# Patient Record
Sex: MALE | Race: WHITE | NOT HISPANIC OR LATINO | Employment: OTHER | URBAN - METROPOLITAN AREA
[De-identification: names, ages, dates, MRNs, and addresses within clinical notes are randomized per-mention and may not be internally consistent; named-entity substitution may affect disease eponyms.]

---

## 2017-03-16 ENCOUNTER — GENERIC CONVERSION - ENCOUNTER (OUTPATIENT)
Dept: OTHER | Facility: OTHER | Age: 82
End: 2017-03-16

## 2017-04-11 ENCOUNTER — TRANSCRIBE ORDERS (OUTPATIENT)
Dept: ADMINISTRATIVE | Facility: HOSPITAL | Age: 82
End: 2017-04-11

## 2017-04-11 ENCOUNTER — ALLSCRIPTS OFFICE VISIT (OUTPATIENT)
Dept: OTHER | Facility: OTHER | Age: 82
End: 2017-04-11

## 2017-04-11 DIAGNOSIS — I10 ESSENTIAL HYPERTENSION, MALIGNANT: ICD-10-CM

## 2017-04-11 DIAGNOSIS — E78.5 OTHER AND UNSPECIFIED HYPERLIPIDEMIA: ICD-10-CM

## 2017-04-11 DIAGNOSIS — I70.219 EXTREMITY ATHEROSCLEROSIS WITH INTERMITTENT CLAUDICATION (HCC): Primary | ICD-10-CM

## 2017-04-19 ENCOUNTER — HOSPITAL ENCOUNTER (OUTPATIENT)
Dept: RADIOLOGY | Facility: HOSPITAL | Age: 82
Discharge: HOME | End: 2017-04-19
Attending: INTERNAL MEDICINE
Payer: COMMERCIAL

## 2017-04-19 DIAGNOSIS — E78.5 OTHER AND UNSPECIFIED HYPERLIPIDEMIA: ICD-10-CM

## 2017-04-19 DIAGNOSIS — I70.219 EXTREMITY ATHEROSCLEROSIS WITH INTERMITTENT CLAUDICATION (HCC): ICD-10-CM

## 2017-04-19 DIAGNOSIS — I10 ESSENTIAL HYPERTENSION, MALIGNANT: ICD-10-CM

## 2017-04-19 PROCEDURE — 93975 VASCULAR STUDY: CPT

## 2017-04-20 ENCOUNTER — GENERIC CONVERSION - ENCOUNTER (OUTPATIENT)
Dept: OTHER | Facility: OTHER | Age: 82
End: 2017-04-20

## 2017-05-03 ENCOUNTER — ALLSCRIPTS OFFICE VISIT (OUTPATIENT)
Dept: OTHER | Facility: OTHER | Age: 82
End: 2017-05-03

## 2017-07-19 ENCOUNTER — ALLSCRIPTS OFFICE VISIT (OUTPATIENT)
Dept: OTHER | Facility: OTHER | Age: 82
End: 2017-07-19

## 2017-08-31 ENCOUNTER — GENERIC CONVERSION - ENCOUNTER (OUTPATIENT)
Dept: OTHER | Facility: OTHER | Age: 82
End: 2017-08-31

## 2017-08-31 ENCOUNTER — GENERIC CONVERSION - ENCOUNTER (OUTPATIENT)
Dept: CARDIOLOGY CLINIC | Facility: CLINIC | Age: 82
End: 2017-08-31

## 2017-10-11 ENCOUNTER — GENERIC CONVERSION - ENCOUNTER (OUTPATIENT)
Dept: OTHER | Facility: OTHER | Age: 82
End: 2017-10-11

## 2017-10-11 LAB
BUN SERPL-MCNC: 15 MG/DL (ref 8–27)
CALCIUM SERPL-MCNC: 9.8 MG/DL (ref 8.6–10.2)
CHLORIDE SERPL-SCNC: 92 MMOL/L (ref 96–106)
CO2 SERPL-SCNC: 25 MMOL/L (ref 18–29)
CREAT SERPL-MCNC: 0.84 MG/DL (ref 0.76–1.27)
EGFR AFRICAN AMERICAN (HISTORICAL): 91
EGFR-AMERICAN CALC (HISTORICAL): 79
GLUCOSE FASTING (HISTORICAL): 94 (ref 65–99)
POTASSIUM SERPL-SCNC: 4 MMOL/L (ref 3.5–5.2)
SODIUM SERPL-SCNC: 135 MMOL/L (ref 134–144)

## 2017-10-18 ENCOUNTER — ALLSCRIPTS OFFICE VISIT (OUTPATIENT)
Dept: OTHER | Facility: OTHER | Age: 82
End: 2017-10-18

## 2017-10-19 NOTE — PROGRESS NOTES
Assessment  Assessed    1  HTN (hypertension) (401 9) (I10)   2  Hyperlipidemia (272 4) (E78 5)   3  Asymptomatic PVCs (427 69) (I49 3)   4  History of BPH (V13 89) (Z87 438)   5  Syncope (780 2) (R55)   6  Hypotension due to drugs (458 8,E947 9) (I95 2)    Plan  Health Maintenance    · (1) BASIC METABOLIC PROFILE; Status:Active; Requested NDV:[id number]; Perform:[facility]; RJW:[id number];DJFPUE;AHSANY:ROZAJF Maintenance; Ordered By:Popkave, Ulysses Kelp;      1  Change amlodipine to 6 PM daily  2  If the nighttime blood pressures remain high, resumed lisinopril 5 mg daily at 6 PM and move the amlodipine back to 8 PM daily  3  Follow-up in 3 months with basic metabolic profile blood test      Discussion/Summary  Cardiology Discussion Summary Free Text Note Form [facility]:       Much improved but still labile essential hypertension with moderately frequent nocturnal surges in systolic blood pressure at 9-11 PM in the 347 systolic range  Asymptomatic chronic PVCs, per patient history, but not evident on today's exam History of carvedilol-induced syncope from hypotension on 1/10/17 and 1/20/17 with episode on 9/11/17 from low blood pressure, cause uncertain  Stable right calf claudication and bilateral lower extremity PAD present for 5 years  Questionable right neck bruit  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 with benign cyst adjacent to left testesTreated dyslipidemia, extremely well-controlledStable chronic BPHRemote history of syncope after Flomax  Chronic nocturnal GERD with water brash                 Goals and Barriers: The patient has the current Goals: Control of hypertension  Avoidance of recurrent syncope  Screening for additional vascular disease involving the carotid and coronary circulation  The patent has the current Barriers: None  Patient's Capacity to Self-Care: Patient is able to Self-Care     Medication SE Review and Pt Understands Tx: Possible side effects of new medications were reviewed with the patient/guardian today  The treatment plan was reviewed with the patient/guardian  The patient/guardian understands and agrees with the treatment plan   Counseling Documentation With Imm: The patient, patient's family was counseled regarding diagnostic results,-- instructions for management,-- risk factor reductions,-- prognosis,-- patient and family education,-- impressions,-- risks and benefits of treatment options,-- importance of compliance with treatment  Immunization Counseling The parent/guardian was counseled on the following vaccine components: Shingles vaccine  -- Total number of vaccine components counseled: One component  total time of encounter was 27 minutes-- and-- 25 minutes was spent counseling  Self Referrals:   Self Referrals: Yes   Transitional Care: Co-manage care with PCP/Referring Provider  Chief Complaint  Chief Complaint Free Text Note Form: Katherine Tripp is here today for a blood pressure followup  He states that he had a syncopal episode in the store on 9/11/17, but EMS came and he was fine  He had a BMP done on 10/11/17  JW       History of Present Illness  Cardiology HPI Free Text Note Form [facility]:   9/14/17, this 66-year-old man was seen by my medical assistant with the following reported:  came in today to have his blood pressure machine compared to manual blood pressure reading  Initial manual bp reading was 144/84, the machine read 166/93  reading manually was 156/86, the machine read 153/82  states that he passed out in [place] last week on 9/11/17 because his blood pressure dropped, once the EMT arrived it was back to normal  Wife states that this was the first time this has happened out in public    patient has had no recurrent syncope since then and is feeling well overall  He has no cardiopulmonary or new medical issues   His home blood pressure log was reviewed for the past 2 weeks and showed a tendency for some elevated blood pressures in the evening only with daytime blood pressure well-controlled  He is no longer taking any lisinopril because of fear of hypotension  He takes his amlodipine at approximately 8 PM every night  Review of Systems  ROS Reviewed:   ROS reviewed  All systems negative, except as noted in history of present illness      Active Problems  Problems    1  Asymptomatic PVCs (427 69) (I49 3)   2  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)   3  History of BPH (V13 89) (Z87 438)   4  HTN (hypertension) (401 9) (I10)   5  Hyperlipidemia (272 4) (E78 5)   6  Hypotension due to drugs (458 8,E947 9) (I95 2)   7  Syncope (780 2) (R55)    Past Medical History  Problems    1  Asymptomatic PVCs (427 69) (I49 3)   2  History of BPH (benign prostatic hyperplasia) (600 00) (N40 0)   3  History of Cellulitis of right thumb (681 00) (L03 011)   4  History of Hearing problem (V41 2) (H91 90)   5  History of High cholesterol (272 0) (E78 00)   6  History of Hypertension, uncontrolled (401 9) (I10)   7  History of PAD (peripheral artery disease) (443 9) (I73 9)  Active Problems And Past Medical History Reviewed: The active problems and past medical history were reviewed and updated today  Surgical History  Problems    1  History of Complete Colonoscopy   2  History of Surgery Testis  Surgical History Reviewed: The surgical history was reviewed and updated today  Family History  Mother    1  Family history of hypertension (V17 49) (Z82 49)  Father    2  Family history of hypertension (V17 49) (Z82 49)  Family History    3  Family history of hypertension (V17 49) (Z82 49)   4  Family history of High cholesterol  Family History Reviewed: The family history was reviewed and updated today         Social History  Problems    · Denied: History of Alcohol use   · Denied: History of Drug use   · Exercises daily (V49 89) (Z78 9)   · Former smoker (N59 54) (U61 797)   ·  · Retired   · Sleeps 6 -7 hours a day  Social History Reviewed: The social history was reviewed and is unchanged  Current Meds   1  AmLODIPine Besylate 5 MG Oral Tablet; 1 DAILY; Therapy: 11Apr2017 to (Last Rx:11Apr2017)  Requested for: 11Apr2017 Ordered   2  Atorvastatin Calcium 10 MG Oral Tablet; TAKE 1 TABLET BY MOUTH EVERY NIGHT AT   BEDTIME; Therapy: (Recorded:11Apr2017) to Recorded   3  Chlorthalidone 25 MG Oral Tablet; take one tablet every other day; Therapy: 31Aug2017 to (Last Rx:31Aug2017)  Requested for: 31Aug2017 Ordered   4  Finasteride 5 MG Oral Tablet; TAKE 1 TABLET DAILY; Therapy: (Recorded:11Apr2017) to Recorded  Medication List Reviewed: The medication list was reviewed and updated today  Allergies  Medication    1  Tamsulosin HCl CAPS    Vitals  Vital Signs    Recorded: 58JQP5164 10:25AM   Heart Rate 55, Apical   Systolic 353, RUE, Sitting   Diastolic 74, RUE, Sitting   BP CUFF SIZE Large   Height 5 ft 11 in   Weight 158 lb 9 6 oz   BMI Calculated 22 12   BSA Calculated 1 91   O2 Saturation 99, RA     No weight change     Physical Exam    Constitutional   General appearance: No acute distress, well appearing and well nourished  Eyes   Conjunctiva and Sclera examination: Conjunctiva pink, sclera anicteric  Ears, Nose, Mouth, and Throat - Oropharynx: Clear, nares are clear, mucous membranes are moist    Neck   Neck and thyroid: Normal, supple, trachea midline, no thyromegaly  Pulmonary   Respiratory effort: No increased work of breathing or signs of respiratory distress  Auscultation of lungs: Clear to auscultation, no rales, no rhonchi, no wheezing, good air movement  Cardiovascular   Auscultation of heart: Abnormal  -- No murmur, gallop, extrasystoles, rub, click  Carotid pulses: Abnormal  -- Questionable grade 1/4 right neck bruit  Peripheral vascular exam: Normal pulses throughout, no tenderness, erythema or swelling      Pedal pulses: Abnormal  -- Absent right ankle and pedal pulses  Examination of extremities for edema and/or varicosities: Normal     Abdomen   Abdomen: Non-tender and no distention  Liver and spleen: No hepatomegaly or splenomegaly  Musculoskeletal Gait and station: Normal gait  -- Digits and nails: Normal without clubbing or cyanosis  -- Inspection/palpation of joints, bones, and muscles: Normal, ROM normal     Skin - Skin and subcutaneous tissue: Normal without rashes or lesions  Skin is warm and well perfused, normal turgor  Neurologic - Cranial nerves: II - XII intact  -- Speech: Normal     Psychiatric - Orientation to person, place, and time: Normal -- Mood and affect: Normal       Results/Data  (1) BASIC METABOLIC PROFILE [id number] 01:10PM Mckayla Panda     Test Name Result Flag Reference   SODIUM 135  134-144   POTASSIUM 4 0  3 5-5 2   CHLORIDE 92 L    CARBON DIOXIDE 25  18-29   BLOOD UREA NITROGEN 15  8-27   CREATININE 0 84  0 76-1 27   CALCIUM 9 8  8 6-10 2   eGFR  91  >59   eGFR Non- 79  >59   GLUCOSE FASTING 94  65-99     Signatures   Electronically signed by : MARVIN John ; Oct 18 2017 11:40AM EST                       (Author)

## 2018-01-09 ENCOUNTER — APPOINTMENT (OUTPATIENT)
Dept: LAB | Facility: CLINIC | Age: 83
End: 2018-01-09
Payer: COMMERCIAL

## 2018-01-09 ENCOUNTER — TRANSCRIBE ORDERS (OUTPATIENT)
Dept: LAB | Facility: CLINIC | Age: 83
End: 2018-01-09

## 2018-01-09 ENCOUNTER — ALLSCRIPTS OFFICE VISIT (OUTPATIENT)
Dept: OTHER | Facility: OTHER | Age: 83
End: 2018-01-09

## 2018-01-09 DIAGNOSIS — I10 ESSENTIAL (PRIMARY) HYPERTENSION: ICD-10-CM

## 2018-01-09 DIAGNOSIS — Z00.00 ROUTINE GENERAL MEDICAL EXAMINATION AT A HEALTH CARE FACILITY: Primary | ICD-10-CM

## 2018-01-09 DIAGNOSIS — E78.5 HYPERLIPIDEMIA: ICD-10-CM

## 2018-01-09 DIAGNOSIS — C62.90 MALIGNANT NEOPLASM OF TESTIS (HCC): ICD-10-CM

## 2018-01-09 LAB
ANION GAP SERPL CALCULATED.3IONS-SCNC: 6 MMOL/L (ref 4–13)
BASOPHILS # BLD AUTO: 0.04 THOUSANDS/ΜL (ref 0–0.1)
BASOPHILS NFR BLD AUTO: 1 % (ref 0–1)
BILIRUB UR QL STRIP: NEGATIVE
BUN SERPL-MCNC: 18 MG/DL (ref 5–25)
CALCIUM SERPL-MCNC: 9.6 MG/DL (ref 8.3–10.1)
CHLORIDE SERPL-SCNC: 101 MMOL/L (ref 100–108)
CHOLEST SERPL-MCNC: 131 MG/DL (ref 50–200)
CLARITY UR: CLEAR
CO2 SERPL-SCNC: 28 MMOL/L (ref 21–32)
COLOR UR: YELLOW
CREAT SERPL-MCNC: 0.8 MG/DL (ref 0.6–1.3)
EOSINOPHIL # BLD AUTO: 0.21 THOUSAND/ΜL (ref 0–0.61)
EOSINOPHIL NFR BLD AUTO: 3 % (ref 0–6)
ERYTHROCYTE [DISTWIDTH] IN BLOOD BY AUTOMATED COUNT: 12.5 % (ref 11.6–15.1)
GFR SERPL CREATININE-BSD FRML MDRD: 80 ML/MIN/1.73SQ M
GLUCOSE P FAST SERPL-MCNC: 88 MG/DL (ref 65–99)
GLUCOSE UR STRIP-MCNC: NEGATIVE MG/DL
HCT VFR BLD AUTO: 36.7 % (ref 36.5–49.3)
HDLC SERPL-MCNC: 73 MG/DL (ref 40–60)
HGB BLD-MCNC: 12.2 G/DL (ref 12–17)
HGB UR QL STRIP.AUTO: NEGATIVE
KETONES UR STRIP-MCNC: NEGATIVE MG/DL
LDLC SERPL CALC-MCNC: 43 MG/DL (ref 0–100)
LEUKOCYTE ESTERASE UR QL STRIP: NEGATIVE
LYMPHOCYTES # BLD AUTO: 0.82 THOUSANDS/ΜL (ref 0.6–4.47)
LYMPHOCYTES NFR BLD AUTO: 13 % (ref 14–44)
MCH RBC QN AUTO: 29.6 PG (ref 26.8–34.3)
MCHC RBC AUTO-ENTMCNC: 33.2 G/DL (ref 31.4–37.4)
MCV RBC AUTO: 89 FL (ref 82–98)
MONOCYTES # BLD AUTO: 0.69 THOUSAND/ΜL (ref 0.17–1.22)
MONOCYTES NFR BLD AUTO: 11 % (ref 4–12)
NEUTROPHILS # BLD AUTO: 4.61 THOUSANDS/ΜL (ref 1.85–7.62)
NEUTS SEG NFR BLD AUTO: 72 % (ref 43–75)
NITRITE UR QL STRIP: NEGATIVE
NRBC BLD AUTO-RTO: 0 /100 WBCS
PH UR STRIP.AUTO: 6 [PH] (ref 4.5–8)
PLATELET # BLD AUTO: 360 THOUSANDS/UL (ref 149–390)
PMV BLD AUTO: 9.1 FL (ref 8.9–12.7)
POTASSIUM SERPL-SCNC: 4.5 MMOL/L (ref 3.5–5.3)
PROT UR STRIP-MCNC: NEGATIVE MG/DL
RBC # BLD AUTO: 4.12 MILLION/UL (ref 3.88–5.62)
SODIUM SERPL-SCNC: 135 MMOL/L (ref 136–145)
SP GR UR STRIP.AUTO: 1.01 (ref 1–1.03)
TRIGL SERPL-MCNC: 76 MG/DL
UROBILINOGEN UR QL STRIP.AUTO: 0.2 E.U./DL
WBC # BLD AUTO: 6.38 THOUSAND/UL (ref 4.31–10.16)

## 2018-01-09 PROCEDURE — 36415 COLL VENOUS BLD VENIPUNCTURE: CPT | Performed by: INTERNAL MEDICINE

## 2018-01-09 PROCEDURE — 80048 BASIC METABOLIC PNL TOTAL CA: CPT | Performed by: INTERNAL MEDICINE

## 2018-01-09 PROCEDURE — 85025 COMPLETE CBC W/AUTO DIFF WBC: CPT

## 2018-01-09 PROCEDURE — 80061 LIPID PANEL: CPT

## 2018-01-09 PROCEDURE — 81003 URINALYSIS AUTO W/O SCOPE: CPT

## 2018-01-09 NOTE — MISCELLANEOUS
Message   Recorded as Task   Date: 09/14/2017 10:40 AM, Created By: Dinorah Soriano   Task Name: Care Coordination   Assigned To: Ginny Florence   Regarding Patient: Jarad Parker, Status: Active   Comment:    Dinorah Soriano - 14 Sep 2017 10:40 Luane Alissa Braun came in today to have his blood pressure machine compared to manual blood pressure reading  Initial manual bp reading was 144/84, the machine read 166/93  Second reading manually was 156/86, the machine read 153/82  Patient states that he passed out in [facility] last week because his blood pressure dropped, once the EMT arrived it was back to normal  Wife states that this was the first time this has happened out in public  Active Problems    1  Asymptomatic PVCs (427 69) (I49 3)   2  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)   3  History of BPH (V13 89) (Z87 438)   4  HTN (hypertension) (401 9) (I10)   5  Hyperlipidemia (272 4) (E78 5)   6  Hypotension due to drugs (458 8,E980 5) (I95 2)   7  Syncope (780 2) (R55)    Current Meds   1  AmLODIPine Besylate 5 MG Oral Tablet; 1 DAILY; Therapy: 61Sxi5582 to (Last Rx:56Pbe2323)  Requested for: 36Yop4036 Ordered   2  Atorvastatin Calcium 10 MG Oral Tablet; TAKE 1 TABLET BY MOUTH EVERY NIGHT AT   BEDTIME; Therapy: (Recorded:66Atc3207) to Recorded   3  Chlorthalidone 25 MG Oral Tablet; take one tablet every other day; Therapy: 27Wsw8695 to (Last Rx:55Bhd2957)  Requested for: 90Psx9318 Ordered   4  Finasteride 5 MG Oral Tablet; TAKE 1 TABLET DAILY; Therapy: (Recorded:75Cbp7690) to Recorded   5  Lisinopril 5 MG Oral Tablet; Take one tablet in a m  and 2 tablets between 5 and 6 PM   daily; Therapy: 43Chg7442 to (Evaluate:06Imw6008); Last Rx:26Zpa9403 Ordered    Allergies    1   Tamsulosin HCl CAPS    Signatures   Electronically signed by : MARVIN Warner ; Sep 14 2017  6:24PM EST (Author)

## 2018-01-11 ENCOUNTER — GENERIC CONVERSION - ENCOUNTER (OUTPATIENT)
Dept: OTHER | Facility: OTHER | Age: 83
End: 2018-01-11

## 2018-01-12 VITALS
BODY MASS INDEX: 22.58 KG/M2 | OXYGEN SATURATION: 98 % | DIASTOLIC BLOOD PRESSURE: 76 MMHG | HEART RATE: 74 BPM | HEIGHT: 71 IN | WEIGHT: 161.3 LBS | SYSTOLIC BLOOD PRESSURE: 140 MMHG

## 2018-01-14 VITALS
BODY MASS INDEX: 22.2 KG/M2 | WEIGHT: 158.6 LBS | HEART RATE: 55 BPM | OXYGEN SATURATION: 99 % | DIASTOLIC BLOOD PRESSURE: 74 MMHG | HEIGHT: 71 IN | SYSTOLIC BLOOD PRESSURE: 144 MMHG

## 2018-01-14 VITALS — SYSTOLIC BLOOD PRESSURE: 144 MMHG | DIASTOLIC BLOOD PRESSURE: 79 MMHG

## 2018-01-15 VITALS
HEART RATE: 87 BPM | SYSTOLIC BLOOD PRESSURE: 188 MMHG | WEIGHT: 166.1 LBS | DIASTOLIC BLOOD PRESSURE: 91 MMHG | BODY MASS INDEX: 23.25 KG/M2 | OXYGEN SATURATION: 97 % | HEIGHT: 71 IN

## 2018-01-18 ENCOUNTER — GENERIC CONVERSION - ENCOUNTER (OUTPATIENT)
Dept: OTHER | Facility: OTHER | Age: 83
End: 2018-01-18

## 2018-01-18 NOTE — RESULT NOTES
Verified Results  VAS RENAL ARTERY COMPLETE BILATERAL 19Apr2017 08:32AM Pelon Mckenzie     Test Name Result Flag Reference   VAS RENAL ARTERY COMPLETE BILATERAL (Report)     [facility] REPORT   CLINICAL:   Indications: Benign Essential Hypertension [I10]  Patient presents to evaluate   the renal arteries secondary to uncontrolled HTN  Patient is currently on 3   medications for Blood pressure  Risk Factors   The patient has history of hypertension and hyperlipidemia  He has no history   of diabetes or CAD  The patient current BMI is 23 67, weight is 165 lb and   height is 70 in  FINDINGS:      Unilateral       PSV (cm/s) EDV (cm/s)    Sup Renal Aorta         90          Celiac Artery Standing     171     47    Splenic            159     44    Px Inf-Tad Ao          77          Ds Inf-Tad Ao          44          Prox   SMA           152             Right     Impression PSV (cm/s) EDV (cm/s)  RI AT (ms) Kidney (cm)    Ostial Renal           137     43                  Prox Renal            131     42 0 68               Mid Renal            165     54 0 68               Dist Renal            179     54 0 70               Kidney                                 10 60    Hilum 1              22      7 0 67               Mid Pole             20      5 0 75               Hilum 3              27      8 0 72               Renal     Patent                                Hilum                          60 000              Left     Impression PSV (cm/s) EDV (cm/s)  RI AT (ms) Kidney (cm)    Ostial Renal           116     29                  Prox Renal            89     23 0 74               Mid Renal             82     23 0 72               Dist Renal            105     33                  Kidney                                 10 90    Hilum 1              17      7 0 58               Mid Pole             35     10 0 71               Hilum 3              38     14 0 62               Renal     Patent Hilum                          48 000                    CONCLUSION:   Impression   The abdominal aorta is widely patent and normal caliber  RIGHT RENAL:   No evidence of significant arterial occlusive disease in the main renal artery  Patent renal vein   Adequate parenchymal flow noted with a renovascular resistive index of 0 75   Renal/Aorta Ratio: 1 99  The Kidney measures 10 6 cm , No Prior  LEFT RENAL:   No evidence of significant arterial occlusive disease in the main renal artery  Patent renal vein   Adequate parenchymal flow noted with a renovascular resistive index of 0 71   Renal/Aorta Ratio: 1 29  The Kidney measures 10 9 cm, No Prior   MESENTERIC:   Celiac and superior mesenteric arteries are patent  No studies are available for comparison        SIGNATURE:   Electronically Signed by: Yamel Montenegro MD, RPVI on 2017-04-20 05:30:28 AM

## 2018-01-22 VITALS — DIASTOLIC BLOOD PRESSURE: 71 MMHG | SYSTOLIC BLOOD PRESSURE: 136 MMHG

## 2018-01-22 VITALS
SYSTOLIC BLOOD PRESSURE: 160 MMHG | HEART RATE: 79 BPM | HEIGHT: 71 IN | BODY MASS INDEX: 22.19 KG/M2 | DIASTOLIC BLOOD PRESSURE: 80 MMHG | OXYGEN SATURATION: 98 % | WEIGHT: 158.5 LBS

## 2018-01-22 VITALS — DIASTOLIC BLOOD PRESSURE: 80 MMHG | SYSTOLIC BLOOD PRESSURE: 150 MMHG

## 2018-01-22 VITALS — DIASTOLIC BLOOD PRESSURE: 77 MMHG | SYSTOLIC BLOOD PRESSURE: 140 MMHG

## 2018-01-23 VITALS
BODY MASS INDEX: 22.6 KG/M2 | RESPIRATION RATE: 16 BRPM | HEIGHT: 71 IN | SYSTOLIC BLOOD PRESSURE: 140 MMHG | HEART RATE: 72 BPM | TEMPERATURE: 97.4 F | DIASTOLIC BLOOD PRESSURE: 70 MMHG | WEIGHT: 161.4 LBS

## 2018-01-23 NOTE — PROGRESS NOTES
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)   5  Testicular cancer (186 9) (C62 90)   6  BMI 22 0-22 9, adult (V85 1) (Z68 22)   7  Medicare annual wellness visit, initial (V70 0) (Z00 00)    Plan  Cough    · Benzonatate 200 MG Oral Capsule; TAKE 1 CAPSULE 3 TIMES DAILY AS NEEDED   · Montelukast Sodium 10 MG Oral Tablet (Singulair); TAKE 1 TABLET DAILY  Cough, HTN (hypertension), Hyperlipidemia    · Follow-up visit in 1 month Evaluation and Treatment  Follow-up  Status: Complete  Done:  15GKI8331  HTN (hypertension)    · Lisinopril 5 MG Oral Tablet; Take one tablet in a m  and 2 tablets between 5 and  6 PM daily  HTN (hypertension), Hyperlipidemia    · (1) CBC/PLT/DIFF; Status:Active; Requested [id number]:[id number];    · (1) URINALYSIS (will reflex a microscopy if leukocytes, occult blood, protein or nitrites are  not within normal limits); Status:Active; Requested [id number];   HTN (hypertension), Testicular cancer    · (1) LIPID PANEL FASTING W DIRECT LDL REFLEX; Status:Active; Requested  QPB:[id number];    Medicare annual wellness visit, initial    · Eat a low fat and low cholesterol diet ; Status:Complete;   Done: 49JFX5546 11:57AM   · Stretch and warm up your muscles during the first 10 minutes , then cool down your  muscles for the last 10 minutes of exercise ; Status:Complete;   Done: 92TFH0432  11:57AM   · These are things you can do to prevent falls in and around the home ; Status:Complete;    Done: 31DYI8163 11:57AM   · We recommend that you create an advance directive ; Status:Complete;   Done:  85CFE7118 11:57AM  Testicular cancer    · 2 - Chitra Cho MD , Clarisa Lopez  (Urology) Co-Management  *  Status: Hold For - Scheduling   Requested for: 18TUX0987  Care Summary provided  : Yes    Discussion/Summary    Reported that immunizations UTD by prior PCP  will get records from prior pcp  again, reinforced importance of AD and lving will  Impression: Initial Annual Wellness Visit, with preventive exam as well as age and risk appropriate counseling completed  Cardiovascular screening and counseling: the risks and benefits of screening were discussed and screening is current  Diabetes screening and counseling: the risks and benefits of screening were discussed, counseling was given on maintaining a healthy diet and counseling was given on maintaining a healthy weight  Colorectal cancer screening and counseling: the risks and benefits of screening were discussed and counseling was given on ways to eat a high fiber diet  Prostate cancer screening and counseling: the risks and benefits of screening were discussed  Abdominal aortic aneurysm screening and counseling: the risks and benefits of screening were discussed and screening not indicated  Glaucoma screening and counseling: screening is current  Advance Directive Planning: not complete, he was encouraged to follow-up with me to discuss his questions and/or decisions, declined information  Advice and education were given regarding fall risk reduction and nutrition (non-diabetic)  Chief Complaint  patient presenting for his awv sl/cma      Advance Directives  Advance Directive [facility]:   The patient is not in agreement to receive the Advance Care Planning service    NO - Patient does not have an advance health care directive  History of Present Illness  The patient is being seen for the initial annual wellness visit  Medicare Screening and Risk Factors   Hospitalizations: no previous hospitalizations  Medicare Screening Tests Risk Questions   Abdominal aortic aneurysm risk assessment: over 72years of age  Osteoporosis risk assessment: none indicated  HIV risk assessment: none indicated  Drug and Alcohol Use: The patient is a former cigarette smoker   The patient reports never drinking alcohol  He has never used illicit drugs  Diet and Physical Activity: Current diet includes well balanced meals  He exercises 4x week times per week  Exercise: cardio and weights 60 minutes per day  Mood Disorder and Cognitive Impairment Screening:   Depression screening  no significant symptoms  He denies feeling down, depressed, or hopeless over the past two weeks  He denies feeling little interest or pleasure in doing things over the past two weeks  Cognitive impairment screening: denies difficulty learning/retaining new information, denies difficulty handling complex tasks, denies difficulty with reasoning, denies difficulty with spatial ability and orientation, denies difficulty with language and denies difficulty with behavior  Functional Ability/Level of Safety: He reports hearing difficulties  He uses a hearing aid  The patient is currently able to do activities of daily living without limitations  Activities of daily living details: does not need help using the phone, no transportation help needed, does not need help shopping, no meal preparation help needed, does not need help doing housework, does not need help doing laundry, does not need help managing medications and does not need help managing money  Injury History: no polypharmacy, no alcohol use, no mobility impairment, no antidepressant use, no deconditioning, no postural hypotension, no sedative use, no visual impairment, no urinary incontinence, no antihypertensive use, no cognitive impairment and up and go test was normal    Home safety risk factors:  no unfamiliar surroundings, no loose rugs, no poor household lighting, no uneven floors, no household clutter, grab bars in the bathroom and handrails on the stairs  Advance Directives: Advance directives: no living will, no durable power of  for health care directives and no advance directives  end of life decisions were reviewed with the patient   Concerns with the patient's end of life decisions: pt declined any info regarding AD or living will  Co-Managers and Medical Equipment/Suppliers: See Patient Care Team   Falls Risk: The patient fell 0 times in the past 12 months  The patient is currently asymptomatic Symptoms Include: The patient currently has no urinary incontinence symptoms  Date of last glaucoma screen was sees Dr Watson Jurado      Patient Care Team    Care Team Member Role Specialty Office Number   [facility] MD  Vascular Surgery [phone]   Baylee Bauman MD  Vascular Surgery [phone]   Samina Chavez MD  Urology [phone]   Ruperto ZENDEJAS  Cardiology [phone]   [place] [address] [phone]   Cathaleen Drain   [phone]   Anne Sheppard MD  Ophthalmology [phone]     Review of Systems    Constitutional: no malaise and no fatigue  Eyes: no blurred vision  ENT: hearing loss, but no sore throat, no scratchy throat, no hoarseness and no nasal congestion  Cardiovascular: negative  Respiratory: dry cough, but no shortness of breath, no wheezing, not sleeping upright or with extra pillows, no clear sputum and no colored sputum  Gastrointestinal: no change in bowel or bladder habits  Musculoskeletal: diffuse joint pain  Neurological: no headache, no dizziness and no fainting  Psychiatric: no anxiety and no depression  Active Problems    1  Asymptomatic PVCs (427 69) (I49 3)   2  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)   3  History of BPH (V13 89) (Z87 438)   4  HTN (hypertension) (401 9) (I10)   5  Hyperlipidemia (272 4) (E78 5)    Past Medical History    1  Asymptomatic PVCs (427 69) (I49 3)   2  History of BPH (benign prostatic hyperplasia) (600 00) (N40 0)   3  History of Cellulitis of right thumb (681 00) (L03 011)   4  History of Hearing problem (V41 2) (H91 90)   5  History of High cholesterol (272 0) (E78 00)   6   History of Hypertension, uncontrolled (401 9) (I10)   7  History of PAD (peripheral artery disease) (443 9) (I73 9)    The active problems and past medical history were reviewed and updated today  Surgical History    1  History of Complete Colonoscopy   2  History of Surgery Testis    The surgical history was reviewed and updated today  Family History  Mother    1  Family history of hypertension (V17 49) (Z82 49)  Father    2  Family history of hypertension (V17 49) (Z82 49)  Family History    3  Family history of hypertension (V17 49) (Z82 49)   4  Family history of High cholesterol    The family history was reviewed and updated today  Social History    · Denied: History of Alcohol use   · Denied: History of Drug use   · Exercises daily (V49 89) (Z78 9)   · Former smoker (B46 82) (M92 162)   ·    · Retired   · Sleeps 6 -7 hours a day  The social history was reviewed and updated today  Current Meds   1  AmLODIPine Besylate 5 MG Oral Tablet; 1 DAILY; Therapy: 88Mia5675 to (Last Rx:21Wtt1235)  Requested for: 48UDW2722 Ordered   2  Atorvastatin Calcium 10 MG Oral Tablet; TAKE 1 TABLET BY MOUTH EVERY NIGHT AT   BEDTIME; Therapy: (Recorded:20Wix6973) to Recorded   3  Chlorthalidone 25 MG Oral Tablet; take one tablet every other day; Therapy: 14Wtn5227 to (Last Rx:98Psu1992)  Requested for: 57Hgt4990 Ordered   4  Finasteride 5 MG Oral Tablet; TAKE 1 TABLET DAILY; Therapy: (Recorded:65Pfw9713) to Recorded    The medication list was reviewed and updated today  Allergies    1   Tamsulosin HCl CAPS    Immunizations  Influenza --- [place]: 09-Oct-2017     Vitals  Signs   Recorded: 48UZH4769 09:34AM   Temperature: 97 4 F  Heart Rate: 72  Respiration: 16  Systolic: 977  Diastolic: 70  Height: 5 ft 11 in  Weight: 161 lb 6 4 oz  BMI Calculated: 22 51  BSA Calculated: 1 93    Results/Data  PHQ-9 Adult Depression Screening 15LJV1949 09:44AM User, Ahs     Test Name Result Flag Reference   PHQ-9 Adult Depression Score 0     Over the last two weeks, how often have you been bothered by any of the following problems? Little interest or pleasure in doing things: Not at all - 0  Feeling down, depressed, or hopeless: Not at all - 0  Trouble falling or staying asleep, or sleeping too much: Not at all - 0  Feeling tired or having little energy: Not at all - 0  Poor appetite or over eating: Not at all - 0  Feeling bad about yourself - or that you are a failure or have let yourself or your family down: Not at all - 0  Trouble concentrating on things, such as reading the newspaper or watching television: Not at all - 0  Moving or speaking so slowly that other people could have noticed  Or the opposite -  being so fidgety or restless that you have been moving around a lot more than usual: Not at all - 0  Thoughts that you would be better off dead, or of hurting yourself in some way: Not at all - 0   PHQ-9 Adult Depression Screening Negative     PHQ-9 Difficulty Level Not difficult at all     PHQ-9 Severity No Depression       PHQ-2 Adult Depression Screening 45XVQ9715 09:36AM User, Ahs     Test Name Result Flag Reference   PHQ-2 Adult Depression Score 0     Over the last two weeks, how often have you been bothered by any of the following problems?   Little interest or pleasure in doing things: Not at all - 0  Feeling down, depressed, or hopeless: Not at all - 0   PHQ-2 Adult Depression Screening Negative         Future Appointments    Date/Time Provider Specialty Site   02/09/2018 09:45 AM Arelis Guy DO Family Medicine [address]     [facility]   Electronically signed by : Gillermo Denver, DO; Jan 9 2018 11:57AM EST                       (Author)

## 2018-01-24 VITALS
SYSTOLIC BLOOD PRESSURE: 126 MMHG | HEART RATE: 72 BPM | OXYGEN SATURATION: 97 % | BODY MASS INDEX: 22.54 KG/M2 | WEIGHT: 161 LBS | HEIGHT: 71 IN | DIASTOLIC BLOOD PRESSURE: 50 MMHG

## 2018-02-09 ENCOUNTER — OFFICE VISIT (OUTPATIENT)
Dept: FAMILY MEDICINE CLINIC | Facility: CLINIC | Age: 83
End: 2018-02-09
Payer: COMMERCIAL

## 2018-02-09 VITALS
RESPIRATION RATE: 16 BRPM | TEMPERATURE: 97.4 F | WEIGHT: 160.2 LBS | SYSTOLIC BLOOD PRESSURE: 120 MMHG | DIASTOLIC BLOOD PRESSURE: 70 MMHG | HEART RATE: 84 BPM | HEIGHT: 72 IN | BODY MASS INDEX: 21.7 KG/M2

## 2018-02-09 DIAGNOSIS — C62.90 MALIGNANT NEOPLASM OF TESTIS, UNSPECIFIED LATERALITY, UNSPECIFIED WHETHER DESCENDED OR UNDESCENDED: ICD-10-CM

## 2018-02-09 DIAGNOSIS — R05.9 COUGH: Primary | ICD-10-CM

## 2018-02-09 DIAGNOSIS — R21 RASH: ICD-10-CM

## 2018-02-09 DIAGNOSIS — K11.7 INCREASED SALIVATION: ICD-10-CM

## 2018-02-09 PROBLEM — I73.9 PAD (PERIPHERAL ARTERY DISEASE) (HCC): Status: ACTIVE | Noted: 2017-04-11

## 2018-02-09 PROCEDURE — 99214 OFFICE O/P EST MOD 30 MIN: CPT | Performed by: FAMILY MEDICINE

## 2018-02-09 RX ORDER — TRIAMCINOLONE ACETONIDE 1 MG/G
CREAM TOPICAL 2 TIMES DAILY
Qty: 45 G | Refills: 6 | Status: SHIPPED | OUTPATIENT
Start: 2018-02-09 | End: 2019-05-21 | Stop reason: SDUPTHER

## 2018-02-09 RX ORDER — FINASTERIDE 5 MG/1
1 TABLET, FILM COATED ORAL DAILY
COMMUNITY
End: 2018-08-10 | Stop reason: SDUPTHER

## 2018-02-09 RX ORDER — AMLODIPINE BESYLATE 5 MG/1
TABLET ORAL DAILY
COMMUNITY
Start: 2017-04-11 | End: 2018-08-10 | Stop reason: SDUPTHER

## 2018-02-09 RX ORDER — ATORVASTATIN CALCIUM 10 MG/1
1 TABLET, FILM COATED ORAL
COMMUNITY
End: 2018-06-05 | Stop reason: SDUPTHER

## 2018-02-09 RX ORDER — CHLORTHALIDONE 25 MG/1
1 TABLET ORAL EVERY OTHER DAY
COMMUNITY
Start: 2017-08-31 | End: 2019-01-23 | Stop reason: SDUPTHER

## 2018-02-09 RX ORDER — LISINOPRIL 5 MG/1
TABLET ORAL
COMMUNITY
Start: 2017-04-11 | End: 2018-04-24 | Stop reason: SDUPTHER

## 2018-02-09 RX ORDER — MONTELUKAST SODIUM 10 MG/1
1 TABLET ORAL DAILY
COMMUNITY
Start: 2018-01-09 | End: 2018-08-10 | Stop reason: ALTCHOICE

## 2018-02-09 NOTE — PATIENT INSTRUCTIONS
Doing well  Try zyrtec otc for saliva  If no improvement will try prescription med  Follow up with urology and cardiology

## 2018-02-09 NOTE — PROGRESS NOTES
Chief Complaint   Patient presents with    Cough    Follow-up        Patient ID: Jani Bright is a 80 y o  male  Pt seen and examined  Cough has subsided  Pt still has a lot of saliva  Doesn't feel singulair helped      Hasn't seen urology yet  Sees cardiology regularly  Has been using triamcinolone for itchy legs which helps tremendously   Symptoms only in winter  No lesions  No numbness/tingling       No other complaints at this time       Cough   The problem has been resolved  Associated symptoms include a rash  Pertinent negatives include no chest pain, headaches, myalgias or postnasal drip  The following portions of the patient's history were reviewed and updated as appropriate: allergies, current medications, past family history, past medical history, past social history, past surgical history and problem list     Review of Systems   Constitutional: Negative for activity change, appetite change and fatigue  HENT: Negative for postnasal drip  Respiratory: Negative for cough and chest tightness  Cardiovascular: Negative for chest pain, palpitations and leg swelling  Gastrointestinal: Negative for abdominal pain, constipation, diarrhea, nausea and vomiting  Genitourinary: Negative for difficulty urinating  Musculoskeletal: Negative for arthralgias and myalgias  Skin: Positive for rash  Neurological: Negative for dizziness, weakness and headaches  Hematological: Negative for adenopathy  Psychiatric/Behavioral: Negative for behavioral problems  The patient is not nervous/anxious            Current Outpatient Prescriptions   Medication Sig Dispense Refill    amLODIPine (NORVASC) 5 mg tablet Take by mouth daily      atorvastatin (LIPITOR) 10 mg tablet Take 1 tablet by mouth      chlorthalidone 25 mg tablet Take 1 tablet by mouth every other day      finasteride (PROSCAR) 5 mg tablet Take 1 tablet by mouth daily      lisinopril (ZESTRIL) 5 mg tablet Take by mouth      montelukast (SINGULAIR) 10 mg tablet Take 1 tablet by mouth daily      triamcinolone (KENALOG) 0 1 % cream Apply topically 2 (two) times a day 45 g 6     No current facility-administered medications for this visit  Objective:    /70 (BP Location: Left arm, Patient Position: Sitting, Cuff Size: Standard)   Pulse 84   Temp (!) 97 4 °F (36 3 °C)   Resp 16   Ht 6' (1 829 m)   Wt 72 7 kg (160 lb 3 2 oz)   BMI 21 73 kg/m²        Physical Exam   Constitutional: He is oriented to person, place, and time  He appears well-developed and well-nourished  Eyes: Conjunctivae are normal    Neck: Normal range of motion  No thyromegaly present  Cardiovascular: Normal rate, regular rhythm and intact distal pulses  Murmur heard  Systolic murmur is present with a grade of 3/6   Pulmonary/Chest: Effort normal and breath sounds normal    Abdominal: Soft  Musculoskeletal: He exhibits no edema  Neurological: He is alert and oriented to person, place, and time  Skin: Skin is dry  Psychiatric: He has a normal mood and affect  Results Reviewed     None            Assessment/Plan:        Diagnoses and all orders for this visit:    Cough  Comments:  resolved  Increased salivation  Comments:  t/c medication but would rather pt try otc zyrtec     follow up if no improvement  Malignant neoplasm of testis, unspecified laterality, unspecified whether descended or undescended [facility])  Comments:  please follow with urology  Rash  Comments:  triamcinolone called in    Orders:  -     triamcinolone (KENALOG) 0 1 % cream; Apply topically 2 (two) times a day                  No Follow-up on file            Dawit Guido DO

## 2018-02-26 NOTE — RESULT NOTES
Discussion/Summary   Your labs are stable  Please follow up as we discussed at your last appointment  Dr John Thomas      Verified Results  (1) CBC/PLT/DIFF 19ZWZ7325 10:24AM [place] Order Number: [id number]     Test Name Result Flag Reference   WBC COUNT 6 38 Thousand/uL  4 31-10 16   RBC COUNT 4 12 Million/uL  3 88-5 62   HEMOGLOBIN 12 2 g/dL  12 0-17 0   HEMATOCRIT 36 7 %  36 5-49 3   MCV 89 fL  82-98   MCH 29 6 pg  26 8-34 3   MCHC 33 2 g/dL  31 4-37 4   RDW 12 5 %  11 6-15 1   MPV 9 1 fL  8 9-12 7   PLATELET COUNT 175 Thousands/uL  149-390   nRBC AUTOMATED 0 /100 WBCs     NEUTROPHILS RELATIVE PERCENT 72 %  43-75   LYMPHOCYTES RELATIVE PERCENT 13 % L 14-44   MONOCYTES RELATIVE PERCENT 11 %  4-12   EOSINOPHILS RELATIVE PERCENT 3 %  0-6   BASOPHILS RELATIVE PERCENT 1 %  0-1   NEUTROPHILS ABSOLUTE COUNT 4 61 Thousands/? ??L  1 85-7 62   LYMPHOCYTES ABSOLUTE COUNT 0 82 Thousands/? ??L  0 60-4 47   MONOCYTES ABSOLUTE COUNT 0 69 Thousand/? ??L  0 17-1 22   EOSINOPHILS ABSOLUTE COUNT 0 21 Thousand/? ??L  0 00-0 61   BASOPHILS ABSOLUTE COUNT 0 04 Thousands/? ??L  0 00-0 10     (1) URINALYSIS (will reflex a microscopy if leukocytes, occult blood, protein or nitrites are not within normal limits) 84CIW7797 10:24AM [place] Order Number: [id number]     Test Name Result Flag Reference   COLOR Yellow     CLARITY Clear     SPECIFIC GRAVITY UA 1 012  1 003-1 030   PH UA 6 0  4 5-8 0   LEUKOCYTE ESTERASE UA Negative  Negative   NITRITE UA Negative  Negative   PROTEIN UA Negative mg/dl  Negative   GLUCOSE UA Negative mg/dl  Negative   KETONES UA Negative mg/dl  Negative   UROBILINOGEN UA 0 2 E U /dl  0 2, 1 0 E U /dl   BILIRUBIN UA Negative  Negative   BLOOD UA Negative  Negative     (1) LIPID PANEL FASTING W DIRECT LDL REFLEX 44ZGH7337 10:24AM [place] Order Number: [id number]     Test Name Result Flag Reference   CHOLESTEROL 131 mg/dL     Cholesterol: Desirable <200 mg/dl    Borderline 200-239 mg/dl    High>239   LDL CHOLESTEROL CALCULATED 43 mg/dL  0-100   This screening LDL is a calculated result  It does not have the accuracy of the Direct Measured LDL in the monitoring of patients with hyperlipidemia and/or statin therapy  Direct Measure LDL (PEJ689) must be ordered separately in these patients  Triglyceride:        Normal <150 mg/dl   Borderline High 150-199 mg/dl   High 200-499 mg/dl   Very High >499 mg/dl   TRIGLYCERIDES 76 mg/dL  <=150   Specimen collection should occur prior to N-Acetylcysteine or Metamizole administration due to the potential for falsely depressed results     HDL,DIRECT 73 mg/dL H 40-60   HDL Cholesterol:    High>59 mg/dL    Low <41 mg/dL  HDL Cholesterol:    High>59 mg/dL    Low <41 mg/dL

## 2018-04-24 DIAGNOSIS — I10 ESSENTIAL HYPERTENSION: Primary | ICD-10-CM

## 2018-04-25 RX ORDER — LISINOPRIL 5 MG/1
TABLET ORAL
Qty: 270 TABLET | Refills: 3 | Status: SHIPPED | OUTPATIENT
Start: 2018-04-25 | End: 2019-05-08 | Stop reason: SDUPTHER

## 2018-05-17 ENCOUNTER — OFFICE VISIT (OUTPATIENT)
Dept: CARDIOLOGY CLINIC | Facility: CLINIC | Age: 83
End: 2018-05-17
Payer: COMMERCIAL

## 2018-05-17 VITALS
SYSTOLIC BLOOD PRESSURE: 138 MMHG | DIASTOLIC BLOOD PRESSURE: 86 MMHG | HEART RATE: 63 BPM | WEIGHT: 161.6 LBS | OXYGEN SATURATION: 99 % | HEIGHT: 72 IN | BODY MASS INDEX: 21.89 KG/M2

## 2018-05-17 DIAGNOSIS — Z87.898 HISTORY OF SYNCOPE: ICD-10-CM

## 2018-05-17 DIAGNOSIS — E78.5 DYSLIPIDEMIA: ICD-10-CM

## 2018-05-17 DIAGNOSIS — I49.3 PVCS (PREMATURE VENTRICULAR CONTRACTIONS): ICD-10-CM

## 2018-05-17 DIAGNOSIS — I10 ESSENTIAL HYPERTENSION: Primary | ICD-10-CM

## 2018-05-17 DIAGNOSIS — I73.9 PVD (PERIPHERAL VASCULAR DISEASE) (HCC): ICD-10-CM

## 2018-05-17 PROCEDURE — 99214 OFFICE O/P EST MOD 30 MIN: CPT | Performed by: INTERNAL MEDICINE

## 2018-05-17 PROCEDURE — 93000 ELECTROCARDIOGRAM COMPLETE: CPT | Performed by: INTERNAL MEDICINE

## 2018-05-17 NOTE — PATIENT INSTRUCTIONS
1   Divide up blood pressure medication to morning and 6:00 p m  daily but resume current schedule if blood pressure gets too high  2   Continue present medications otherwise as taken  3   Cardiology follow-up in 6 months with EKG, lipids, CMP

## 2018-05-17 NOTE — PROGRESS NOTES
Cardiology Progress Note    ASSESSMENT:       1  Much improved essential hypertension with resolved nocturnal surges in systolic blood pressure at 9-11 PM, previously in the 072 systolic range  Appears to have white coat hypertension  2  Asymptomatic chronic PVCs, per patient history, but not evident on today's exam   Sinus arrhythmia on EKG  3  History of carvedilol-induced syncope from hypotension on 1/10/17 and 1/20/17 with episode on 9/11/17 from low blood pressure, cause uncertain  4  Stable right calf claudication and bilateral right more than left lower extremity PAD present for 5 years  5  Questionable right neck bruit  6  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 with benign cyst adjacent to left testes   7  Treated dyslipidemia, extremely well-controlled   8  Stable chronic BPH   9  Remote history of syncope after Flomax  10  Chronic nocturnal GERD with water brash  11  Multiple basal cell skin cancers with recent excisions from scalp, right forehead, right cheek, left ear, left wrist, dorsum of left hand dating back to 04/15/2018  Plan       Patient Instructions     1  Divide up blood pressure medication to morning and 6:00 p m  daily but resume current schedule if blood pressure gets too high  2   Continue present medications otherwise as taken  3   Cardiology follow-up in 6 months with EKG, lipids, CMP  HPI    This 80 y o  male  denies new cardiopulmonary and medical symptoms  He does experience some exertional dyspnea if he does heavy work  We reviewed his home blood pressures which have been satisfactory recently  Beginning on approximately 04/15/2018, he had a series of basal cell skin cancers removed from his scalp, right forehead, right cheek, left ear, left wrist, and dorsum of left hand  This is being done by Dr Misti Bolden in [place], [place]      The patient claimed he is currently taking is lisinopril 5 milligrams at 6:00 p m  daily along with amlodipine at 8:00 p m  daily  He has had occasional systolic blood pressure in the 90s in the mornings, but not recently  The patient has a stable level of right calf claudication when ambulating distances  Review of Systems    All other systems negative, except as noted in history of present illness    Historical Information   History reviewed  No pertinent past medical history  History reviewed  No pertinent surgical history  History   Alcohol Use No     History   Drug Use No     History   Smoking Status    Former Smoker   Smokeless Tobacco    Never Used       Family History:  Family History   Problem Relation Age of Onset    Heart disease Mother     Heart disease Father          Meds/Allergies     Prior to Admission medications    Medication Sig Start Date End Date Taking? Authorizing Provider   amLODIPine (NORVASC) 5 mg tablet Take by mouth daily 4/11/17  Yes Historical Provider, MD   atorvastatin (LIPITOR) 10 mg tablet Take 1 tablet by mouth   Yes Historical Provider, MD   chlorthalidone 25 mg tablet Take 1 tablet by mouth every other day 8/31/17  Yes Historical Provider, MD   finasteride (PROSCAR) 5 mg tablet Take 1 tablet by mouth daily   Yes Historical Provider, MD   lisinopril (ZESTRIL) 5 mg tablet Take 1 tablet in morning and 2 tablets in the evening  Patient taking differently: 5 mg daily Take 1 tablet in morning and 2 tablets in the evening    4/25/18  Yes Trixie Mcgarry MD   triamcinolone (KENALOG) 0 1 % cream Apply topically 2 (two) times a day 2/9/18  Yes Mahamed Schultz DO   montelukast (SINGULAIR) 10 mg tablet Take 1 tablet by mouth daily 1/9/18   Historical Provider, MD       Allergies   Allergen Reactions    Tamsulosin Syncope         Vitals:    05/17/18 1000   BP: 138/86       150/70 right upper extremity, standing, end of exam   BP Location: Right arm   Patient Position: Sitting   Cuff Size: Standard   Pulse: 63   SpO2: 99%   Weight: 73 3 kg (161 lb 9 6 oz)   Height: 5' 11 5" (1 816 m)       Body mass index is 22 22 kg/m²  Physical Exam:    General Appearance:  Alert, cooperative, no distress, appears stated age   Head:  Normocephalic, without obvious abnormality, atraumatic   Eyes:  PERRL, conjunctiva/corneas clear, EOM's intact,   both eyes   Ears:  Normal TM's and external ear canals, both ears   Nose: Nares normal, septum midline, mucosa normal, no drainage or sinus tenderness   Throat: Lips, mucosa, and tongue normal; teeth and gums normal   Neck: Supple, symmetrical, trachea midline, no adenopathy, thyroid: not enlarged, symmetric, no tenderness/mass/nodules, no carotid bruit or JVD   Back:   Symmetric, no curvature, ROM normal, no CVA tenderness   Lungs:   Clear to auscultation bilaterally, respirations unlabored   Chest Wall:  No tenderness or deformity   Heart:  Regular rate and rhythm, S1, S2 normal, no murmur, rub or gallop   Abdomen:   Soft, non-tender, bowel sounds active all four quadrants,  no masses, no organomegaly   Extremities: Extremities normal, atraumatic, no cyanosis or edema   Pulses: 1+ on left  and nonpalpable on right   Skin: Skin showed normal color, texture, turgor and no rashes with multiple Band-Aids over entire scalp, right forehead, right cheek, tragus of left ear, left wrist, and dorsum of left hand  Lymph nodes: Cervical, supraclavicular, and axillary nodes normal   Neurologic: Normal         Cardiographics    ECG 05/17/2018:    Sinus arrhythmia  Inferior T inversions, new compared to 04/11/2017    Imaging    Chest X-Ray :  No Chest XR results available for this patient            Lab Review       Lab Results   Component Value Date     (L) 01/09/2018    K 4 5 01/09/2018     01/09/2018    CO2 28 01/09/2018    ANIONGAP 6 01/09/2018    BUN 18 01/09/2018    CREATININE 0 80 01/09/2018    GLUF 88 01/09/2018    CALCIUM 9 6 01/09/2018    EGFR 80 01/09/2018       Lab Results   Component Value Date    CHOL 131 01/09/2018     Lab Results Component Value Date    HDL 73 (H) 01/09/2018     Lab Results   Component Value Date    LDLCALC 43 01/09/2018     Lab Results   Component Value Date    TRIG 76 01/09/2018     No components found for: [place], [place]    Lab Results   Component Value Date    CALCIUM 9 6 01/09/2018     (L) 01/09/2018    K 4 5 01/09/2018    CO2 28 01/09/2018     01/09/2018    BUN 18 01/09/2018    CREATININE 0 80 01/09/2018           Paula Joseph MD

## 2018-06-05 DIAGNOSIS — E78.2 MIXED HYPERLIPIDEMIA: Primary | ICD-10-CM

## 2018-06-06 RX ORDER — ATORVASTATIN CALCIUM 10 MG/1
10 TABLET, FILM COATED ORAL DAILY
Qty: 30 TABLET | Refills: 3 | Status: SHIPPED | OUTPATIENT
Start: 2018-06-06 | End: 2018-08-20 | Stop reason: SDUPTHER

## 2018-08-10 ENCOUNTER — OFFICE VISIT (OUTPATIENT)
Dept: FAMILY MEDICINE CLINIC | Facility: CLINIC | Age: 83
End: 2018-08-10
Payer: COMMERCIAL

## 2018-08-10 VITALS
DIASTOLIC BLOOD PRESSURE: 74 MMHG | HEART RATE: 72 BPM | HEIGHT: 72 IN | SYSTOLIC BLOOD PRESSURE: 140 MMHG | WEIGHT: 157.4 LBS | TEMPERATURE: 98 F | BODY MASS INDEX: 21.32 KG/M2 | RESPIRATION RATE: 16 BRPM

## 2018-08-10 DIAGNOSIS — E78.5 HYPERLIPIDEMIA, UNSPECIFIED HYPERLIPIDEMIA TYPE: Primary | ICD-10-CM

## 2018-08-10 DIAGNOSIS — M10.9 ACUTE GOUT OF LEFT FOOT, UNSPECIFIED CAUSE: ICD-10-CM

## 2018-08-10 DIAGNOSIS — I10 ESSENTIAL HYPERTENSION: ICD-10-CM

## 2018-08-10 DIAGNOSIS — N40.0 BENIGN PROSTATIC HYPERPLASIA WITHOUT LOWER URINARY TRACT SYMPTOMS: ICD-10-CM

## 2018-08-10 PROBLEM — C62.90 TESTICULAR CANCER (HCC): Status: RESOLVED | Noted: 2018-01-09 | Resolved: 2018-08-10

## 2018-08-10 PROCEDURE — 99214 OFFICE O/P EST MOD 30 MIN: CPT | Performed by: FAMILY MEDICINE

## 2018-08-10 RX ORDER — PREDNISONE 20 MG/1
40 TABLET ORAL DAILY
Qty: 14 TABLET | Refills: 0 | Status: SHIPPED | OUTPATIENT
Start: 2018-08-10 | End: 2018-08-20 | Stop reason: SDUPTHER

## 2018-08-10 RX ORDER — AMLODIPINE BESYLATE 5 MG/1
5 TABLET ORAL DAILY
Qty: 90 TABLET | Refills: 3 | Status: SHIPPED | OUTPATIENT
Start: 2018-08-10 | End: 2019-01-29 | Stop reason: SDUPTHER

## 2018-08-10 RX ORDER — FINASTERIDE 5 MG/1
5 TABLET, FILM COATED ORAL DAILY
Qty: 90 TABLET | Refills: 3 | Status: SHIPPED | OUTPATIENT
Start: 2018-08-10 | End: 2019-08-27 | Stop reason: SDUPTHER

## 2018-08-10 RX ORDER — ASPIRIN 81 MG/1
TABLET, CHEWABLE ORAL
COMMUNITY
End: 2018-10-25 | Stop reason: HOSPADM

## 2018-08-10 NOTE — PROGRESS NOTES
Chief Complaint   Patient presents with    Blood Pressure Check    Gout   HLD     Patient ID: Cedric Grimes is a 80 y o  male  HPI  Pt is seeing for f/u HTN, HLD - stable taking meds, labs are at goal 6 m ago, has Gout -  No attacks for a few y, L foot pain and swelling at the base of great toe x few days -  Worsening, limping, no injury, no therapy tried     The following portions of the patient's history were reviewed and updated as appropriate: allergies, current medications, past family history, past medical history, past social history, past surgical history and problem list     Review of Systems   Constitutional: Negative  Respiratory: Negative  Cardiovascular: Negative  Gastrointestinal: Negative  Genitourinary: Negative  Musculoskeletal: Positive for arthralgias and joint swelling  Skin: Negative  Neurological: Negative  Current Outpatient Prescriptions   Medication Sig Dispense Refill    amLODIPine (NORVASC) 5 mg tablet Take 1 tablet (5 mg total) by mouth daily 90 tablet 3    aspirin 81 mg chewable tablet One tablet daily by mouth      atorvastatin (LIPITOR) 10 mg tablet Take 1 tablet (10 mg total) by mouth daily 30 tablet 3    chlorthalidone 25 mg tablet Take 1 tablet by mouth every other day      finasteride (PROSCAR) 5 mg tablet Take 1 tablet (5 mg total) by mouth daily 90 tablet 3    lisinopril (ZESTRIL) 5 mg tablet Take 1 tablet in morning and 2 tablets in the evening  (Patient taking differently: 5 mg daily Take 1 tablet in morning and 2 tablets in the evening  ) 270 tablet 3    predniSONE 20 mg tablet Take 2 tablets (40 mg total) by mouth daily 14 tablet 0    triamcinolone (KENALOG) 0 1 % cream Apply topically 2 (two) times a day 45 g 6     No current facility-administered medications for this visit          Objective:    /74 (BP Location: Left arm, Patient Position: Sitting, Cuff Size: Adult)   Pulse 72   Temp 98 °F (36 7 °C) (Temporal)   Resp 16    5' 11 5" (1 816 m)   Wt 71 4 kg (157 lb 6 4 oz)   BMI 21 65 kg/m²        Physical Exam   Constitutional: He is oriented to person, place, and time  He appears well-nourished  No distress  Eyes: Conjunctivae are normal    Cardiovascular: Normal rate, regular rhythm and normal heart sounds  Exam reveals no gallop  No murmur heard  Pulmonary/Chest: Effort normal and breath sounds normal  No respiratory distress  He has no wheezes  He has no rales  Abdominal: Soft  There is no tenderness  Musculoskeletal:        Feet:    Neurological: He is oriented to person, place, and time  No cranial nerve deficit  Skin: No pallor  Psychiatric: He has a normal mood and affect  Labs in chart were reviewed  Assessment/Plan:         Diagnoses and all orders for this visit:    Hyperlipidemia, unspecified hyperlipidemia type    Essential hypertension  -     amLODIPine (NORVASC) 5 mg tablet; Take 1 tablet (5 mg total) by mouth daily    Acute gout of left foot, unspecified cause  -     predniSONE 20 mg tablet; Take 2 tablets (40 mg total) by mouth daily    Benign prostatic hyperplasia without lower urinary tract symptoms  -     finasteride (PROSCAR) 5 mg tablet; Take 1 tablet (5 mg total) by mouth daily    Other orders  -     aspirin 81 mg chewable tablet;  One tablet daily by mouth        rto in 6 m                   John Benjamin MD

## 2018-08-20 ENCOUNTER — TELEPHONE (OUTPATIENT)
Dept: FAMILY MEDICINE CLINIC | Facility: CLINIC | Age: 83
End: 2018-08-20

## 2018-08-20 DIAGNOSIS — E78.2 MIXED HYPERLIPIDEMIA: ICD-10-CM

## 2018-08-20 DIAGNOSIS — M10.9 ACUTE GOUT OF LEFT FOOT, UNSPECIFIED CAUSE: ICD-10-CM

## 2018-08-20 RX ORDER — PREDNISONE 10 MG/1
20 TABLET ORAL DAILY
Qty: 13 TABLET | Refills: 0 | Status: SHIPPED | OUTPATIENT
Start: 2018-08-20 | End: 2019-03-05

## 2018-08-20 RX ORDER — ATORVASTATIN CALCIUM 10 MG/1
10 TABLET, FILM COATED ORAL DAILY
Qty: 90 TABLET | Refills: 0 | Status: SHIPPED | OUTPATIENT
Start: 2018-08-20 | End: 2018-12-10 | Stop reason: SDUPTHER

## 2018-08-20 NOTE — TELEPHONE ENCOUNTER
Dr Karen Wilkerson finished prednisone and now gout in toe has returned  Can you prescribe something else?

## 2018-08-20 NOTE — TELEPHONE ENCOUNTER
Pl, advise pt -  Will try smaller Prednisone dose with slow tapering - 20 mg a day for 3 days, then 10 mg a day for 1 wk

## 2018-10-09 ENCOUNTER — TRANSCRIBE ORDERS (OUTPATIENT)
Dept: ADMINISTRATIVE | Facility: HOSPITAL | Age: 83
End: 2018-10-09

## 2018-10-09 DIAGNOSIS — N50.819 TESTICULAR PAIN: Primary | ICD-10-CM

## 2018-10-18 ENCOUNTER — HOSPITAL ENCOUNTER (OUTPATIENT)
Dept: RADIOLOGY | Facility: HOSPITAL | Age: 83
Discharge: HOME | End: 2018-10-18
Attending: SPECIALIST
Payer: COMMERCIAL

## 2018-10-18 ENCOUNTER — APPOINTMENT (OUTPATIENT)
Dept: LAB | Facility: HOSPITAL | Age: 83
End: 2018-10-18
Attending: SPECIALIST
Payer: COMMERCIAL

## 2018-10-18 ENCOUNTER — TRANSCRIBE ORDERS (OUTPATIENT)
Dept: ADMINISTRATIVE | Facility: HOSPITAL | Age: 83
End: 2018-10-18

## 2018-10-18 DIAGNOSIS — Z01.818 PREOP EXAMINATION: Primary | ICD-10-CM

## 2018-10-18 DIAGNOSIS — N50.819 TESTICULAR PAIN: ICD-10-CM

## 2018-10-18 LAB
ALBUMIN SERPL BCP-MCNC: 4 G/DL (ref 3.5–5)
ALP SERPL-CCNC: 68 U/L (ref 46–116)
ALT SERPL W P-5'-P-CCNC: 27 U/L (ref 12–78)
ANION GAP SERPL CALCULATED.3IONS-SCNC: 12 MMOL/L (ref 4–13)
AST SERPL W P-5'-P-CCNC: 20 U/L (ref 5–45)
BASOPHILS # BLD AUTO: 0.03 THOUSANDS/ΜL (ref 0–0.1)
BASOPHILS NFR BLD AUTO: 1 % (ref 0–1)
BILIRUB SERPL-MCNC: 0.6 MG/DL (ref 0.2–1)
BUN SERPL-MCNC: 20 MG/DL (ref 5–25)
CALCIUM SERPL-MCNC: 9.6 MG/DL (ref 8.3–10.1)
CHLORIDE SERPL-SCNC: 98 MMOL/L (ref 100–108)
CO2 SERPL-SCNC: 26 MMOL/L (ref 21–32)
CREAT SERPL-MCNC: 0.81 MG/DL (ref 0.6–1.3)
EOSINOPHIL # BLD AUTO: 0.09 THOUSAND/ΜL (ref 0–0.61)
EOSINOPHIL NFR BLD AUTO: 2 % (ref 0–6)
ERYTHROCYTE [DISTWIDTH] IN BLOOD BY AUTOMATED COUNT: 12.3 % (ref 11.6–15.1)
GFR SERPL CREATININE-BSD FRML MDRD: 79 ML/MIN/1.73SQ M
GLUCOSE SERPL-MCNC: 99 MG/DL (ref 65–140)
HCT VFR BLD AUTO: 39.4 % (ref 36.5–49.3)
HGB BLD-MCNC: 13 G/DL (ref 12–17)
IMM GRANULOCYTES # BLD AUTO: 0.01 THOUSAND/UL (ref 0–0.2)
IMM GRANULOCYTES NFR BLD AUTO: 0 % (ref 0–2)
LYMPHOCYTES # BLD AUTO: 0.83 THOUSANDS/ΜL (ref 0.6–4.47)
LYMPHOCYTES NFR BLD AUTO: 16 % (ref 14–44)
MCH RBC QN AUTO: 29.7 PG (ref 26.8–34.3)
MCHC RBC AUTO-ENTMCNC: 33 G/DL (ref 31.4–37.4)
MCV RBC AUTO: 90 FL (ref 82–98)
MONOCYTES # BLD AUTO: 0.47 THOUSAND/ΜL (ref 0.17–1.22)
MONOCYTES NFR BLD AUTO: 9 % (ref 4–12)
NEUTROPHILS # BLD AUTO: 3.94 THOUSANDS/ΜL (ref 1.85–7.62)
NEUTS SEG NFR BLD AUTO: 72 % (ref 43–75)
NRBC BLD AUTO-RTO: 0 /100 WBCS
PLATELET # BLD AUTO: 272 THOUSANDS/UL (ref 149–390)
PMV BLD AUTO: 9.1 FL (ref 8.9–12.7)
POTASSIUM SERPL-SCNC: 4 MMOL/L (ref 3.5–5.3)
PROT SERPL-MCNC: 7.5 G/DL (ref 6.4–8.2)
RBC # BLD AUTO: 4.37 MILLION/UL (ref 3.88–5.62)
SODIUM SERPL-SCNC: 136 MMOL/L (ref 136–145)
WBC # BLD AUTO: 5.37 THOUSAND/UL (ref 4.31–10.16)

## 2018-10-18 PROCEDURE — 85025 COMPLETE CBC W/AUTO DIFF WBC: CPT | Performed by: SPECIALIST

## 2018-10-18 PROCEDURE — 36415 COLL VENOUS BLD VENIPUNCTURE: CPT | Performed by: SPECIALIST

## 2018-10-18 PROCEDURE — 76870 US EXAM SCROTUM: CPT

## 2018-10-18 PROCEDURE — 80053 COMPREHEN METABOLIC PANEL: CPT | Performed by: SPECIALIST

## 2018-10-18 RX ORDER — MULTIVIT-MIN/IRON/FOLIC ACID/K 18-600-40
CAPSULE ORAL
COMMUNITY
End: 2022-06-15 | Stop reason: SDUPTHER

## 2018-10-18 NOTE — PRE-PROCEDURE INSTRUCTIONS
My Surgical Experience    The following information was developed to assist you to prepare for your operation  What do I need to do before coming to the hospital?   Arrange for a responsible person to drive you to and from the hospital    Arrange care for your children at home  Children are not allowed in the recovery areas of the hospital   Plan to wear clothing that is easy to put on and take off  If you are having shoulder surgery, wear a shirt that buttons or zippers in the front  Bathing  o Shower the evening before and the morning of your surgery with an antibacterial soap  Please refer to the Pre Op Showering Instructions for Surgery Patients Sheet   o Remove nail polish and all body piercing jewelry  o Do not shave any body part for at least 24 hours before surgery-this includes face, arms, legs and upper body  Food  o Nothing to eat or drink after midnight the night before your surgery  This includes candy and chewing gum  o Exception: If your surgery is after 12:00pm (noon), you may have clear liquids such as 7-Up®, ginger ale, apple or cranberry juice, Jell-O®, water, or clear broth until 8:00 am  o Do not drink milk or juice with pulp on the morning before surgery  o Do not drink alcohol 24 hours before surgery  Medicine  o Follow instructions you received from your surgeon about which medicines you may take on the day of surgery  o If instructed to take medicine on the morning of surgery, take pills with just a small sip of water  Call your prescribing doctor for specific infroamtion on what to do if you take insulin    What should I bring to the hospital?    Bring:  Alberto Barfield or a walker, if you have them, for foot or knee surgery   A list of the daily medicines, vitamins, minerals, herbals and nutritional supplements you take   Include the dosages of medicines and the time you take them each day   Glasses, dentures or hearing aids   Minimal clothing; you will be wearing hospital sleepwear   Photo ID; required to verify your identity   If you have a Living Will or Power of , bring a copy of the documents   If you have an ostomy, bring an extra pouch and any supplies you use    Do not bring   Medicines or inhalers   Money, valuables or jewelry    What other information should I know about the day of surgery?  Notify your surgeons if you develop a cold, sore throat, cough, fever, rash or any other illness   Report to the Ambulatory Surgical/Same Day Surgery Unit   You will be instructed to stop at Registration only if you have not been pre-registered   Inform your  fi they do not stay that they will be asked by the staff to leave a phone number where they can be reached   Be available to be reached before surgery  In the event the operating room schedule changes, you may be asked to come in earlier or later than expected    *It is important to tell your doctor and others involved in your health care if you are taking or have been taking any non-prescription drugs, vitamins, minerals, herbals or other nutritional supplements  Any of these may interact with some food or medicines and cause a reaction      Pre-Surgery Instructions:   Medication Instructions    amLODIPine (NORVASC) 5 mg tablet Instructed patient per Anesthesia Guidelines   aspirin 81 mg chewable tablet Instructed patient per Anesthesia Guidelines   atorvastatin (LIPITOR) 10 mg tablet Instructed patient per Anesthesia Guidelines   Calcium Carbonate-Vitamin D (CALCIUM 500 + D) 500-125 MG-UNIT TABS Instructed patient per Anesthesia Guidelines   chlorthalidone 25 mg tablet Instructed patient per Anesthesia Guidelines   Cholecalciferol (VITAMIN D) 2000 units CAPS Instructed patient per Anesthesia Guidelines   finasteride (PROSCAR) 5 mg tablet Instructed patient per Anesthesia Guidelines   lisinopril (ZESTRIL) 5 mg tablet Instructed patient per Anesthesia Guidelines      Multiple Vitamins-Minerals (CENTRUM SILVER ADULT 50+ PO) Instructed patient per Anesthesia Guidelines  To take lisinopril a m  Of surgery

## 2018-10-23 ENCOUNTER — ANESTHESIA EVENT (OUTPATIENT)
Dept: PERIOP | Facility: HOSPITAL | Age: 83
End: 2018-10-23
Payer: COMMERCIAL

## 2018-10-24 NOTE — H&P
H&P Exam - Urology       Patient: Smith Acevedo   : 1930 Sex: male   MRN: [id number]     CSN: [id number]      History of Present Illness   HPI:  Smith Acevedo is a 80 y o  male who presents  Painful increasing right hydrocele        Review of Systems:   Constitutional:  Negative for activity change, fever, chills and diaphoresis  HENT: Negative for hearing loss and trouble swallowing  Eyes: Negative for itching and visual disturbance  Respiratory: Negative for chest tightness and shortness of breath  Cardiovascular: Negative for chest pain, edema  Gastrointestinal: Negative for abdominal distention, na abdominal pain, constipation, diarrhea, Nausea and vomiting  Genitourinary: Negative for decreased urine volume, difficulty urinating, dysuria, enuresis, frequency, hematuria and urgency  Musculoskeletal: Negative for gait problem and myalgias  Neurological: Negative for dizziness and headaches  Hematological: Does not bruise/bleed easily  Historical Information   Past Medical History:   Diagnosis Date    Asymptomatic PVCs     last assessed 18    BPH (benign prostatic hyperplasia)     last assessed 17     High cholesterol     last assessed 17     Hypertension     uncontrolled, last assessed 17     Past Surgical History:   Procedure Laterality Date    CATARACT EXTRACTION      COLONOSCOPY      Complete    TESTICLE SURGERY      last assessed 3/31/15     Social History   History   Alcohol Use No     History   Drug Use No     History   Smoking Status    Former Smoker    Years: 20 00    Quit date: 10/18/1965   Smokeless Tobacco    Never Used     Family History:   Family History   Problem Relation Age of Onset    Heart disease Mother     Hypertension Mother     Heart disease Father     Hypertension Father     Hypertension Family     Hyperlipidemia Family        Meds/Allergies   No prescriptions prior to admission       Allergies   Allergen Reactions    Tamsulosin Syncope       Objective   Vitals: Wt 72 1 kg (159 lb)   BMI 21 87 kg/m²     Physical Exam:  General Alert awake   Normocephalic atraumatic PERRLA  Lungs clear bilaterally  Cardiac normal S1 normal S2  Abdomen soft, flank pain   right hydrocele 500 cc  Extremities no edema    No intake/output data recorded      Invasive Devices          No matching active lines, drains, or airways              Lab Results: CBC:   Lab Results   Component Value Date    WBC 5 37 10/18/2018    HGB 13 0 10/18/2018    HCT 39 4 10/18/2018    MCV 90 10/18/2018     10/18/2018    MCH 29 7 10/18/2018    MCHC 33 0 10/18/2018    RDW 12 3 10/18/2018    MPV 9 1 10/18/2018    NRBC 0 10/18/2018     CMP:   Lab Results   Component Value Date     10/18/2018     10/11/2017    CL 98 (L) 10/18/2018    CL 92 (L) 10/11/2017    CO2 26 10/18/2018    CO2 25 10/11/2017    BUN 20 10/18/2018    BUN 15 10/11/2017    CREATININE 0 81 10/18/2018    CREATININE 0 84 10/11/2017    CALCIUM 9 6 10/18/2018    CALCIUM 9 8 10/11/2017    AST 20 10/18/2018    ALT 27 10/18/2018    ALKPHOS 68 10/18/2018    EGFR 79 10/18/2018     Urinalysis:   Lab Results   Component Value Date    COLORU Yellow 01/09/2018    CLARITYU Clear 01/09/2018    SPECGRAV 1 012 01/09/2018    PHUR 6 0 01/09/2018    LEUKOCYTESUR Negative 01/09/2018    NITRITE Negative 01/09/2018    PROTEINUA Negative 01/09/2018    GLUCOSEU Negative 01/09/2018    KETONESU Negative 01/09/2018    BILIRUBINUR Negative 01/09/2018    BLOODU Negative 01/09/2018     Urine Culture: No results found for: URINECX  PSA: No results found for: PSA        Assessment/ Plan:   painful right hydrocele   right scrotal hydrocelectomy      Samia Boswell MD

## 2018-10-24 NOTE — ANESTHESIA PREPROCEDURE EVALUATION
Atheroscler native arteries the extremities w/intermit claudication (HCC)   HTN (hypertension)   Hyperlipidemia   PAD (peripheral artery disease) (HCC)   Benign prostatic hyperplasia without lower urinary tract symptoms       Review of Systems/Medical History  Patient summary reviewed  Chart reviewed      Cardiovascular  Hyperlipidemia, Hypertension , PVD,    Pulmonary  Smoker ex-smoker  ,        GI/Hepatic  Negative GI/hepatic ROS          Prostatic disorder, benign prostatic hyperplasia       Endo/Other  Negative endo/other ROS      GYN       Hematology  Negative hematology ROS      Musculoskeletal  Negative musculoskeletal ROS        Neurology  Negative neurology ROS      Psychology   Negative psychology ROS              Physical Exam    Airway      TM Distance: >3 FB  Neck ROM: full     Dental       Cardiovascular  Rhythm: regular, Rate: normal,     Pulmonary  Breath sounds clear to auscultation,     Other Findings        Anesthesia Plan  ASA Score- 2     Anesthesia Type- general with ASA Monitors  Additional Monitors:   Airway Plan: LMA  Plan Factors-    Induction- intravenous  Postoperative Plan- Plan for postoperative opioid use  Planned trial extubation    Informed Consent- Anesthetic plan and risks discussed with patient  I personally reviewed this patient with the CRNA  Discussed and agreed on the Anesthesia Plan with the CRNA  Janine Duval

## 2018-10-25 ENCOUNTER — HOSPITAL ENCOUNTER (OUTPATIENT)
Facility: HOSPITAL | Age: 83
Setting detail: OUTPATIENT SURGERY
Discharge: HOME | End: 2018-10-25
Attending: SPECIALIST | Admitting: SPECIALIST
Payer: COMMERCIAL

## 2018-10-25 ENCOUNTER — ANESTHESIA (OUTPATIENT)
Dept: PERIOP | Facility: HOSPITAL | Age: 83
End: 2018-10-25
Payer: COMMERCIAL

## 2018-10-25 VITALS
DIASTOLIC BLOOD PRESSURE: 78 MMHG | BODY MASS INDEX: 20.99 KG/M2 | RESPIRATION RATE: 18 BRPM | HEART RATE: 78 BPM | SYSTOLIC BLOOD PRESSURE: 132 MMHG | OXYGEN SATURATION: 96 % | TEMPERATURE: 97.1 F | WEIGHT: 155 LBS | HEIGHT: 72 IN

## 2018-10-25 DIAGNOSIS — N43.3 HYDROCELE: ICD-10-CM

## 2018-10-25 PROCEDURE — 88302 TISSUE EXAM BY PATHOLOGIST: CPT | Performed by: PATHOLOGY

## 2018-10-25 RX ORDER — FENTANYL CITRATE/PF 50 MCG/ML
25 SYRINGE (ML) INJECTION
Status: DISCONTINUED | OUTPATIENT
Start: 2018-10-25 | End: 2018-10-25 | Stop reason: HOSPADM

## 2018-10-25 RX ORDER — EPHEDRINE SULFATE 50 MG/ML
INJECTION, SOLUTION INTRAVENOUS AS NEEDED
Status: DISCONTINUED | OUTPATIENT
Start: 2018-10-25 | End: 2018-10-25 | Stop reason: SURG

## 2018-10-25 RX ORDER — PROPOFOL 10 MG/ML
INJECTION, EMULSION INTRAVENOUS AS NEEDED
Status: DISCONTINUED | OUTPATIENT
Start: 2018-10-25 | End: 2018-10-25 | Stop reason: SURG

## 2018-10-25 RX ORDER — LEVOFLOXACIN 5 MG/ML
500 INJECTION, SOLUTION INTRAVENOUS ONCE
Status: COMPLETED | OUTPATIENT
Start: 2018-10-25 | End: 2018-10-25

## 2018-10-25 RX ORDER — LIDOCAINE HYDROCHLORIDE 10 MG/ML
INJECTION, SOLUTION INFILTRATION; PERINEURAL AS NEEDED
Status: DISCONTINUED | OUTPATIENT
Start: 2018-10-25 | End: 2018-10-25 | Stop reason: SURG

## 2018-10-25 RX ORDER — SODIUM CHLORIDE, SODIUM LACTATE, POTASSIUM CHLORIDE, CALCIUM CHLORIDE 600; 310; 30; 20 MG/100ML; MG/100ML; MG/100ML; MG/100ML
75 INJECTION, SOLUTION INTRAVENOUS CONTINUOUS
Status: DISCONTINUED | OUTPATIENT
Start: 2018-10-25 | End: 2018-10-25

## 2018-10-25 RX ORDER — ONDANSETRON 2 MG/ML
4 INJECTION INTRAMUSCULAR; INTRAVENOUS ONCE AS NEEDED
Status: DISCONTINUED | OUTPATIENT
Start: 2018-10-25 | End: 2018-10-25 | Stop reason: HOSPADM

## 2018-10-25 RX ORDER — FENTANYL CITRATE 50 UG/ML
INJECTION, SOLUTION INTRAMUSCULAR; INTRAVENOUS AS NEEDED
Status: DISCONTINUED | OUTPATIENT
Start: 2018-10-25 | End: 2018-10-25 | Stop reason: SURG

## 2018-10-25 RX ORDER — SODIUM CHLORIDE, SODIUM LACTATE, POTASSIUM CHLORIDE, CALCIUM CHLORIDE 600; 310; 30; 20 MG/100ML; MG/100ML; MG/100ML; MG/100ML
50 INJECTION, SOLUTION INTRAVENOUS CONTINUOUS
Status: DISCONTINUED | OUTPATIENT
Start: 2018-10-25 | End: 2018-10-25 | Stop reason: HOSPADM

## 2018-10-25 RX ORDER — MAGNESIUM HYDROXIDE 1200 MG/15ML
LIQUID ORAL AS NEEDED
Status: DISCONTINUED | OUTPATIENT
Start: 2018-10-25 | End: 2018-10-25 | Stop reason: HOSPADM

## 2018-10-25 RX ADMIN — FENTANYL CITRATE 50 MCG: 50 INJECTION, SOLUTION INTRAMUSCULAR; INTRAVENOUS at 07:56

## 2018-10-25 RX ADMIN — PROPOFOL 200 MG: 10 INJECTION, EMULSION INTRAVENOUS at 07:56

## 2018-10-25 RX ADMIN — LIDOCAINE HYDROCHLORIDE 50 MG: 10 INJECTION, SOLUTION INFILTRATION; PERINEURAL at 07:56

## 2018-10-25 RX ADMIN — LEVOFLOXACIN 500 MG: 5 INJECTION, SOLUTION INTRAVENOUS at 07:24

## 2018-10-25 RX ADMIN — SODIUM CHLORIDE, SODIUM LACTATE, POTASSIUM CHLORIDE, AND CALCIUM CHLORIDE: .6; .31; .03; .02 INJECTION, SOLUTION INTRAVENOUS at 07:38

## 2018-10-25 RX ADMIN — EPHEDRINE SULFATE 10 MG: 50 INJECTION, SOLUTION INTRAMUSCULAR; INTRAVENOUS; SUBCUTANEOUS at 08:05

## 2018-10-25 NOTE — DISCHARGE INSTRUCTIONS
#1 no heavy straining or lifting above 10 pounds for 2 weeks    #2 call office fevers, chills, or worsening blood in the urine  #3 call office for follow-up in 1 week to remove drain November 1st  No bowling x2 weeks  Ice packs tonight  Pain meds as needed  Start antibiotics tomorrow      Delfin ZENDEJAS  [address] [place] [address] office  [address] [place] [address] 6  [phone]  8:30 AM to 4:30 PM  Monday through Friday    [facility] office  [phone]  [place] [address]  [facility], [address] [place] [address]  [phone]  1:00 to 5:00 PM  Wednesday

## 2018-10-25 NOTE — OP NOTE
OPERATIVE REPORT  PATIENT NAME: Arlene Jackson    :  1930  MRN: [id number]  Pt Location: [facility] OR ROOM 01    SURGERY DATE: 10/25/2018    Surgeon(s) and Role:     * Darby Jacob MD - Primary    Preop Diagnosis:  Hydrocele [N43 3] right    Post-Op Diagnosis Codes: * Hydrocele [N43 3] right        Specimen(s):  ID Type Source Tests Collected by Time Destination   1 : hydrocele sac Tissue Hydrocele Sac TISSUE EXAM Darby Jacob MD 10/25/2018 1850        Estimated Blood Loss:   Minimal    Drains:  Quarter-inch Penrose right hemiscrotum       Anesthesia Type:   IV Sedation with Anesthesia    Operative Indications:  Hydrocele [N43 3]  This 49-year-old male seen in the office for enlarging right hydrocele pain has followed it for years now stating he would like it drained patient apparently underwent left inguinal scrotal surgery years ago found to have a atrophic left testicle cord firm scarred to left external ring not causing him any difficulty scrotal ultrasound confirms large right hydrocele somewhat atrophic left testicle and right patient now to undergo elective right hydrocelectomy aware of risk of bleeding infection additional surgeries    Operative Findings:  1  4-500 cc hydrocele sac drained multiple calcifications located along the entire testicle somewhat atrophic quarter-inch drain place bottle capping repair  Drain left to be removed in 1 week in the office    Complications:   None    Procedure and Technique:  After the patient identified in the holding area consent signed right side marked he was placed in the op suite after anesthesia was induced the areas draped prep standard fashion time-out performed a 4 cm incision was made across the right hemiscrotum transversely down through subcu fascia the blue dome cyst was then noted hemostasis by cautery blunt dissection of the entire hydrocele sac cord was performed and brought out through the incision    The hydrocele sac was opened and drained the sac was biopsied on the right left side the remaining edges were then sutured around the epididymis in the bile capping technique testicle was dropped down into the right hemiscrotum drain placed scrotal fascia closed with 3 O chromic interrupted sutures drain left in the left lateral aspect skin was closed with 3 O chromic interrupted sutures 3 O chromic drain stitch placed sterile dressing applied jockstrap were applied patient tolerated procedure well awakened taken recovery room stable condition will have a drain at 1 week   I was present for the entire procedure    Patient Disposition:  PACU     SIGNATURE: Komal Marvin MD  DATE: October 25, 2018  TIME: 9:02 AM

## 2018-10-25 NOTE — ANESTHESIA POSTPROCEDURE EVALUATION
Post-Op Assessment Note      CV Status:  Stable    Mental Status:  Somnolent    Hydration Status:  Stable    PONV Controlled:  Controlled    Airway Patency:  Patent    Post Op Vitals Reviewed: Yes          Staff: CRNA, Anesthesiologist           BP      Temp      Pulse     Resp      SpO2

## 2018-10-25 NOTE — ANESTHESIA POSTPROCEDURE EVALUATION
Post-Op Assessment Note      CV Status:  Stable    Mental Status:  Alert and awake    Hydration Status:  Euvolemic    PONV Controlled:  Controlled    Airway Patency:  Patent    Post Op Vitals Reviewed: Yes          Staff: Anesthesiologist           /72 (10/25/18 0900)    Temp (!) 97 1 °F (36 2 °C) (10/25/18 0900)    Pulse 81 (10/25/18 0900)   Resp 18 (10/25/18 0900)    SpO2 100 % (10/25/18 0900)

## 2018-10-25 NOTE — PROGRESS NOTES
Pt History & Physical  Dictated on 18 hours ago October 24th  Pt seen at bedside now  No change in heart and lung  Agree with procedure right scrotal hydrocelectomy  Aware of risk of pain bleeding swelling infection home for with a drain x1 Keysha Ruff MD  10/25/2018

## 2018-11-07 LAB
CHOLEST SERPL-MCNC: 145 MG/DL
CHOLEST/HDLC SERPL: 2.1 (CALC)
HDLC SERPL-MCNC: 69 MG/DL
LDLC SERPL CALC-MCNC: 61 MG/DL (CALC)
NONHDLC SERPL-MCNC: 76 MG/DL (CALC)
TRIGL SERPL-MCNC: 70 MG/DL

## 2018-11-29 ENCOUNTER — OFFICE VISIT (OUTPATIENT)
Dept: CARDIOLOGY CLINIC | Facility: CLINIC | Age: 83
End: 2018-11-29
Payer: COMMERCIAL

## 2018-11-29 VITALS
BODY MASS INDEX: 22.43 KG/M2 | WEIGHT: 160.2 LBS | OXYGEN SATURATION: 99 % | HEART RATE: 73 BPM | DIASTOLIC BLOOD PRESSURE: 74 MMHG | SYSTOLIC BLOOD PRESSURE: 146 MMHG | HEIGHT: 71 IN

## 2018-11-29 DIAGNOSIS — K21.00 GERD WITH ESOPHAGITIS: ICD-10-CM

## 2018-11-29 DIAGNOSIS — E78.5 DYSLIPIDEMIA: ICD-10-CM

## 2018-11-29 DIAGNOSIS — N40.0 BPH WITHOUT OBSTRUCTION/LOWER URINARY TRACT SYMPTOMS: ICD-10-CM

## 2018-11-29 DIAGNOSIS — I49.1 PREMATURE ATRIAL CONTRACTIONS: ICD-10-CM

## 2018-11-29 DIAGNOSIS — I70.219 ATHEROSCLEROTIC PVD WITH INTERMITTENT CLAUDICATION (HCC): ICD-10-CM

## 2018-11-29 DIAGNOSIS — I10 HYPERTENSION, UNSPECIFIED TYPE: Primary | ICD-10-CM

## 2018-11-29 DIAGNOSIS — Z85.828 HX OF SKIN CANCER, BASAL CELL: ICD-10-CM

## 2018-11-29 DIAGNOSIS — I10 LABILE ESSENTIAL HYPERTENSION: ICD-10-CM

## 2018-11-29 PROCEDURE — 93000 ELECTROCARDIOGRAM COMPLETE: CPT | Performed by: INTERNAL MEDICINE

## 2018-11-29 PROCEDURE — 99214 OFFICE O/P EST MOD 30 MIN: CPT | Performed by: INTERNAL MEDICINE

## 2018-11-29 NOTE — PATIENT INSTRUCTIONS
1  Take home blood pressure medication as long as systolic blood pressures at least 100 or more  2  Continue current medications  3  Cardiology follow-up approximately 6 months with EKG

## 2018-11-29 NOTE — PROGRESS NOTES
Cardiology Progress Note    ASSESSMENT:    1  Labile essential hypertension with resolved nocturnal surges in systolic blood pressure at 9-11 PM, previously in the 901 systolic range  Appears to have white coat hypertension  Frequent hold of blood pressure medication for systolic blood pressures of 120 or less being done by patient  2  Asymptomatic chronic PACs, per patient history, and evident on today's exam    Frequent PACs on EKG  3  History of carvedilol-induced syncope from hypotension on 1/10/17 and 1/20/17 with episode on 9/11/17 from low blood pressure, cause uncertain  4  Stable right calf claudication and bilateral right more than left lower extremity PAD present for 5-1/2 years  5  Questionable right neck bruit  6  Status post repair of right hydrocele on 10/25/2018 and history of chronic incomplete torsion of left testicle with benign cyst adjacent to left testes   7  Treated dyslipidemia, extremely well-controlled as of 11/06/2018   8  Stable chronic BPH   9  Remote history of syncope after Flomax  10  Chronic nocturnal GERD with water brash  11  Multiple basal cell skin cancers with recent excisions from scalp, and previously from right forehead, right cheek, left ear, left wrist, dorsum of left hand dating back to 04/15/2018  Plan       Patient Instructions     1  Take home blood pressure medication as long as systolic blood pressures at least 100 or more  2  Continue current medications  3  Cardiology follow-up approximately 6 months with EKG  HPI    This 80 y o  male  denies new cardiopulmonary and medical symptoms  Patient had repair of right hydrocele by Dr Onesimo Esteves on 10/25/2018 and just had for more basal cell skin cancers removed from his scalp by Dr Maliha Marino in [address]  Patient does calisthenics to a video for 1 and 0 5 hours 4 days a week but does not do much walking because of chronic right calf claudication  This has not worsened      He notices extrasystoles when he takes his blood pressure  Recent blood pressures have been labile with some readings in the 140s to 150s, but patient states that he holds his blood pressure medication if his systolic blood pressure is 120 or less  Review of Systems    All other systems negative, except as noted in history of present illness    Historical Information   Past Medical History:   Diagnosis Date    Asymptomatic PVCs     last assessed 1/18/18    BPH (benign prostatic hyperplasia)     last assessed 4/11/17     High cholesterol     last assessed 4/11/17     Hypertension     uncontrolled, last assessed 4/11/17     Past Surgical History:   Procedure Laterality Date    CATARACT EXTRACTION      COLONOSCOPY      Complete    [place] REMOVAL OF HYDROCELE,TUNICA,UNILAT Right 10/25/2018    Procedure: HYDROCELECTOMY;  Surgeon: Addison Becerra MD;  [facility];  Service: Urology    TESTICLE SURGERY      last assessed 3/31/15     History   Alcohol Use No     History   Drug Use No     History   Smoking Status    Former Smoker    Years: 20 00    Quit date: 10/18/1965   Smokeless Tobacco    Never Used       Family History:  Family History   Problem Relation Age of Onset    Heart disease Mother     Hypertension Mother     Heart disease Father     Hypertension Father     Hypertension Family     Hyperlipidemia Family          Meds/Allergies     Prior to Admission medications    Medication Sig Start Date End Date Taking?  Authorizing Provider   amLODIPine (NORVASC) 5 mg tablet Take 1 tablet (5 mg total) by mouth daily  Patient taking differently: Take 5 mg by mouth daily at bedtime   8/10/18  Yes Willy Casillas MD   atorvastatin (LIPITOR) 10 mg tablet Take 1 tablet (10 mg total) by mouth daily  Patient taking differently: Take 10 mg by mouth daily at bedtime   8/20/18  Yes Willy Casillas MD   Calcium Carbonate-Vitamin D (CALCIUM 500 + D) 500-125 MG-UNIT TABS Take by mouth   Yes Historical Provider, MD   chlorthalidone 25 mg tablet Take 1 tablet by mouth every other day 8/31/17  Yes Historical Provider, MD   Cholecalciferol (VITAMIN D) 2000 units CAPS Take by mouth   Yes Historical Provider, MD   finasteride (PROSCAR) 5 mg tablet Take 1 tablet (5 mg total) by mouth daily  Patient taking differently: Take 5 mg by mouth daily at bedtime   8/10/18  Yes Gloria Enrique MD   lisinopril (ZESTRIL) 5 mg tablet Take 1 tablet in morning and 2 tablets in the evening  Patient taking differently: 5 mg daily   4/25/18  Yes Tonya Frederick MD   triamcinolone (KENALOG) 0 1 % cream Apply topically 2 (two) times a day 2/9/18  Yes Abbie Barbosa, DO   Multiple Vitamins-Minerals (CENTRUM SILVER ADULT 50+ PO) Take by mouth    Historical Provider, MD   predniSONE 10 mg tablet Take 2 tablets (20 mg total) by mouth daily For 3 days then 1 tab Po daily for 10 days  Patient not taking: Reported on 11/29/2018 8/20/18   Gloria Enrique MD       Allergies   Allergen Reactions    Tamsulosin Syncope         Vitals:    11/29/18 0947   BP: 146/74   BP Location: Left arm   Patient Position: Sitting   Cuff Size: Adult   Pulse: 73   SpO2: 99%   Weight: 72 7 kg (160 lb 3 2 oz)   Height: 5' 11" (1 803 m)       Body mass index is 22 34 kg/m²  1 4 pound weight loss compared to 6+ months ago      Physical Exam:    General Appearance:  Alert, cooperative, no distress, appears stated age   Head:  Normocephalic, without obvious abnormality, atraumatic   Eyes:  PERRL, conjunctiva/corneas clear, EOM's intact,   both eyes   Ears:  Normal TM's and external ear canals, both ears   Nose: Nares normal, septum midline, mucosa normal, no drainage or sinus tenderness   Throat: Lips, mucosa, and tongue normal; teeth and gums normal   Neck: Supple, symmetrical, trachea midline, no adenopathy, thyroid: not enlarged, symmetric, no tenderness/mass/nodules, no carotid bruit or JVD   Back:   Symmetric, no curvature, ROM normal, no CVA tenderness   Lungs: Clear to auscultation bilaterally, respirations unlabored   Chest Wall:  No tenderness or deformity   Heart:  Regular cardiac rhythm, with frequent extrasystoles; S1, S2 normal, no murmur, rub or gallop   Abdomen:   Soft, non-tender, bowel sounds active all four quadrants,  no masses, no organomegaly   Extremities: Extremities normal, atraumatic, no cyanosis or edema   Pulses: 2+ and symmetric   Skin: Skin showed normal color, texture, turgor and no rashes with multiple Band-Aids over scalp from recent excision of basal cell cancers  Lymph nodes: Cervical, supraclavicular, and axillary nodes normal   Neurologic: Normal         Cardiographics    ECG  11/29/2018:    Frequent premature atrial contractions  New PACs and resolved inferior T inversions since 05/17/2018    Imaging    Chest X-Ray :  No Chest XR results available for this patient            Lab Review     CMP and CBC 10/18/2018:  Normal  Lab Results   Component Value Date    SODIUM 136 10/18/2018    K 4 0 10/18/2018    CL 98 (L) 10/18/2018    CO2 26 10/18/2018    BUN 20 10/18/2018    CREATININE 0 81 10/18/2018    GLUF 88 01/09/2018    CALCIUM 9 6 10/18/2018    AST 20 10/18/2018    ALT 27 10/18/2018    ALKPHOS 68 10/18/2018    EGFR 79 10/18/2018       Lab Results   Component Value Date    CHOLESTEROL 145 11/06/2018    CHOLESTEROL 131 01/09/2018     Lab Results   Component Value Date    HDL 69 11/06/2018    HDL 73 (H) 01/09/2018     No results found for: LDLCHOLEST  Lab Results   Component Value Date    LDLCALC 43 01/09/2018    LDL direct 11/06/2018:  61  Lab Results   Component Value Date    TRIG 70 11/06/2018    TRIG 76 01/09/2018         Lab Results   Component Value Date    CALCIUM 9 6 10/18/2018     10/11/2017    K 4 0 10/18/2018    CO2 26 10/18/2018    CL 98 (L) 10/18/2018    BUN 20 10/18/2018    CREATININE 0 81 10/18/2018           Virginia Page MD

## 2018-12-10 DIAGNOSIS — E78.2 MIXED HYPERLIPIDEMIA: ICD-10-CM

## 2018-12-10 RX ORDER — ATORVASTATIN CALCIUM 10 MG/1
10 TABLET, FILM COATED ORAL DAILY
Qty: 90 TABLET | Refills: 3 | Status: SHIPPED | OUTPATIENT
Start: 2018-12-10 | End: 2019-12-03 | Stop reason: SDUPTHER

## 2019-01-23 DIAGNOSIS — I10 ESSENTIAL HYPERTENSION: Primary | ICD-10-CM

## 2019-01-23 RX ORDER — CHLORTHALIDONE 25 MG/1
TABLET ORAL
Qty: 45 TABLET | Refills: 3 | Status: SHIPPED | OUTPATIENT
Start: 2019-01-23 | End: 2020-01-09 | Stop reason: SDUPTHER

## 2019-01-29 ENCOUNTER — TELEPHONE (OUTPATIENT)
Dept: CARDIOLOGY CLINIC | Facility: CLINIC | Age: 84
End: 2019-01-29

## 2019-01-29 DIAGNOSIS — I10 ESSENTIAL HYPERTENSION: ICD-10-CM

## 2019-01-29 RX ORDER — AMLODIPINE BESYLATE 5 MG/1
5 TABLET ORAL DAILY
Qty: 90 TABLET | Refills: 3 | Status: CANCELLED | OUTPATIENT
Start: 2019-01-29

## 2019-01-29 RX ORDER — AMLODIPINE BESYLATE 5 MG/1
5 TABLET ORAL
Qty: 90 TABLET | Refills: 3 | Status: SHIPPED | OUTPATIENT
Start: 2019-01-29 | End: 2020-02-19

## 2019-03-05 ENCOUNTER — APPOINTMENT (OUTPATIENT)
Dept: RADIOLOGY | Facility: CLINIC | Age: 84
End: 2019-03-05
Payer: COMMERCIAL

## 2019-03-05 ENCOUNTER — APPOINTMENT (OUTPATIENT)
Dept: LAB | Facility: CLINIC | Age: 84
End: 2019-03-05
Payer: COMMERCIAL

## 2019-03-05 ENCOUNTER — TRANSCRIBE ORDERS (OUTPATIENT)
Dept: RADIOLOGY | Facility: CLINIC | Age: 84
End: 2019-03-05

## 2019-03-05 ENCOUNTER — OFFICE VISIT (OUTPATIENT)
Dept: FAMILY MEDICINE CLINIC | Facility: CLINIC | Age: 84
End: 2019-03-05
Payer: COMMERCIAL

## 2019-03-05 VITALS
DIASTOLIC BLOOD PRESSURE: 64 MMHG | WEIGHT: 162 LBS | RESPIRATION RATE: 16 BRPM | SYSTOLIC BLOOD PRESSURE: 116 MMHG | BODY MASS INDEX: 22.68 KG/M2 | HEART RATE: 68 BPM | HEIGHT: 71 IN | TEMPERATURE: 97.8 F

## 2019-03-05 DIAGNOSIS — N40.0 BENIGN PROSTATIC HYPERPLASIA WITHOUT LOWER URINARY TRACT SYMPTOMS: ICD-10-CM

## 2019-03-05 DIAGNOSIS — M54.50 CHRONIC RIGHT-SIDED LOW BACK PAIN WITHOUT SCIATICA: ICD-10-CM

## 2019-03-05 DIAGNOSIS — G89.29 CHRONIC RIGHT-SIDED LOW BACK PAIN WITHOUT SCIATICA: ICD-10-CM

## 2019-03-05 DIAGNOSIS — Z23 NEED FOR PNEUMOCOCCAL VACCINATION: ICD-10-CM

## 2019-03-05 DIAGNOSIS — E78.5 HYPERLIPIDEMIA, UNSPECIFIED HYPERLIPIDEMIA TYPE: ICD-10-CM

## 2019-03-05 DIAGNOSIS — L60.0 INGROWN TOENAIL OF RIGHT FOOT WITH INFECTION: ICD-10-CM

## 2019-03-05 DIAGNOSIS — I10 ESSENTIAL HYPERTENSION: ICD-10-CM

## 2019-03-05 DIAGNOSIS — I10 ESSENTIAL HYPERTENSION: Primary | ICD-10-CM

## 2019-03-05 DIAGNOSIS — Z00.00 ROUTINE MEDICAL EXAM: ICD-10-CM

## 2019-03-05 PROCEDURE — 1170F FXNL STATUS ASSESSED: CPT | Performed by: FAMILY MEDICINE

## 2019-03-05 PROCEDURE — 1160F RVW MEDS BY RX/DR IN RCRD: CPT | Performed by: FAMILY MEDICINE

## 2019-03-05 PROCEDURE — 1036F TOBACCO NON-USER: CPT | Performed by: FAMILY MEDICINE

## 2019-03-05 PROCEDURE — 4040F PNEUMOC VAC/ADMIN/RCVD: CPT

## 2019-03-05 PROCEDURE — 1125F AMNT PAIN NOTED PAIN PRSNT: CPT | Performed by: FAMILY MEDICINE

## 2019-03-05 PROCEDURE — G0439 PPPS, SUBSEQ VISIT: HCPCS | Performed by: FAMILY MEDICINE

## 2019-03-05 PROCEDURE — 3725F SCREEN DEPRESSION PERFORMED: CPT | Performed by: FAMILY MEDICINE

## 2019-03-05 PROCEDURE — 1101F PT FALLS ASSESS-DOCD LE1/YR: CPT | Performed by: FAMILY MEDICINE

## 2019-03-05 PROCEDURE — G0009 ADMIN PNEUMOCOCCAL VACCINE: HCPCS

## 2019-03-05 PROCEDURE — 99214 OFFICE O/P EST MOD 30 MIN: CPT | Performed by: FAMILY MEDICINE

## 2019-03-05 PROCEDURE — 72110 X-RAY EXAM L-2 SPINE 4/>VWS: CPT

## 2019-03-05 PROCEDURE — 90670 PCV13 VACCINE IM: CPT

## 2019-03-05 RX ORDER — CEPHALEXIN 500 MG/1
500 CAPSULE ORAL EVERY 8 HOURS SCHEDULED
Qty: 15 CAPSULE | Refills: 0 | Status: SHIPPED | OUTPATIENT
Start: 2019-03-05 | End: 2019-03-10

## 2019-03-05 NOTE — PROGRESS NOTES
Chief Complaint   Patient presents with    Blood Pressure Check    Medicare Wellness Visit   multiple issues      Patient ID: David Hedrick is a 80 y o  male  HPI  Pt is seeing for f/u HTN, BPH, HLD -  All stable -  Last 6 m noticed R sided low back pain in am and when walking  -  No symptoms sitting up  -  Woke up with pain in R great toe today     The following portions of the patient's history were reviewed and updated as appropriate: allergies, current medications, past family history, past medical history, past social history, past surgical history and problem list     Review of Systems   Constitutional: Negative  Respiratory: Negative  Cardiovascular: Negative  Gastrointestinal: Negative  Negative for bowel incontinence  Genitourinary: Negative  Musculoskeletal: Negative  Skin: Negative  Except for R great toe mild redness ans swelling at medial aspect    Neurological: Negative  Psychiatric/Behavioral: The patient is not nervous/anxious  Current Outpatient Medications   Medication Sig Dispense Refill    amLODIPine (NORVASC) 5 mg tablet Take 1 tablet (5 mg total) by mouth daily at bedtime 90 tablet 3    atorvastatin (LIPITOR) 10 mg tablet Take 1 tablet (10 mg total) by mouth daily 90 tablet 3    Calcium Carbonate-Vitamin D (CALCIUM 500 + D) 500-125 MG-UNIT TABS Take by mouth      chlorthalidone 25 mg tablet TAKE ONE TABLET BY MOUTH EVERY OTHER DAY 45 tablet 3    Cholecalciferol (VITAMIN D) 2000 units CAPS Take by mouth      finasteride (PROSCAR) 5 mg tablet Take 1 tablet (5 mg total) by mouth daily (Patient taking differently: Take 5 mg by mouth daily at bedtime  ) 90 tablet 3    lisinopril (ZESTRIL) 5 mg tablet Take 1 tablet in morning and 2 tablets in the evening   (Patient taking differently: 5 mg daily  ) 270 tablet 3    Multiple Vitamins-Minerals (CENTRUM SILVER ADULT 50+ PO) Take by mouth      triamcinolone (KENALOG) 0 1 % cream Apply topically 2 (two) times a day 45 g 6     No current facility-administered medications for this visit  Objective:    /64 (BP Location: Right arm, Patient Position: Sitting, Cuff Size: Standard)   Pulse 68   Temp 97 8 °F (36 6 °C) (Tympanic)   Resp 16   Ht 5' 11" (1 803 m)   Wt 73 5 kg (162 lb)   BMI 22 59 kg/m²        Physical Exam   Constitutional: He is oriented to person, place, and time  He appears well-nourished  No distress  Eyes: Conjunctivae are normal    Cardiovascular: Normal rate, regular rhythm and normal heart sounds  Exam reveals no gallop  No murmur heard  Pulmonary/Chest: Effort normal and breath sounds normal  No respiratory distress  He has no wheezes  He has no rales  Abdominal: Soft  There is no tenderness  Musculoskeletal: He exhibits tenderness  Lumbar back: He exhibits tenderness  He exhibits normal range of motion  Back:         Feet:    Neurological: He is oriented to person, place, and time  No cranial nerve deficit  Skin: No pallor  Psychiatric: He has a normal mood and affect  Labs in chart were reviewed  Assessment/Plan:         Diagnoses and all orders for this visit:    Essential hypertension  -     Comprehensive metabolic panel; Future  -     Lipid panel; Future  -     TSH, 3rd generation; Future    Benign prostatic hyperplasia without lower urinary tract symptoms    Hyperlipidemia, unspecified hyperlipidemia type    Chronic right-sided low back pain without sciatica  -     XR spine lumbar minimum 4 views non injury; Future    Routine medical exam    Ingrown toenail of right foot with infection  -     cephalexin (KEFLEX) 500 mg capsule;  Take 1 capsule (500 mg total) by mouth every 8 (eight) hours for 5 days    Need for pneumococcal vaccination  -     PNEUMOCOCCAL CONJUGATE VACCINE 13-VALENT GREATER THAN 6 MONTHS          rto in 3 m                   Lisette Saunders MD    Assessment and Plan:    Problem List Items Addressed This Visit Cardiovascular and Mediastinum    HTN (hypertension) - Primary    Relevant Orders    Comprehensive metabolic panel    Lipid panel    TSH, 3rd generation       Genitourinary    Benign prostatic hyperplasia without lower urinary tract symptoms       Other    Hyperlipidemia      Other Visit Diagnoses     Chronic right-sided low back pain without sciatica        Relevant Orders    XR spine lumbar minimum 4 views non injury    Routine medical exam        Ingrown toenail of right foot with infection        Relevant Medications    cephalexin (KEFLEX) 500 mg capsule    Need for pneumococcal vaccination        Relevant Orders    PNEUMOCOCCAL CONJUGATE VACCINE 13-VALENT GREATER THAN 6 MONTHS (Completed)        Health Maintenance Due   Topic Date Due    Medicare Annual Wellness Visit (AWV)  08/06/1930    Pneumococcal PPSV23/PCV13 65+ Years / High and Highest Risk (1 of 2 - PCV13) 08/06/1995         HPI:  Gideon Carmona is a 80 y o  male here for his Subsequent Wellness Visit      Patient Active Problem List   Diagnosis    HTN (hypertension)    Hyperlipidemia    PAD (peripheral artery disease) (HCC)    Benign prostatic hyperplasia without lower urinary tract symptoms     Past Medical History:   Diagnosis Date    Asymptomatic PVCs     last assessed 1/18/18    BPH (benign prostatic hyperplasia)     last assessed 4/11/17     High cholesterol     last assessed 4/11/17     Hypertension     uncontrolled, last assessed 4/11/17     Past Surgical History:   Procedure Laterality Date    CATARACT EXTRACTION      COLONOSCOPY      Complete    DE REMOVAL OF HYDROCELE,TUNICA,UNILAT Right 10/25/2018    Procedure: HYDROCELECTOMY;  Surgeon: Kenneth Theodore MD;  Location: [address] [place] [address];  Service: Urology    TESTICLE SURGERY      last assessed 3/31/15     Family History   Problem Relation Age of Onset    Heart disease Mother     Hypertension Mother     Heart disease Father     Hypertension Father     Hypertension Family  Hyperlipidemia Family      Social History     Tobacco Use   Smoking Status Former Smoker    Years: 20 00    Last attempt to quit: 10/18/1965    Years since quittin 4   Smokeless Tobacco Never Used     Social History     Substance and Sexual Activity   Alcohol Use No      Social History     Substance and Sexual Activity   Drug Use No       Current Outpatient Medications   Medication Sig Dispense Refill    amLODIPine (NORVASC) 5 mg tablet Take 1 tablet (5 mg total) by mouth daily at bedtime 90 tablet 3    atorvastatin (LIPITOR) 10 mg tablet Take 1 tablet (10 mg total) by mouth daily 90 tablet 3    Calcium Carbonate-Vitamin D (CALCIUM 500 + D) 500-125 MG-UNIT TABS Take by mouth      chlorthalidone 25 mg tablet TAKE ONE TABLET BY MOUTH EVERY OTHER DAY 45 tablet 3    Cholecalciferol (VITAMIN D) 2000 units CAPS Take by mouth      finasteride (PROSCAR) 5 mg tablet Take 1 tablet (5 mg total) by mouth daily (Patient taking differently: Take 5 mg by mouth daily at bedtime  ) 90 tablet 3    lisinopril (ZESTRIL) 5 mg tablet Take 1 tablet in morning and 2 tablets in the evening  (Patient taking differently: 5 mg daily  ) 270 tablet 3    Multiple Vitamins-Minerals (CENTRUM SILVER ADULT 50+ PO) Take by mouth      triamcinolone (KENALOG) 0 1 % cream Apply topically 2 (two) times a day 45 g 6    cephalexin (KEFLEX) 500 mg capsule Take 1 capsule (500 mg total) by mouth every 8 (eight) hours for 5 days 15 capsule 0     No current facility-administered medications for this visit        Allergies   Allergen Reactions    Tamsulosin Syncope     Immunization History   Administered Date(s) Administered    INFLUENZA 10/01/2018    Influenza Split High Dose Preservative Free IM 10/09/2017    Pneumococcal Conjugate 13-Valent 2019       Patient Care Team:  Rickey Estes MD as PCP - General (Family Medicine)  MD Misha Parmar MD Janise Oddi, MD    Medicare Screening Tests and Risk Assessments:  Berta Patel is here for his Subsequent Wellness visit  Health Risk Assessment:  Patient rates overall health as good  Patient feels that their physical health rating is Same  Eyesight was rated as Same  Hearing was rated as Same  Patient feels that their emotional and mental health rating is Same  Pain experienced by patient in the last 7 days has been Some  Patient's pain rating has been 5/10  Patient states that he has experienced no weight loss or gain in last 6 months  Emotional/Mental Health:  Patient has been feeling nervous/anxious  PHQ-9 Depression Screening:    Frequency of the following problems over the past two weeks:      1  Little interest or pleasure in doing things: 0 - not at all      2  Feeling down, depressed, or hopeless: 0 - not at all  PHQ-2 Score: 0          Broken Bones/Falls: Fall Risk Assessment:    In the past year, patient has experienced: No history of falling in past year          Bladder/Bowel:  Patient has not leaked urine accidently in the last six months  Patient reports no loss of bowel control  Immunizations:  Patient has had a flu vaccination within the last year  Patient has not received a pneumonia shot  Patient has not received a shingles shot  Patient has not received tetanus/diphtheria shot  Home Safety:  Patient does not have trouble with stairs inside or outside of their home  Patient currently reports that there are no safety hazards present in home, working smoke alarms, working carbon monoxide detectors  Preventative Screenings:   no prostate cancer screen performed, no colon cancer screen completed, no cholesterol screen completed, no glaucoma eye exam completed    Nutrition:  Current diet: Regular and No Added Salt with servings of the following:    Medications:  Patient is currently taking over-the-counter supplements  Patient is able to manage medications  Lifestyle Choices:  Patient reports no tobacco use    Patient has smoked or used tobacco in the past   Patient has stopped his tobacco use  Tobacco use quit date: 1965  Patient reports no alcohol use  Patient drives a vehicle  Patient wears seat belt  Activities of Daily Living:  Can get out of bed by his or her self, able to dress self, able to make own meals, able to do own shopping, able to bathe self, can do own laundry/housekeeping, can manage own money, pay bills and track expenses    Previous Hospitalizations:  No hospitalization or ED visit in past 12 months        Advanced Directives:  Patient has not decided on power of   Patient has not completed advanced directive  Preventative Screening/Counseling:      Cardiovascular:      General: Risks and Benefits Discussed      Counseling: Healthy Diet     Due for Labs/Analytes/Optional EKG: Lipid Panel          Diabetes:      General: Risks and Benefits Discussed      Counseling: Healthy Diet      Due for labs: Blood Glucose          Colorectal Cancer:      General: Screening Not Indicated          Prostate Cancer:      General: Screening Not Indicated          Osteoporosis:      General: Screening Not Indicated          AAA:      General: Screening Not Indicated          Glaucoma:      General: Patient Declines          HIV:      General: Screening Not Indicated          Hepatitis C:      General: Screening Not Indicated        Advanced Directives:   Patient has no living will for healthcare, does not have durable POA for healthcare, patient does not have an advanced directive  Information on ACP and/or AD provided       Immunizations:      Influenza: Influenza UTD This Year, Risks & Benefits Discussed and Influenza Recommended Annually      Pneumococcal: Pneumococcal Due Today      Shingrix: Shingrix Vaccine Needed Today

## 2019-03-13 ENCOUNTER — TELEPHONE (OUTPATIENT)
Dept: FAMILY MEDICINE CLINIC | Facility: CLINIC | Age: 84
End: 2019-03-13

## 2019-03-13 NOTE — TELEPHONE ENCOUNTER
----- Message from Shannan Giang MD sent at 3/13/2019  6:46 PM EDT -----  Pl, advise pt -  Low back xrays showed OA

## 2019-03-14 ENCOUNTER — APPOINTMENT (OUTPATIENT)
Dept: LAB | Facility: CLINIC | Age: 84
End: 2019-03-14
Payer: COMMERCIAL

## 2019-03-14 ENCOUNTER — TRANSCRIBE ORDERS (OUTPATIENT)
Dept: LAB | Facility: CLINIC | Age: 84
End: 2019-03-14

## 2019-03-14 DIAGNOSIS — I10 ESSENTIAL HYPERTENSION, MALIGNANT: Primary | ICD-10-CM

## 2019-03-14 DIAGNOSIS — I10 ESSENTIAL HYPERTENSION, MALIGNANT: ICD-10-CM

## 2019-03-14 LAB
ALBUMIN SERPL BCP-MCNC: 3.9 G/DL (ref 3.5–5)
ALP SERPL-CCNC: 76 U/L (ref 46–116)
ALT SERPL W P-5'-P-CCNC: 27 U/L (ref 12–78)
ANION GAP SERPL CALCULATED.3IONS-SCNC: 8 MMOL/L (ref 4–13)
AST SERPL W P-5'-P-CCNC: 24 U/L (ref 5–45)
BILIRUB SERPL-MCNC: 0.51 MG/DL (ref 0.2–1)
BUN SERPL-MCNC: 27 MG/DL (ref 5–25)
CALCIUM SERPL-MCNC: 9.3 MG/DL (ref 8.3–10.1)
CHLORIDE SERPL-SCNC: 102 MMOL/L (ref 100–108)
CHOLEST SERPL-MCNC: 176 MG/DL (ref 50–200)
CO2 SERPL-SCNC: 28 MMOL/L (ref 21–32)
CREAT SERPL-MCNC: 0.85 MG/DL (ref 0.6–1.3)
GFR SERPL CREATININE-BSD FRML MDRD: 78 ML/MIN/1.73SQ M
GLUCOSE P FAST SERPL-MCNC: 87 MG/DL (ref 65–99)
HDLC SERPL-MCNC: 87 MG/DL (ref 40–60)
LDLC SERPL CALC-MCNC: 74 MG/DL (ref 0–100)
NONHDLC SERPL-MCNC: 89 MG/DL
POTASSIUM SERPL-SCNC: 4 MMOL/L (ref 3.5–5.3)
PROT SERPL-MCNC: 7.4 G/DL (ref 6.4–8.2)
SODIUM SERPL-SCNC: 138 MMOL/L (ref 136–145)
TRIGL SERPL-MCNC: 74 MG/DL
TSH SERPL DL<=0.05 MIU/L-ACNC: 1.63 UIU/ML (ref 0.36–3.74)

## 2019-03-14 PROCEDURE — 36415 COLL VENOUS BLD VENIPUNCTURE: CPT | Performed by: FAMILY MEDICINE

## 2019-03-14 PROCEDURE — 84443 ASSAY THYROID STIM HORMONE: CPT

## 2019-03-14 PROCEDURE — 80061 LIPID PANEL: CPT | Performed by: FAMILY MEDICINE

## 2019-03-14 PROCEDURE — 80053 COMPREHEN METABOLIC PANEL: CPT | Performed by: FAMILY MEDICINE

## 2019-03-15 ENCOUNTER — TELEPHONE (OUTPATIENT)
Dept: FAMILY MEDICINE CLINIC | Facility: CLINIC | Age: 84
End: 2019-03-15

## 2019-03-15 NOTE — TELEPHONE ENCOUNTER
----- Message from Sujit Hills MD sent at 3/15/2019  9:13 AM EDT -----  Pl, advise pt -  Normal labs

## 2019-05-08 DIAGNOSIS — I10 ESSENTIAL HYPERTENSION: ICD-10-CM

## 2019-05-08 RX ORDER — LISINOPRIL 5 MG/1
TABLET ORAL
Qty: 270 TABLET | Refills: 3 | Status: SHIPPED | OUTPATIENT
Start: 2019-05-08 | End: 2019-06-07

## 2019-05-21 DIAGNOSIS — R21 RASH: ICD-10-CM

## 2019-05-21 RX ORDER — TRIAMCINOLONE ACETONIDE 1 MG/G
CREAM TOPICAL 2 TIMES DAILY
Qty: 45 G | Refills: 6 | Status: SHIPPED | OUTPATIENT
Start: 2019-05-21 | End: 2020-09-08 | Stop reason: SDUPTHER

## 2019-06-07 ENCOUNTER — OFFICE VISIT (OUTPATIENT)
Dept: CARDIOLOGY CLINIC | Facility: CLINIC | Age: 84
End: 2019-06-07
Payer: COMMERCIAL

## 2019-06-07 VITALS
WEIGHT: 164 LBS | BODY MASS INDEX: 22.96 KG/M2 | OXYGEN SATURATION: 99 % | HEART RATE: 60 BPM | SYSTOLIC BLOOD PRESSURE: 144 MMHG | HEIGHT: 71 IN | DIASTOLIC BLOOD PRESSURE: 70 MMHG

## 2019-06-07 DIAGNOSIS — K21.00 GERD WITH ESOPHAGITIS: ICD-10-CM

## 2019-06-07 DIAGNOSIS — R60.0 EDEMA OF RIGHT LOWER EXTREMITY: ICD-10-CM

## 2019-06-07 DIAGNOSIS — E78.5 DYSLIPIDEMIA: ICD-10-CM

## 2019-06-07 DIAGNOSIS — I70.219 ATHEROSCLEROTIC PVD WITH INTERMITTENT CLAUDICATION (HCC): ICD-10-CM

## 2019-06-07 DIAGNOSIS — I10 LABILE ESSENTIAL HYPERTENSION: Primary | ICD-10-CM

## 2019-06-07 DIAGNOSIS — I49.1 PREMATURE ATRIAL CONTRACTIONS: ICD-10-CM

## 2019-06-07 DIAGNOSIS — I10 ESSENTIAL HYPERTENSION: ICD-10-CM

## 2019-06-07 PROCEDURE — 99214 OFFICE O/P EST MOD 30 MIN: CPT | Performed by: INTERNAL MEDICINE

## 2019-06-07 PROCEDURE — 93000 ELECTROCARDIOGRAM COMPLETE: CPT | Performed by: INTERNAL MEDICINE

## 2019-06-07 RX ORDER — LISINOPRIL 5 MG/1
TABLET ORAL
Qty: 270 TABLET | Refills: 3 | Status: SHIPPED | OUTPATIENT
Start: 2019-06-07 | End: 2020-10-20 | Stop reason: SDUPTHER

## 2019-06-21 ENCOUNTER — HOSPITAL ENCOUNTER (OUTPATIENT)
Dept: RADIOLOGY | Facility: HOSPITAL | Age: 84
Discharge: HOME | End: 2019-06-21
Attending: INTERNAL MEDICINE
Payer: COMMERCIAL

## 2019-06-21 DIAGNOSIS — R60.0 EDEMA OF RIGHT LOWER EXTREMITY: Primary | ICD-10-CM

## 2019-06-21 DIAGNOSIS — S81.801A NON-HEALING WOUND OF LOWER EXTREMITY, RIGHT, INITIAL ENCOUNTER: ICD-10-CM

## 2019-06-21 DIAGNOSIS — I87.2 VENOUS INSUFFICIENCY OF RIGHT LOWER EXTREMITY: ICD-10-CM

## 2019-06-21 DIAGNOSIS — R60.0 EDEMA OF RIGHT LOWER EXTREMITY: ICD-10-CM

## 2019-06-21 PROCEDURE — 93971 EXTREMITY STUDY: CPT | Performed by: SURGERY

## 2019-06-21 PROCEDURE — 93971 EXTREMITY STUDY: CPT

## 2019-06-24 ENCOUNTER — TELEPHONE (OUTPATIENT)
Dept: CARDIOLOGY CLINIC | Facility: CLINIC | Age: 84
End: 2019-06-24

## 2019-07-10 NOTE — PROGRESS NOTES
Assessment/Plan:    PAD (peripheral artery disease) ([address] )    Peripheral artery disease as evidenced by absence of palpable pedal pulses  Doppler signals are present  Wound at lateral malleolar region  Is more characteristic of venous stasis wound  However wound healing potential may need to be optimized with improvement to the arterial perfusion  Will prescribe light compression (15-20 mm mercury knee-high)  Will obtain non  Invasive arterial testing to be reviewed in 6 weeks  If no significant wound healing has occurred will discuss the possibility of proceeding with a percutaneous revascularization  Venous insufficiency of right lower extremity    Peripheral artery disease as evidenced by absence of palpable pedal pulses  Doppler signals are present  Wound at lateral malleolar region  Is more characteristic of venous stasis wound  However wound healing potential may need to be optimized with improvement to the arterial perfusion  Will prescribe light compression (15-20 mm mercury knee-high)         Diagnoses and all orders for this visit:    PAD (peripheral artery disease) ([facility])  -     VAS lower limb arterial duplex, complete bilateral; Future    Edema of right lower extremity  -     Ambulatory referral to Vascular Surgery  -     Compression Stocking    Venous insufficiency of right lower extremity  -     Ambulatory referral to Vascular Surgery  -     Compression Stocking    Other orders  -     mupirocin (BACTROBAN) 2 % ointment          Subjective:      Patient ID: Gina Brittle is a 80 y o  male  Pt is new to our practice and was referred by Dr Pamela Morelos MD   Pt is here to review the results of his LEVDR on 6/21/19  Pt c/o wound to his right lower leg for the past 4 months, wound is now getting better  Wound is clean and dry  Pt c/o walking about 15-20 minutes and he get leg pain  He then rests 20 minutes for relief  Pt denies any numbness or tingling in the right leg    Pt is currently taking ASA and Lipitor  Patient presents with small wound at the right lateral malleolar area  Patient has signs of both arterial and venous insufficiency  I am unable to palpate pedal pulses  Signals are present  Patient underwent a venous valvular insufficiency study which shows deep venous incompetence of the right lower extremity and incompetence of the small saphenous vein  The great saphenous vein showed no significant reflux  Because of the arterial insufficiency  We are limited with the amount of compression, therefore I will prescribe 15-20 mm mercury knee-high compression stockings  I will have the patient return in 6 weeks to reassess wound healing and obtain noninvasive arterial testing to be reviewed with the patient  If the wound has not shown significant improvement I have informed the patient we may need to proceed with attempt at percutaneous revascularization  However patient states the wound has been improving over the last few weeks and I am hopeful it will go on to heal without intervention  The following portions of the patient's history were reviewed and updated as appropriate: allergies, current medications, past family history, past medical history, past social history, past surgical history and problem list     Review of Systems   Constitutional: Negative  HENT: Negative  Eyes: Negative  Respiratory: Negative  Cardiovascular: Negative  Gastrointestinal: Negative  Endocrine: Negative  Genitourinary: Negative  Musculoskeletal: Positive for back pain  Skin: Positive for wound (Lower right  leg)  Allergic/Immunologic: Negative  Neurological: Negative  Hematological: Bruises/bleeds easily  Psychiatric/Behavioral: Negative            Objective:      /74 (BP Location: Right arm, Patient Position: Sitting, Cuff Size: Adult)   Pulse 64   Temp (!) 97 4 °F (36 3 °C) (Tympanic)   Resp 16   Ht 5' 11" (1 803 m)   Wt 74 4 kg (164 lb)   BMI 22 87 kg/m²          Physical Exam

## 2019-07-12 ENCOUNTER — CONSULT (OUTPATIENT)
Dept: VASCULAR SURGERY | Facility: CLINIC | Age: 84
End: 2019-07-12
Payer: COMMERCIAL

## 2019-07-12 VITALS
TEMPERATURE: 97.4 F | HEART RATE: 64 BPM | DIASTOLIC BLOOD PRESSURE: 74 MMHG | SYSTOLIC BLOOD PRESSURE: 128 MMHG | HEIGHT: 71 IN | RESPIRATION RATE: 16 BRPM | WEIGHT: 164 LBS | BODY MASS INDEX: 22.96 KG/M2

## 2019-07-12 DIAGNOSIS — I87.2 VENOUS INSUFFICIENCY OF RIGHT LOWER EXTREMITY: ICD-10-CM

## 2019-07-12 DIAGNOSIS — R60.0 EDEMA OF RIGHT LOWER EXTREMITY: ICD-10-CM

## 2019-07-12 DIAGNOSIS — I73.9 PAD (PERIPHERAL ARTERY DISEASE) (HCC): Primary | ICD-10-CM

## 2019-07-12 PROCEDURE — 99203 OFFICE O/P NEW LOW 30 MIN: CPT | Performed by: SURGERY

## 2019-07-12 NOTE — ASSESSMENT & PLAN NOTE
Peripheral artery disease as evidenced by absence of palpable pedal pulses  Doppler signals are present  Wound at lateral malleolar region  Is more characteristic of venous stasis wound  However wound healing potential may need to be optimized with improvement to the arterial perfusion  Will prescribe light compression (15-20 mm mercury knee-high)  Will obtain non  Invasive arterial testing to be reviewed in 6 weeks  If no significant wound healing has occurred will discuss the possibility of proceeding with a percutaneous revascularization

## 2019-07-12 NOTE — ASSESSMENT & PLAN NOTE
Peripheral artery disease as evidenced by absence of palpable pedal pulses  Doppler signals are present  Wound at lateral malleolar region  Is more characteristic of venous stasis wound  However wound healing potential may need to be optimized with improvement to the arterial perfusion    Will prescribe light compression (15-20 mm mercury knee-high)

## 2019-07-12 NOTE — LETTER
July 12, 2019     Ann Yeboah MD  [id number]    Patient: Jesu Foster   YOB: 1930   Date of Visit: 7/12/2019       Dear Dr Dallas Jeffries,    Thank you for referring Cheryl Smith to me for evaluation  Below are the relevant portions of my H&P  Patient presents with small wound at the right lateral malleolar area  Patient has signs of both arterial and venous insufficiency  I am unable to palpate pedal pulses  Signals are present  Patient underwent a venous valvular insufficiency study which shows deep venous incompetence of the right lower extremity and incompetence of the small saphenous vein  The great saphenous vein showed no significant reflux  Because of the arterial insufficiency  We are limited with the amount of compression, therefore I will prescribe 15-20 mm mercury knee-high compression stockings  I will have the patient return in 6 weeks to reassess wound healing and obtain noninvasive arterial testing to be reviewed with the patient  If the wound has not shown significant improvement I have informed the patient we may need to proceed with attempt at percutaneous revascularization  However patient states the wound has been improving over the last few weeks and I am hopeful it will go on to heal without intervention  Assessment/Plan:    PAD (peripheral artery disease) ([address] )    Peripheral artery disease as evidenced by absence of palpable pedal pulses  Doppler signals are present  Wound at lateral malleolar region  Is more characteristic of venous stasis wound  However wound healing potential may need to be optimized with improvement to the arterial perfusion  Will prescribe light compression (15-20 mm mercury knee-high)  Will obtain non  Invasive arterial testing to be reviewed in 6 weeks    If no significant wound healing has occurred will discuss the possibility of proceeding with a percutaneous revascularization  Venous insufficiency of right lower extremity    Peripheral artery disease as evidenced by absence of palpable pedal pulses  Doppler signals are present  Wound at lateral malleolar region  Is more characteristic of venous stasis wound  However wound healing potential may need to be optimized with improvement to the arterial perfusion  Will prescribe light compression (15-20 mm mercury knee-high)    If you have questions, please do not hesitate to call me  I look forward to following Carrie Haynes along with you           Sincerely,        Yoly Veras MD        CC: Grayson Runner, MD Chase Brackett, MD

## 2019-07-22 ENCOUNTER — HOSPITAL ENCOUNTER (OUTPATIENT)
Dept: RADIOLOGY | Facility: HOSPITAL | Age: 84
Discharge: HOME | End: 2019-07-22
Attending: SURGERY
Payer: COMMERCIAL

## 2019-07-22 DIAGNOSIS — I73.9 PAD (PERIPHERAL ARTERY DISEASE) (HCC): ICD-10-CM

## 2019-07-22 PROCEDURE — 93925 LOWER EXTREMITY STUDY: CPT

## 2019-07-22 PROCEDURE — 93923 UPR/LXTR ART STDY 3+ LVLS: CPT

## 2019-07-23 PROCEDURE — 93925 LOWER EXTREMITY STUDY: CPT | Performed by: SURGERY

## 2019-07-23 PROCEDURE — 93922 UPR/L XTREMITY ART 2 LEVELS: CPT | Performed by: SURGERY

## 2019-08-27 ENCOUNTER — TELEPHONE (OUTPATIENT)
Dept: FAMILY MEDICINE CLINIC | Facility: CLINIC | Age: 84
End: 2019-08-27

## 2019-08-27 DIAGNOSIS — N40.0 BENIGN PROSTATIC HYPERPLASIA WITHOUT LOWER URINARY TRACT SYMPTOMS: ICD-10-CM

## 2019-08-27 RX ORDER — FINASTERIDE 5 MG/1
5 TABLET, FILM COATED ORAL
Qty: 90 TABLET | Refills: 3 | Status: SHIPPED | OUTPATIENT
Start: 2019-08-27 | End: 2020-08-19 | Stop reason: SDUPTHER

## 2019-08-27 NOTE — TELEPHONE ENCOUNTER
Dr Rhonda Alvarado,     Patient needs a refill of his Finasteride 5 MG sent to the [facility] in [place] when you can, TY

## 2019-08-29 NOTE — PROGRESS NOTES
Assessment/Plan:    PAD (peripheral artery disease) ([facility])   Significant arterial occlusive disease right lower extremity  Severe stenosis in the common femoral and proximal superficial femoral arteries  CHU is 0 53  Patient does claudicate however it is not disabling  Patient instructed on the importance of continuing a walking exercise program and to report to this office should wounds recur to the right lower extremity  Patient on aspirin and statin  Venous insufficiency of right lower extremity  Tolerating compression therapy (15-20 mmHg) with excellent results  Compression therapy limited to 15-20 mmHg due to significant arterial occlusive disease       Diagnoses and all orders for this visit:    PAD (peripheral artery disease) ([facility] [address] )    Venous insufficiency of right lower extremity          Subjective:      Patient ID: Hilary Hawthorne is a 80 y o  male  Pt is here to review the results of his IVANNA on 7/22/19  Pt also had a LEVDR on 6/21/19  Pt was last seen on 7/12/19 for RLE Edema  Pt has a wound on his RLE  Pt has a closed wound by his right ankle  Pt does get some leg swelling on the top of his right foot  Pt does wear compression stockings and elevates his leg as much as possible  Pt is currently taking ASA and Lipitor  Mr Belinda Cabrera is a pleasant 79yo male who returns to   Evaluate his IVANNA and reassess the right posterior lateral malleolar wound  The lower extremity arterial duplex was performed on 07/22/2019  This study shows significant right common femoral and SFA occlusive disease  The CHU is measured at 0 53  CHU to the left lower extremity was measured at 0 83  Despite the significant occlusive disease present, I am happy to report the wound at the right lateral malleolar region has now completely healed  Patient is wearing compression therapy as previously prescribed with good results  Although patient does complain of claudication, this is not disabling to him  He is able to walk for about 15-20 minutes and before the onset of leg pain  He then rests 20 minutes for relief  Pt denies any numbness or tingling in the right leg  Pt is currently taking ASA and Lipitor  Therefore, in the face of a healed wound and non disabling claudication I would not recommend proceeding with intervention for the significant arterial occlusive disease in the right lower extremity  Patient was instructed on the importance of continuing with compression therapy and walking exercise program and to return to this office should wounds recur or the claudication become disabling            The following portions of the patient's history were reviewed and updated as appropriate: allergies, current medications, past family history, past medical history, past social history, past surgical history and problem list     Review of Systems   Constitutional: Negative  HENT: Negative  Eyes: Negative  Respiratory: Negative  Cardiovascular: Positive for leg swelling  Gastrointestinal: Negative  Endocrine: Negative  Genitourinary: Negative  Skin: Positive for wound (Right Ankle)  Allergic/Immunologic: Negative  Neurological: Negative  Hematological: Bruises/bleeds easily  Psychiatric/Behavioral: Negative  Objective:      /66 (BP Location: Right arm, Patient Position: Sitting, Cuff Size: Adult)   Pulse 60   Temp 97 5 °F (36 4 °C) (Tympanic)   Resp 16   Ht 5' 11" (1 803 m)   Wt 73 kg (161 lb)   BMI 22 45 kg/m²          Physical Exam   Constitutional: He is oriented to person, place, and time  He appears well-developed and well-nourished  HENT:   Head: Normocephalic and atraumatic  Mouth/Throat: Oropharynx is clear and moist    Eyes: Pupils are equal, round, and reactive to light  Conjunctivae and EOM are normal    Neck: Normal range of motion  Neck supple  No JVD present  Cardiovascular: Normal rate, regular rhythm and normal heart sounds  Pulmonary/Chest: Effort normal and breath sounds normal  No stridor  No respiratory distress  He has no wheezes  He has no rales  He exhibits no tenderness  Abdominal: Soft  He exhibits no distension and no mass  There is no tenderness  There is no rebound and no guarding  Musculoskeletal: Normal range of motion  He exhibits no tenderness or deformity  Wound at the lateral malleolar region of right lower extremity is now healed  Doppler signals present at the DP /PT / P bilaterally  Swelling better controlled with compression therapy  No other open wounds  Neurological: He is alert and oriented to person, place, and time  Skin: Skin is warm and dry  No rash noted  No erythema  Psychiatric: He has a normal mood and affect  His behavior is normal  Thought content normal    Nursing note and vitals reviewed

## 2019-08-30 ENCOUNTER — OFFICE VISIT (OUTPATIENT)
Dept: VASCULAR SURGERY | Facility: CLINIC | Age: 84
End: 2019-08-30
Payer: COMMERCIAL

## 2019-08-30 VITALS
SYSTOLIC BLOOD PRESSURE: 126 MMHG | WEIGHT: 161 LBS | RESPIRATION RATE: 16 BRPM | BODY MASS INDEX: 22.54 KG/M2 | HEART RATE: 60 BPM | HEIGHT: 71 IN | TEMPERATURE: 97.5 F | DIASTOLIC BLOOD PRESSURE: 66 MMHG

## 2019-08-30 DIAGNOSIS — I73.9 PAD (PERIPHERAL ARTERY DISEASE) (HCC): Primary | ICD-10-CM

## 2019-08-30 DIAGNOSIS — I87.2 VENOUS INSUFFICIENCY OF RIGHT LOWER EXTREMITY: ICD-10-CM

## 2019-08-30 PROCEDURE — 99214 OFFICE O/P EST MOD 30 MIN: CPT | Performed by: SURGERY

## 2019-08-30 NOTE — ASSESSMENT & PLAN NOTE
Significant arterial occlusive disease right lower extremity  Severe stenosis in the common femoral and proximal superficial femoral arteries  CHU is 0 53  Patient does claudicate however it is not disabling  Patient instructed on the importance of continuing a walking exercise program and to report to this office should wounds recur to the right lower extremity  Patient on aspirin and statin

## 2019-08-30 NOTE — ASSESSMENT & PLAN NOTE
Tolerating compression therapy (15-20 mmHg) with excellent results    Compression therapy limited to 15-20 mmHg due to significant arterial occlusive disease

## 2019-08-30 NOTE — LETTER
August 30, 2019     Mustapha Gomez MD  [address] [facility]    Patient: Trisha Thompson   YOB: 1930   Date of Visit: 8/30/2019       Dear Dr Melodie Agarwal: Thank you for referring Regine Lu to me for evaluation  Below are the relevant portions of my assessment and plan of care  Mr Keren Weiss is a pleasant 81yo male who returns to   Evaluate his IVANNA and reassess the right posterior lateral malleolar wound  The lower extremity arterial duplex was performed on 07/22/2019  This study shows significant right common femoral and SFA occlusive disease  The CHU is measured at 0 53  CHU to the left lower extremity was measured at 0 83  Despite the significant occlusive disease present, I am happy to report the wound at the right lateral malleolar region has now completely healed  Patient is wearing compression therapy as previously prescribed with good results  Although patient does complain of claudication, this is not disabling to him  He is able to walk for about 15-20 minutes and before the onset of leg pain  He then rests 20 minutes for relief  Pt denies any numbness or tingling in the right leg  Pt is currently taking ASA and Lipitor  Therefore, in the face of a healed wound and non disabling claudication I would not recommend proceeding with intervention for the significant arterial occlusive disease in the right lower extremity  Patient was instructed on the importance of continuing with compression therapy and walking exercise program and to return to this office should wounds recur or the claudication become disabling      Assessment/Plan:    PAD (peripheral artery disease) ([address] )  Significant arterial occlusive disease right lower extremity  Severe stenosis in the common femoral and proximal superficial femoral arteries  CHU is 0 53  Patient does claudicate however it is not disabling    Patient instructed on the importance of continuing a walking exercise program and to report to this office should wounds recur to the right lower extremity  Patient on aspirin and statin  Venous insufficiency of right lower extremity  Tolerating compression therapy (15-20 mmHg) with excellent results  Compression therapy limited to 15-20 mmHg due to significant arterial occlusive disease      If you have questions, please do not hesitate to call me  I look forward to following Rosi Atkinson along with you           Sincerely,        Sveta Guzmán MD        CC: MD Lisa Blackwell MD

## 2019-09-03 ENCOUNTER — OFFICE VISIT (OUTPATIENT)
Dept: FAMILY MEDICINE CLINIC | Facility: CLINIC | Age: 84
End: 2019-09-03
Payer: COMMERCIAL

## 2019-09-03 VITALS
BODY MASS INDEX: 22.82 KG/M2 | SYSTOLIC BLOOD PRESSURE: 130 MMHG | HEART RATE: 64 BPM | HEIGHT: 71 IN | TEMPERATURE: 97 F | WEIGHT: 163 LBS | RESPIRATION RATE: 14 BRPM | DIASTOLIC BLOOD PRESSURE: 60 MMHG

## 2019-09-03 DIAGNOSIS — I10 ESSENTIAL HYPERTENSION: Primary | ICD-10-CM

## 2019-09-03 PROCEDURE — 99213 OFFICE O/P EST LOW 20 MIN: CPT | Performed by: FAMILY MEDICINE

## 2019-09-03 NOTE — PROGRESS NOTES
Chief Complaint   Patient presents with    Blood Pressure Check        Patient ID: Thad Panda is a 80 y o  male  HPI  Pt is seeing for BP check  -  Stable     The following portions of the patient's history were reviewed and updated as appropriate: allergies, current medications, past family history, past medical history, past social history, past surgical history and problem list     Review of Systems   Constitutional: Negative  Respiratory: Negative  Cardiovascular: Negative  Gastrointestinal: Negative  Genitourinary: Negative  Neurological: Negative  Current Outpatient Medications   Medication Sig Dispense Refill    amLODIPine (NORVASC) 5 mg tablet Take 1 tablet (5 mg total) by mouth daily at bedtime 90 tablet 3    aspirin 81 MG tablet Take 81 mg by mouth daily      atorvastatin (LIPITOR) 10 mg tablet Take 1 tablet (10 mg total) by mouth daily 90 tablet 3    Calcium Carbonate-Vitamin D (CALCIUM 500 + D) 500-125 MG-UNIT TABS Take by mouth      chlorthalidone 25 mg tablet TAKE ONE TABLET BY MOUTH EVERY OTHER DAY 45 tablet 3    Cholecalciferol (VITAMIN D) 2000 units CAPS Take by mouth      finasteride (PROSCAR) 5 mg tablet Take 1 tablet (5 mg total) by mouth daily at bedtime 90 tablet 3    lisinopril (ZESTRIL) 5 mg tablet Take 1 tablet in morning and 1 tablet in the evening  270 tablet 3    Multiple Vitamins-Minerals (CENTRUM SILVER ADULT 50+ PO) Take by mouth      mupirocin (BACTROBAN) 2 % ointment       triamcinolone (KENALOG) 0 1 % cream Apply topically 2 (two) times a day 45 g 6     No current facility-administered medications for this visit  Objective:    /60 (BP Location: Right arm, Patient Position: Sitting, Cuff Size: Standard)   Pulse 64   Temp (!) 97 °F (36 1 °C) (Tympanic)   Resp 14   Ht 5' 11" (1 803 m)   Wt 73 9 kg (163 lb)   BMI 22 73 kg/m²        Physical Exam   Constitutional: No distress     Cardiovascular: Normal rate and regular rhythm  No murmur heard  Pulmonary/Chest: Effort normal and breath sounds normal  No respiratory distress  He has no wheezes  He has no rales  Musculoskeletal: He exhibits no edema                 Assessment/Plan:         Diagnoses and all orders for this visit:    Essential hypertension        Stable  Cont meds    rto in 6 m                    Shahzad Albright MD

## 2019-10-09 ENCOUNTER — TRANSCRIBE ORDERS (OUTPATIENT)
Dept: ADMINISTRATIVE | Facility: HOSPITAL | Age: 84
End: 2019-10-09

## 2019-10-09 DIAGNOSIS — N50.812 TESTICULAR PAIN, LEFT: Primary | ICD-10-CM

## 2019-10-10 ENCOUNTER — OFFICE VISIT (OUTPATIENT)
Dept: OTOLARYNGOLOGY | Facility: CLINIC | Age: 84
End: 2019-10-10
Payer: COMMERCIAL

## 2019-10-10 VITALS
DIASTOLIC BLOOD PRESSURE: 80 MMHG | BODY MASS INDEX: 22.68 KG/M2 | SYSTOLIC BLOOD PRESSURE: 142 MMHG | WEIGHT: 162 LBS | HEIGHT: 71 IN

## 2019-10-10 DIAGNOSIS — K21.9 LARYNGOPHARYNGEAL REFLUX (LPR): Primary | ICD-10-CM

## 2019-10-10 DIAGNOSIS — J30.2 SEASONAL ALLERGIC RHINITIS, UNSPECIFIED TRIGGER: ICD-10-CM

## 2019-10-10 PROCEDURE — 99203 OFFICE O/P NEW LOW 30 MIN: CPT | Performed by: OTOLARYNGOLOGY

## 2019-10-10 PROCEDURE — 31575 DIAGNOSTIC LARYNGOSCOPY: CPT | Performed by: OTOLARYNGOLOGY

## 2019-10-10 RX ORDER — OMEPRAZOLE 40 MG/1
40 CAPSULE, DELAYED RELEASE ORAL DAILY
Qty: 30 CAPSULE | Refills: 1 | Status: SHIPPED | OUTPATIENT
Start: 2019-10-10 | End: 2019-11-21

## 2019-10-10 RX ORDER — AZELASTINE 1 MG/ML
2 SPRAY, METERED NASAL 2 TIMES DAILY
Qty: 1 BOTTLE | Refills: 5 | Status: SHIPPED | OUTPATIENT
Start: 2019-10-10 | End: 2020-03-10

## 2019-10-10 NOTE — PROGRESS NOTES
[facility]  [facility] [address] [facility] [facility] Otolaryngology      Otolaryngology -- Head and Neck Surgery New Patient Visit    Nickie Gonsalves is a 80 y o  who presents with a chief complaint of throat problems    Pertinent elements of the history include:  He presents with throat pain  Associated increased pharyngeal mucus and throat clearing  Pain worse with eating  Associated hoarseness  Denies globus sensation  Symptoms present for months  More noticeable during change of seasons  Denies dysphagia  Denies heartburn  Some associated seasonal allergies, more noticeable this year than in the past -- rhinorrhea, usually clear and nasal congestion  Review of systems 10 point review of systems reviewed, as documented on a separate form  Results reviewed; images from any scan have been personally reviewed: The past medical, surgical, social and family history have been reviewed as documented in today's record      Past Medical History:   Diagnosis Date    Asymptomatic PVCs     last assessed 1/18/18    BPH (benign prostatic hyperplasia)     last assessed 4/11/17     High cholesterol     last assessed 4/11/17     Hypertension     uncontrolled, last assessed 4/11/17       Past Surgical History:   Procedure Laterality Date    CATARACT EXTRACTION      COLONOSCOPY      Complete    [place] REMOVAL OF HYDROCELE,TUNICA,UNILAT Right 10/25/2018    Procedure: HYDROCELECTOMY;  Surgeon: Danny Mckinnon MD;  Location: [address] [place] [address];  Service: Urology    TESTICLE SURGERY      last assessed 3/31/15       Family History   Problem Relation Age of Onset    Heart disease Mother     Hypertension Mother     Heart disease Father     Hypertension Father     Hypertension Family     Hyperlipidemia Family        Social History     Socioeconomic History    Marital status: /Civil Union     Spouse name: Not on file    Number of children: Not on file    Years of education: Not on file  Highest education level: Not on file   Occupational History    Occupation: Retired   Social Needs    Financial resource strain: Not on file    Food insecurity:     Worry: Not on file     Inability: Not on file   [employer] needs:     Medical: Not on file     Non-medical: Not on file   Tobacco Use    Smoking status: Former Smoker     Years: 20 00     Last attempt to quit: 10/18/1965     Years since quittin 0    Smokeless tobacco: Never Used   Substance and Sexual Activity    Alcohol use: No    Drug use: No    Sexual activity: Not on file   Lifestyle    Physical activity:     Days per week: Not on file     Minutes per session: Not on file    Stress: Not on file   Relationships    Social connections:     Talks on phone: Not on file     Gets together: Not on file     Attends Mu-ism service: Not on file     Active member of club or organization: Not on file     Attends meetings of clubs or organizations: Not on file     Relationship status: Not on file    Intimate partner violence:     Fear of current or ex partner: Not on file     Emotionally abused: Not on file     Physically abused: Not on file     Forced sexual activity: Not on file   Other Topics Concern    Not on file   Social History Narrative    Exercises daily     Sleeps 6-7 hrs/day         Physical exam: (abnormal findings appear in bold and supercede any conflicting normal findings listed below)    /80 (BP Location: Left arm, Patient Position: Sitting, Cuff Size: Adult)   Ht 5' 11" (1 803 m)   Wt 73 5 kg (162 lb)   BMI 22 59 kg/m²     Constitutional:  Well developed, well nourished and groomed, in no acute distress  Eyes:  Extra-ocular movements intact, pupils equally round and reactive to light and accommodation, the lids and conjunctivae are normal in appearance      Head: Atraumatic, normocephalic, no visible scalp lesions, bony palpation unremarkable without stepoffs, parotid and submandibular salivary glands non-tender to palpation and without masses bilaterally  Ears:  Auricles normal in appearance bilaterally, mastoid prominence non-tender, external auditory canals clear bilaterally, tympanic membranes intact bilaterally without evidence of middle ear effusion or masses, normal appearing ossicles  Nose/Sinuses:  External appearance unremarkable, no maxillary or frontal sinus tenderness to palpation bilaterally  Anterior rhinoscopy reveals: right septal deviation, pale, allergic appearing left inferior turbinate  Oral Cavity:  Moist mucus membranes, gums and dentition unremarkable, no oral mucosal masses or lesions, floor of mouth soft, tongue mobile without masses or lesions  Oropharynx:  Base of tongue soft and without masses, tonsils bilaterally unremarkable, soft palate mucosa unremarkable  Neck:  No visible or palpable cervical lesions or lymphadenopathy, thyroid gland is normal in size and symmetry and without masses, normal laryngeal elevation with swallowing  Cardiovascular:  Normal rate and rhythm, no palpable thrills, no jugulovenous distension observed  Respiratory:  Normal respiratory effort without evidence of retractions or use of accessory muscles  Integument:  Normal appearing without observed masses or lesions  Neurologic:  Cranial nerves II-XII intact bilaterally  Psychiatric:  Alert and oriented to time, place and person, normal affect  Procedures  Flexible nasopharyngolaryngoscopy was performed after nasal topicalization with lidocaine and oxymetazoline  Findings demonstrate:    Nasal cavity:  Clear, no masses, exudates or polyps  Nasopharynx:  Clear, fossae of Rosenmuller clear bilaterally  Base of tongue:  Clear  Vallecula:   Empty  Epiglottis:  Crisp  Arytenoids/folds: Normal mucosa  Interaryntenoid: Normal mucosa  Postcricoid:  Normal mucosa  Glottis:  Normal vocal fold motion, no laryngeal masses  Piriform sinuses: Clear      Assessment:   1   Laryngopharyngeal reflux (LPR)  omeprazole (PriLOSEC) 40 MG capsule   2  Seasonal allergic rhinitis, unspecified trigger  azelastine (ASTELIN) 0 1 % nasal spray       Orders  No orders of the defined types were placed in this encounter  Discussion/Plan:    1  Symptoms overlap with reflux and also allergic rhinitis  His exam showed mucosal changes consistent with allergic rhinitis but his throat exam was relatively normal   No evidence of malignancy  I placed him on astelin nasal spray and also omeprazole  I recommended cutting down to 1 cup of coffee per day  Follow up in 6 weeks  Thank you for allowing me to participate in the care of your patient

## 2019-10-10 NOTE — PATIENT INSTRUCTIONS
Laryngopharyngeal Reflux (LPR): What you should know    What is LPR? LPR is when stomach acid backflows up and onto the larynx (voice box) and the pharynx (base of the throat and back of the nose)  The tissues of the nose, throat and voicebox are easily damaged by stomach acid when this backflow occurs more than occasionally  This condition is also called Silent Reflux and Atypical Reflux   LPR is related to Gastroesophageal Reflux (GERD), but it is not the same condition  People with GERD typically have heartburn as their main symptom  Many people with LPR may or may not have heartburn  Signs of LPR include:   Voice changes or hoarseness   Difficulty swallowing   Frequent coughing or choking   A feeling of something stuck in the back of the throat     Excessive throat mucus   Postnasal Drip   Heartburn   Bitter taste in the mouth      Risk factors for LPR:   Tobacco use   Alcohol use   Being overweight   Stress   Eating close to bedtime   Eating large meals   Lying down or exercising soon after eating   Wearing tight clothing   Drinking carbonated, caffeinated and/or citrus based beverages   Eating fatty, greasy or spicy foods            Treatment  It is important that you take the medicine prescribed by your doctor every day  You may not see results until after 30 to 45 days of treatment  You should take PPI medications 30 minutes or more before eating      Examples of PPI medications include:   Omeprazole (Prilosec)   Esomeprazole (Nexium)   Pantoprazole (Protonix)   Lansoprazole (Prevacid)   Rabeprazole (Aciphex)    Examples of histamine blockers include:   Ranitidine (Zantac)   Famotidine (Pepcid)   Cimetidine (Tagamet)    Lifestyle changes    In addition to your medication, you should strive to make the following lifestyle changes:     Eat smaller, more frequent meals   Drink plenty of water   Limit your consumption of the following food:  o Alcohol  o Caffeinated beverages  o Carbonated beverages  o Citrus based beverages  o Citrus fruits  o Fatty foods  o Spicy foods  o Tomato based foods (especially sauce)  o Chocolate, licorice or mint   Avoid  o Wearing tight clothing  o Exercising or singing after a meal  o Lying down within 3 hours after eating  o Eating within 3 hours of going to bed    To learn more about this condition and how you can improve your LPR-related quality of life, you should consider reading the book:  Dropping acid:  The reflux diet cookbook and cure by Dr Velia Torrez MD

## 2019-10-15 ENCOUNTER — HOSPITAL ENCOUNTER (OUTPATIENT)
Dept: RADIOLOGY | Facility: HOSPITAL | Age: 84
Discharge: HOME | End: 2019-10-15
Attending: SPECIALIST
Payer: COMMERCIAL

## 2019-10-15 DIAGNOSIS — N50.812 TESTICULAR PAIN, LEFT: ICD-10-CM

## 2019-10-15 PROCEDURE — 76870 US EXAM SCROTUM: CPT

## 2019-11-21 ENCOUNTER — OFFICE VISIT (OUTPATIENT)
Dept: OTOLARYNGOLOGY | Facility: CLINIC | Age: 84
End: 2019-11-21
Payer: COMMERCIAL

## 2019-11-21 VITALS
DIASTOLIC BLOOD PRESSURE: 64 MMHG | SYSTOLIC BLOOD PRESSURE: 112 MMHG | HEART RATE: 59 BPM | HEIGHT: 71 IN | WEIGHT: 163.6 LBS | TEMPERATURE: 97.6 F | BODY MASS INDEX: 22.9 KG/M2

## 2019-11-21 DIAGNOSIS — K21.9 LARYNGOPHARYNGEAL REFLUX (LPR): ICD-10-CM

## 2019-11-21 PROCEDURE — 99213 OFFICE O/P EST LOW 20 MIN: CPT | Performed by: OTOLARYNGOLOGY

## 2019-11-21 RX ORDER — OMEPRAZOLE 40 MG/1
40 CAPSULE, DELAYED RELEASE ORAL DAILY
Qty: 30 CAPSULE | Refills: 1 | Status: SHIPPED | OUTPATIENT
Start: 2019-11-21 | End: 2020-03-10

## 2019-11-21 NOTE — PROGRESS NOTES
Otolaryngology-- Head and Neck Surgery Follow up visit    CC: allergies and reflux      Time interval of problem since last visit:  6 weeks    Pertinent interval elements of the history:  He completed a month of Astelin spray, which made his less congested but did not improve his pharyngeal mucus or throat clearing  The omeprazole also did not make much of a difference in his pharyngeal mucus and throat clearing  This occurs infrequently, less than daily and may or may not be associated with productive mucus  No dysphagia or heartburn  Overall, he feels his symptoms are improved compared to his initial visit, with less frequent symptoms      Review of any relevant imaging:      Interval Review of systems:  General: no weight change, normal energy  CV: no palpitations or chest pain  Pulm: no shortness of breath    Interval Social History:  Social History     Socioeconomic History    Marital status: /Civil Union     Spouse name: Not on file    Number of children: Not on file    Years of education: Not on file    Highest education level: Not on file   Occupational History    Occupation: Retired   Social Needs    Financial resource strain: Not on file    Food insecurity:     Worry: Not on file     Inability: Not on file   [employer] needs:     Medical: Not on file     Non-medical: Not on file   Tobacco Use    Smoking status: Former Smoker     Years: 20      Last attempt to quit: 10/18/1965     Years since quittin 1    Smokeless tobacco: Never Used   Substance and Sexual Activity    Alcohol use: No    Drug use: No    Sexual activity: Not on file   Lifestyle    Physical activity:     Days per week: Not on file     Minutes per session: Not on file    Stress: Not on file   Relationships    Social connections:     Talks on phone: Not on file     Gets together: Not on file     Attends Advent service: Not on file     Active member of club or organization: Not on file     Attends meetings of clubs or organizations: Not on file     Relationship status: Not on file    Intimate partner violence:     Fear of current or ex partner: Not on file     Emotionally abused: Not on file     Physically abused: Not on file     Forced sexual activity: Not on file   Other Topics Concern    Not on file   Social History Narrative    Exercises daily     Sleeps 6-7 hrs/day       Interval Physical Examination:  /64 (BP Location: Left arm, Patient Position: Sitting, Cuff Size: Adult)   Pulse 59   Temp 97 6 °F (36 4 °C) (Oral)   Ht 5' 11" (1 803 m)   Wt 74 2 kg (163 lb 9 6 oz)   BMI 22 82 kg/m²   NAD  Nasal: normal mucosa  OC/OP: clear, normal mucosa, no masses    Interval endoscopy:          Assessment:  1  Laryngopharyngeal reflux (LPR)  omeprazole (PriLOSEC) 40 MG capsule       Plan:  1  He has improved on reflux therapy over the past 6 weeks  I recommended 2 more months of therapy and then he may discontinue the medication  Follow up PRN

## 2019-11-22 ENCOUNTER — OFFICE VISIT (OUTPATIENT)
Dept: URGENT CARE | Facility: CLINIC | Age: 84
End: 2019-11-22
Payer: COMMERCIAL

## 2019-11-22 VITALS
SYSTOLIC BLOOD PRESSURE: 169 MMHG | BODY MASS INDEX: 22.12 KG/M2 | OXYGEN SATURATION: 98 % | RESPIRATION RATE: 16 BRPM | WEIGHT: 158 LBS | TEMPERATURE: 97 F | DIASTOLIC BLOOD PRESSURE: 77 MMHG | HEART RATE: 71 BPM | HEIGHT: 71 IN

## 2019-11-22 DIAGNOSIS — L60.0 INGROWN NAIL OF GREAT TOE OF RIGHT FOOT: Primary | ICD-10-CM

## 2019-11-22 PROCEDURE — 11730 AVULSION NAIL PLATE SIMPLE 1: CPT | Performed by: NURSE PRACTITIONER

## 2019-11-22 PROCEDURE — 99213 OFFICE O/P EST LOW 20 MIN: CPT | Performed by: NURSE PRACTITIONER

## 2019-11-22 NOTE — PATIENT INSTRUCTIONS
Continue to soak over weekend  Elevate when possible  Contact podiatry and follow up on Monday  Go to the ER if symptoms worsen

## 2019-11-22 NOTE — PROGRESS NOTES
[facility]        NAME: John Blair is a 80 y o  male  : 1930    MRN: [id number]  DATE: 2019  TIME: 6:25 PM    Assessment and Plan   Ingrown nail of great toe of right foot [L60 0]  1  Ingrown nail of great toe of right foot         Nail removal  Date/Time: 2019 6:09 PM  Performed by: TJ Toribio  Authorized by: TJ Toribio     Patient location:  Clinic  Indications / Diagnosis:  Ingrown nail of great toe of right foot  Other Assisting Provider: No    Consent:     Consent obtained:  Verbal    Consent given by:  Patient    Risks discussed:  Bleeding, incomplete removal, infection, pain and permanent nail deformity    Alternatives discussed:  Referral and no treatment  Universal protocol:     Immediately prior to procedure a time out was called: yes      Patient identity confirmed:  Verbally with patient  Location:     Foot:  R big toe  Pre-procedure details:     Skin preparation:  Betadine  Anesthesia (see MAR for exact dosages): Anesthesia method:  None  Post-procedure details:     Dressing:  Xeroform gauze and 4x4 sterile gauze (Wrapped with coban)    Patient tolerance of procedure: Patient was tolerating procedure, terminated due to lack of supply  Comments:      Attempted to wedge cut corner of nail, due to nail thickness and limited instrumentation attempt was unsuccessful  Procedure was terminated and patient was advised to continue to soak and elevate over the weekend and follow up with podiatry as scheduled  Patient Instructions     Continue to soak over weekend  Elevate when possible  Contact podiatry and follow up on Monday  Go to the ER if symptoms worsen    Follow up with PCP in 3-5 days  Proceed to  ER if symptoms worsen      Chief Complaint     Chief Complaint   Patient presents with    Ingrown Toenail     rt great toe inner side          History of Present Illness       79 y/o male presents for right great toe ingrown toe nail  He had an appointment with the podiatrist, but states that when he got to the office they were closed  He states the toe has been painful for a couple of days  He has been soaking in epsom salt which helps  He has not taken any tylenol  He denies any fevers, N/V, or drainage from the toe  There is erythema localized to the toe  Review of Systems   Review of Systems   Constitutional: Negative for chills and fever  Respiratory: Negative for cough  Cardiovascular: Negative for chest pain and palpitations  Gastrointestinal: Negative for diarrhea and vomiting  Skin: Positive for color change           Current Medications       Current Outpatient Medications:     amLODIPine (NORVASC) 5 mg tablet, Take 1 tablet (5 mg total) by mouth daily at bedtime, Disp: 90 tablet, Rfl: 3    aspirin 81 MG tablet, Take 81 mg by mouth daily, Disp: , Rfl:     atorvastatin (LIPITOR) 10 mg tablet, Take 1 tablet (10 mg total) by mouth daily, Disp: 90 tablet, Rfl: 3    Calcium Carbonate-Vitamin D (CALCIUM 500 + D) 500-125 MG-UNIT TABS, Take by mouth, Disp: , Rfl:     chlorthalidone 25 mg tablet, TAKE ONE TABLET BY MOUTH EVERY OTHER DAY, Disp: 45 tablet, Rfl: 3    finasteride (PROSCAR) 5 mg tablet, Take 1 tablet (5 mg total) by mouth daily at bedtime, Disp: 90 tablet, Rfl: 3    lisinopril (ZESTRIL) 5 mg tablet, Take 1 tablet in morning and 1 tablet in the evening , Disp: 270 tablet, Rfl: 3    Multiple Vitamins-Minerals (CENTRUM SILVER ADULT 50+ PO), Take by mouth, Disp: , Rfl:     mupirocin (BACTROBAN) 2 % ointment, , Disp: , Rfl:     omeprazole (PriLOSEC) 40 MG capsule, Take 1 capsule (40 mg total) by mouth daily, Disp: 30 capsule, Rfl: 1    triamcinolone (KENALOG) 0 1 % cream, Apply topically 2 (two) times a day, Disp: 45 g, Rfl: 6    azelastine (ASTELIN) 0 1 % nasal spray, 2 sprays into each nostril 2 (two) times a day Use in each nostril as directed (Patient not taking: Reported on 11/21/2019), Disp: 1 Bottle, Rfl: 5    Cholecalciferol (VITAMIN D) 2000 units CAPS, Take by mouth, Disp: , Rfl:     Current Allergies     Allergies as of 11/22/2019 - Reviewed 11/22/2019   Allergen Reaction Noted    Tamsulosin Syncope 04/11/2017            The following portions of the patient's history were reviewed and updated as appropriate: allergies, current medications, past family history, past medical history, past social history, past surgical history and problem list      Past Medical History:   Diagnosis Date    Asymptomatic PVCs     last assessed 1/18/18    BPH (benign prostatic hyperplasia)     last assessed 4/11/17     High cholesterol     last assessed 4/11/17     Hypertension     uncontrolled, last assessed 4/11/17       Past Surgical History:   Procedure Laterality Date    CATARACT EXTRACTION      COLONOSCOPY      Complete    ME REMOVAL OF HYDROCELE,TUNICA,UNILAT Right 10/25/2018    Procedure: HYDROCELECTOMY;  Surgeon: Aspen Vasques MD;  Location: [address] [place] [address];  Service: Urology    TESTICLE SURGERY      last assessed 3/31/15       Family History   Problem Relation Age of Onset    Heart disease Mother     Hypertension Mother     Heart disease Father     Hypertension Father     Hypertension Family     Hyperlipidemia Family          Medications have been verified  Objective   /77   Pulse 71   Temp (!) 97 °F (36 1 °C)   Resp 16   Ht 5' 10 5" (1 791 m)   Wt 71 7 kg (158 lb)   SpO2 98%   BMI 22 35 kg/m²        Physical Exam     Physical Exam   Constitutional: He is oriented to person, place, and time  He appears well-developed and well-nourished  No distress  Cardiovascular: Normal rate  Pulmonary/Chest: Effort normal    Musculoskeletal: Normal range of motion  Neurological: He is alert and oriented to person, place, and time  He has normal strength  GCS eye subscore is 4  GCS verbal subscore is 5  GCS motor subscore is 6  Skin: Skin is warm and dry   There is erythema  There is pain and erythema to the medial aspect of the right great toe  Skin is flaking  There is no purulent drainage  Nail is thick, corner of nail is growing into the nail bed  Psychiatric: He has a normal mood and affect  His speech is normal    Nursing note and vitals reviewed

## 2019-11-25 NOTE — TELEPHONE ENCOUNTER
[address], [address] [facility]     MI at [id number] OverseCoastal Communities Hospital [facility]     Heart attack ([facility]     [facility] (10/29/2014)      CH[facility]congestive [facility] failure) [facility] (10/29/2014)      Hyperthyroidism (7/3/2017)      Hypertensive emergency (11/25/2019)      Elevated troponin (11/25/2019)      NSTEMI (non-ST elevated myocardial infarction) ([facility] (11/25/

## 2019-12-03 DIAGNOSIS — E78.2 MIXED HYPERLIPIDEMIA: ICD-10-CM

## 2019-12-03 RX ORDER — ATORVASTATIN CALCIUM 10 MG/1
10 TABLET, FILM COATED ORAL DAILY
Qty: 90 TABLET | Refills: 3 | Status: SHIPPED | OUTPATIENT
Start: 2019-12-03 | End: 2020-11-19 | Stop reason: SDUPTHER

## 2019-12-03 NOTE — TELEPHONE ENCOUNTER
Dr Byron Blackmon    Patient is requesting refill atorvastatin 10 mg pills sent to [facility]  Last ov 9/3/19

## 2019-12-13 ENCOUNTER — OFFICE VISIT (OUTPATIENT)
Dept: CARDIOLOGY CLINIC | Facility: CLINIC | Age: 84
End: 2019-12-13
Payer: COMMERCIAL

## 2019-12-13 VITALS
BODY MASS INDEX: 23.38 KG/M2 | SYSTOLIC BLOOD PRESSURE: 128 MMHG | DIASTOLIC BLOOD PRESSURE: 76 MMHG | HEART RATE: 69 BPM | HEIGHT: 71 IN | WEIGHT: 167 LBS

## 2019-12-13 DIAGNOSIS — K21.9 LARYNGOPHARYNGEAL REFLUX (LPR): ICD-10-CM

## 2019-12-13 DIAGNOSIS — K21.00 GERD WITH ESOPHAGITIS: ICD-10-CM

## 2019-12-13 DIAGNOSIS — E78.5 DYSLIPIDEMIA: ICD-10-CM

## 2019-12-13 DIAGNOSIS — I10 LABILE ESSENTIAL HYPERTENSION: ICD-10-CM

## 2019-12-13 DIAGNOSIS — Z87.898 HISTORY OF SYNCOPE: Primary | ICD-10-CM

## 2019-12-13 DIAGNOSIS — R09.89 RIGHT CAROTID BRUIT: ICD-10-CM

## 2019-12-13 DIAGNOSIS — I70.219 ATHEROSCLEROTIC PVD WITH INTERMITTENT CLAUDICATION (HCC): ICD-10-CM

## 2019-12-13 DIAGNOSIS — I34.0 NONRHEUMATIC MITRAL VALVE REGURGITATION: ICD-10-CM

## 2019-12-13 DIAGNOSIS — N40.0 BPH WITHOUT OBSTRUCTION/LOWER URINARY TRACT SYMPTOMS: ICD-10-CM

## 2019-12-13 DIAGNOSIS — I49.1 PREMATURE ATRIAL CONTRACTIONS: ICD-10-CM

## 2019-12-13 PROCEDURE — 99214 OFFICE O/P EST MOD 30 MIN: CPT | Performed by: INTERNAL MEDICINE

## 2019-12-13 PROCEDURE — 93000 ELECTROCARDIOGRAM COMPLETE: CPT | Performed by: INTERNAL MEDICINE

## 2019-12-13 NOTE — PATIENT INSTRUCTIONS
1  May hold lisinopril if systolic blood pressure is less than 110 prior to dosage  2  Because of bruit in right neck, we have ordered a carotid duplex scan to be done at [facility]   3  Because of a heart murmur, we have ordered an echocardiogram to be done at [facility]   This is an ultrasound of the heart  4  Cardiology follow-up approximately six months with EKG, lipids, CMP

## 2019-12-13 NOTE — PROGRESS NOTES
Office Cardiology Progress Note  Jessica Handley 80 y o  male MRN: [id number]  12/13/19  11:31 AM      ASSESSMENT:    1   Occasional morning hypotension with associated dizziness and background of labile essential hypertension with resolved nocturnal surges in systolic blood pressure at 9-11 PM, previously in the 317 systolic range   Appears to have white coat hypertension   Frequent hold of blood pressure medication in the mornings for systolic blood pressures of 120 or less previously being done by patient  Current average home blood pressure taken from 12/09 through 12/12/2019 was 115/50   2  Asymptomatic chronic PACs, per patient history, and evident on today's exam    Frequent PACs on EKG in the past   Also has apical systolic ejection murmur, consistent with mitral regurgitation  3  History of carvedilol-induced syncope from hypotension on 1/10/17 and 1/20/17 with episode on 9/11/17 from low blood pressure, cause uncertain  4  Stable right calf claudication and bilateral right more than left lower extremity PAD present for 6-1/2 years   Last lower arterial duplex scan 07/22/2019 showed right CHU 0 53 and left CHU 0 83, indicating worsening  5  Grade one/4 right neck bruit with significant carotid artery stenosis to be excluded  6  Status post repair of right hydrocele on 10/25/2018 and history of chronic incomplete torsion of left testicle with benign cyst adjacent to left testes  7  Treated dyslipidemia, extremely well-controlled as of 11/06/2018     8  Stable chronic BPH  9  Remote history of syncope after Flomax  10  Chronic nocturnal GERD with water brash  11   Multiple basal cell skin cancers with previous excisions from scalp, and previously from right forehead, right cheek, left ear, left wrist, dorsum of left hand dating back to 04/15/2018     12  Nonhealing ulceration above right medial ankle for approximately 1-1/2 years, likely venostasis ulcer     13  Venous insufficiency of right lower extremity, confirmed on venous duplex scan dated 06/21/2019, patient currently being managed with compression hosiery  14  Laryngopharyngeal reflux (LPR), being followed by Dr Alfonso Cabot  Plan       Patient Instructions     1  May hold lisinopril if systolic blood pressure is less than 110 prior to dosage  2  Because of bruit in right neck, we have ordered a carotid duplex scan to be done at [facility]   3  Because of a heart murmur, we have ordered an echocardiogram to be done at [facility]   This is an ultrasound of the heart  4  Cardiology follow-up approximately six months with EKG, lipids, CMP  HPI    This 80 y o  male  denies new cardiopulmonary and medical symptoms  He has occasional morning dizziness and sometimes holds his lisinopril, based on blood pressure  His average home blood pressure readings from 12/09 through 12/12/2019 were 115/50  He has had no change in his right calf claudication and denies chest pain, dyspnea, palpitations, syncope  The patient is seen in follow-up of the below listed diagnoses  Encounter Diagnoses   Name Primary?     Labile essential hypertension     Premature atrial contractions     Atherosclerotic PVD with intermittent claudication (HCC)     Dyslipidemia     GERD with esophagitis     BPH without obstruction/lower urinary tract symptoms     History of syncope Yes    Right carotid bruit     Nonrheumatic mitral valve regurgitation     Laryngopharyngeal reflux (LPR)         Review of Systems    All other systems negative, except as noted in history of present illness    Historical Information   Past Medical History:   Diagnosis Date    Asymptomatic PVCs     last assessed 1/18/18    BPH (benign prostatic hyperplasia)     last assessed 4/11/17     High cholesterol     last assessed 4/11/17     Hypertension     uncontrolled, last assessed 4/11/17     Past Surgical History:   Procedure Laterality Date    CATARACT EXTRACTION      COLONOSCOPY      Complete    MS REMOVAL OF HYDROCELE,TUNICA,UNILAT Right 10/25/2018    Procedure: HYDROCELECTOMY;  Surgeon: Woo More MD;  Location: [address] [place] [address];  Service: Urology    TESTICLE SURGERY      last assessed 3/31/15     Social History     Substance and Sexual Activity   Alcohol Use No     Social History     Substance and Sexual Activity   Drug Use No     Social History     Tobacco Use   Smoking Status Former Smoker    Years: 20 00    Last attempt to quit: 10/18/1965    Years since quittin 4   Smokeless Tobacco Never Used       Family History:  Family History   Problem Relation Age of Onset    Heart disease Mother     Hypertension Mother     Heart disease Father     Hypertension Father     Hypertension Family     Hyperlipidemia Family          Meds/Allergies     Prior to Admission medications    Medication Sig Start Date End Date Taking? Authorizing Provider   amLODIPine (NORVASC) 5 mg tablet Take 1 tablet (5 mg total) by mouth daily at bedtime 19  Yes Mario Adams MD   aspirin 81 MG tablet Take 81 mg by mouth daily   Yes Historical Provider, MD   atorvastatin (LIPITOR) 10 mg tablet Take 1 tablet (10 mg total) by mouth daily 12/3/19  Yes Yogesh Darling MD   Calcium Carbonate-Vitamin D (CALCIUM 500 + D) 500-125 MG-UNIT TABS Take by mouth   Yes Historical Provider, MD   chlorthalidone 25 mg tablet TAKE ONE TABLET BY MOUTH EVERY OTHER DAY 19  Yes Mario Adams MD   Cholecalciferol (VITAMIN D) 2000 units CAPS Take by mouth   Yes Historical Provider, MD   finasteride (PROSCAR) 5 mg tablet Take 1 tablet (5 mg total) by mouth daily at bedtime 19  Yes Yogesh Darling MD   lisinopril (ZESTRIL) 5 mg tablet Take 1 tablet in morning and 1 tablet in the evening   19  Yes Mario Adams MD   mupirocin Olarcynthia Tyler) 2 % ointment  19  Yes Historical Provider, MD   omeprazole (PriLOSEC) 40 MG capsule Take 1 capsule (40 mg total) by mouth daily 11/21/19  Yes Geoffrey Funes MD   triamcinolone (KENALOG) 0 1 % cream Apply topically 2 (two) times a day 5/21/19  Yes Valeria Jeffrey MD   azelastine (ASTELIN) 0 1 % nasal spray 2 sprays into each nostril 2 (two) times a day Use in each nostril as directed  Patient not taking: Reported on 11/21/2019 10/10/19   Geoffrey Funes MD   Multiple Vitamins-Minerals (CENTRUM SILVER ADULT 50+ PO) Take by mouth    Historical Provider, MD       Allergies   Allergen Reactions    Tamsulosin Syncope         Vitals:    12/13/19 1026   BP: 128/76   BP Location: Left arm   Patient Position: Sitting   Cuff Size: Standard   Pulse: 69   Weight: 75 8 kg (167 lb)   Height: 5' 10 5" (1 791 m)       Body mass index is 23 62 kg/m²  3 pound weight gain in approximately six months since 6 8 pound weight gain in approximately 1+ years    Physical Exam:    General Appearance:  Alert, cooperative, no distress, appears stated age   Head:  Normocephalic, without obvious abnormality, atraumatic   Eyes:  PERRL, conjunctiva/corneas clear, EOM's intact,   both eyes   Ears:  Normal TM's and external ear canals, both ears   Nose: Nares normal, septum midline, mucosa normal, no drainage or sinus tenderness   Throat: Lips, mucosa, and tongue normal; teeth and gums normal   Neck: Supple, symmetrical, trachea midline, no adenopathy, thyroid: not enlarged, symmetric, no tenderness/mass/nodules, grade 1/4 right  carotid bruit is heard with no JVD   Back:   Symmetric, no curvature, ROM normal, no CVA tenderness   Lungs:   Clear to auscultation bilaterally, respirations unlabored   Chest Wall:  No tenderness or deformity   Heart:  Occasional extrasystoles, S1, S2 normal, grade 2/6 apical systolic ejection  murmur, radiating towards left lower sternal border with no rub or gallop     Abdomen:   Soft, non-tender, bowel sounds active all four quadrants,  no masses, no organomegaly   Extremities: Extremities normal, atraumatic, no cyanosis or edema   Pulses: 2+ on the left and absent on the right  Skin: Skin showed normal color, texture, turgor and no rashes or lesions   Lymph nodes: Cervical, supraclavicular, and axillary nodes normal   Neurologic: Normal         Cardiographics    ECG 12/13/19:    Normal sinus rhythm with moderately frequent PACs  Otherwise normal ECG, with more frequent PACs compared to 06/07/2019  IMAGING:    No Chest XR results available for this patient      VENOUS REFLUX DUPLEX SCAN 06/21/2019:    Deep and superficial venous incompetence of right common femoral and small saphenous veins    Lower arterial duplex scan 07/22/2019:    Greater than 75% stenosis of right common femoral artery with CHU 0 53 and great toe pressure below healing range  50-75% stenosis of left distal SFA and possible tibioperoneal disease with CHU 0 83 and great toe pressure within healing range  Compared to 12/19/2014, there is increase in disease       LAB REVIEW:      Lab Results   Component Value Date    SODIUM 138 03/14/2019    K 4 0 03/14/2019     03/14/2019    CO2 28 03/14/2019    BUN 27 (H) 03/14/2019    CREATININE 0 85 03/14/2019    GLUF 87 03/14/2019    CALCIUM 9 3 03/14/2019    AST 24 03/14/2019    ALT 27 03/14/2019    ALKPHOS 76 03/14/2019    EGFR 78 03/14/2019     Lab Results   Component Value Date    CHOLESTEROL 176 03/14/2019    CHOLESTEROL 145 11/06/2018    CHOLESTEROL 131 01/09/2018     Lab Results   Component Value Date    HDL 87 (H) 03/14/2019    HDL 69 11/06/2018    HDL 73 (H) 01/09/2018     Lab Results   Component Value Date    LDLCALC 74 03/14/2019    LDLCALC 43 01/09/2018       Lab Results   Component Value Date    TRIG 74 03/14/2019    TRIG 70 11/06/2018    TRIG 76 01/09/2018               Viky Noel MD

## 2020-01-08 ENCOUNTER — HOSPITAL ENCOUNTER (OUTPATIENT)
Dept: NON INVASIVE DIAGNOSTICS | Facility: HOSPITAL | Age: 85
Discharge: HOME | End: 2020-01-08
Attending: INTERNAL MEDICINE
Payer: COMMERCIAL

## 2020-01-08 ENCOUNTER — HOSPITAL ENCOUNTER (OUTPATIENT)
Dept: RADIOLOGY | Facility: HOSPITAL | Age: 85
Discharge: HOME | End: 2020-01-08
Attending: INTERNAL MEDICINE
Payer: COMMERCIAL

## 2020-01-08 DIAGNOSIS — I34.0 NONRHEUMATIC MITRAL VALVE REGURGITATION: ICD-10-CM

## 2020-01-08 DIAGNOSIS — R09.89 RIGHT CAROTID BRUIT: ICD-10-CM

## 2020-01-08 DIAGNOSIS — I49.1 PREMATURE ATRIAL CONTRACTIONS: ICD-10-CM

## 2020-01-08 PROCEDURE — 93306 TTE W/DOPPLER COMPLETE: CPT

## 2020-01-08 PROCEDURE — 93880 EXTRACRANIAL BILAT STUDY: CPT | Performed by: SURGERY

## 2020-01-08 PROCEDURE — 93880 EXTRACRANIAL BILAT STUDY: CPT

## 2020-01-08 PROCEDURE — 93306 TTE W/DOPPLER COMPLETE: CPT | Performed by: INTERNAL MEDICINE

## 2020-01-09 DIAGNOSIS — I10 ESSENTIAL HYPERTENSION: ICD-10-CM

## 2020-01-09 RX ORDER — CHLORTHALIDONE 25 MG/1
25 TABLET ORAL EVERY OTHER DAY
Qty: 45 TABLET | Refills: 3 | Status: SHIPPED | OUTPATIENT
Start: 2020-01-09 | End: 2020-12-16 | Stop reason: SDUPTHER

## 2020-01-10 ENCOUNTER — TELEPHONE (OUTPATIENT)
Dept: CARDIOLOGY CLINIC | Facility: CLINIC | Age: 85
End: 2020-01-10

## 2020-01-10 NOTE — TELEPHONE ENCOUNTER
I spoke with patient, made aware of this  Patient had no further questions     On recall list for Lisa

## 2020-02-19 DIAGNOSIS — I10 ESSENTIAL HYPERTENSION: ICD-10-CM

## 2020-02-19 RX ORDER — AMLODIPINE BESYLATE 5 MG/1
TABLET ORAL
Qty: 90 TABLET | Refills: 3 | Status: SHIPPED | OUTPATIENT
Start: 2020-02-19 | End: 2020-06-18 | Stop reason: ALTCHOICE

## 2020-03-10 ENCOUNTER — OFFICE VISIT (OUTPATIENT)
Dept: FAMILY MEDICINE CLINIC | Facility: CLINIC | Age: 85
End: 2020-03-10
Payer: COMMERCIAL

## 2020-03-10 VITALS
HEART RATE: 68 BPM | SYSTOLIC BLOOD PRESSURE: 110 MMHG | HEIGHT: 71 IN | DIASTOLIC BLOOD PRESSURE: 60 MMHG | WEIGHT: 167 LBS | RESPIRATION RATE: 16 BRPM | TEMPERATURE: 97.5 F | BODY MASS INDEX: 23.38 KG/M2

## 2020-03-10 DIAGNOSIS — Z00.00 ROUTINE MEDICAL EXAM: ICD-10-CM

## 2020-03-10 DIAGNOSIS — R09.82 POSTNASAL DRIP: ICD-10-CM

## 2020-03-10 DIAGNOSIS — E78.5 HYPERLIPIDEMIA, UNSPECIFIED HYPERLIPIDEMIA TYPE: ICD-10-CM

## 2020-03-10 DIAGNOSIS — I10 ESSENTIAL HYPERTENSION: Primary | ICD-10-CM

## 2020-03-10 PROCEDURE — 1160F RVW MEDS BY RX/DR IN RCRD: CPT | Performed by: FAMILY MEDICINE

## 2020-03-10 PROCEDURE — 1170F FXNL STATUS ASSESSED: CPT | Performed by: FAMILY MEDICINE

## 2020-03-10 PROCEDURE — 3078F DIAST BP <80 MM HG: CPT | Performed by: FAMILY MEDICINE

## 2020-03-10 PROCEDURE — G0439 PPPS, SUBSEQ VISIT: HCPCS | Performed by: FAMILY MEDICINE

## 2020-03-10 PROCEDURE — 1101F PT FALLS ASSESS-DOCD LE1/YR: CPT | Performed by: FAMILY MEDICINE

## 2020-03-10 PROCEDURE — 3288F FALL RISK ASSESSMENT DOCD: CPT | Performed by: FAMILY MEDICINE

## 2020-03-10 PROCEDURE — 1125F AMNT PAIN NOTED PAIN PRSNT: CPT | Performed by: FAMILY MEDICINE

## 2020-03-10 PROCEDURE — 3008F BODY MASS INDEX DOCD: CPT | Performed by: FAMILY MEDICINE

## 2020-03-10 PROCEDURE — 3074F SYST BP LT 130 MM HG: CPT | Performed by: FAMILY MEDICINE

## 2020-03-10 PROCEDURE — 1036F TOBACCO NON-USER: CPT | Performed by: FAMILY MEDICINE

## 2020-03-10 PROCEDURE — 99214 OFFICE O/P EST MOD 30 MIN: CPT | Performed by: FAMILY MEDICINE

## 2020-03-10 PROCEDURE — 4040F PNEUMOC VAC/ADMIN/RCVD: CPT | Performed by: FAMILY MEDICINE

## 2020-03-10 RX ORDER — MONTELUKAST SODIUM 10 MG/1
10 TABLET ORAL
Qty: 30 TABLET | Refills: 1 | Status: SHIPPED | OUTPATIENT
Start: 2020-03-10 | End: 2020-06-18 | Stop reason: ALTCHOICE

## 2020-03-10 NOTE — PATIENT INSTRUCTIONS
Medicare Preventive Visit Patient Instructions  Thank you for completing your Welcome to Medicare Visit or Medicare Annual Wellness Visit today  Your next wellness visit will be due in one year (3/10/2021)  The screening/preventive services that you may require over the next 5-10 years are detailed below  Some tests may not apply to you based off risk factors and/or age  Screening tests ordered at today's visit but not completed yet may show as past due  Also, please note that scanned in results may not display below  Preventive Screenings:  Service Recommendations Previous Testing/Comments   Colorectal Cancer Screening  · Colonoscopy    · Fecal Occult Blood Test (FOBT)/Fecal Immunochemical Test (FIT)  · Fecal DNA/Cologuard Test  · Flexible Sigmoidoscopy Age: 54-65 years old   Colonoscopy: every 10 years (May be performed more frequently if at higher risk)  OR  FOBT/FIT: every 1 year  OR  Cologuard: every 3 years  OR  Sigmoidoscopy: every 5 years  Screening may be recommended earlier than age 48 if at higher risk for colorectal cancer  Also, an individualized decision between you and your healthcare provider will decide whether screening between the ages of 74-80 would be appropriate   Colonoscopy: Not on file  FOBT/FIT: Not on file  Cologuard: Not on file  Sigmoidoscopy: Not on file         Prostate Cancer Screening Individualized decision between patient and health care provider in men between ages of 53-78   Medicare will cover every 12 months beginning on the day after your 50th birthday PSA: No results in last 5 years          Hepatitis C Screening Once for adults born between [facility][1]  More frequently in patients at high risk for Hepatitis C Hep C Antibody: Not on file       Diabetes Screening 1-2 times per year if you're at risk for diabetes or have pre-diabetes Fasting glucose: 87 mg/dL   A1C: No results in last 5 years       Cholesterol Screening Once every 5 years if you don't have a lipid disorder  May order more often based on risk factors  Lipid panel: 03/14/2019          Other Preventive Screenings Covered by Medicare:  1  Abdominal Aortic Aneurysm (AAA) Screening: covered once if your at risk  You're considered to be at risk if you have a family history of AAA or a male between the age of 73-68 who smoking at least 100 cigarettes in your lifetime  2  Lung Cancer Screening: covers low dose CT scan once per year if you meet all of the following conditions: (1) Age 50-69; (2) No signs or symptoms of lung cancer; (3) Current smoker or have quit smoking within the last 15 years; (4) You have a tobacco smoking history of at least 30 pack years (packs per day x number of years you smoked); (5) You get a written order from a healthcare provider  3  Glaucoma Screening: covered annually if you're considered high risk: (1) You have diabetes OR (2) Family history of glaucoma OR (3)  aged 48 and older OR (3)  American aged 72 and older  3  Osteoporosis Screening: covered every 2 years if you meet one of the following conditions: (1) Have a vertebral abnormality; (2) On glucocorticoid therapy for more than 3 months; (3) Have primary hyperparathyroidism; (4) On osteoporosis medications and need to assess response to drug therapy  5  HIV Screening: covered annually if you're between the age of 12-76  Also covered annually if you are younger than 13 and older than 72 with risk factors for HIV infection  For pregnant patients, it is covered up to 3 times per pregnancy      Immunizations:  Immunization Recommendations   Influenza Vaccine Annual influenza vaccination during flu season is recommended for all persons aged >= 6 months who do not have contraindications   Pneumococcal Vaccine (Prevnar and Pneumovax)  * Prevnar = PCV13  * Pneumovax = PPSV23 Adults 25-60 years old: 1-3 doses may be recommended based on certain risk factors  Adults 72 years old: Prevnar (PCV13) vaccine recommended followed by Pneumovax (PPSV23) vaccine  If already received PPSV23 since turning 65, then PCV13 recommended at least one year after PPSV23 dose  Hepatitis B Vaccine 3 dose series if at intermediate or high risk (ex: diabetes, end stage renal disease, liver disease)   Tetanus (Td) Vaccine - COST NOT COVERED BY MEDICARE PART B Following completion of primary series, a booster dose should be given every 10 years to maintain immunity against tetanus  Td may also be given as tetanus wound prophylaxis  Tdap Vaccine - COST NOT COVERED BY MEDICARE PART B Recommended at least once for all adults  For pregnant patients, recommended with each pregnancy  Shingles Vaccine (Shingrix) - COST NOT COVERED BY MEDICARE PART B  2 shot series recommended in those aged 48 and above     Health Maintenance Due:  There are no preventive care reminders to display for this patient  Immunizations Due:  There are no preventive care reminders to display for this patient  Advance Directives   What are advance directives? Advance directives are legal documents that state your wishes and plans for medical care  These plans are made ahead of time in case you lose your ability to make decisions for yourself  Advance directives can apply to any medical decision, such as the treatments you want, and if you want to donate organs  What are the types of advance directives? There are many types of advance directives, and each state has rules about how to use them  You may choose a combination of any of the following:  · Living will: This is a written record of the treatment you want  You can also choose which treatments you do not want, which to limit, and which to stop at a certain time  This includes surgery, medicine, IV fluid, and tube feedings  · Durable power of  for healthcare [facility]): This is a written record that states who you want to make healthcare choices for you when you are unable to make them for yourself   This person, called a proxy, is usually a family member or a friend  You may choose more than 1 proxy  · Do not resuscitate (DNR) order:  A DNR order is used in case your heart stops beating or you stop breathing  It is a request not to have certain forms of treatment, such as CPR  A DNR order may be included in other types of advance directives  · Medical directive: This covers the care that you want if you are in a coma, near death, or unable to make decisions for yourself  You can list the treatments you want for each condition  Treatment may include pain medicine, surgery, blood transfusions, dialysis, IV or tube feedings, and a ventilator (breathing machine)  · Values history: This document has questions about your views, beliefs, and how you feel and think about life  This information can help others choose the care that you would choose  Why are advance directives important? An advance directive helps you control your care  Although spoken wishes may be used, it is better to have your wishes written down  Spoken wishes can be misunderstood, or not followed  Treatments may be given even if you do not want them  An advance directive may make it easier for your family to make difficult choices about your care  © Copyright [employer] 2018 Information is for End User's use only and may not be sold, redistributed or otherwise used for commercial purposes   All illustrations and images included in CareNotes® are the copyrighted property of A D A M , Inc  or [facility]

## 2020-03-10 NOTE — PROGRESS NOTES
Assessment and Plan:     Problem List Items Addressed This Visit        Cardiovascular and Mediastinum    HTN (hypertension) - Primary    Relevant Orders    TSH, 3rd generation       Other    Hyperlipidemia      Other Visit Diagnoses     Routine medical exam        Postnasal drip        Relevant Medications    montelukast (SINGULAIR) 10 mg tablet           Preventive health issues were discussed with patient, and age appropriate screening tests were ordered as noted in patient's After Visit Summary  Personalized health advice and appropriate referrals for health education or preventive services given if needed, as noted in patient's After Visit Summary       History of Present Illness:     Patient presents for Medicare Annual Wellness visit    Patient Care Team:  Hipolito Manriquez MD as PCP - General (Family Medicine)  Burak Cornejo MD (Vascular Surgery)  Lily Mcpherson MD (Vascular Surgery)  MD Chelsie Sosa MD (Cardiology)     Problem List:     Patient Active Problem List   Diagnosis    HTN (hypertension)    Hyperlipidemia    PAD (peripheral artery disease) ([facility] [address] )    Benign prostatic hyperplasia without lower urinary tract symptoms    Venous insufficiency of right lower extremity      Past Medical and Surgical History:     Past Medical History:   Diagnosis Date    Asymptomatic PVCs     last assessed 1/18/18    BPH (benign prostatic hyperplasia)     last assessed 4/11/17     High cholesterol     last assessed 4/11/17     Hypertension     uncontrolled, last assessed 4/11/17     Past Surgical History:   Procedure Laterality Date    CATARACT EXTRACTION      COLONOSCOPY      Complete    [place] REMOVAL OF HYDROCELE,TUNICA,UNILAT Right 10/25/2018    Procedure: HYDROCELECTOMY;  Surgeon: Domingo Peacock MD;  Location: [facility];  Service: Urology    TESTICLE SURGERY      last assessed 3/31/15      Family History:     Family History   Problem Relation Age of Onset    Heart disease Mother     Hypertension Mother     Heart disease Father     Hypertension Father     Hypertension Family     Hyperlipidemia Family       Social History:        Social History     Socioeconomic History    Marital status: /Civil Union     Spouse name: None    Number of children: None    Years of education: None    Highest education level: None   Occupational History    Occupation: Retired   Social Needs    Financial resource strain: None    Food insecurity:     Worry: None     Inability: None    Transportation needs:     Medical: None     Non-medical: None   Tobacco Use    Smoking status: Former Smoker     Years: 20 00     Last attempt to quit: 10/18/1965     Years since quittin 4    Smokeless tobacco: Never Used   Substance and Sexual Activity    Alcohol use: No    Drug use: No    Sexual activity: None   Lifestyle    Physical activity:     Days per week: None     Minutes per session: None    Stress: None   Relationships    Social connections:     Talks on phone: None     Gets together: None     Attends [employer] service: None     Active member of club or organization: None     Attends meetings of clubs or organizations: None     Relationship status: None    Intimate partner violence:     Fear of current or ex partner: None     Emotionally abused: None     Physically abused: None     Forced sexual activity: None   Other Topics Concern    None   Social History Narrative    Exercises daily     Sleeps 6-7 hrs/day      Medications and Allergies:     Current Outpatient Medications   Medication Sig Dispense Refill    amLODIPine (NORVASC) 5 mg tablet TAKE ONE TABLET BY MOUTH EVERY DAY AT BEDTIME 90 tablet 3    aspirin 81 MG tablet Take 81 mg by mouth daily      atorvastatin (LIPITOR) 10 mg tablet Take 1 tablet (10 mg total) by mouth daily 90 tablet 3    Calcium Carbonate-Vitamin D (CALCIUM 500 + D) 500-125 MG-UNIT TABS Take by mouth      chlorthalidone 25 mg tablet Take 1 tablet (25 mg total) by mouth every other day 45 tablet 3    Cholecalciferol (VITAMIN D) 2000 units CAPS Take by mouth      finasteride (PROSCAR) 5 mg tablet Take 1 tablet (5 mg total) by mouth daily at bedtime 90 tablet 3    lisinopril (ZESTRIL) 5 mg tablet Take 1 tablet in morning and 1 tablet in the evening  270 tablet 3    Multiple Vitamins-Minerals (CENTRUM SILVER ADULT 50+ PO) Take by mouth      mupirocin (BACTROBAN) 2 % ointment       triamcinolone (KENALOG) 0 1 % cream Apply topically 2 (two) times a day 45 g 6    montelukast (SINGULAIR) 10 mg tablet Take 1 tablet (10 mg total) by mouth daily at bedtime 30 tablet 1     No current facility-administered medications for this visit  Allergies   Allergen Reactions    Tamsulosin Syncope      Immunizations:     Immunization History   Administered Date(s) Administered    INFLUENZA 10/01/2018    Influenza Split High Dose Preservative Free IM 10/09/2017, 10/18/2019    Pneumococcal Conjugate 13-Valent 03/05/2019      Health Maintenance: There are no preventive care reminders to display for this patient  There are no preventive care reminders to display for this patient  Medicare Health Risk Assessment:     /60 (BP Location: Right arm, Patient Position: Sitting, Cuff Size: Standard)   Pulse 68   Temp 97 5 °F (36 4 °C) (Tympanic)   Resp 16   Ht 5' 10 5" (1 791 m)   Wt 75 8 kg (167 lb)   BMI 23 62 kg/m²      Blake Ovalles is here for his Subsequent Wellness visit  Health Risk Assessment:   Patient rates overall health as good  Patient feels that their physical health rating is same  Eyesight was rated as same  Hearing was rated as same  Patient feels that their emotional and mental health rating is same  Pain experienced in the last 7 days has been none  Patient states that he has experienced no weight loss or gain in last 6 months  Depression Screening:   PHQ-2 Score: 0      Fall Risk Screening:    In the past year, patient has experienced: no history of falling in past year      Home Safety:  Patient does not have trouble with stairs inside or outside of their home  Patient has working smoke alarms and has working carbon monoxide detector  Home safety hazards include: none  Nutrition:   Current diet is Regular and No Added Salt  Medications:   Patient is currently taking over-the-counter supplements  OTC medications include: see medication list  Patient is able to manage medications  Activities of Daily Living (ADLs)/Instrumental Activities of Daily Living (IADLs):   Walk and transfer into and out of bed and chair?: Yes  Dress and groom yourself?: Yes    Bathe or shower yourself?: Yes    Feed yourself?  Yes  Do your laundry/housekeeping?: Yes  Manage your money, pay your bills and track your expenses?: Yes  Make your own meals?: Yes    Do your own shopping?: Yes    Previous Hospitalizations:   Any hospitalizations or ED visits within the last 12 months?: No      Advance Care Planning:   Living will: No    Durable POA for healthcare: No    Advanced directive: No    Advanced directive counseling given: Yes    Five wishes given: Yes      Cognitive Screening:   Provider or family/friend/caregiver concerned regarding cognition?: No    PREVENTIVE SCREENINGS      Cardiovascular Screening:    General: Screening Not Indicated and History Lipid Disorder    Due for: Lipid Panel      Diabetes Screening:     General: Screening Current    Due for: Blood Glucose      Colorectal Cancer Screening:     General: Screening Not Indicated      Prostate Cancer Screening:    General: Screening Not Indicated      Osteoporosis Screening:    General: Screening Not Indicated      Abdominal Aortic Aneurysm (AAA) Screening:    Risk factors include: tobacco use        General: Screening Not Indicated      Lung Cancer Screening:     General: Screening Not Indicated      Hepatitis C Screening:    General: Screening Not Indicated      Cadence Vela MD

## 2020-03-10 NOTE — PROGRESS NOTES
Chief Complaint   Patient presents with    Medicare Wellness Visit    Follow-up     chronic conditions        Patient ID: Kevon Acosta is a 80 y o  male  HPI  Pt is seeing for f/u HTN and HLD -  Stable, taking meds, f/u with cardiologist as well - has chronic post nasal drip with increase "mucus" that required frequent "throat clearing" for several months -  Was seen by ENT -  Was Rx Omeprazole for possible LPR -  Did not stop symptoms     The following portions of the patient's history were reviewed and updated as appropriate: allergies, current medications, past family history, past medical history, past social history, past surgical history and problem list     Review of Systems   Constitutional: Negative  HENT: Positive for congestion and postnasal drip  Negative for sneezing, sore throat and voice change  Respiratory: Negative  Cardiovascular: Negative  Gastrointestinal: Negative  Genitourinary: Negative  Musculoskeletal: Negative  Skin: Negative  Neurological: Negative  Current Outpatient Medications   Medication Sig Dispense Refill    amLODIPine (NORVASC) 5 mg tablet TAKE ONE TABLET BY MOUTH EVERY DAY AT BEDTIME 90 tablet 3    aspirin 81 MG tablet Take 81 mg by mouth daily      atorvastatin (LIPITOR) 10 mg tablet Take 1 tablet (10 mg total) by mouth daily 90 tablet 3    Calcium Carbonate-Vitamin D (CALCIUM 500 + D) 500-125 MG-UNIT TABS Take by mouth      chlorthalidone 25 mg tablet Take 1 tablet (25 mg total) by mouth every other day 45 tablet 3    Cholecalciferol (VITAMIN D) 2000 units CAPS Take by mouth      finasteride (PROSCAR) 5 mg tablet Take 1 tablet (5 mg total) by mouth daily at bedtime 90 tablet 3    lisinopril (ZESTRIL) 5 mg tablet Take 1 tablet in morning and 1 tablet in the evening   270 tablet 3    Multiple Vitamins-Minerals (CENTRUM SILVER ADULT 50+ PO) Take by mouth      mupirocin (BACTROBAN) 2 % ointment       omeprazole (PriLOSEC) 40 MG capsule Take 1 capsule (40 mg total) by mouth daily 30 capsule 1    triamcinolone (KENALOG) 0 1 % cream Apply topically 2 (two) times a day 45 g 6     No current facility-administered medications for this visit  Objective:    /60 (BP Location: Right arm, Patient Position: Sitting, Cuff Size: Standard)   Pulse 68   Temp 97 5 °F (36 4 °C) (Tympanic)   Resp 16   Ht 5' 10 5" (1 791 m)   Wt 75 8 kg (167 lb)   BMI 23 62 kg/m²        Physical Exam   Constitutional: No distress  HENT:   Mouth/Throat: No oropharyngeal exudate  + postnasal drip    Cardiovascular: Normal rate and regular rhythm  No murmur heard  Pulmonary/Chest: Effort normal and breath sounds normal  No respiratory distress  He has no wheezes  He has no rales  Musculoskeletal: He exhibits no edema  Labs in chart were reviewed  Assessment/Plan:         Diagnoses and all orders for this visit:    Essential hypertension  -     TSH, 3rd generation; Future    Hyperlipidemia, unspecified hyperlipidemia type  CMP and Lipids were ordered by cardiologist   Routine medical exam    Postnasal drip  -     montelukast (SINGULAIR) 10 mg tablet;  Take 1 tablet (10 mg total) by mouth daily at bedtime            rto in 6 m                 Marly Alexandre MD

## 2020-04-30 ENCOUNTER — OFFICE VISIT (OUTPATIENT)
Dept: PODIATRY | Facility: CLINIC | Age: 85
End: 2020-04-30
Payer: COMMERCIAL

## 2020-04-30 VITALS — HEART RATE: 70 BPM | DIASTOLIC BLOOD PRESSURE: 80 MMHG | SYSTOLIC BLOOD PRESSURE: 122 MMHG | RESPIRATION RATE: 16 BRPM

## 2020-04-30 DIAGNOSIS — I73.9 PERIPHERAL VASCULAR DISEASE, UNSPECIFIED (HCC): ICD-10-CM

## 2020-04-30 DIAGNOSIS — L60.0 INGROWN TOENAIL: ICD-10-CM

## 2020-04-30 DIAGNOSIS — L60.3 ONYCHODYSTROPHY: ICD-10-CM

## 2020-04-30 DIAGNOSIS — B35.1 ONYCHOMYCOSIS: ICD-10-CM

## 2020-04-30 DIAGNOSIS — M79.671 RIGHT FOOT PAIN: Primary | ICD-10-CM

## 2020-04-30 PROCEDURE — 99203 OFFICE O/P NEW LOW 30 MIN: CPT | Performed by: PODIATRIST

## 2020-04-30 PROCEDURE — 11721 DEBRIDE NAIL 6 OR MORE: CPT | Performed by: PODIATRIST

## 2020-04-30 RX ORDER — DOXYCYCLINE HYCLATE 100 MG/1
100 CAPSULE ORAL EVERY 12 HOURS SCHEDULED
Qty: 20 CAPSULE | Refills: 0 | Status: SHIPPED | OUTPATIENT
Start: 2020-04-30 | End: 2020-05-10

## 2020-05-14 ENCOUNTER — OFFICE VISIT (OUTPATIENT)
Dept: PODIATRY | Facility: CLINIC | Age: 85
End: 2020-05-14
Payer: COMMERCIAL

## 2020-05-14 VITALS
HEART RATE: 68 BPM | SYSTOLIC BLOOD PRESSURE: 124 MMHG | BODY MASS INDEX: 23.1 KG/M2 | RESPIRATION RATE: 16 BRPM | HEIGHT: 71 IN | WEIGHT: 165 LBS | DIASTOLIC BLOOD PRESSURE: 78 MMHG

## 2020-05-14 DIAGNOSIS — L60.3 ONYCHODYSTROPHY: ICD-10-CM

## 2020-05-14 DIAGNOSIS — L60.0 INGROWN TOENAIL: ICD-10-CM

## 2020-05-14 DIAGNOSIS — I73.9 PERIPHERAL VASCULAR DISEASE, UNSPECIFIED (HCC): ICD-10-CM

## 2020-05-14 DIAGNOSIS — M79.671 RIGHT FOOT PAIN: Primary | ICD-10-CM

## 2020-05-14 DIAGNOSIS — B35.1 ONYCHOMYCOSIS: ICD-10-CM

## 2020-05-14 PROCEDURE — 99212 OFFICE O/P EST SF 10 MIN: CPT | Performed by: PODIATRIST

## 2020-06-01 ENCOUNTER — TELEPHONE (OUTPATIENT)
Dept: CARDIOLOGY CLINIC | Facility: CLINIC | Age: 85
End: 2020-06-01

## 2020-06-10 ENCOUNTER — APPOINTMENT (OUTPATIENT)
Dept: LAB | Facility: CLINIC | Age: 85
End: 2020-06-10
Payer: COMMERCIAL

## 2020-06-10 DIAGNOSIS — E78.5 DYSLIPIDEMIA: ICD-10-CM

## 2020-06-10 DIAGNOSIS — I70.219 ATHEROSCLEROTIC PVD WITH INTERMITTENT CLAUDICATION (HCC): ICD-10-CM

## 2020-06-10 DIAGNOSIS — I10 ESSENTIAL HYPERTENSION: ICD-10-CM

## 2020-06-10 DIAGNOSIS — I10 LABILE ESSENTIAL HYPERTENSION: ICD-10-CM

## 2020-06-10 LAB
ALBUMIN SERPL BCP-MCNC: 3.9 G/DL (ref 3.5–5)
ALP SERPL-CCNC: 67 U/L (ref 46–116)
ALT SERPL W P-5'-P-CCNC: 21 U/L (ref 12–78)
ANION GAP SERPL CALCULATED.3IONS-SCNC: 5 MMOL/L (ref 4–13)
AST SERPL W P-5'-P-CCNC: 18 U/L (ref 5–45)
BILIRUB SERPL-MCNC: 0.5 MG/DL (ref 0.2–1)
BUN SERPL-MCNC: 29 MG/DL (ref 5–25)
CALCIUM SERPL-MCNC: 9.7 MG/DL (ref 8.3–10.1)
CHLORIDE SERPL-SCNC: 104 MMOL/L (ref 100–108)
CHOLEST SERPL-MCNC: 158 MG/DL (ref 50–200)
CO2 SERPL-SCNC: 27 MMOL/L (ref 21–32)
CREAT SERPL-MCNC: 0.88 MG/DL (ref 0.6–1.3)
GFR SERPL CREATININE-BSD FRML MDRD: 76 ML/MIN/1.73SQ M
GLUCOSE P FAST SERPL-MCNC: 94 MG/DL (ref 65–99)
HDLC SERPL-MCNC: 72 MG/DL
LDLC SERPL CALC-MCNC: 69 MG/DL (ref 0–100)
NONHDLC SERPL-MCNC: 86 MG/DL
POTASSIUM SERPL-SCNC: 3.9 MMOL/L (ref 3.5–5.3)
PROT SERPL-MCNC: 7.2 G/DL (ref 6.4–8.2)
SODIUM SERPL-SCNC: 136 MMOL/L (ref 136–145)
TRIGL SERPL-MCNC: 87 MG/DL

## 2020-06-10 PROCEDURE — 36415 COLL VENOUS BLD VENIPUNCTURE: CPT

## 2020-06-10 PROCEDURE — 80053 COMPREHEN METABOLIC PANEL: CPT

## 2020-06-10 PROCEDURE — 80061 LIPID PANEL: CPT

## 2020-06-11 ENCOUNTER — TELEPHONE (OUTPATIENT)
Dept: FAMILY MEDICINE CLINIC | Facility: CLINIC | Age: 85
End: 2020-06-11

## 2020-06-18 ENCOUNTER — OFFICE VISIT (OUTPATIENT)
Dept: CARDIOLOGY CLINIC | Facility: CLINIC | Age: 85
End: 2020-06-18
Payer: COMMERCIAL

## 2020-06-18 VITALS
SYSTOLIC BLOOD PRESSURE: 160 MMHG | BODY MASS INDEX: 22.68 KG/M2 | HEIGHT: 71 IN | WEIGHT: 162 LBS | OXYGEN SATURATION: 98 % | TEMPERATURE: 98.4 F | HEART RATE: 63 BPM | DIASTOLIC BLOOD PRESSURE: 86 MMHG

## 2020-06-18 DIAGNOSIS — I34.0 NONRHEUMATIC MITRAL VALVE REGURGITATION: ICD-10-CM

## 2020-06-18 DIAGNOSIS — R09.89 RIGHT CAROTID BRUIT: ICD-10-CM

## 2020-06-18 DIAGNOSIS — I10 ESSENTIAL HYPERTENSION: Primary | ICD-10-CM

## 2020-06-18 DIAGNOSIS — K21.00 GERD WITH ESOPHAGITIS: ICD-10-CM

## 2020-06-18 DIAGNOSIS — I49.1 PREMATURE ATRIAL CONTRACTIONS: ICD-10-CM

## 2020-06-18 DIAGNOSIS — E78.5 DYSLIPIDEMIA: ICD-10-CM

## 2020-06-18 DIAGNOSIS — I70.219 ATHEROSCLEROTIC PVD WITH INTERMITTENT CLAUDICATION (HCC): ICD-10-CM

## 2020-06-18 DIAGNOSIS — I87.2 VENOUS INSUFFICIENCY OF RIGHT LOWER EXTREMITY: ICD-10-CM

## 2020-06-18 DIAGNOSIS — Z87.898 HISTORY OF SYNCOPE: ICD-10-CM

## 2020-06-18 PROCEDURE — 1036F TOBACCO NON-USER: CPT | Performed by: INTERNAL MEDICINE

## 2020-06-18 PROCEDURE — 93000 ELECTROCARDIOGRAM COMPLETE: CPT | Performed by: INTERNAL MEDICINE

## 2020-06-18 PROCEDURE — 99214 OFFICE O/P EST MOD 30 MIN: CPT | Performed by: INTERNAL MEDICINE

## 2020-06-18 PROCEDURE — 3008F BODY MASS INDEX DOCD: CPT | Performed by: INTERNAL MEDICINE

## 2020-06-18 PROCEDURE — 1160F RVW MEDS BY RX/DR IN RCRD: CPT | Performed by: INTERNAL MEDICINE

## 2020-06-18 PROCEDURE — 3077F SYST BP >= 140 MM HG: CPT | Performed by: INTERNAL MEDICINE

## 2020-06-18 PROCEDURE — 3079F DIAST BP 80-89 MM HG: CPT | Performed by: INTERNAL MEDICINE

## 2020-06-18 PROCEDURE — 4040F PNEUMOC VAC/ADMIN/RCVD: CPT | Performed by: INTERNAL MEDICINE

## 2020-06-18 RX ORDER — AMLODIPINE BESYLATE 2.5 MG/1
TABLET ORAL
Qty: 30 TABLET | Refills: 5 | Status: SHIPPED | OUTPATIENT
Start: 2020-06-18 | End: 2020-09-10

## 2020-08-19 DIAGNOSIS — N40.0 BENIGN PROSTATIC HYPERPLASIA WITHOUT LOWER URINARY TRACT SYMPTOMS: ICD-10-CM

## 2020-08-19 RX ORDER — FINASTERIDE 5 MG/1
5 TABLET, FILM COATED ORAL
Qty: 90 TABLET | Refills: 3 | Status: SHIPPED | OUTPATIENT
Start: 2020-08-19 | End: 2021-08-11 | Stop reason: SDUPTHER

## 2020-08-19 NOTE — TELEPHONE ENCOUNTER
Dr Mckenna Rhodes:    Patient needs a refill of finasteride sent to [facility] in [place]

## 2020-09-08 DIAGNOSIS — R21 RASH: ICD-10-CM

## 2020-09-08 RX ORDER — TRIAMCINOLONE ACETONIDE 1 MG/G
CREAM TOPICAL 2 TIMES DAILY
Qty: 45 G | Refills: 6 | Status: SHIPPED | OUTPATIENT
Start: 2020-09-08 | End: 2021-11-11

## 2020-09-08 NOTE — TELEPHONE ENCOUNTER
Dr Mamadou Whelan,    Patient requesting a refill on triamcinolone 0 1% cream sent to [facility] in [place]

## 2020-09-10 ENCOUNTER — OFFICE VISIT (OUTPATIENT)
Dept: FAMILY MEDICINE CLINIC | Facility: CLINIC | Age: 85
End: 2020-09-10
Payer: COMMERCIAL

## 2020-09-10 VITALS
WEIGHT: 162 LBS | RESPIRATION RATE: 14 BRPM | BODY MASS INDEX: 22.68 KG/M2 | DIASTOLIC BLOOD PRESSURE: 70 MMHG | HEIGHT: 71 IN | SYSTOLIC BLOOD PRESSURE: 140 MMHG | HEART RATE: 72 BPM | TEMPERATURE: 97.4 F

## 2020-09-10 DIAGNOSIS — G89.29 CHRONIC BILATERAL LOW BACK PAIN WITHOUT SCIATICA: ICD-10-CM

## 2020-09-10 DIAGNOSIS — I10 ESSENTIAL HYPERTENSION: Primary | ICD-10-CM

## 2020-09-10 DIAGNOSIS — M79.672 PAIN IN BOTH FEET: ICD-10-CM

## 2020-09-10 DIAGNOSIS — M79.671 PAIN IN BOTH FEET: ICD-10-CM

## 2020-09-10 DIAGNOSIS — M54.50 CHRONIC BILATERAL LOW BACK PAIN WITHOUT SCIATICA: ICD-10-CM

## 2020-09-10 PROCEDURE — 99213 OFFICE O/P EST LOW 20 MIN: CPT | Performed by: FAMILY MEDICINE

## 2020-09-10 PROCEDURE — 1036F TOBACCO NON-USER: CPT | Performed by: FAMILY MEDICINE

## 2020-09-10 PROCEDURE — 1160F RVW MEDS BY RX/DR IN RCRD: CPT | Performed by: FAMILY MEDICINE

## 2020-09-10 NOTE — PROGRESS NOTES
Chief Complaint   Patient presents with    Blood Pressure Check        Patient ID: Blu Hernandez is a 80 y o  male  HPI  Pt is seeing for f/u HTN -  Still has low BP almost daily in the middle of the day -  syst < 100 -  No lightheadedness -  Amlodipine was decreased by cardiologist to 2 5 mg 3 m ago for the same reason - has chronic Sob with exertion -  Normal ECHO -  Cardiologist is aware, alos asking fr DMV placard 2 to low back pain  -  Chronic -  Only with walking, relived by rest -  Has for many eyras -  Does not want to do anything about it -  " I can live with that"     The following portions of the patient's history were reviewed and updated as appropriate: allergies, current medications, past family history, past medical history, past social history, past surgical history and problem list     Review of Systems   Constitutional: Negative  HENT: Negative  Respiratory: Negative for cough, chest tightness and wheezing  Cardiovascular: Negative  Negative for chest pain, palpitations and leg swelling  Gastrointestinal: Negative  Genitourinary: Negative  Musculoskeletal: Positive for back pain and gait problem (occasional )  Both feet pain at rest after walking  -  Self limited with rest -  Wearing compression stockings    Skin: Negative  Psychiatric/Behavioral: Negative  Current Outpatient Medications   Medication Sig Dispense Refill    amLODIPine (NORVASC) 2 5 mg tablet Take one tablet by mouth at bedtime daily   30 tablet 5    aspirin 81 MG tablet Take 81 mg by mouth daily      atorvastatin (LIPITOR) 10 mg tablet Take 1 tablet (10 mg total) by mouth daily 90 tablet 3    Calcium Carbonate-Vitamin D (CALCIUM 500 + D) 500-125 MG-UNIT TABS Take by mouth      chlorthalidone 25 mg tablet Take 1 tablet (25 mg total) by mouth every other day 45 tablet 3    Cholecalciferol (VITAMIN D) 2000 units CAPS Take by mouth      finasteride (PROSCAR) 5 mg tablet Take 1 tablet (5 mg total) by mouth daily at bedtime 90 tablet 3    lisinopril (ZESTRIL) 5 mg tablet Take 1 tablet in morning and 1 tablet in the evening  270 tablet 3    Multiple Vitamins-Minerals (CENTRUM SILVER ADULT 50+ PO) Take by mouth      mupirocin (BACTROBAN) 2 % ointment       triamcinolone (KENALOG) 0 1 % cream Apply topically 2 (two) times a day 45 g 6     No current facility-administered medications for this visit  Objective:    /70 (BP Location: Right arm, Patient Position: Sitting, Cuff Size: Standard)   Pulse 72   Temp (!) 97 4 °F (36 3 °C) (Tympanic)   Resp 14   Ht 5' 11" (1 803 m)   Wt 73 5 kg (162 lb)   BMI 22 59 kg/m²        Physical Exam  Constitutional:       General: He is not in acute distress  Appearance: Normal appearance  Cardiovascular:      Rate and Rhythm: Normal rate and regular rhythm  Heart sounds: No murmur  Pulmonary:      Effort: Pulmonary effort is normal  No respiratory distress  Breath sounds: Normal breath sounds  No wheezing or rales  Musculoskeletal:      Right lower leg: No edema (compression stocking )  Left lower leg: No edema (compression stocking is on )  Neurological:      General: No focal deficit present  Mental Status: He is alert and oriented to person, place, and time  Psychiatric:         Mood and Affect: Mood normal          Thought Content: Thought content normal            Labs in chart were reviewed        Assessment/Plan:         Diagnoses and all orders for this visit:    Essential hypertension   will d/c Amlodipine   Pain in both feet  No injuries  Will monitor   Chronic bilateral low back pain without sciatica  Declined meds           rto in 6 m                 Mojgan Butler MD

## 2020-10-07 ENCOUNTER — CLINICAL SUPPORT (OUTPATIENT)
Dept: FAMILY MEDICINE CLINIC | Facility: CLINIC | Age: 85
End: 2020-10-07
Payer: COMMERCIAL

## 2020-10-07 VITALS — TEMPERATURE: 97.6 F

## 2020-10-07 DIAGNOSIS — Z23 NEED FOR INFLUENZA VACCINATION: Primary | ICD-10-CM

## 2020-10-07 PROCEDURE — G0008 ADMIN INFLUENZA VIRUS VAC: HCPCS

## 2020-10-07 PROCEDURE — 90662 IIV NO PRSV INCREASED AG IM: CPT

## 2020-10-20 DIAGNOSIS — I10 ESSENTIAL HYPERTENSION: ICD-10-CM

## 2020-10-20 RX ORDER — LISINOPRIL 5 MG/1
TABLET ORAL
Qty: 180 TABLET | Refills: 3 | Status: SHIPPED | OUTPATIENT
Start: 2020-10-20

## 2020-10-22 ENCOUNTER — OFFICE VISIT (OUTPATIENT)
Dept: PODIATRY | Facility: CLINIC | Age: 85
End: 2020-10-22
Payer: COMMERCIAL

## 2020-10-22 VITALS
HEIGHT: 71 IN | DIASTOLIC BLOOD PRESSURE: 91 MMHG | SYSTOLIC BLOOD PRESSURE: 173 MMHG | WEIGHT: 162 LBS | HEART RATE: 68 BPM | RESPIRATION RATE: 14 BRPM | BODY MASS INDEX: 22.68 KG/M2

## 2020-10-22 DIAGNOSIS — L97.512 RIGHT FOOT ULCER, WITH FAT LAYER EXPOSED (HCC): Primary | ICD-10-CM

## 2020-10-22 DIAGNOSIS — L03.031 PARONYCHIA OF GREAT TOE, RIGHT: ICD-10-CM

## 2020-10-22 DIAGNOSIS — I73.9 PERIPHERAL VASCULAR DISEASE, UNSPECIFIED (HCC): ICD-10-CM

## 2020-10-22 DIAGNOSIS — M79.671 PAIN IN BOTH FEET: ICD-10-CM

## 2020-10-22 DIAGNOSIS — M79.672 PAIN IN BOTH FEET: ICD-10-CM

## 2020-10-22 PROCEDURE — 99213 OFFICE O/P EST LOW 20 MIN: CPT | Performed by: PODIATRIST

## 2020-10-22 RX ORDER — DOXYCYCLINE HYCLATE 100 MG/1
100 CAPSULE ORAL EVERY 12 HOURS SCHEDULED
Qty: 20 CAPSULE | Refills: 0 | Status: SHIPPED | OUTPATIENT
Start: 2020-10-22 | End: 2020-11-01

## 2020-10-30 ENCOUNTER — OFFICE VISIT (OUTPATIENT)
Dept: PODIATRY | Facility: CLINIC | Age: 85
End: 2020-10-30
Payer: COMMERCIAL

## 2020-10-30 VITALS
RESPIRATION RATE: 14 BRPM | DIASTOLIC BLOOD PRESSURE: 76 MMHG | HEART RATE: 65 BPM | SYSTOLIC BLOOD PRESSURE: 141 MMHG | WEIGHT: 162 LBS | BODY MASS INDEX: 22.68 KG/M2 | HEIGHT: 71 IN

## 2020-10-30 DIAGNOSIS — L60.3 ONYCHODYSTROPHY: ICD-10-CM

## 2020-10-30 DIAGNOSIS — M79.672 PAIN IN BOTH FEET: ICD-10-CM

## 2020-10-30 DIAGNOSIS — L85.3 XEROSIS CUTIS: Primary | ICD-10-CM

## 2020-10-30 DIAGNOSIS — L60.0 INGROWN TOENAIL: ICD-10-CM

## 2020-10-30 DIAGNOSIS — R21 SKIN RASH: ICD-10-CM

## 2020-10-30 DIAGNOSIS — M79.671 PAIN IN BOTH FEET: ICD-10-CM

## 2020-10-30 DIAGNOSIS — I73.9 PERIPHERAL VASCULAR DISEASE, UNSPECIFIED (HCC): ICD-10-CM

## 2020-10-30 DIAGNOSIS — B35.1 ONYCHOMYCOSIS: ICD-10-CM

## 2020-10-30 PROCEDURE — 99213 OFFICE O/P EST LOW 20 MIN: CPT | Performed by: PODIATRIST

## 2020-10-30 RX ORDER — AMMONIUM LACTATE 12 G/100G
CREAM TOPICAL AS NEEDED
Qty: 385 G | Refills: 0 | Status: SHIPPED | OUTPATIENT
Start: 2020-10-30

## 2020-11-19 DIAGNOSIS — E78.2 MIXED HYPERLIPIDEMIA: ICD-10-CM

## 2020-11-20 RX ORDER — ATORVASTATIN CALCIUM 10 MG/1
10 TABLET, FILM COATED ORAL DAILY
Qty: 90 TABLET | Refills: 3 | Status: SHIPPED | OUTPATIENT
Start: 2020-11-20 | End: 2021-11-17 | Stop reason: SDUPTHER

## 2020-12-09 ENCOUNTER — LAB (OUTPATIENT)
Dept: LAB | Facility: CLINIC | Age: 85
End: 2020-12-09
Payer: COMMERCIAL

## 2020-12-09 DIAGNOSIS — I70.219 ATHEROSCLEROTIC PVD WITH INTERMITTENT CLAUDICATION (HCC): ICD-10-CM

## 2020-12-09 DIAGNOSIS — I10 ESSENTIAL HYPERTENSION: ICD-10-CM

## 2020-12-09 DIAGNOSIS — E78.5 DYSLIPIDEMIA: ICD-10-CM

## 2020-12-09 LAB
ALBUMIN SERPL BCP-MCNC: 3.7 G/DL (ref 3.5–5)
ALP SERPL-CCNC: 66 U/L (ref 46–116)
ALT SERPL W P-5'-P-CCNC: 21 U/L (ref 12–78)
ANION GAP SERPL CALCULATED.3IONS-SCNC: 5 MMOL/L (ref 4–13)
AST SERPL W P-5'-P-CCNC: 14 U/L (ref 5–45)
BILIRUB SERPL-MCNC: 0.47 MG/DL (ref 0.2–1)
BUN SERPL-MCNC: 28 MG/DL (ref 5–25)
CALCIUM SERPL-MCNC: 9.8 MG/DL (ref 8.3–10.1)
CHLORIDE SERPL-SCNC: 105 MMOL/L (ref 100–108)
CHOLEST SERPL-MCNC: 149 MG/DL (ref 50–200)
CO2 SERPL-SCNC: 29 MMOL/L (ref 21–32)
CREAT SERPL-MCNC: 0.84 MG/DL (ref 0.6–1.3)
GFR SERPL CREATININE-BSD FRML MDRD: 77 ML/MIN/1.73SQ M
GLUCOSE P FAST SERPL-MCNC: 84 MG/DL (ref 65–99)
HDLC SERPL-MCNC: 81 MG/DL
LDLC SERPL CALC-MCNC: 56 MG/DL (ref 0–100)
NONHDLC SERPL-MCNC: 68 MG/DL
POTASSIUM SERPL-SCNC: 4 MMOL/L (ref 3.5–5.3)
PROT SERPL-MCNC: 6.9 G/DL (ref 6.4–8.2)
SODIUM SERPL-SCNC: 139 MMOL/L (ref 136–145)
TRIGL SERPL-MCNC: 58 MG/DL

## 2020-12-09 PROCEDURE — 80053 COMPREHEN METABOLIC PANEL: CPT

## 2020-12-09 PROCEDURE — 36415 COLL VENOUS BLD VENIPUNCTURE: CPT

## 2020-12-09 PROCEDURE — 80061 LIPID PANEL: CPT

## 2020-12-16 ENCOUNTER — OFFICE VISIT (OUTPATIENT)
Dept: CARDIOLOGY CLINIC | Facility: CLINIC | Age: 85
End: 2020-12-16
Payer: COMMERCIAL

## 2020-12-16 VITALS
OXYGEN SATURATION: 98 % | SYSTOLIC BLOOD PRESSURE: 152 MMHG | WEIGHT: 165 LBS | HEIGHT: 71 IN | RESPIRATION RATE: 18 BRPM | TEMPERATURE: 96.7 F | BODY MASS INDEX: 23.1 KG/M2 | DIASTOLIC BLOOD PRESSURE: 70 MMHG | HEART RATE: 81 BPM

## 2020-12-16 DIAGNOSIS — R09.89 RIGHT CAROTID BRUIT: ICD-10-CM

## 2020-12-16 DIAGNOSIS — I73.9 PAD (PERIPHERAL ARTERY DISEASE) (HCC): ICD-10-CM

## 2020-12-16 DIAGNOSIS — I10 LABILE ESSENTIAL HYPERTENSION: Primary | ICD-10-CM

## 2020-12-16 DIAGNOSIS — R60.0 EDEMA OF LEFT LOWER EXTREMITY: ICD-10-CM

## 2020-12-16 DIAGNOSIS — I49.1 PREMATURE ATRIAL CONTRACTIONS: ICD-10-CM

## 2020-12-16 DIAGNOSIS — I87.2 VENOUS INSUFFICIENCY OF RIGHT LOWER EXTREMITY: ICD-10-CM

## 2020-12-16 DIAGNOSIS — Z87.898 HISTORY OF SYNCOPE: ICD-10-CM

## 2020-12-16 DIAGNOSIS — I10 ESSENTIAL HYPERTENSION: ICD-10-CM

## 2020-12-16 DIAGNOSIS — I34.0 NONRHEUMATIC MITRAL VALVE REGURGITATION: ICD-10-CM

## 2020-12-16 DIAGNOSIS — E78.5 DYSLIPIDEMIA: ICD-10-CM

## 2020-12-16 DIAGNOSIS — K21.9 LARYNGOPHARYNGEAL REFLUX (LPR): ICD-10-CM

## 2020-12-16 PROCEDURE — 1036F TOBACCO NON-USER: CPT | Performed by: INTERNAL MEDICINE

## 2020-12-16 PROCEDURE — 1160F RVW MEDS BY RX/DR IN RCRD: CPT | Performed by: INTERNAL MEDICINE

## 2020-12-16 PROCEDURE — 99214 OFFICE O/P EST MOD 30 MIN: CPT | Performed by: INTERNAL MEDICINE

## 2020-12-16 RX ORDER — CHLORTHALIDONE 25 MG/1
25 TABLET ORAL EVERY OTHER DAY
Qty: 45 TABLET | Refills: 3 | Status: SHIPPED | OUTPATIENT
Start: 2020-12-16 | End: 2021-12-27 | Stop reason: SDUPTHER

## 2020-12-18 ENCOUNTER — TELEPHONE (OUTPATIENT)
Dept: CARDIOLOGY CLINIC | Facility: CLINIC | Age: 85
End: 2020-12-18

## 2020-12-18 PROCEDURE — 93000 ELECTROCARDIOGRAM COMPLETE: CPT | Performed by: INTERNAL MEDICINE

## 2021-01-15 ENCOUNTER — APPOINTMENT (EMERGENCY)
Dept: RADIOLOGY | Facility: HOSPITAL | Age: 86
End: 2021-01-15
Payer: COMMERCIAL

## 2021-01-15 ENCOUNTER — HOSPITAL ENCOUNTER (EMERGENCY)
Facility: HOSPITAL | Age: 86
Discharge: HOME | End: 2021-01-15
Attending: EMERGENCY MEDICINE | Admitting: EMERGENCY MEDICINE
Payer: COMMERCIAL

## 2021-01-15 VITALS
OXYGEN SATURATION: 97 % | SYSTOLIC BLOOD PRESSURE: 114 MMHG | DIASTOLIC BLOOD PRESSURE: 58 MMHG | HEART RATE: 72 BPM | RESPIRATION RATE: 18 BRPM | TEMPERATURE: 99 F

## 2021-01-15 DIAGNOSIS — R10.9 ABDOMINAL PAIN: ICD-10-CM

## 2021-01-15 DIAGNOSIS — K57.92 DIVERTICULITIS: Primary | ICD-10-CM

## 2021-01-15 LAB
ALBUMIN SERPL BCP-MCNC: 4.1 G/DL (ref 3.5–5)
ALP SERPL-CCNC: 65 U/L (ref 46–116)
ALT SERPL W P-5'-P-CCNC: 25 U/L (ref 12–78)
ANION GAP SERPL CALCULATED.3IONS-SCNC: 12 MMOL/L (ref 4–13)
AST SERPL W P-5'-P-CCNC: 22 U/L (ref 5–45)
BASOPHILS # BLD AUTO: 0.03 THOUSANDS/ΜL (ref 0–0.1)
BASOPHILS NFR BLD AUTO: 0 % (ref 0–1)
BILIRUB SERPL-MCNC: 0.5 MG/DL (ref 0.2–1)
BUN SERPL-MCNC: 36 MG/DL (ref 5–25)
CALCIUM SERPL-MCNC: 9.4 MG/DL (ref 8.3–10.1)
CHLORIDE SERPL-SCNC: 103 MMOL/L (ref 100–108)
CO2 SERPL-SCNC: 25 MMOL/L (ref 21–32)
CREAT SERPL-MCNC: 1 MG/DL (ref 0.6–1.3)
EOSINOPHIL # BLD AUTO: 0.05 THOUSAND/ΜL (ref 0–0.61)
EOSINOPHIL NFR BLD AUTO: 0 % (ref 0–6)
ERYTHROCYTE [DISTWIDTH] IN BLOOD BY AUTOMATED COUNT: 12.3 % (ref 11.6–15.1)
GFR SERPL CREATININE-BSD FRML MDRD: 66 ML/MIN/1.73SQ M
GLUCOSE SERPL-MCNC: 108 MG/DL (ref 65–140)
HCT VFR BLD AUTO: 38.9 % (ref 36.5–49.3)
HGB BLD-MCNC: 12.4 G/DL (ref 12–17)
IMM GRANULOCYTES # BLD AUTO: 0.04 THOUSAND/UL (ref 0–0.2)
IMM GRANULOCYTES NFR BLD AUTO: 0 % (ref 0–2)
LIPASE SERPL-CCNC: 119 U/L (ref 73–393)
LYMPHOCYTES # BLD AUTO: 0.87 THOUSANDS/ΜL (ref 0.6–4.47)
LYMPHOCYTES NFR BLD AUTO: 7 % (ref 14–44)
MAGNESIUM SERPL-MCNC: 1.8 MG/DL (ref 1.6–2.6)
MCH RBC QN AUTO: 29.9 PG (ref 26.8–34.3)
MCHC RBC AUTO-ENTMCNC: 31.9 G/DL (ref 31.4–37.4)
MCV RBC AUTO: 94 FL (ref 82–98)
MONOCYTES # BLD AUTO: 0.35 THOUSAND/ΜL (ref 0.17–1.22)
MONOCYTES NFR BLD AUTO: 3 % (ref 4–12)
NEUTROPHILS # BLD AUTO: 10.61 THOUSANDS/ΜL (ref 1.85–7.62)
NEUTS SEG NFR BLD AUTO: 90 % (ref 43–75)
NRBC BLD AUTO-RTO: 0 /100 WBCS
PLATELET # BLD AUTO: 276 THOUSANDS/UL (ref 149–390)
PMV BLD AUTO: 9.4 FL (ref 8.9–12.7)
POTASSIUM SERPL-SCNC: 4.2 MMOL/L (ref 3.5–5.3)
PROT SERPL-MCNC: 7.5 G/DL (ref 6.4–8.2)
RBC # BLD AUTO: 4.15 MILLION/UL (ref 3.88–5.62)
SODIUM SERPL-SCNC: 140 MMOL/L (ref 136–145)
WBC # BLD AUTO: 11.95 THOUSAND/UL (ref 4.31–10.16)

## 2021-01-15 PROCEDURE — 83735 ASSAY OF MAGNESIUM: CPT | Performed by: EMERGENCY MEDICINE

## 2021-01-15 PROCEDURE — 96361 HYDRATE IV INFUSION ADD-ON: CPT

## 2021-01-15 PROCEDURE — 74177 CT ABD & PELVIS W/CONTRAST: CPT

## 2021-01-15 PROCEDURE — 99285 EMERGENCY DEPT VISIT HI MDM: CPT | Performed by: EMERGENCY MEDICINE

## 2021-01-15 PROCEDURE — 83690 ASSAY OF LIPASE: CPT | Performed by: EMERGENCY MEDICINE

## 2021-01-15 PROCEDURE — G1004 CDSM NDSC: HCPCS

## 2021-01-15 PROCEDURE — 80053 COMPREHEN METABOLIC PANEL: CPT | Performed by: EMERGENCY MEDICINE

## 2021-01-15 PROCEDURE — 99284 EMERGENCY DEPT VISIT MOD MDM: CPT

## 2021-01-15 PROCEDURE — 96374 THER/PROPH/DIAG INJ IV PUSH: CPT

## 2021-01-15 PROCEDURE — 36415 COLL VENOUS BLD VENIPUNCTURE: CPT | Performed by: EMERGENCY MEDICINE

## 2021-01-15 PROCEDURE — 85025 COMPLETE CBC W/AUTO DIFF WBC: CPT | Performed by: EMERGENCY MEDICINE

## 2021-01-15 RX ORDER — AMOXICILLIN AND CLAVULANATE POTASSIUM 875; 125 MG/1; MG/1
1 TABLET, FILM COATED ORAL EVERY 12 HOURS
Qty: 20 TABLET | Refills: 0 | Status: SHIPPED | OUTPATIENT
Start: 2021-01-15 | End: 2021-01-25

## 2021-01-15 RX ORDER — METRONIDAZOLE 500 MG/1
500 TABLET ORAL EVERY 8 HOURS SCHEDULED
Qty: 30 TABLET | Refills: 0 | Status: SHIPPED | OUTPATIENT
Start: 2021-01-15 | End: 2021-01-15

## 2021-01-15 RX ORDER — LEVOFLOXACIN 500 MG/1
500 TABLET, FILM COATED ORAL DAILY
Qty: 10 TABLET | Refills: 0 | Status: SHIPPED | OUTPATIENT
Start: 2021-01-15 | End: 2021-01-15

## 2021-01-15 RX ORDER — AMOXICILLIN AND CLAVULANATE POTASSIUM 875; 125 MG/1; MG/1
1 TABLET, FILM COATED ORAL ONCE
Status: COMPLETED | OUTPATIENT
Start: 2021-01-15 | End: 2021-01-15

## 2021-01-15 RX ORDER — DICYCLOMINE HCL 20 MG
20 TABLET ORAL 2 TIMES DAILY
Qty: 9 TABLET | Refills: 0 | Status: SHIPPED | OUTPATIENT
Start: 2021-01-15 | End: 2022-03-29

## 2021-01-15 RX ORDER — ONDANSETRON 4 MG/1
4 TABLET, ORALLY DISINTEGRATING ORAL EVERY 6 HOURS PRN
Qty: 9 TABLET | Refills: 0 | Status: SHIPPED | OUTPATIENT
Start: 2021-01-15 | End: 2022-03-29

## 2021-01-15 RX ADMIN — AMOXICILLIN AND CLAVULANATE POTASSIUM 1 TABLET: 875; 125 TABLET, FILM COATED ORAL at 15:24

## 2021-01-15 RX ADMIN — IOHEXOL 100 ML: 350 INJECTION, SOLUTION INTRAVENOUS at 14:07

## 2021-01-15 RX ADMIN — SODIUM CHLORIDE 500 ML: 0.9 INJECTION, SOLUTION INTRAVENOUS at 13:39

## 2021-01-15 RX ADMIN — MORPHINE SULFATE 2 MG: 2 INJECTION, SOLUTION INTRAMUSCULAR; INTRAVENOUS at 13:33

## 2021-01-15 NOTE — ED NOTES
Wife is called, she will be here to pick him up with daughter        Lynda Aguila, RN  01/15/21 [id number]

## 2021-01-15 NOTE — ED PROVIDER NOTES
History  Chief Complaint   Patient presents with    Abdominal Pain     Pt brought in S  States he was working in the bathroom around 10 and felt "horrible abdominal pain" which is now a 6/10  Also complains of shakiness  45-year-old male presents with left lower abdominal pain that started suddenly at 10:00 a m  This morning it  It is sharp continuous nonradiating currently a 6/10 nothing makes it better worse  Denies any nausea vomiting fevers chills cough congestion no abdominal wall trauma no hematuria dysuria urgency frequency no constipation or diarrhea had a good bowel movement this morning  No abdominal surgeries noted  Not on anticoagulation  History provided by:  Patient   used: No    Abdominal Pain      Prior to Admission Medications   Prescriptions Last Dose Informant Patient Reported? Taking? Calcium Carbonate-Vitamin D (CALCIUM 500 + D) 500-125 MG-UNIT TABS  Self Yes No   Sig: Take by mouth   Cholecalciferol (VITAMIN D) 2000 units CAPS  Self Yes No   Sig: Take by mouth   Multiple Vitamins-Minerals (CENTRUM SILVER ADULT 50+ PO)  Self Yes No   Sig: Take by mouth   ammonium lactate (LAC-HYDRIN) 12 % cream   No No   Sig: Apply topically as needed for dry skin   aspirin 81 MG tablet  Self Yes No   Sig: Take 81 mg by mouth daily   atorvastatin (LIPITOR) 10 mg tablet   No No   Sig: Take 1 tablet (10 mg total) by mouth daily   chlorthalidone 25 mg tablet   No No   Sig: Take 1 tablet (25 mg total) by mouth every other day   finasteride (PROSCAR) 5 mg tablet   No No   Sig: Take 1 tablet (5 mg total) by mouth daily at bedtime   lisinopril (ZESTRIL) 5 mg tablet   No No   Sig: Take 1 tablet in morning and 1 tablet in the evening     mupirocin (BACTROBAN) 2 % ointment  Self Yes No   mupirocin (BACTROBAN) 2 % ointment   No No   Sig: Apply topically 3 (three) times a day   triamcinolone (KENALOG) 0 1 % cream   No No   Sig: Apply topically 2 (two) times a day Facility-Administered Medications: None       Past Medical History:   Diagnosis Date    Asymptomatic PVCs     last assessed 18    BPH (benign prostatic hyperplasia)     last assessed 17     High cholesterol     last assessed 17     Hypertension     uncontrolled, last assessed 17       Past Surgical History:   Procedure Laterality Date    CATARACT EXTRACTION      COLONOSCOPY      Complete    OK REMOVAL OF HYDROCELE,TUNICA,UNILAT Right 10/25/2018    Procedure: HYDROCELECTOMY;  Surgeon: Quan Soares MD;  Location: [address] [place] [address];  Service: Urology    TESTICLE SURGERY      last assessed 3/31/15       Family History   Problem Relation Age of Onset    Heart disease Mother     Hypertension Mother     Heart disease Father     Hypertension Father     Hypertension Family     Hyperlipidemia Family      I have reviewed and agree with the history as documented  E-Cigarette/Vaping    E-Cigarette Use Never User      E-Cigarette/Vaping Substances     Social History     Tobacco Use    Smoking status: Former Smoker     Years:      Quit date: 10/18/1965     Years since quittin 2    Smokeless tobacco: Never Used   Substance Use Topics    Alcohol use: No    Drug use: No       Review of Systems   Constitutional: Negative  HENT: Negative  Eyes: Negative  Respiratory: Negative  Cardiovascular: Negative  Gastrointestinal: Positive for abdominal pain  Endocrine: Negative  Genitourinary: Negative  Musculoskeletal: Negative  Skin: Negative  Allergic/Immunologic: Negative  Neurological: Negative  Hematological: Negative  Psychiatric/Behavioral: Negative  All other systems reviewed and are negative  Physical Exam  Physical Exam  Vitals signs and nursing note reviewed  Constitutional:       Appearance: He is well-developed  HENT:      Head: Normocephalic and atraumatic  Eyes:      Pupils: Pupils are equal, round, and reactive to light     Neck: Musculoskeletal: Normal range of motion and neck supple  Cardiovascular:      Rate and Rhythm: Normal rate and regular rhythm  Pulmonary:      Effort: Pulmonary effort is normal       Breath sounds: Normal breath sounds  Abdominal:      General: Bowel sounds are normal       Palpations: Abdomen is soft  Comments: Abdomen is soft tenderness noted in the left lower quadrant no guarding rigidity rebound tenderness  Abdomen is non peritoneal bowel sounds present and normal    Musculoskeletal: Normal range of motion  Skin:     General: Skin is warm and dry  Neurological:      Mental Status: He is alert and oriented to person, place, and time           Vital Signs  ED Triage Vitals [01/15/21 1307]   Temperature Pulse Respirations Blood Pressure SpO2   (!) 96 9 °F (36 1 °C) 88 (!) 24 (!) 172/75 97 %      Temp Source Heart Rate Source Patient Position - Orthostatic VS BP Location FiO2 (%)   Tympanic Monitor Lying Left arm --      Pain Score       6           Vitals:    01/15/21 1315 01/15/21 1400 01/15/21 1451 01/15/21 1516   BP:   145/68 114/58   Pulse: 92 92 97 72   Patient Position - Orthostatic VS:   Lying Lying         Visual Acuity      ED Medications  Medications   morphine injection 2 mg (2 mg Intravenous Given 1/15/21 1333)   sodium chloride 0 9 % bolus 500 mL (0 mL Intravenous Stopped 1/15/21 1439)   iohexol (OMNIPAQUE) 350 MG/ML injection (SINGLE-DOSE) 100 mL (100 mL Intravenous Given 1/15/21 1407)   amoxicillin-clavulanate (AUGMENTIN) 875-125 mg per tablet 1 tablet (1 tablet Oral Given 1/15/21 1524)       Diagnostic Studies  Results Reviewed     Procedure Component Value Units Date/Time    CBC and differential [[id number]]  (Abnormal) Collected: 01/15/21 1328    Lab Status: Final result Specimen: Blood from Arm, Right Updated: 01/15/21 1353     WBC 11 95 Thousand/uL      RBC 4 15 Million/uL      Hemoglobin 12 4 g/dL      Hematocrit 38 9 %      MCV 94 fL      MCH 29 9 pg      MCHC 31 9 g/dL RDW 12 3 %      MPV 9 4 fL      Platelets 521 Thousands/uL      nRBC 0 /100 WBCs      Neutrophils Relative 90 %      Immat GRANS % 0 %      Lymphocytes Relative 7 %      Monocytes Relative 3 %      Eosinophils Relative 0 %      Basophils Relative 0 %      Neutrophils Absolute 10 61 Thousands/µL      Immature Grans Absolute 0 04 Thousand/uL      Lymphocytes Absolute 0 87 Thousands/µL      Monocytes Absolute 0 35 Thousand/µL      Eosinophils Absolute 0 05 Thousand/µL      Basophils Absolute 0 03 Thousands/µL     Narrative: This is an appended report  These results have been appended to a previously verified report      Comprehensive metabolic panel [[id number]]  (Abnormal) Collected: 01/15/21 1328    Lab Status: Final result Specimen: Blood from Arm, Right Updated: 01/15/21 1352     Sodium 140 mmol/L      Potassium 4 2 mmol/L      Chloride 103 mmol/L      CO2 25 mmol/L      ANION GAP 12 mmol/L      BUN 36 mg/dL      Creatinine 1 00 mg/dL      Glucose 108 mg/dL      Calcium 9 4 mg/dL      AST 22 U/L      ALT 25 U/L      Alkaline Phosphatase 65 U/L      Total Protein 7 5 g/dL      Albumin 4 1 g/dL      Total Bilirubin 0 50 mg/dL      eGFR 66 ml/min/1 73sq m     Narrative:      [place] guidelines for Chronic Kidney Disease (CKD):     Stage 1 with normal or high GFR (GFR > 90 mL/min/1 73 square meters)    Stage 2 Mild CKD (GFR = 60-89 mL/min/1 73 square meters)    Stage 3A Moderate CKD (GFR = 45-59 mL/min/1 73 square meters)    Stage 3B Moderate CKD (GFR = 30-44 mL/min/1 73 square meters)    Stage 4 Severe CKD (GFR = 15-29 mL/min/1 73 square meters)    Stage 5 End Stage CKD (GFR <15 mL/min/1 73 square meters)  Note: GFR calculation is accurate only with a steady state creatinine    Magnesium [[id number]]  (Normal) Collected: 01/15/21 1328    Lab Status: Final result Specimen: Blood from Arm, Right Updated: 01/15/21 1352     Magnesium 1 8 mg/dL     Lipase [[id number]]  (Normal) Collected: 01/15/21 1328    Lab Status: Final result Specimen: Blood from Arm, Right Updated: 01/15/21 1352     Lipase 119 u/L                  CT abdomen pelvis with contrast   Final Result by Julio MD Kulwant (01/15 9707)      Colonic diverticulosis with uncomplicated sigmoid diverticulitis  The study was marked in [facility] for immediate notification  Workstation performed: [id number]                    Procedures  Procedures         ED Course                                           MDM  Number of Diagnoses or Management Options  Abdominal pain:   Diverticulitis:      Amount and/or Complexity of Data Reviewed  Clinical lab tests: ordered and reviewed  Tests in the radiology section of CPT®: ordered and reviewed  Tests in the medicine section of CPT®: ordered and reviewed    Patient Progress  Patient progress: stable      Disposition  Final diagnoses:   Diverticulitis   Abdominal pain     Time reflects when diagnosis was documented in both MDM as applicable and the Disposition within this note     Time User Action Codes Description Comment    1/15/2021  3:10 PM Mamadou Jackson Add [K57 92] Diverticulitis     1/15/2021  3:12 PM Mamadou Jackson Add [R10 9] Abdominal pain       ED Disposition     ED Disposition Condition Date/Time Comment    Discharge Stable Fri Mahesh 15, 2021  3:10  [facility] discharge to home/self care              Follow-up Information     Follow up With Specialties Details Why Contact Info Additional Information    Meli Bond MD Family Medicine Schedule an appointment as soon as possible for a visit   [address] [address] Emergency Department Emergency Medicine  If symptoms worsen [address] [facility] [address] [address] [address] Emergency Department, [facility], [id number] [place] [address], [place]  Sancho Wilkins MD Gastroenterology   [address] [facility]  [phone]             Discharge Medication List as of 1/15/2021  3:18 PM      START taking these medications    Details   amoxicillin-clavulanate (AUGMENTIN) 875-125 mg per tablet Take 1 tablet by mouth every 12 (twelve) hours for 10 days, Starting Fri 1/15/2021, Until Mon 1/25/2021, Print      dicyclomine (BENTYL) 20 mg tablet Take 1 tablet (20 mg total) by mouth 2 (two) times a day for 3 days, Starting Fri 1/15/2021, Until Mon 1/18/2021, Print      ondansetron (ZOFRAN-ODT) 4 mg disintegrating tablet Take 1 tablet (4 mg total) by mouth every 6 (six) hours as needed for nausea or vomiting for up to 3 days, Starting Fri 1/15/2021, Until Mon 1/18/2021, Print         CONTINUE these medications which have NOT CHANGED    Details   ammonium lactate (LAC-HYDRIN) 12 % cream Apply topically as needed for dry skin, Starting Fri 10/30/2020, Normal      aspirin 81 MG tablet Take 81 mg by mouth daily, Historical Med      atorvastatin (LIPITOR) 10 mg tablet Take 1 tablet (10 mg total) by mouth daily, Starting Fri 11/20/2020, Normal      Calcium Carbonate-Vitamin D (CALCIUM 500 + D) 500-125 MG-UNIT TABS Take by mouth, Historical Med      chlorthalidone 25 mg tablet Take 1 tablet (25 mg total) by mouth every other day, Starting Wed 12/16/2020, Normal      Cholecalciferol (VITAMIN D) 2000 units CAPS Take by mouth, Historical Med      finasteride (PROSCAR) 5 mg tablet Take 1 tablet (5 mg total) by mouth daily at bedtime, Starting Wed 8/19/2020, Normal      lisinopril (ZESTRIL) 5 mg tablet Take 1 tablet in morning and 1 tablet in the evening , Normal      Multiple Vitamins-Minerals (CENTRUM SILVER ADULT 50+ PO) Take by mouth, Historical Med      !! mupirocin (BACTROBAN) 2 % ointment Starting Tue 6/11/2019, Historical Med      !! mupirocin (BACTROBAN) 2 % ointment Apply topically 3 (three) times a day, Starting Thu 10/22/2020, Normal      triamcinolone (KENALOG) 0 1 % cream Apply topically 2 (two) times a day, Starting Tue 9/8/2020, Normal       !! - Potential duplicate medications found  Please discuss with provider  No discharge procedures on file      PDMP Review     None          ED Provider  Electronically Signed by           Pebbles Joy DO  01/15/21 0099

## 2021-01-15 NOTE — DISCHARGE INSTRUCTIONS
-eat foods with lots of fiber,drink plenty of water  -avoid nuts  -follow up with the gastroenterologist as listed once your infection resolves in a few weeks   -take the antibiotic as prescribed

## 2021-01-19 ENCOUNTER — OFFICE VISIT (OUTPATIENT)
Dept: FAMILY MEDICINE CLINIC | Facility: CLINIC | Age: 86
End: 2021-01-19
Payer: COMMERCIAL

## 2021-01-19 ENCOUNTER — HOSPITAL ENCOUNTER (OUTPATIENT)
Dept: RADIOLOGY | Facility: HOSPITAL | Age: 86
Discharge: HOME | End: 2021-01-19
Attending: INTERNAL MEDICINE
Payer: COMMERCIAL

## 2021-01-19 VITALS
HEART RATE: 72 BPM | WEIGHT: 166 LBS | TEMPERATURE: 97.8 F | RESPIRATION RATE: 14 BRPM | DIASTOLIC BLOOD PRESSURE: 70 MMHG | BODY MASS INDEX: 23.24 KG/M2 | SYSTOLIC BLOOD PRESSURE: 130 MMHG | HEIGHT: 71 IN

## 2021-01-19 DIAGNOSIS — R60.0 EDEMA OF LEFT LOWER EXTREMITY: ICD-10-CM

## 2021-01-19 DIAGNOSIS — K57.92 DIVERTICULITIS: Primary | ICD-10-CM

## 2021-01-19 PROCEDURE — 99213 OFFICE O/P EST LOW 20 MIN: CPT | Performed by: FAMILY MEDICINE

## 2021-01-19 PROCEDURE — 93970 EXTREMITY STUDY: CPT

## 2021-01-19 PROCEDURE — 1036F TOBACCO NON-USER: CPT | Performed by: FAMILY MEDICINE

## 2021-01-19 PROCEDURE — 93970 EXTREMITY STUDY: CPT | Performed by: SURGERY

## 2021-01-19 PROCEDURE — 1160F RVW MEDS BY RX/DR IN RCRD: CPT | Performed by: FAMILY MEDICINE

## 2021-01-19 RX ORDER — AMLODIPINE BESYLATE 5 MG/1
5 TABLET ORAL DAILY
COMMUNITY
End: 2022-01-25 | Stop reason: SDUPTHER

## 2021-01-19 NOTE — PROGRESS NOTES
Chief Complaint   Patient presents with    Follow-up     ER diverticulitis        Patient ID: Jaylen Trinidad is a 80 y o  male  HPI  Pt is seeing for f/u ER visit omn 1/15/2021 for LLq pain -  Was dx with diverticulitis  -  Started on Augmentin -  Taking med as Rx  -  Feeling much better -  Pain resolved, eating regular diet     The following portions of the patient's history were reviewed and updated as appropriate: allergies, current medications, past family history, past medical history, past social history, past surgical history and problem list     Review of Systems   Constitutional: Negative  Respiratory: Negative  Cardiovascular: Negative  Gastrointestinal: Negative  Genitourinary: Negative  Musculoskeletal: Negative  Skin: Negative  Neurological: Negative  Current Outpatient Medications   Medication Sig Dispense Refill    amLODIPine (NORVASC) 5 mg tablet Take 5 mg by mouth daily      ammonium lactate (LAC-HYDRIN) 12 % cream Apply topically as needed for dry skin 385 g 0    amoxicillin-clavulanate (AUGMENTIN) 875-125 mg per tablet Take 1 tablet by mouth every 12 (twelve) hours for 10 days 20 tablet 0    aspirin 81 MG tablet Take 81 mg by mouth daily      atorvastatin (LIPITOR) 10 mg tablet Take 1 tablet (10 mg total) by mouth daily 90 tablet 3    Calcium Carbonate-Vitamin D (CALCIUM 500 + D) 500-125 MG-UNIT TABS Take by mouth      Cholecalciferol (VITAMIN D) 2000 units CAPS Take by mouth      finasteride (PROSCAR) 5 mg tablet Take 1 tablet (5 mg total) by mouth daily at bedtime 90 tablet 3    lisinopril (ZESTRIL) 5 mg tablet Take 1 tablet in morning and 1 tablet in the evening   180 tablet 3    mupirocin (BACTROBAN) 2 % ointment Apply topically 3 (three) times a day 22 g 0    triamcinolone (KENALOG) 0 1 % cream Apply topically 2 (two) times a day 45 g 6    chlorthalidone 25 mg tablet Take 1 tablet (25 mg total) by mouth every other day 45 tablet 3    dicyclomine (BENTYL) 20 mg tablet Take 1 tablet (20 mg total) by mouth 2 (two) times a day for 3 days 9 tablet 0    Multiple Vitamins-Minerals (CENTRUM SILVER ADULT 50+ PO) Take by mouth      ondansetron (ZOFRAN-ODT) 4 mg disintegrating tablet Take 1 tablet (4 mg total) by mouth every 6 (six) hours as needed for nausea or vomiting for up to 3 days 9 tablet 0     No current facility-administered medications for this visit  Objective:    /70 (BP Location: Left arm, Patient Position: Sitting, Cuff Size: Standard)   Pulse 72   Temp 97 8 °F (36 6 °C) (Temporal)   Resp 14   Ht 5' 11" (1 803 m)   Wt 75 3 kg (166 lb)   BMI 23 15 kg/m²        Physical Exam  Constitutional:       Appearance: He is not ill-appearing  Cardiovascular:      Rate and Rhythm: Normal rate and regular rhythm  Pulmonary:      Effort: Pulmonary effort is normal  No respiratory distress  Abdominal:      Palpations: Abdomen is soft  Tenderness: There is no abdominal tenderness (minimal discomfort with palpation in LLQ )  There is no guarding or rebound  Neurological:      Mental Status: He is alert  Labs in chart were reviewed  Assessment/Plan:         Diagnoses and all orders for this visit:    Diverticulitis  Improved  Was advised to finish Augmentin  Fiber supplements OTC   Other orders  -     amLODIPine (NORVASC) 5 mg tablet;  Take 5 mg by mouth daily          rto in 2 m for Dayo Meier MD

## 2021-01-20 ENCOUNTER — IMMUNIZATIONS (OUTPATIENT)
Dept: FAMILY MEDICINE CLINIC | Facility: HOSPITAL | Age: 86
End: 2021-01-20

## 2021-01-20 ENCOUNTER — TELEPHONE (OUTPATIENT)
Dept: CARDIOLOGY CLINIC | Facility: CLINIC | Age: 86
End: 2021-01-20

## 2021-01-20 DIAGNOSIS — Z23 ENCOUNTER FOR IMMUNIZATION: Primary | ICD-10-CM

## 2021-01-20 PROCEDURE — 91301 SARS-COV-2 / COVID-19 MRNA VACCINE (MODERNA) 100 MCG: CPT

## 2021-01-20 PROCEDURE — 0011A SARS-COV-2 / COVID-19 MRNA VACCINE (MODERNA) 100 MCG: CPT

## 2021-01-20 NOTE — TELEPHONE ENCOUNTER
Pt called back and is aware of results  He states he does wear compression socks as well as occasionally elevating legs

## 2021-01-20 NOTE — TELEPHONE ENCOUNTER
----- Message from Tammy Medina MD sent at 1/20/2021  8:54 AM EST -----  Notify patient that venous Doppler study on 01/19/2021 did show bilateral lower extremity venous insufficiency, worse on the left than on the right, meaning that the valves in the veins are nonfunctioning properly, leading to the leg swelling  Recommended treatment for this is compression hosiery and leg elevation  If the situation does not respond to that treatment, we can consider a referral to vascular surgery for their recommendations  However, the management is not normally surgical   Let me know if he has any questions

## 2021-02-17 ENCOUNTER — IMMUNIZATIONS (OUTPATIENT)
Dept: FAMILY MEDICINE CLINIC | Facility: HOSPITAL | Age: 86
End: 2021-02-17

## 2021-02-17 DIAGNOSIS — Z23 ENCOUNTER FOR IMMUNIZATION: Primary | ICD-10-CM

## 2021-02-17 PROCEDURE — 91301 SARS-COV-2 / COVID-19 MRNA VACCINE (MODERNA) 100 MCG: CPT

## 2021-02-17 PROCEDURE — 0012A SARS-COV-2 / COVID-19 MRNA VACCINE (MODERNA) 100 MCG: CPT

## 2021-03-24 ENCOUNTER — OFFICE VISIT (OUTPATIENT)
Dept: FAMILY MEDICINE CLINIC | Facility: CLINIC | Age: 86
End: 2021-03-24
Payer: COMMERCIAL

## 2021-03-24 VITALS
DIASTOLIC BLOOD PRESSURE: 60 MMHG | RESPIRATION RATE: 18 BRPM | OXYGEN SATURATION: 98 % | HEART RATE: 77 BPM | SYSTOLIC BLOOD PRESSURE: 132 MMHG | HEIGHT: 71 IN | WEIGHT: 167 LBS | TEMPERATURE: 97.6 F | BODY MASS INDEX: 23.38 KG/M2

## 2021-03-24 DIAGNOSIS — Z00.00 MEDICARE ANNUAL WELLNESS VISIT, SUBSEQUENT: Primary | ICD-10-CM

## 2021-03-24 PROCEDURE — 1101F PT FALLS ASSESS-DOCD LE1/YR: CPT | Performed by: FAMILY MEDICINE

## 2021-03-24 PROCEDURE — 1125F AMNT PAIN NOTED PAIN PRSNT: CPT | Performed by: FAMILY MEDICINE

## 2021-03-24 PROCEDURE — 1036F TOBACCO NON-USER: CPT | Performed by: FAMILY MEDICINE

## 2021-03-24 PROCEDURE — 1160F RVW MEDS BY RX/DR IN RCRD: CPT | Performed by: FAMILY MEDICINE

## 2021-03-24 PROCEDURE — G0439 PPPS, SUBSEQ VISIT: HCPCS | Performed by: FAMILY MEDICINE

## 2021-03-24 PROCEDURE — 3288F FALL RISK ASSESSMENT DOCD: CPT | Performed by: FAMILY MEDICINE

## 2021-03-24 PROCEDURE — 3725F SCREEN DEPRESSION PERFORMED: CPT | Performed by: FAMILY MEDICINE

## 2021-03-24 PROCEDURE — 1170F FXNL STATUS ASSESSED: CPT | Performed by: FAMILY MEDICINE

## 2021-03-24 NOTE — PROGRESS NOTES
Assessment and Plan:     Problem List Items Addressed This Visit     None           Preventive health issues were discussed with patient, and age appropriate screening tests were ordered as noted in patient's After Visit Summary  Personalized health advice and appropriate referrals for health education or preventive services given if needed, as noted in patient's After Visit Summary       History of Present Illness:     Patient presents for Medicare Annual Wellness visit    Patient Care Team:  Lucrecia Gilman MD as PCP - General (Family Medicine)  Taniya Rudd MD (Vascular Surgery)  Jacquelin Freire MD (Vascular Surgery)  MD Hieu Gonzalez MD (Cardiology)     Problem List:     Patient Active Problem List   Diagnosis    HTN (hypertension)    Hyperlipidemia    PAD (peripheral artery disease) ([facility] [address] )    Benign prostatic hyperplasia without lower urinary tract symptoms    Venous insufficiency of right lower extremity      Past Medical and Surgical History:     Past Medical History:   Diagnosis Date    Asymptomatic PVCs     last assessed 1/18/18    BPH (benign prostatic hyperplasia)     last assessed 4/11/17     High cholesterol     last assessed 4/11/17     Hypertension     uncontrolled, last assessed 4/11/17     Past Surgical History:   Procedure Laterality Date    CATARACT EXTRACTION      COLONOSCOPY      Complete    [place] REMOVAL OF HYDROCELE,TUNICA,UNILAT Right 10/25/2018    Procedure: HYDROCELECTOMY;  Surgeon: Shannon Brothers MD;  Location: [address] [place] [address];  Service: Urology    TESTICLE SURGERY      last assessed 3/31/15      Family History:     Family History   Problem Relation Age of Onset    Heart disease Mother     Hypertension Mother     Heart disease Father     Hypertension Father     Hypertension Family     Hyperlipidemia Family       Social History:        Social History     Socioeconomic History    Marital status: /Civil Union     Spouse name: None    Number of children: None    Years of education: None    Highest education level: None   Occupational History    Occupation: Retired   Social Needs    Financial resource strain: None    Food insecurity     Worry: None     Inability: None    Transportation needs     Medical: None     Non-medical: None   Tobacco Use    Smoking status: Former Smoker     Years:      Quit date: 10/18/1965     Years since quittin 4    Smokeless tobacco: Never Used   Substance and Sexual Activity    Alcohol use: No    Drug use: No    Sexual activity: None   Lifestyle    Physical activity     Days per week: None     Minutes per session: None    Stress: None   Relationships    Social connections     Talks on phone: None     Gets together: None     Attends Mosque service: None     Active member of club or organization: None     Attends meetings of clubs or organizations: None     Relationship status: None    Intimate partner violence     Fear of current or ex partner: None     Emotionally abused: None     Physically abused: None     Forced sexual activity: None   Other Topics Concern    None   Social History Narrative    Exercises daily     Sleeps 6-7 hrs/day      Medications and Allergies:     Current Outpatient Medications   Medication Sig Dispense Refill    amLODIPine (NORVASC) 5 mg tablet Take 5 mg by mouth daily      ammonium lactate (LAC-HYDRIN) 12 % cream Apply topically as needed for dry skin 385 g 0    aspirin 81 MG tablet Take 81 mg by mouth daily      atorvastatin (LIPITOR) 10 mg tablet Take 1 tablet (10 mg total) by mouth daily 90 tablet 3    Calcium Carbonate-Vitamin D (CALCIUM 500 + D) 500-125 MG-UNIT TABS Take by mouth      chlorthalidone 25 mg tablet Take 1 tablet (25 mg total) by mouth every other day 45 tablet 3    Cholecalciferol (VITAMIN D) 2000 units CAPS Take by mouth      finasteride (PROSCAR) 5 mg tablet Take 1 tablet (5 mg total) by mouth daily at bedtime 90 tablet 3    lisinopril (ZESTRIL) 5 mg tablet Take 1 tablet in morning and 1 tablet in the evening  180 tablet 3    Multiple Vitamins-Minerals (CENTRUM SILVER ADULT 50+ PO) Take by mouth      mupirocin (BACTROBAN) 2 % ointment Apply topically 3 (three) times a day 22 g 0    triamcinolone (KENALOG) 0 1 % cream Apply topically 2 (two) times a day 45 g 6    dicyclomine (BENTYL) 20 mg tablet Take 1 tablet (20 mg total) by mouth 2 (two) times a day for 3 days 9 tablet 0    ondansetron (ZOFRAN-ODT) 4 mg disintegrating tablet Take 1 tablet (4 mg total) by mouth every 6 (six) hours as needed for nausea or vomiting for up to 3 days 9 tablet 0     No current facility-administered medications for this visit  Allergies   Allergen Reactions    Tamsulosin Syncope      Immunizations:     Immunization History   Administered Date(s) Administered    INFLUENZA 10/01/2018    Influenza Split High Dose Preservative Free IM 10/09/2017, 10/18/2019    Influenza, high dose seasonal 0 7 mL 10/07/2020    Pneumococcal Conjugate 13-Valent 03/05/2019    SARS-CoV-2 / COVID-19 mRNA IM (Navid Juve) 01/20/2021, 02/17/2021      Health Maintenance: There are no preventive care reminders to display for this patient  There are no preventive care reminders to display for this patient  Medicare Health Risk Assessment:     /60 (BP Location: Left arm, Patient Position: Sitting, Cuff Size: Large)   Pulse 77   Temp 97 6 °F (36 4 °C)   Resp 18   Ht 5' 11" (1 803 m)   Wt 75 8 kg (167 lb)   SpO2 98%   BMI 23 29 kg/m²      Lico Escobedo is here for his Subsequent Wellness visit  Health Risk Assessment:   Patient rates overall health as good  Patient feels that their physical health rating is slightly worse  Patient is satisfied with their life  Eyesight was rated as same  Hearing was rated as same  Patient feels that their emotional and mental health rating is same  Patients states they are never, rarely angry   Patient states they are sometimes unusually tired/fatigued  Pain experienced in the last 7 days has been some  Patient's pain rating has been 3/10  Patient states that he has experienced no weight loss or gain in last 6 months  Depression Screening:   PHQ-2 Score: 0      Fall Risk Screening: In the past year, patient has experienced: no history of falling in past year      Home Safety:  Patient does not have trouble with stairs inside or outside of their home  Patient has working smoke alarms and has working carbon monoxide detector  Home safety hazards include: none  Nutrition:   Current diet is Regular  Medications:   Patient is currently taking over-the-counter supplements  OTC medications include: see medication list  Patient is able to manage medications  Activities of Daily Living (ADLs)/Instrumental Activities of Daily Living (IADLs):   Walk and transfer into and out of bed and chair?: Yes  Dress and groom yourself?: Yes    Bathe or shower yourself?: Yes    Feed yourself?  Yes  Do your laundry/housekeeping?: Yes  Manage your money, pay your bills and track your expenses?: Yes  Make your own meals?: Yes    Do your own shopping?: Yes    Previous Hospitalizations:   Any hospitalizations or ED visits within the last 12 months?: Yes    How many hospitalizations have you had in the last year?: 1-2    Advance Care Planning:   Living will: No    Durable POA for healthcare: No    Advanced directive: No    Advanced directive counseling given: Yes    Five wishes given: Yes      Cognitive Screening:   Provider or family/friend/caregiver concerned regarding cognition?: No    PREVENTIVE SCREENINGS      Cardiovascular Screening:    General: Screening Not Indicated and History Lipid Disorder      Diabetes Screening:     General: Screening Current      Colorectal Cancer Screening:     General: Screening Not Indicated      Prostate Cancer Screening:    General: Screening Not Indicated      Osteoporosis Screening:    General: Screening Not Indicated Abdominal Aortic Aneurysm (AAA) Screening:    Risk factors include: tobacco use        Lung Cancer Screening:     General: Screening Not Indicated      Hepatitis C Screening:    General: Screening Not Indicated    Screening, Brief Intervention, and Referral to Treatment (SBIRT)    Screening  Typical number of drinks in a day: 0  Typical number of drinks in a week: 0  Interpretation: Low risk drinking behavior      Single Item Drug Screening:  How often have you used an illegal drug (including marijuana) or a prescription medication for non-medical reasons in the past year? never    Single Item Drug Screen Score: 0  Interpretation: Negative screen for possible drug use disorder      Nilda Cornejo MD

## 2021-03-24 NOTE — PATIENT INSTRUCTIONS
Medicare Preventive Visit Patient Instructions  Thank you for completing your Welcome to Medicare Visit or Medicare Annual Wellness Visit today  Your next wellness visit will be due in one year (3/25/2022)  The screening/preventive services that you may require over the next 5-10 years are detailed below  Some tests may not apply to you based off risk factors and/or age  Screening tests ordered at today's visit but not completed yet may show as past due  Also, please note that scanned in results may not display below  Preventive Screenings:  Service Recommendations Previous Testing/Comments   Colorectal Cancer Screening  · Colonoscopy    · Fecal Occult Blood Test (FOBT)/Fecal Immunochemical Test (FIT)  · Fecal DNA/Cologuard Test  · Flexible Sigmoidoscopy Age: 54-65 years old   Colonoscopy: every 10 years (May be performed more frequently if at higher risk)  OR  FOBT/FIT: every 1 year  OR  Cologuard: every 3 years  OR  Sigmoidoscopy: every 5 years  Screening may be recommended earlier than age 48 if at higher risk for colorectal cancer  Also, an individualized decision between you and your healthcare provider will decide whether screening between the ages of 74-80 would be appropriate   Colonoscopy: Not on file  FOBT/FIT: Not on file  Cologuard: Not on file  Sigmoidoscopy: Not on file          Prostate Cancer Screening Individualized decision between patient and health care provider in men between ages of 53-78   Medicare will cover every 12 months beginning on the day after your 50th birthday PSA: No results in last 5 years           Hepatitis C Screening Once for adults born between [facility]  More frequently in patients at high risk for Hepatitis C Hep C Antibody: Not on file        Diabetes Screening 1-2 times per year if you're at risk for diabetes or have pre-diabetes Fasting glucose: 84 mg/dL   A1C: No results in last 5 years        Cholesterol Screening Once every 5 years if you don't have a lipid disorder  May order more often based on risk factors  Lipid panel: 12/09/2020           Other Preventive Screenings Covered by Medicare:  1  Abdominal Aortic Aneurysm (AAA) Screening: covered once if your at risk  You're considered to be at risk if you have a family history of AAA or a male between the age of 73-68 who smoking at least 100 cigarettes in your lifetime  2  Lung Cancer Screening: covers low dose CT scan once per year if you meet all of the following conditions: (1) Age 50-69; (2) No signs or symptoms of lung cancer; (3) Current smoker or have quit smoking within the last 15 years; (4) You have a tobacco smoking history of at least 30 pack years (packs per day x number of years you smoked); (5) You get a written order from a healthcare provider  3  Glaucoma Screening: covered annually if you're considered high risk: (1) You have diabetes OR (2) Family history of glaucoma OR (3)  aged 48 and older OR (3)  American aged 72 and older  3  Osteoporosis Screening: covered every 2 years if you meet one of the following conditions: (1) Have a vertebral abnormality; (2) On glucocorticoid therapy for more than 3 months; (3) Have primary hyperparathyroidism; (4) On osteoporosis medications and need to assess response to drug therapy  5  HIV Screening: covered annually if you're between the age of 12-76  Also covered annually if you are younger than 13 and older than 72 with risk factors for HIV infection  For pregnant patients, it is covered up to 3 times per pregnancy      Immunizations:  Immunization Recommendations   Influenza Vaccine Annual influenza vaccination during flu season is recommended for all persons aged >= 6 months who do not have contraindications   Pneumococcal Vaccine (Prevnar and Pneumovax)  * Prevnar = PCV13  * Pneumovax = PPSV23 Adults 25-60 years old: 1-3 doses may be recommended based on certain risk factors  Adults 72 years old: Prevnar (PCV13) vaccine recommended followed by Pneumovax (PPSV23) vaccine  If already received PPSV23 since turning 65, then PCV13 recommended at least one year after PPSV23 dose  Hepatitis B Vaccine 3 dose series if at intermediate or high risk (ex: diabetes, end stage renal disease, liver disease)   Tetanus (Td) Vaccine - COST NOT COVERED BY MEDICARE PART B Following completion of primary series, a booster dose should be given every 10 years to maintain immunity against tetanus  Td may also be given as tetanus wound prophylaxis  Tdap Vaccine - COST NOT COVERED BY MEDICARE PART B Recommended at least once for all adults  For pregnant patients, recommended with each pregnancy  Shingles Vaccine (Shingrix) - COST NOT COVERED BY MEDICARE PART B  2 shot series recommended in those aged 48 and above     Health Maintenance Due:  There are no preventive care reminders to display for this patient  Immunizations Due:  There are no preventive care reminders to display for this patient  Advance Directives   What are advance directives? Advance directives are legal documents that state your wishes and plans for medical care  These plans are made ahead of time in case you lose your ability to make decisions for yourself  Advance directives can apply to any medical decision, such as the treatments you want, and if you want to donate organs  What are the types of advance directives? There are many types of advance directives, and each state has rules about how to use them  You may choose a combination of any of the following:  · Living will: This is a written record of the treatment you want  You can also choose which treatments you do not want, which to limit, and which to stop at a certain time  This includes surgery, medicine, IV fluid, and tube feedings  · Durable power of  for healthcare [facility]):   This is a written record that states who you want to make healthcare choices for you when you are unable to make them for yourself  This person, called a proxy, is usually a family member or a friend  You may choose more than 1 proxy  · Do not resuscitate (DNR) order:  A DNR order is used in case your heart stops beating or you stop breathing  It is a request not to have certain forms of treatment, such as CPR  A DNR order may be included in other types of advance directives  · Medical directive: This covers the care that you want if you are in a coma, near death, or unable to make decisions for yourself  You can list the treatments you want for each condition  Treatment may include pain medicine, surgery, blood transfusions, dialysis, IV or tube feedings, and a ventilator (breathing machine)  · Values history: This document has questions about your views, beliefs, and how you feel and think about life  This information can help others choose the care that you would choose  Why are advance directives important? An advance directive helps you control your care  Although spoken wishes may be used, it is better to have your wishes written down  Spoken wishes can be misunderstood, or not followed  Treatments may be given even if you do not want them  An advance directive may make it easier for your family to make difficult choices about your care  © Copyright [employer] 2018 Information is for End User's use only and may not be sold, redistributed or otherwise used for commercial purposes   All illustrations and images included in CareNotes® are the copyrighted property of A D A M , Inc  or [facility] [employer]

## 2021-05-18 ENCOUNTER — OFFICE VISIT (OUTPATIENT)
Dept: FAMILY MEDICINE CLINIC | Facility: CLINIC | Age: 86
End: 2021-05-18
Payer: COMMERCIAL

## 2021-05-18 VITALS
BODY MASS INDEX: 22.96 KG/M2 | HEIGHT: 71 IN | RESPIRATION RATE: 18 BRPM | TEMPERATURE: 98 F | DIASTOLIC BLOOD PRESSURE: 60 MMHG | WEIGHT: 164 LBS | SYSTOLIC BLOOD PRESSURE: 134 MMHG | HEART RATE: 64 BPM

## 2021-05-18 DIAGNOSIS — L08.9 TOE INFECTION: Primary | ICD-10-CM

## 2021-05-18 PROCEDURE — 99213 OFFICE O/P EST LOW 20 MIN: CPT | Performed by: FAMILY MEDICINE

## 2021-05-18 RX ORDER — AMOXICILLIN AND CLAVULANATE POTASSIUM 875; 125 MG/1; MG/1
1 TABLET, FILM COATED ORAL EVERY 12 HOURS SCHEDULED
Qty: 14 TABLET | Refills: 0 | Status: SHIPPED | OUTPATIENT
Start: 2021-05-18 | End: 2021-05-25

## 2021-05-18 NOTE — PROGRESS NOTES
Chief Complaint   Patient presents with    Toe Pain     big toe - right foot        Patient ID: Rashard Treviño is a 80 y o  male  HPI  Pt is seeing for redness and pain at the tip of R great toe x few days -  Tried soaking once a day  -  Not better     The following portions of the patient's history were reviewed and updated as appropriate: allergies, current medications, past family history, past medical history, past social history, past surgical history and problem list     Review of Systems   Constitutional: Negative  Respiratory: Negative  Cardiovascular: Negative  Gastrointestinal: Negative  Genitourinary: Negative  Musculoskeletal: Negative  Neurological: Negative  Current Outpatient Medications   Medication Sig Dispense Refill    amLODIPine (NORVASC) 5 mg tablet Take 5 mg by mouth daily      ammonium lactate (LAC-HYDRIN) 12 % cream Apply topically as needed for dry skin 385 g 0    aspirin 81 MG tablet Take 81 mg by mouth daily      atorvastatin (LIPITOR) 10 mg tablet Take 1 tablet (10 mg total) by mouth daily 90 tablet 3    Calcium Carbonate-Vitamin D (CALCIUM 500 + D) 500-125 MG-UNIT TABS Take by mouth      chlorthalidone 25 mg tablet Take 1 tablet (25 mg total) by mouth every other day 45 tablet 3    Cholecalciferol (VITAMIN D) 2000 units CAPS Take by mouth      finasteride (PROSCAR) 5 mg tablet Take 1 tablet (5 mg total) by mouth daily at bedtime 90 tablet 3    lisinopril (ZESTRIL) 5 mg tablet Take 1 tablet in morning and 1 tablet in the evening   180 tablet 3    Multiple Vitamins-Minerals (CENTRUM SILVER ADULT 50+ PO) Take by mouth      mupirocin (BACTROBAN) 2 % ointment Apply topically 3 (three) times a day 22 g 0    triamcinolone (KENALOG) 0 1 % cream Apply topically 2 (two) times a day 45 g 6    dicyclomine (BENTYL) 20 mg tablet Take 1 tablet (20 mg total) by mouth 2 (two) times a day for 3 days 9 tablet 0    ondansetron (ZOFRAN-ODT) 4 mg disintegrating tablet Take 1 tablet (4 mg total) by mouth every 6 (six) hours as needed for nausea or vomiting for up to 3 days 9 tablet 0     No current facility-administered medications for this visit  Objective:    /60   Pulse 64   Temp 98 °F (36 7 °C) (Tympanic)   Resp 18   Ht 5' 11" (1 803 m)   Wt 74 4 kg (164 lb)   BMI 22 87 kg/m²        Physical Exam  Constitutional:       Appearance: He is not ill-appearing  Musculoskeletal:      Right lower leg: No edema  Left lower leg: No edema  Skin:     Findings: Erythema (of the tip of R great toe 15 mm in d ) present  No wound  Comments: No tenderness, no swelling    Neurological:      Mental Status: He is alert  Assessment/Plan:         Diagnoses and all orders for this visit:    Toe infection  -     amoxicillin-clavulanate (AUGMENTIN) 875-125 mg per tablet;  Take 1 tablet by mouth every 12 (twelve) hours for 7 days          Soaks in salt water few times a day     rto prn                   Mojgan Butler MD

## 2021-06-10 ENCOUNTER — OFFICE VISIT (OUTPATIENT)
Dept: CARDIOLOGY CLINIC | Facility: CLINIC | Age: 86
End: 2021-06-10
Payer: COMMERCIAL

## 2021-06-10 VITALS
SYSTOLIC BLOOD PRESSURE: 158 MMHG | BODY MASS INDEX: 23.09 KG/M2 | WEIGHT: 164.9 LBS | TEMPERATURE: 97.9 F | DIASTOLIC BLOOD PRESSURE: 84 MMHG | HEART RATE: 67 BPM | OXYGEN SATURATION: 94 % | HEIGHT: 71 IN

## 2021-06-10 DIAGNOSIS — E78.5 DYSLIPIDEMIA: ICD-10-CM

## 2021-06-10 DIAGNOSIS — I87.2 VENOUS INSUFFICIENCY OF BOTH LOWER EXTREMITIES: ICD-10-CM

## 2021-06-10 DIAGNOSIS — I73.9 PAD (PERIPHERAL ARTERY DISEASE) (HCC): ICD-10-CM

## 2021-06-10 DIAGNOSIS — I10 ESSENTIAL HYPERTENSION: Primary | ICD-10-CM

## 2021-06-10 DIAGNOSIS — I49.1 PREMATURE ATRIAL CONTRACTIONS: ICD-10-CM

## 2021-06-10 PROCEDURE — 1036F TOBACCO NON-USER: CPT | Performed by: INTERNAL MEDICINE

## 2021-06-10 PROCEDURE — 93000 ELECTROCARDIOGRAM COMPLETE: CPT | Performed by: INTERNAL MEDICINE

## 2021-06-10 PROCEDURE — 1160F RVW MEDS BY RX/DR IN RCRD: CPT | Performed by: INTERNAL MEDICINE

## 2021-06-10 PROCEDURE — 99214 OFFICE O/P EST MOD 30 MIN: CPT | Performed by: INTERNAL MEDICINE

## 2021-06-10 NOTE — PATIENT INSTRUCTIONS
1  Continue current medication  2  Cardiology follow-up approximately six months with home blood pressure readings taken during the week prior to visit for four consecutive days, morning and evening, three readings at a time, recorded on blood pressure data sheet provided by our office  3  Six-month follow-up will also include lipids, CMP blood tests and EKG

## 2021-06-10 NOTE — PROGRESS NOTES
Office Cardiology Progress Note  Antoinette Masters 80 y o  male MRN: [id number]  06/10/21  11:50 AM      ASSESSMENT:    1   Controlled morning hypotension with resolved associated dizziness and background of labile essential hypertension with resolved nocturnal surges in systolic blood pressure at 9-11 PM, previously in the 489 systolic range   Appears to have white coat hypertension  Previously with frequent hold of blood pressure medication in the mornings for systolic blood pressures of 120 or less previously being done by patient   Average home blood pressure currently from  12/12 through  12/15/2020 was  127/62  and from 06/06/2021 through 06/10/2021 was 128/61  Previous average home blood pressure taken from 12/09 through 12/12/2019 was 115/50   2  Asymptomatic chronic PACs, per patient history, and  present on today's exam and EKG  Frequent PACs and sinus arrhythmia on EKG in the past and currently  Carmen Better has apical systolic ejection murmur, consistent with mild aortic valve stenosis documented on recent echocardiogram 01/08/2020, which also showed 1+ MR/TR, mild MAC, 60% LVEF  3  History of carvedilol-induced syncope from hypotension on 1/10/17 and 1/20/17 with episode on 9/11/17 from low blood pressure, cause uncertain  4  Stable right calf claudication and bilateral right more than left lower extremity PAD present for 8 years   Last lower arterial duplex scan 07/22/2019 showed right CHU 0 53 and left CHU 0 83, indicating worsening PAD  5  Grade 1/4 right neck bruit with less than 50% JOVANNI and 50-69% left ICA stenoses on 01/08/2020 carotid duplex     6  Status post repair of right hydrocele on 10/25/2018 and history of chronic incomplete torsion of left testicle with benign cyst adjacent to left testes     7  Treated dyslipidemia, extremely well-controlled as of  12/09/2020     8  Stable chronic BPH     9  Remote history of syncope after Flomax  10  Chronic nocturnal GERD with water brash    11  Multiple basal cell skin cancers with previous excisions from scalp, and previously from right forehead, right cheek, left ear, left wrist, dorsum of left hand dating back to 04/15/2018     12  Nonhealing ulceration above right medial ankle for approximately 3 years, likely venostasis ulcer    13  Venous insufficiency of left more than right lower extremity, with right lower extremity venous insufficiency previously confirmed on venous duplex scan dated 06/21/2019, patient currently being managed with compression hosiery  14  Laryngopharyngeal reflux (LPR), being followed by Dr Tucker  Plan       Patient Instructions     1  Continue current medication  2  Cardiology follow-up approximately six months with home blood pressure readings taken during the week prior to visit for four consecutive days, morning and evening, three readings at a time, recorded on blood pressure data sheet provided by our office  3  Six-month follow-up will also include lipids, CMP blood tests and EKG  HPI    This 80 y o  male  denies new cardiopulmonary and medical symptoms  He has to be careful with his walking because of mild gait instability with progressive age  He also complains of soreness in his feet and occasional right lower extremity calf claudication, relieved by rest     The patient's previous average home blood pressure in December, 2020 was 127/62  He just updated is home blood pressures between 6/6 and 6/10/ 21 with average overall home blood pressure of 128/61  His lower extremity edema is under control with compression hosiery  The patient is being seen in follow-up of the below listed diagnoses  Encounter Diagnoses   Name Primary?     PAD (peripheral artery disease) (HCC) Yes    Venous insufficiency of right lower extremity     Hypertension, unspecified type     Dyslipidemia     Essential hypertension         Review of Systems    All other systems negative, except as noted in history of present illness    Historical Information   Past Medical History:   Diagnosis Date    Asymptomatic PVCs     last assessed 18    BPH (benign prostatic hyperplasia)     last assessed 17     High cholesterol     last assessed 17     Hypertension     uncontrolled, last assessed 17     Past Surgical History:   Procedure Laterality Date    CATARACT EXTRACTION      COLONOSCOPY      Complete    IL REMOVAL OF HYDROCELE,TUNICA,UNILAT Right 10/25/2018    Procedure: HYDROCELECTOMY;  Surgeon: Komal Marvin MD;  Location: [address] [place] [address];  Service: Urology    TESTICLE SURGERY      last assessed 3/31/15     Social History     Substance and Sexual Activity   Alcohol Use No     Social History     Substance and Sexual Activity   Drug Use No     Social History     Tobacco Use   Smoking Status Former Smoker    Years:     Quit date: 10/18/1965    Years since quittin 8   Smokeless Tobacco Never Used       Family History:  Family History   Problem Relation Age of Onset    Heart disease Mother     Hypertension Mother     Heart disease Father     Hypertension Father     Hypertension Family     Hyperlipidemia Family          Meds/Allergies     Prior to Admission medications    Medication Sig Start Date End Date Taking?  Authorizing Provider   amLODIPine (NORVASC) 5 mg tablet Take 5 mg by mouth daily   Yes Historical Provider, MD   ammonium lactate (LAC-HYDRIN) 12 % cream Apply topically as needed for dry skin 10/30/20  Yes Javier Hernandez DPM   aspirin 81 MG tablet Take 81 mg by mouth daily   Yes Historical Provider, MD   atorvastatin (LIPITOR) 10 mg tablet Take 1 tablet (10 mg total) by mouth daily 20  Yes Demetrio Perkins MD   Calcium Carbonate-Vitamin D (CALCIUM 500 + D) 500-125 MG-UNIT TABS Take by mouth   Yes Historical Provider, MD   chlorthalidone 25 mg tablet Take 1 tablet (25 mg total) by mouth every other day 20  Yes Jannie Espinal MD   Cholecalciferol (VITAMIN D) 2000 units CAPS Take by mouth   Yes Historical Provider, MD   finasteride (PROSCAR) 5 mg tablet Take 1 tablet (5 mg total) by mouth daily at bedtime 8/19/20  Yes Reed Andrew MD   lisinopril (ZESTRIL) 5 mg tablet Take 1 tablet in morning and 1 tablet in the evening  10/20/20  Yes Junito Anderson MD   Multiple Vitamins-Minerals (CENTRUM SILVER ADULT 50+ PO) Take by mouth   Yes Historical Provider, MD   mupirocin (BACTROBAN) 2 % ointment Apply topically 3 (three) times a day 10/22/20  Yes Marisol Scott DPM   triamcinolone (KENALOG) 0 1 % cream Apply topically 2 (two) times a day 9/8/20  Yes Reed Andrew MD   dicyclomine (BENTYL) 20 mg tablet Take 1 tablet (20 mg total) by mouth 2 (two) times a day for 3 days 1/15/21 1/18/21  Delmy Jackson DO   ondansetron (ZOFRAN-ODT) 4 mg disintegrating tablet Take 1 tablet (4 mg total) by mouth every 6 (six) hours as needed for nausea or vomiting for up to 3 days 1/15/21 1/18/21  Delmy Jackson DO       Allergies   Allergen Reactions    Tamsulosin Syncope         Vitals:    06/10/21 1059   BP: 158/84   BP Location: Left arm   Patient Position: Sitting   Cuff Size: Standard   Pulse: 67   Temp: 97 9 °F (36 6 °C)   TempSrc: Temporal   SpO2: 94%   Weight: 74 8 kg (164 lb 14 4 oz)   Height: 5' 11" (1 803 m)       Body mass index is 23 kg/m²  No significant change in weight in approximately six months  Physical Exam:    General Appearance:  Alert, cooperative, no distress, appears stated age      Head:  Normocephalic, without obvious abnormality, atraumatic   Eyes:  PERRL, conjunctiva/corneas clear, EOM's intact,   both eyes   Ears:  Normal TM's and external ear canals, both ears   Nose: Nares normal, septum midline, mucosa normal, no drainage or sinus tenderness   Throat: Lips, mucosa, and tongue normal; teeth and gums normal   Neck: Supple, symmetrical, trachea midline, no adenopathy, thyroid: not enlarged, symmetric, no tenderness/mass/nodules, no carotid bruit or JVD   Back: Symmetric, no curvature, ROM normal, no CVA tenderness   Lungs:   Clear to auscultation bilaterally, respirations unlabored   Chest Wall:  No tenderness or deformity   Heart:  Irregular cardiac rhythm, S1, S2 normal, no murmur, rub or gallop   Abdomen:   Soft, non-tender, bowel sounds active all four quadrants,  no masses, no organomegaly   Extremities: Extremities normal, atraumatic, no cyanosis  with nonpitting left greater than right lower extremity edema   Pulses: Trace-1+ and symmetric pedal pulses   Skin: Skin showed normal color, texture, turgor and no rashes or lesions   Lymph nodes: Cervical, supraclavicular, and axillary nodes normal   Neurologic: Normal         Cardiographics    ECG 06/10/21:    Marked sinus arrhythmia with possible nonconducted PACs buried in T-waves  Otherwise normal ECG, unchanged from 12/16/2020  IMAGING:    No Chest XR results available for this patient      Venous duplex scan 01/19/2021:    Bilateral lower extremity venous insufficiency, worse on left than right      LAB REVIEW:      Lab Results   Component Value Date    SODIUM 140 01/15/2021    K 4 2 01/15/2021     01/15/2021    CO2 25 01/15/2021    BUN 36 (H) 01/15/2021    CREATININE 1 00 01/15/2021    GLUF 84 12/09/2020    CALCIUM 9 4 01/15/2021    AST 22 01/15/2021    ALT 25 01/15/2021    ALKPHOS 65 01/15/2021    EGFR 66 01/15/2021   Glucose 01/15/2021:  108; lipase and magnesium 01/15/2021: Normal    Lab Results   Component Value Date    CHOLESTEROL 149 12/09/2020    CHOLESTEROL 158 06/10/2020    CHOLESTEROL 176 03/14/2019     Lab Results   Component Value Date    HDL 81 12/09/2020    HDL 72 06/10/2020    HDL 87 (H) 03/14/2019       Lab Results   Component Value Date    LDLCALC 56 12/09/2020    LDLCALC 69 06/10/2020    LDLCALC 74 03/14/2019     No components found for: [facility]  Lab Results   Component Value Date    TRIG 58 12/09/2020    TRIG 87 06/10/2020    TRIG 74 03/14/2019      CBC 01/15/2021:  WBC 11 95 with normal H/ H and platelets          Ciara Pacheco MD

## 2021-07-08 ENCOUNTER — RA CDI HCC (OUTPATIENT)
Dept: OTHER | Facility: HOSPITAL | Age: 86
End: 2021-07-08

## 2021-07-08 ENCOUNTER — OFFICE VISIT (OUTPATIENT)
Dept: FAMILY MEDICINE CLINIC | Facility: CLINIC | Age: 86
End: 2021-07-08
Payer: COMMERCIAL

## 2021-07-08 VITALS
RESPIRATION RATE: 14 BRPM | HEIGHT: 71 IN | TEMPERATURE: 97.7 F | BODY MASS INDEX: 22.82 KG/M2 | HEART RATE: 72 BPM | WEIGHT: 163 LBS | DIASTOLIC BLOOD PRESSURE: 72 MMHG | SYSTOLIC BLOOD PRESSURE: 140 MMHG

## 2021-07-08 DIAGNOSIS — R10.31 RIGHT LOWER QUADRANT ABDOMINAL PAIN: Primary | ICD-10-CM

## 2021-07-08 PROCEDURE — 99214 OFFICE O/P EST MOD 30 MIN: CPT | Performed by: FAMILY MEDICINE

## 2021-07-08 PROCEDURE — 1160F RVW MEDS BY RX/DR IN RCRD: CPT | Performed by: FAMILY MEDICINE

## 2021-07-08 PROCEDURE — 1036F TOBACCO NON-USER: CPT | Performed by: FAMILY MEDICINE

## 2021-07-08 RX ORDER — AMOXICILLIN AND CLAVULANATE POTASSIUM 875; 125 MG/1; MG/1
1 TABLET, FILM COATED ORAL EVERY 12 HOURS SCHEDULED
Qty: 20 TABLET | Refills: 0 | Status: SHIPPED | OUTPATIENT
Start: 2021-07-08 | End: 2021-07-18

## 2021-07-08 NOTE — PROGRESS NOTES
[facility] 75  coding opportunities          Chart reviewed, no opportunity found: CHART REVIEWED, NO OPPORTUNITY FOUND                     Patients insurance company: [facility] [address]  (Medicare Advantage and Commercial)

## 2021-07-08 NOTE — PROGRESS NOTES
Chief Complaint   Patient presents with    Abdominal Pain     pt c/o RLQ pain x1 week        Patient ID: Addison Anaya is a 80 y o  male  HPI  Pt is seeing for constant mild RLQ pain x 1 wk -  No therapy tried, was in Er 6 m ago for diverticulitis -  At that time had severe LLQ pain  -  CT reviewed  -  No fever, no N/V, had diarrhea this am  -  Taking Colace daily for constipation     The following portions of the patient's history were reviewed and updated as appropriate: allergies, current medications, past family history, past medical history, past social history, past surgical history and problem list     Review of Systems   Constitutional: Negative  Respiratory: Negative  Cardiovascular: Negative  Gastrointestinal: Positive for abdominal pain  Negative for abdominal distention, blood in stool, nausea and vomiting  Genitourinary: Negative  Musculoskeletal: Negative  Skin: Negative  Neurological: Negative  Current Outpatient Medications   Medication Sig Dispense Refill    amLODIPine (NORVASC) 5 mg tablet Take 5 mg by mouth daily      ammonium lactate (LAC-HYDRIN) 12 % cream Apply topically as needed for dry skin 385 g 0    aspirin 81 MG tablet Take 81 mg by mouth daily      atorvastatin (LIPITOR) 10 mg tablet Take 1 tablet (10 mg total) by mouth daily 90 tablet 3    Calcium Carbonate-Vitamin D (CALCIUM 500 + D) 500-125 MG-UNIT TABS Take by mouth      chlorthalidone 25 mg tablet Take 1 tablet (25 mg total) by mouth every other day 45 tablet 3    Cholecalciferol (VITAMIN D) 2000 units CAPS Take by mouth      finasteride (PROSCAR) 5 mg tablet Take 1 tablet (5 mg total) by mouth daily at bedtime 90 tablet 3    lisinopril (ZESTRIL) 5 mg tablet Take 1 tablet in morning and 1 tablet in the evening   180 tablet 3    Multiple Vitamins-Minerals (CENTRUM SILVER ADULT 50+ PO) Take by mouth      triamcinolone (KENALOG) 0 1 % cream Apply topically 2 (two) times a day 45 g 6    dicyclomine (BENTYL) 20 mg tablet Take 1 tablet (20 mg total) by mouth 2 (two) times a day for 3 days 9 tablet 0    ondansetron (ZOFRAN-ODT) 4 mg disintegrating tablet Take 1 tablet (4 mg total) by mouth every 6 (six) hours as needed for nausea or vomiting for up to 3 days 9 tablet 0     No current facility-administered medications for this visit  Objective:    /72 (Patient Position: Sitting, Cuff Size: Standard)   Pulse 72   Temp 97 7 °F (36 5 °C) (Temporal)   Resp 14   Ht 5' 11" (1 803 m)   Wt 73 9 kg (163 lb)   BMI 22 73 kg/m²        Physical Exam  Constitutional:       General: He is not in acute distress  Appearance: He is not ill-appearing  Cardiovascular:      Rate and Rhythm: Normal rate and regular rhythm  Pulmonary:      Effort: Pulmonary effort is normal  No respiratory distress  Abdominal:      General: Bowel sounds are normal       Palpations: Abdomen is soft  Tenderness: There is abdominal tenderness (minimal ) in the right lower quadrant  There is no guarding or rebound  Skin:     Coloration: Skin is not pale  Neurological:      General: No focal deficit present  Mental Status: He is alert and oriented to person, place, and time  Assessment/Plan:         Diagnoses and all orders for this visit:    Right lower quadrant abdominal pain  -     amoxicillin-clavulanate (Augmentin) 875-125 mg per tablet; Take 1 tablet by mouth every 12 (twelve) hours for 10 days  -     Ambulatory referral to Gastroenterology;  Future      likely diverticulitis   Clear diet x 2 days  Phone f/u in 4 days  If worsening -  Needs to go to ER     rto ezekiel Richmond MD

## 2021-07-12 ENCOUNTER — TELEPHONE (OUTPATIENT)
Dept: FAMILY MEDICINE CLINIC | Facility: CLINIC | Age: 86
End: 2021-07-12

## 2021-07-12 NOTE — TELEPHONE ENCOUNTER
Dr Dianna James:    FYI:  Patient is still experiencing ache/pain same as last week  He is taking the ABX but feels that there has been no change

## 2021-07-12 NOTE — TELEPHONE ENCOUNTER
D/w pt -  somewhat better -  was advised to finish Augmentin for 10 days and call back if still having symptoms

## 2021-08-11 DIAGNOSIS — N40.0 BENIGN PROSTATIC HYPERPLASIA WITHOUT LOWER URINARY TRACT SYMPTOMS: ICD-10-CM

## 2021-08-11 RX ORDER — FINASTERIDE 5 MG/1
5 TABLET, FILM COATED ORAL
Qty: 90 TABLET | Refills: 3 | Status: SHIPPED | OUTPATIENT
Start: 2021-08-11 | End: 2022-08-09 | Stop reason: SDUPTHER

## 2021-11-10 DIAGNOSIS — R21 RASH: ICD-10-CM

## 2021-11-11 RX ORDER — TRIAMCINOLONE ACETONIDE 1 MG/G
CREAM TOPICAL
Qty: 30 G | Refills: 6 | Status: SHIPPED | OUTPATIENT
Start: 2021-11-11

## 2021-11-17 DIAGNOSIS — E78.2 MIXED HYPERLIPIDEMIA: ICD-10-CM

## 2021-11-17 RX ORDER — ATORVASTATIN CALCIUM 10 MG/1
10 TABLET, FILM COATED ORAL DAILY
Qty: 90 TABLET | Refills: 3 | Status: SHIPPED | OUTPATIENT
Start: 2021-11-17

## 2021-11-30 ENCOUNTER — APPOINTMENT (OUTPATIENT)
Dept: LAB | Facility: CLINIC | Age: 86
End: 2021-11-30
Payer: COMMERCIAL

## 2021-11-30 DIAGNOSIS — E78.5 DYSLIPIDEMIA: ICD-10-CM

## 2021-11-30 DIAGNOSIS — I10 ESSENTIAL HYPERTENSION: ICD-10-CM

## 2021-11-30 DIAGNOSIS — I73.9 PAD (PERIPHERAL ARTERY DISEASE) (HCC): ICD-10-CM

## 2021-11-30 LAB
ALBUMIN SERPL BCP-MCNC: 3.5 G/DL (ref 3.5–5)
ALP SERPL-CCNC: 64 U/L (ref 46–116)
ALT SERPL W P-5'-P-CCNC: 24 U/L (ref 12–78)
ANION GAP SERPL CALCULATED.3IONS-SCNC: 5 MMOL/L (ref 4–13)
AST SERPL W P-5'-P-CCNC: 18 U/L (ref 5–45)
BILIRUB SERPL-MCNC: 0.87 MG/DL (ref 0.2–1)
BUN SERPL-MCNC: 28 MG/DL (ref 5–25)
CALCIUM SERPL-MCNC: 9.5 MG/DL (ref 8.3–10.1)
CHLORIDE SERPL-SCNC: 105 MMOL/L (ref 100–108)
CHOLEST SERPL-MCNC: 154 MG/DL
CO2 SERPL-SCNC: 27 MMOL/L (ref 21–32)
CREAT SERPL-MCNC: 0.94 MG/DL (ref 0.6–1.3)
GFR SERPL CREATININE-BSD FRML MDRD: 71 ML/MIN/1.73SQ M
GLUCOSE P FAST SERPL-MCNC: 82 MG/DL (ref 65–99)
HDLC SERPL-MCNC: 78 MG/DL
LDLC SERPL CALC-MCNC: 64 MG/DL (ref 0–100)
NONHDLC SERPL-MCNC: 76 MG/DL
POTASSIUM SERPL-SCNC: 4 MMOL/L (ref 3.5–5.3)
PROT SERPL-MCNC: 7.3 G/DL (ref 6.4–8.2)
SODIUM SERPL-SCNC: 137 MMOL/L (ref 136–145)
TRIGL SERPL-MCNC: 61 MG/DL

## 2021-11-30 PROCEDURE — 80053 COMPREHEN METABOLIC PANEL: CPT

## 2021-11-30 PROCEDURE — 36415 COLL VENOUS BLD VENIPUNCTURE: CPT

## 2021-11-30 PROCEDURE — 80061 LIPID PANEL: CPT

## 2021-12-14 ENCOUNTER — OFFICE VISIT (OUTPATIENT)
Dept: CARDIOLOGY CLINIC | Facility: CLINIC | Age: 86
End: 2021-12-14
Payer: COMMERCIAL

## 2021-12-14 VITALS
BODY MASS INDEX: 22.79 KG/M2 | RESPIRATION RATE: 16 BRPM | DIASTOLIC BLOOD PRESSURE: 80 MMHG | HEIGHT: 71 IN | OXYGEN SATURATION: 98 % | TEMPERATURE: 97.9 F | WEIGHT: 162.8 LBS | SYSTOLIC BLOOD PRESSURE: 152 MMHG | HEART RATE: 89 BPM

## 2021-12-14 DIAGNOSIS — I34.0 NONRHEUMATIC MITRAL VALVE REGURGITATION: ICD-10-CM

## 2021-12-14 DIAGNOSIS — I10 ESSENTIAL HYPERTENSION: Primary | ICD-10-CM

## 2021-12-14 DIAGNOSIS — I87.2 VENOUS INSUFFICIENCY OF BOTH LOWER EXTREMITIES: ICD-10-CM

## 2021-12-14 DIAGNOSIS — E78.5 DYSLIPIDEMIA: ICD-10-CM

## 2021-12-14 DIAGNOSIS — I70.219 ATHEROSCLEROTIC PVD WITH INTERMITTENT CLAUDICATION (HCC): ICD-10-CM

## 2021-12-14 DIAGNOSIS — I49.1 PREMATURE ATRIAL CONTRACTIONS: ICD-10-CM

## 2021-12-14 PROCEDURE — 1160F RVW MEDS BY RX/DR IN RCRD: CPT | Performed by: INTERNAL MEDICINE

## 2021-12-14 PROCEDURE — 1036F TOBACCO NON-USER: CPT | Performed by: INTERNAL MEDICINE

## 2021-12-14 PROCEDURE — 93000 ELECTROCARDIOGRAM COMPLETE: CPT | Performed by: INTERNAL MEDICINE

## 2021-12-14 PROCEDURE — 99214 OFFICE O/P EST MOD 30 MIN: CPT | Performed by: INTERNAL MEDICINE

## 2021-12-20 ENCOUNTER — TELEPHONE (OUTPATIENT)
Dept: CARDIOLOGY CLINIC | Facility: CLINIC | Age: 86
End: 2021-12-20

## 2021-12-27 DIAGNOSIS — I10 ESSENTIAL HYPERTENSION: ICD-10-CM

## 2021-12-27 RX ORDER — CHLORTHALIDONE 25 MG/1
25 TABLET ORAL EVERY OTHER DAY
Qty: 45 TABLET | Refills: 3 | Status: SHIPPED | OUTPATIENT
Start: 2021-12-27

## 2022-01-25 DIAGNOSIS — I10 ESSENTIAL HYPERTENSION: Primary | ICD-10-CM

## 2022-01-25 RX ORDER — AMLODIPINE BESYLATE 5 MG/1
5 TABLET ORAL DAILY
Qty: 90 TABLET | Refills: 3 | Status: SHIPPED | OUTPATIENT
Start: 2022-01-25

## 2022-03-29 ENCOUNTER — OFFICE VISIT (OUTPATIENT)
Dept: FAMILY MEDICINE CLINIC | Facility: CLINIC | Age: 87
End: 2022-03-29
Payer: COMMERCIAL

## 2022-03-29 VITALS
WEIGHT: 162.4 LBS | HEIGHT: 71 IN | RESPIRATION RATE: 18 BRPM | TEMPERATURE: 97.2 F | BODY MASS INDEX: 22.73 KG/M2 | DIASTOLIC BLOOD PRESSURE: 66 MMHG | SYSTOLIC BLOOD PRESSURE: 142 MMHG | HEART RATE: 74 BPM

## 2022-03-29 DIAGNOSIS — Z00.00 MEDICARE ANNUAL WELLNESS VISIT, SUBSEQUENT: Primary | ICD-10-CM

## 2022-03-29 DIAGNOSIS — R21 RASH: ICD-10-CM

## 2022-03-29 PROCEDURE — 1125F AMNT PAIN NOTED PAIN PRSNT: CPT | Performed by: FAMILY MEDICINE

## 2022-03-29 PROCEDURE — 3725F SCREEN DEPRESSION PERFORMED: CPT | Performed by: FAMILY MEDICINE

## 2022-03-29 PROCEDURE — 3288F FALL RISK ASSESSMENT DOCD: CPT | Performed by: FAMILY MEDICINE

## 2022-03-29 PROCEDURE — G0439 PPPS, SUBSEQ VISIT: HCPCS | Performed by: FAMILY MEDICINE

## 2022-03-29 PROCEDURE — 1170F FXNL STATUS ASSESSED: CPT | Performed by: FAMILY MEDICINE

## 2022-03-29 NOTE — PATIENT INSTRUCTIONS
Medicare Preventive Visit Patient Instructions  Thank you for completing your Welcome to Medicare Visit or Medicare Annual Wellness Visit today  Your next wellness visit will be due in one year (3/30/2023)  The screening/preventive services that you may require over the next 5-10 years are detailed below  Some tests may not apply to you based off risk factors and/or age  Screening tests ordered at today's visit but not completed yet may show as past due  Also, please note that scanned in results may not display below  Preventive Screenings:  Service Recommendations Previous Testing/Comments   Colorectal Cancer Screening  · Colonoscopy    · Fecal Occult Blood Test (FOBT)/Fecal Immunochemical Test (FIT)  · Fecal DNA/Cologuard Test  · Flexible Sigmoidoscopy Age: 54-65 years old   Colonoscopy: every 10 years (May be performed more frequently if at higher risk)  OR  FOBT/FIT: every 1 year  OR  Cologuard: every 3 years  OR  Sigmoidoscopy: every 5 years  Screening may be recommended earlier than age 48 if at higher risk for colorectal cancer  Also, an individualized decision between you and your healthcare provider will decide whether screening between the ages of 74-80 would be appropriate   Colonoscopy: Not on file  FOBT/FIT: Not on file  Cologuard: Not on file  Sigmoidoscopy: Not on file          Prostate Cancer Screening Individualized decision between patient and health care provider in men between ages of 53-78   Medicare will cover every 12 months beginning on the day after your 50th birthday PSA: No results in last 5 years           Hepatitis C Screening Once for adults born between [facility]  More frequently in patients at high risk for Hepatitis C Hep C Antibody: Not on file        Diabetes Screening 1-2 times per year if you're at risk for diabetes or have pre-diabetes Fasting glucose: 82 mg/dL   A1C: No results in last 5 years        Cholesterol Screening Once every 5 years if you don't have a lipid disorder  May order more often based on risk factors  Lipid panel: 11/30/2021           Other Preventive Screenings Covered by Medicare:  1  Abdominal Aortic Aneurysm (AAA) Screening: covered once if your at risk  You're considered to be at risk if you have a family history of AAA or a male between the age of 73-68 who smoking at least 100 cigarettes in your lifetime  2  Lung Cancer Screening: covers low dose CT scan once per year if you meet all of the following conditions: (1) Age 50-69; (2) No signs or symptoms of lung cancer; (3) Current smoker or have quit smoking within the last 15 years; (4) You have a tobacco smoking history of at least 30 pack years (packs per day x number of years you smoked); (5) You get a written order from a healthcare provider  3  Glaucoma Screening: covered annually if you're considered high risk: (1) You have diabetes OR (2) Family history of glaucoma OR (3)  aged 48 and older OR (3)  American aged 72 and older  3  Osteoporosis Screening: covered every 2 years if you meet one of the following conditions: (1) Have a vertebral abnormality; (2) On glucocorticoid therapy for more than 3 months; (3) Have primary hyperparathyroidism; (4) On osteoporosis medications and need to assess response to drug therapy  5  HIV Screening: covered annually if you're between the age of 12-76  Also covered annually if you are younger than 13 and older than 72 with risk factors for HIV infection  For pregnant patients, it is covered up to 3 times per pregnancy      Immunizations:  Immunization Recommendations   Influenza Vaccine Annual influenza vaccination during flu season is recommended for all persons aged >= 6 months who do not have contraindications   Pneumococcal Vaccine (Prevnar and Pneumovax)  * Prevnar = PCV13  * Pneumovax = PPSV23 Adults 25-60 years old: 1-3 doses may be recommended based on certain risk factors  Adults 72 years old: Prevnar (PCV13) vaccine recommended followed by Pneumovax (PPSV23) vaccine  If already received PPSV23 since turning 65, then PCV13 recommended at least one year after PPSV23 dose  Hepatitis B Vaccine 3 dose series if at intermediate or high risk (ex: diabetes, end stage renal disease, liver disease)   Tetanus (Td) Vaccine - COST NOT COVERED BY MEDICARE PART B Following completion of primary series, a booster dose should be given every 10 years to maintain immunity against tetanus  Td may also be given as tetanus wound prophylaxis  Tdap Vaccine - COST NOT COVERED BY MEDICARE PART B Recommended at least once for all adults  For pregnant patients, recommended with each pregnancy  Shingles Vaccine (Shingrix) - COST NOT COVERED BY MEDICARE PART B  2 shot series recommended in those aged 48 and above     Health Maintenance Due:  There are no preventive care reminders to display for this patient  Immunizations Due:  There are no preventive care reminders to display for this patient  Advance Directives   What are advance directives? Advance directives are legal documents that state your wishes and plans for medical care  These plans are made ahead of time in case you lose your ability to make decisions for yourself  Advance directives can apply to any medical decision, such as the treatments you want, and if you want to donate organs  What are the types of advance directives? There are many types of advance directives, and each state has rules about how to use them  You may choose a combination of any of the following:  · Living will: This is a written record of the treatment you want  You can also choose which treatments you do not want, which to limit, and which to stop at a certain time  This includes surgery, medicine, IV fluid, and tube feedings  · Durable power of  for healthcare [place] [facility]):   This is a written record that states who you want to make healthcare choices for you when you are unable to make them for yourself  This person, called a proxy, is usually a family member or a friend  You may choose more than 1 proxy  · Do not resuscitate (DNR) order:  A DNR order is used in case your heart stops beating or you stop breathing  It is a request not to have certain forms of treatment, such as CPR  A DNR order may be included in other types of advance directives  · Medical directive: This covers the care that you want if you are in a coma, near death, or unable to make decisions for yourself  You can list the treatments you want for each condition  Treatment may include pain medicine, surgery, blood transfusions, dialysis, IV or tube feedings, and a ventilator (breathing machine)  · Values history: This document has questions about your views, beliefs, and how you feel and think about life  This information can help others choose the care that you would choose  Why are advance directives important? An advance directive helps you control your care  Although spoken wishes may be used, it is better to have your wishes written down  Spoken wishes can be misunderstood, or not followed  Treatments may be given even if you do not want them  An advance directive may make it easier for your family to make difficult choices about your care  © Copyright [employer] 2018 Information is for End User's use only and may not be sold, redistributed or otherwise used for commercial purposes   All illustrations and images included in CareNotes® are the copyrighted property of A D A M , Inc  or [facility] [employer]

## 2022-03-29 NOTE — PROGRESS NOTES
Assessment and Plan:     Problem List Items Addressed This Visit     None      Visit Diagnoses     Medicare annual wellness visit, subsequent    -  Primary    Rash        Relevant Orders    Ambulatory Referral to Dermatology           Preventive health issues were discussed with patient, and age appropriate screening tests were ordered as noted in patient's After Visit Summary  Personalized health advice and appropriate referrals for health education or preventive services given if needed, as noted in patient's After Visit Summary       History of Present Illness:     Patient presents for Medicare Annual Wellness visit    Patient Care Team:  Sonia Aleln MD as PCP - General (Family Medicine)  Annalisa Garcia MD (Vascular Surgery)  Cristina Adams MD (Vascular Surgery)  MD Eddie Obrien MD (Cardiology)     Problem List:     Patient Active Problem List   Diagnosis    Essential hypertension    Hyperlipidemia    Atherosclerotic PVD with intermittent claudication ([address] )    Benign prostatic hyperplasia without lower urinary tract symptoms    Venous insufficiency of both lower extremities    Dyslipidemia    Premature atrial contractions    Nonrheumatic mitral valve regurgitation      Past Medical and Surgical History:     Past Medical History:   Diagnosis Date    Asymptomatic PVCs     last assessed 1/18/18    BPH (benign prostatic hyperplasia)     last assessed 4/11/17     High cholesterol     last assessed 4/11/17     Hypertension     uncontrolled, last assessed 4/11/17     Past Surgical History:   Procedure Laterality Date    CATARACT EXTRACTION      COLONOSCOPY      Complete    DC REMOVAL OF HYDROCELE,TUNICA,UNILAT Right 10/25/2018    Procedure: HYDROCELECTOMY;  Surgeon: Christin Sheikh MD;  Location: [facility];  Service: Urology    TESTICLE SURGERY      last assessed 3/31/15      Family History:     Family History   Problem Relation Age of Onset    Heart disease Mother     Hypertension Mother     Heart disease Father     Hypertension Father     Hypertension Family     Hyperlipidemia Family       Social History:     Social History     Socioeconomic History    Marital status: /Civil Union     Spouse name: None    Number of children: None    Years of education: None    Highest education level: None   Occupational History    Occupation: Retired   Tobacco Use    Smoking status: Former Smoker     Years: 20 00     Quit date: 10/18/1965     Years since quittin 3    Smokeless tobacco: Never Used   Vaping Use    Vaping Use: Never used   Substance and Sexual Activity    Alcohol use: No    Drug use: No    Sexual activity: None   Other Topics Concern    None   Social History Narrative    Exercises daily     Sleeps 6-7 hrs/day     Social Determinants of Health     Financial Resource Strain: Not on file   Food Insecurity: Not on file   Transportation Needs: Not on file   Physical Activity: Not on file   Stress: Not on file   Social Connections: Not on file   Intimate Partner Violence: Not on file   Housing Stability: Not on file      Medications and Allergies:     Current Outpatient Medications   Medication Sig Dispense Refill    amLODIPine (NORVASC) 5 mg tablet Take 1 tablet (5 mg total) by mouth daily 90 tablet 3    ammonium lactate (LAC-HYDRIN) 12 % cream Apply topically as needed for dry skin 385 g 0    aspirin 81 MG tablet Take 81 mg by mouth daily      atorvastatin (LIPITOR) 10 mg tablet Take 1 tablet (10 mg total) by mouth daily 90 tablet 3    Calcium Carbonate-Vitamin D (CALCIUM 500 + D) 500-125 MG-UNIT TABS Take by mouth      chlorthalidone 25 mg tablet Take 1 tablet (25 mg total) by mouth every other day 45 tablet 3    Cholecalciferol (VITAMIN D) 2000 units CAPS Take by mouth      finasteride (PROSCAR) 5 mg tablet Take 1 tablet (5 mg total) by mouth daily at bedtime 90 tablet 3    lisinopril (ZESTRIL) 5 mg tablet Take 1 tablet in morning and 1 tablet in the evening  180 tablet 3    Multiple Vitamins-Minerals (CENTRUM SILVER ADULT 50+ PO) Take by mouth      triamcinolone (KENALOG) 0 1 % cream APPLY TO AFFECTED AREA(S) TWO TIMES A DAY AS DIRECTED 30 g 6     No current facility-administered medications for this visit  Allergies   Allergen Reactions    Tamsulosin Syncope      Immunizations:     Immunization History   Administered Date(s) Administered    COVID-19 MODERNA VACC 0 5 ML IM 01/20/2021, 02/17/2021, 11/04/2021    INFLUENZA 10/01/2018, 10/01/2021    Influenza Split High Dose Preservative Free IM 10/09/2017, 10/18/2019    Influenza, high dose seasonal 0 7 mL 10/07/2020    Pneumococcal Conjugate 13-Valent 03/05/2019      Health Maintenance: There are no preventive care reminders to display for this patient  There are no preventive care reminders to display for this patient  Medicare Health Risk Assessment:     /66 (BP Location: Left arm, Patient Position: Sitting, Cuff Size: Large)   Pulse 74   Temp (!) 97 2 °F (36 2 °C)   Resp 18   Ht 5' 11" (1 803 m)   Wt 73 7 kg (162 lb 6 4 oz)   BMI 22 65 kg/m²      Dave Joiner is here for his Subsequent Wellness visit  Health Risk Assessment:   Patient rates overall health as good  Patient feels that their physical health rating is slightly worse  Patient is satisfied with their life  Eyesight was rated as same  Hearing was rated as same  Patient feels that their emotional and mental health rating is same  Patients states they are never, rarely angry  Patient states they are never, rarely unusually tired/fatigued  Pain experienced in the last 7 days has been none  Patient states that he has experienced no weight loss or gain in last 6 months  Depression Screening:   PHQ-2 Score: 0      Fall Risk Screening:    In the past year, patient has experienced: no history of falling in past year      Home Safety:  Patient does not have trouble with stairs inside or outside of their home  Patient has working smoke alarms and has working carbon monoxide detector  Home safety hazards include: none  Nutrition:   Current diet is Regular  Medications:   Patient is currently taking over-the-counter supplements  OTC medications include: see medication list  Patient is able to manage medications  Activities of Daily Living (ADLs)/Instrumental Activities of Daily Living (IADLs):   Walk and transfer into and out of bed and chair?: Yes  Dress and groom yourself?: Yes    Bathe or shower yourself?: Yes    Feed yourself? Yes  Do your laundry/housekeeping?: Yes  Manage your money, pay your bills and track your expenses?: Yes  Make your own meals?: Yes    Do your own shopping?: Yes    Previous Hospitalizations:   Any hospitalizations or ED visits within the last 12 months?: No      Advance Care Planning:   Living will: Yes    Durable POA for healthcare: Yes    Advanced directive: Yes      Cognitive Screening:   Provider or family/friend/caregiver concerned regarding cognition?: No    PREVENTIVE SCREENINGS      Cardiovascular Screening:    General: Screening Not Indicated and History Lipid Disorder      Diabetes Screening:     General: Screening Current      Colorectal Cancer Screening:     General: Screening Not Indicated      Prostate Cancer Screening:    General: Screening Not Indicated      Osteoporosis Screening:    General: Screening Not Indicated      Abdominal Aortic Aneurysm (AAA) Screening:    Risk factors include: tobacco use        General: Screening Not Indicated      Lung Cancer Screening:     General: Screening Not Indicated      Hepatitis C Screening:    General: Screening Not Indicated    Screening, Brief Intervention, and Referral to Treatment (SBIRT)    Screening  Typical number of drinks in a day: 0  Typical number of drinks in a week: 0  Interpretation: Low risk drinking behavior      Single Item Drug Screening:  How often have you used an illegal drug (including marijuana) or a prescription medication for non-medical reasons in the past year? never    Single Item Drug Screen Score: 0  Interpretation: Negative screen for possible drug use disorder      Mojgan Butler MD

## 2022-03-30 ENCOUNTER — CONSULT (OUTPATIENT)
Dept: DERMATOLOGY | Age: 87
End: 2022-03-30
Payer: COMMERCIAL

## 2022-03-30 ENCOUNTER — OFFICE VISIT (OUTPATIENT)
Dept: FAMILY MEDICINE CLINIC | Facility: CLINIC | Age: 87
End: 2022-03-30
Payer: COMMERCIAL

## 2022-03-30 VITALS — BODY MASS INDEX: 22.32 KG/M2 | WEIGHT: 160 LBS | TEMPERATURE: 97.3 F

## 2022-03-30 DIAGNOSIS — H61.22 IMPACTED CERUMEN OF LEFT EAR: Primary | ICD-10-CM

## 2022-03-30 DIAGNOSIS — R21 RASH: ICD-10-CM

## 2022-03-30 DIAGNOSIS — L30.0 NUMMULAR ECZEMA: Primary | ICD-10-CM

## 2022-03-30 PROCEDURE — 1036F TOBACCO NON-USER: CPT | Performed by: FAMILY MEDICINE

## 2022-03-30 PROCEDURE — 99203 OFFICE O/P NEW LOW 30 MIN: CPT | Performed by: DERMATOLOGY

## 2022-03-30 PROCEDURE — 1160F RVW MEDS BY RX/DR IN RCRD: CPT | Performed by: FAMILY MEDICINE

## 2022-03-30 PROCEDURE — 99212 OFFICE O/P EST SF 10 MIN: CPT | Performed by: FAMILY MEDICINE

## 2022-03-30 NOTE — PROGRESS NOTES
Chief Complaint   Patient presents with    Ear Fullness        Patient ID: Cris Silverio is a 80 y o  male  HPI  Pt is seeing for f/u L ear increased cerumen that was noticed by hearing aid specialist    The following portions of the patient's history were reviewed and updated as appropriate: allergies, current medications, past family history, past medical history, past social history, past surgical history and problem list     Review of Systems   Constitutional: Negative  HENT: Negative  Respiratory: Negative  Current Outpatient Medications   Medication Sig Dispense Refill    amLODIPine (NORVASC) 5 mg tablet Take 1 tablet (5 mg total) by mouth daily 90 tablet 3    ammonium lactate (LAC-HYDRIN) 12 % cream Apply topically as needed for dry skin 385 g 0    aspirin 81 MG tablet Take 81 mg by mouth daily      atorvastatin (LIPITOR) 10 mg tablet Take 1 tablet (10 mg total) by mouth daily 90 tablet 3    Calcium Carbonate-Vitamin D (CALCIUM 500 + D) 500-125 MG-UNIT TABS Take by mouth      chlorthalidone 25 mg tablet Take 1 tablet (25 mg total) by mouth every other day 45 tablet 3    Cholecalciferol (VITAMIN D) 2000 units CAPS Take by mouth      finasteride (PROSCAR) 5 mg tablet Take 1 tablet (5 mg total) by mouth daily at bedtime 90 tablet 3    lisinopril (ZESTRIL) 5 mg tablet Take 1 tablet in morning and 1 tablet in the evening  180 tablet 3    Multiple Vitamins-Minerals (CENTRUM SILVER ADULT 50+ PO) Take by mouth      triamcinolone (KENALOG) 0 1 % cream APPLY TO AFFECTED AREA(S) TWO TIMES A DAY AS DIRECTED 30 g 6     No current facility-administered medications for this visit  Objective: There were no vitals taken for this visit  Physical Exam  Constitutional:       General: He is not in acute distress  Appearance: He is not ill-appearing  HENT:      Left Ear: There is impacted cerumen                   Assessment/Plan:         Diagnoses and all orders for this visit:    Impacted cerumen of left ear  -     Ambulatory Referral to Otolaryngology;  Future      attempted to flush L ear -  Not successful    rto prn                        Clarisse Wilson MD

## 2022-03-30 NOTE — PATIENT INSTRUCTIONS
NUMMULAR ECZEMA    Assessment and Plan:  Based on a thorough discussion of this condition and the management approach to it (including a comprehensive discussion of the known risks, side effects and potential benefits of treatment), the patient (family) agrees to implement the following specific plan:  Triamcinolone 0 1% ointment - apply topically 2x/day for 2-4 weeks  Use moisturizer like Eucerin,Cerave, Vanicream or Aveeno Cream 3 times a day for the dry skin                Numular eczema   Nummular dermatitis (or eczema) is also known as discoid eczema (or dermatitis)  It has two forms:   Exudative (wet) nummular dermatitis    Dry nummular dermatitis    Exudative nummular dermatitis  The exudative variant starts acutely and may persist for weeks, months and rarely years  Although it may arise at any age, most subjects are over 48 years  In children, it is often thought to be a type of atopic dermatitis, but in adults it doesn't appear to relate to atopy  It is more common in males than females  The initial plaque may appear at the site of trauma or infection  For example:   Thermal burn    Scabies infestation    Varicose vein surgery    Insect bite    Impetigo    Nummular dermatitis is sometimes due to drug allergy (e g  to interferon alpha, intravenous immunoglobulins, etanercept) or systematised contact allergy, especially to nickel, gold and mercury  The initial lesions are papules or vesicles, which form confluent plaques  The plaques may be crusted, weeping or blistered and are intensely itchy  Secondary infection with pustules, pain and spreading erythema is not uncommon  Older lesions may be dry and excoriated  Clusters of round or oval plaques may be localised to lower legs, the backs of the hands or other sites  Nummular dermatitis may also generalise to affect scalp, face, trunk and limbs  Generally the eruption is scattered, but sometimes the plaques are distributed symmetrically  In-between skin appears normal     Investigations  Skin swabs frequently culture abundant Staphylococcus aureus from exudative nummular dermatitis and sometimes from dry discoid dermatitis  Skin scrapings for microscopy and fungal culture may be necessary to rule out tinea corporis  Differential diagnosis  Discoid eczema is frequently confused with the following skin disorders:   Psoriasis (redder, scalier plaques, symmetrical distribution, typical psoriatic sites)    Tinea corporis (grouped lesions, scaly or pustular edge)    Contact dermatitis (irregular shaped and sized lesions, contact factors)    Lichen simplex (lichenified plaques, may co-exist with nummular dermatitis)    Stasis dermatitis (lower legs, circumferential, hyperpigmentation, lipodermatosclerosis)    Management  Management of nummular dermatitis may involve the following:     Skin emollients  Emollients include bath oils, soap substitutes and moisturizing creams  They can be applied to the dermatitis as frequently as required to relieve itching, scaling and dryness  Emollients should also be used on the unaffected skin to reduce dryness  It may be necessary to try several different products to find one that suits  Many people find one or more of the following helpful: glycerine and cetomacrogol cream, white soft paraffin/liquid paraffin mixed, fatty cream, wool fat lotions   Topical steroids   Topical steroids are anti-inflammatory creams or ointments available on prescription which may clear the dermatitis and reduce irritation  The stronger products are applied to the patches just once or twice daily for 2-4 weeks  They may be repeated from time to time depending on flare ups  Mild ones such as hydrocortisone are safe for daily use if necessary   Antibiotics   Antibiotics (most often flucloxacillin) are often prescribed if the rash is blistered, sticky or crusted   Sometimes nummular eczema clears completely on oral antibiotics, only to recur when they are discontinued   Oral antihistamines   Antihistamine pills may reduce the itching, and are particularly helpful at night-time  They do not clear the dermatitis  Non-sedating antihistamines appear less useful for nummular eczema than first-generation antihistamines taken at night to help sleep   Ultraviolet radiation (UV) treatment   Phototherapy several times weekly for 6-12 weeks can reduce the extent and severity of discoid eczema   Steroid injections  Intralesional steroids are sometimes injected into one or two particularly stubborn areas of discoid eczema  This treatment is unsuitable for multiple lesions   Oral steroids (very rarely)  Systemic steroids are reserved for severe and extensive cases of discoid eczema  They are usually prescribed for a few weeks while continuing steroid creams and emollients on residual dermatitis   Other oral treatments   Persistent and troublesome nummular eczema is occasionally treated with methotrexate, azathioprine or ciclosporin  These medicines have important risks and side effects and require careful monitoring by a specialist dermatologist  They may be more suitable in many cases than long-term steroids  Nummular eczema can usually be controlled with the above measures, although it has a tendency to recur when the treatment has been stopped  In most patients, nummular eczema eventually clears up completely

## 2022-03-30 NOTE — PROGRESS NOTES
[facility] Note     Patient Name: Antoinette Masters  Encounter Date: 03/30/22     Have you been cared for by a [facility] Dermatologist in the last 3 years and, if so, which one? No    · Have you traveled outside of the [address] [place] [address] in the past 3 months or outside of the [facility] area in the last 2 weeks? No     May we call your Preferred Phone number to discuss your specific medical information? Yes     May we leave a detailed message that includes your specific medical information? Yes      Today's Chief Concerns:   Concern #1:  Rash on leg     Past Medical History:  Have you personally ever had or currently have any of the following? · Skin cancer (such as Melanoma, Basal Cell Carcinoma, Squamous Cell Carcinoma? (If Yes, please provide more detail)- No  · Eczema: No  · Psoriasis: No  · HIV/AIDS: No  · Hepatitis B or C: No  · Tuberculosis: No  · Systemic Immunosuppression such as Diabetes, Biologic or Immunotherapy, Chemotherapy, Organ Transplantation, Bone Marrow Transplantation (If YES, please provide more detail): No  · Radiation Treatment (If YES, please provide more detail): No  · Any other major medical conditions/concerns? (If Yes, which types)- No    Social History:     What is/was your primary occupation? retired     What are your hobbies/past-times? n/a    Family History:  Have any of your "first degree relatives" (parent, brother, sister, or child) had any of the following       · Skin cancer such as Melanoma or Merkel Cell Carcinoma or Pancreatic Cancer? No  · Eczema, Asthma, Hay Fever or Seasonal Allergies: No  · Psoriasis or Psoriatic Arthritis: No  · Do any other medical conditions seem to run in your family? If Yes, what condition and which relatives?   No    Current Medications:       Current Outpatient Medications:     amLODIPine (NORVASC) 5 mg tablet, Take 1 tablet (5 mg total) by mouth daily, Disp: 90 tablet, Rfl: 3    aspirin 81 MG tablet, Take 81 mg by mouth daily, Disp: , Rfl:     atorvastatin (LIPITOR) 10 mg tablet, Take 1 tablet (10 mg total) by mouth daily, Disp: 90 tablet, Rfl: 3    Calcium Carbonate-Vitamin D (CALCIUM 500 + D) 500-125 MG-UNIT TABS, Take by mouth, Disp: , Rfl:     chlorthalidone 25 mg tablet, Take 1 tablet (25 mg total) by mouth every other day, Disp: 45 tablet, Rfl: 3    Cholecalciferol (VITAMIN D) 2000 units CAPS, Take by mouth, Disp: , Rfl:     finasteride (PROSCAR) 5 mg tablet, Take 1 tablet (5 mg total) by mouth daily at bedtime, Disp: 90 tablet, Rfl: 3    lisinopril (ZESTRIL) 5 mg tablet, Take 1 tablet in morning and 1 tablet in the evening , Disp: 180 tablet, Rfl: 3    Multiple Vitamins-Minerals (CENTRUM SILVER ADULT 50+ PO), Take by mouth, Disp: , Rfl:     triamcinolone (KENALOG) 0 1 % cream, APPLY TO AFFECTED AREA(S) TWO TIMES A DAY AS DIRECTED, Disp: 30 g, Rfl: 6    ammonium lactate (LAC-HYDRIN) 12 % cream, Apply topically as needed for dry skin (Patient not taking: Reported on 3/30/2022 ), Disp: 385 g, Rfl: 0      Review of Systems:  Have you recently had or currently have any of the following? If YES, what are you doing for the problem? · Fever, chills or unintended weight loss: No  · Sudden loss or change in your vision: No  · Nausea, vomiting or blood in your stool: No  · Painful or swollen joints: No  · Wheezing or cough: No  · Changing mole or non-healing wound: No  · Nosebleeds: No  · Excessive sweating: No  · Easy or prolonged bleeding? No  · Over the last 2 weeks, how often have you been bothered by the following problems? · Taking little interest or pleasure in doing things: 2 - Several days  · Feeling down, depressed, or hopeless: 1 - Not at All  · Rapid heartbeat with epinephrine:  No    · FEMALES ONLY:    · Are you pregnant or planning to become pregnant? No  · Are you currently or planning to be nursing or breast feeding? No    · Any known allergies?       Allergies   Allergen Reactions    Tamsulosin Syncope         Physical Exam:     Was a chaperone (Derm Clinical Assistant) present throughout the entire Physical Exam? Yes     Did the Dermatology Team specifically  the patient on the importance of a Full Skin Exam to be sure that nothing is missed clinically? Yes}  o Did the patient ultimately request or accept a Full Skin Exam?  NO  o Did the patient specifically refuse to have the areas "under-the-bra" examined by the Dermatologist? No  o Did the patient specifically refuse to have the areas "under-the-underwear" examined by the Dermatologist? No    CONSTITUTIONAL:   Vitals:    03/30/22 1402   Temp: (!) 97 3 °F (36 3 °C)   TempSrc: Temporal   Weight: 72 6 kg (160 lb)       PSYCH: Normal mood and affect  EYES: Normal conjunctiva  ENT: Normal lips and oral mucosa  CARDIOVASCULAR: No edema  RESPIRATORY: Normal respirations  HEME/LYMPH/IMMUNO:  No regional lymphadenopathy except as noted below in "ASSESSMENT AND PLAN BY DIAGNOSIS"    SKIN:  FULL ORGAN SYSTEM EXAM   Hair, Scalp, Ears, Face Normal except as noted below in Assessment   Neck, Cervical Chain Nodes Normal except as noted below in Assessment   Right Arm/Hand/Fingers Normal except as noted below in Assessment   Left Arm/Hand/Fingers Normal except as noted below in Assessment   Chest/Breasts/Axillae Viewed areas Normal except as noted below in Assessment   Abdomen, Umbilicus Normal except as noted below in Assessment   Back/Spine Normal except as noted below in Assessment   Groin/Genitalia/Buttocks NOT EXAMINED   Right Leg, Foot, Toes Normal except as noted below in Assessment   Left Leg, Foot, Toes Normal except as noted below in Assessment        Assessment and Plan by Diagnosis:    History of Present Condition:     Duration:  How long has this been an issue for you?     o  6 months   Location Affected:  Where on the body is this affecting you?    o  left leg   Quality:  Is there any bleeding, pain, itch, burning/irritation, or redness associated with the skin lesion? o  itchy, redness   Severity:  Describe any bleeding, pain, itch, burning/irritation, or redness on a scale of 1 to 10 (with 10 being the worst)  o  4 - itching   Timing:  Does this condition seem to be there pretty constantly or do you notice it more at specific times throughout the day? o  constant   Context:  Have you ever noticed that this condition seems to be associated with specific activities you do?    o  denies   Modifying Factors:    o Anything that seems to make the condition worse?    -  worse when going to bed  o What have you tried to do to make the condition better?    -  denies   Associated Signs and Symptoms:  Does this skin lesion seem to be associated with any of the following:  o  SL AMB DERM SIGNS AND SYMPTOMS: Itching and Scratching     NUMMULAR ECZEMA    Physical Exam:   Anatomic Location Affected:  Left lower leg    Morphological Description:  Fissured red patches with scale and crust   Pertinent Positives:   Pertinent Negatives: Additional History of Present Condition:  Present for about 6 months, patient states he is itch at times  Assessment and Plan:  Based on a thorough discussion of this condition and the management approach to it (including a comprehensive discussion of the known risks, side effects and potential benefits of treatment), the patient (family) agrees to implement the following specific plan:  Triamcinolone 0 1% ointment - apply topically 2x/day for 2-4 weeks  Use moisturizer like Eucerin,Cerave, Vanicream or Aveeno Cream 3 times a day for the dry skin                Numular eczema   Nummular dermatitis (or eczema) is also known as discoid eczema (or dermatitis)  It has two forms:   Exudative (wet) nummular dermatitis    Dry nummular dermatitis    Exudative nummular dermatitis  The exudative variant starts acutely and may persist for weeks, months and rarely years   Although it may arise at any age, most subjects are over 48 years  In children, it is often thought to be a type of atopic dermatitis, but in adults it doesn't appear to relate to atopy  It is more common in males than females  The initial plaque may appear at the site of trauma or infection  For example:   Thermal burn    Scabies infestation    Varicose vein surgery    Insect bite    Impetigo    Nummular dermatitis is sometimes due to drug allergy (e g  to interferon alpha, intravenous immunoglobulins, etanercept) or systematised contact allergy, especially to nickel, gold and mercury  The initial lesions are papules or vesicles, which form confluent plaques  The plaques may be crusted, weeping or blistered and are intensely itchy  Secondary infection with pustules, pain and spreading erythema is not uncommon  Older lesions may be dry and excoriated  Clusters of round or oval plaques may be localised to lower legs, the backs of the hands or other sites  Nummular dermatitis may also generalise to affect scalp, face, trunk and limbs  Generally the eruption is scattered, but sometimes the plaques are distributed symmetrically  In-between skin appears normal     Investigations  Skin swabs frequently culture abundant Staphylococcus aureus from exudative nummular dermatitis and sometimes from dry discoid dermatitis  Skin scrapings for microscopy and fungal culture may be necessary to rule out tinea corporis      Differential diagnosis  Discoid eczema is frequently confused with the following skin disorders:   Psoriasis (redder, scalier plaques, symmetrical distribution, typical psoriatic sites)    Tinea corporis (grouped lesions, scaly or pustular edge)    Contact dermatitis (irregular shaped and sized lesions, contact factors)    Lichen simplex (lichenified plaques, may co-exist with nummular dermatitis)    Stasis dermatitis (lower legs, circumferential, hyperpigmentation, lipodermatosclerosis)    Management  Management of nummular dermatitis may involve the following:     Skin emollients  Emollients include bath oils, soap substitutes and moisturizing creams  They can be applied to the dermatitis as frequently as required to relieve itching, scaling and dryness  Emollients should also be used on the unaffected skin to reduce dryness  It may be necessary to try several different products to find one that suits  Many people find one or more of the following helpful: glycerine and cetomacrogol cream, white soft paraffin/liquid paraffin mixed, fatty cream, wool fat lotions   Topical steroids   Topical steroids are anti-inflammatory creams or ointments available on prescription which may clear the dermatitis and reduce irritation  The stronger products are applied to the patches just once or twice daily for 2-4 weeks  They may be repeated from time to time depending on flare ups  Mild ones such as hydrocortisone are safe for daily use if necessary   Antibiotics   Antibiotics (most often flucloxacillin) are often prescribed if the rash is blistered, sticky or crusted  Sometimes nummular eczema clears completely on oral antibiotics, only to recur when they are discontinued   Oral antihistamines   Antihistamine pills may reduce the itching, and are particularly helpful at night-time  They do not clear the dermatitis  Non-sedating antihistamines appear less useful for nummular eczema than first-generation antihistamines taken at night to help sleep   Ultraviolet radiation (UV) treatment   Phototherapy several times weekly for 6-12 weeks can reduce the extent and severity of discoid eczema   Steroid injections  Intralesional steroids are sometimes injected into one or two particularly stubborn areas of discoid eczema  This treatment is unsuitable for multiple lesions   Oral steroids (very rarely)  Systemic steroids are reserved for severe and extensive cases of discoid eczema   They are usually prescribed for a few weeks while continuing steroid creams and emollients on residual dermatitis   Other oral treatments   Persistent and troublesome nummular eczema is occasionally treated with methotrexate, azathioprine or ciclosporin  These medicines have important risks and side effects and require careful monitoring by a specialist dermatologist  They may be more suitable in many cases than long-term steroids  Nummular eczema can usually be controlled with the above measures, although it has a tendency to recur when the treatment has been stopped  In most patients, nummular eczema eventually clears up completely      Scribe Attestation    I,:  Leela Oswald am acting as a scribe while in the presence of the attending physician :       I,:  Jeovanny Chin MD personally performed the services described in this documentation    as scribed in my presence :

## 2022-05-12 ENCOUNTER — OFFICE VISIT (OUTPATIENT)
Dept: OTOLARYNGOLOGY | Facility: CLINIC | Age: 87
End: 2022-05-12
Payer: COMMERCIAL

## 2022-05-12 VITALS — TEMPERATURE: 97.6 F | WEIGHT: 158 LBS | HEIGHT: 71 IN | BODY MASS INDEX: 22.12 KG/M2

## 2022-05-12 DIAGNOSIS — H61.22 IMPACTED CERUMEN OF LEFT EAR: ICD-10-CM

## 2022-05-12 DIAGNOSIS — H61.23 BILATERAL IMPACTED CERUMEN: Primary | ICD-10-CM

## 2022-05-12 PROCEDURE — 99213 OFFICE O/P EST LOW 20 MIN: CPT | Performed by: OTOLARYNGOLOGY

## 2022-05-12 PROCEDURE — 1036F TOBACCO NON-USER: CPT | Performed by: OTOLARYNGOLOGY

## 2022-05-12 PROCEDURE — 69210 REMOVE IMPACTED EAR WAX UNI: CPT | Performed by: OTOLARYNGOLOGY

## 2022-05-12 PROCEDURE — 1160F RVW MEDS BY RX/DR IN RCRD: CPT | Performed by: OTOLARYNGOLOGY

## 2022-05-12 NOTE — PROGRESS NOTES
Assessment/Plan:  Cerumen removed  F/u PRN  No problem-specific Assessment & Plan notes found for this encounter  Diagnoses and all orders for this visit:    Bilateral impacted cerumen    Impacted cerumen of left ear  -     Ambulatory Referral to Otolaryngology          Subjective:      Patient ID: Dennis Hogan is a 80 y o  male  Pt c/o cerumen impaction  The following portions of the patient's history were reviewed and updated as appropriate: allergies, current medications, past family history, past medical history, past social history, past surgical history and problem list     Review of Systems      Objective:      Temp 97 6 °F (36 4 °C) (Temporal)   Ht 5' 11" (1 803 m)   Wt 71 7 kg (158 lb)   BMI 22 04 kg/m²          Physical Exam  HENT:      Right Ear: Tympanic membrane, ear canal and external ear normal  There is impacted cerumen  Left Ear: Tympanic membrane, ear canal and external ear normal  There is impacted cerumen  PROCEDURE: Cerumen removal from ears  Impacted cerumen removal was performed by Dr Christina Aguilar and removed from both ears  The visualization instrument used was the operating otoscope and speculum  The ear cleaning instrument used was alligator forceps  The outcome was successful and the patient tolerated the procedure well  There were no complications

## 2022-05-19 ENCOUNTER — HOSPITAL ENCOUNTER (OUTPATIENT)
Dept: NON INVASIVE DIAGNOSTICS | Facility: HOSPITAL | Age: 87
Discharge: HOME | End: 2022-05-19
Attending: INTERNAL MEDICINE
Payer: COMMERCIAL

## 2022-05-19 DIAGNOSIS — I49.1 PREMATURE ATRIAL CONTRACTIONS: ICD-10-CM

## 2022-05-19 PROCEDURE — 93226 XTRNL ECG REC<48 HR SCAN A/R: CPT

## 2022-05-19 PROCEDURE — 93225 XTRNL ECG REC<48 HRS REC: CPT

## 2022-05-25 PROCEDURE — 93227 XTRNL ECG REC<48 HR R&I: CPT | Performed by: INTERNAL MEDICINE

## 2022-06-15 ENCOUNTER — OFFICE VISIT (OUTPATIENT)
Dept: CARDIOLOGY CLINIC | Facility: CLINIC | Age: 87
End: 2022-06-15
Payer: COMMERCIAL

## 2022-06-15 VITALS
BODY MASS INDEX: 22.54 KG/M2 | HEART RATE: 65 BPM | DIASTOLIC BLOOD PRESSURE: 70 MMHG | WEIGHT: 161 LBS | OXYGEN SATURATION: 97 % | TEMPERATURE: 98.2 F | HEIGHT: 71 IN | SYSTOLIC BLOOD PRESSURE: 158 MMHG

## 2022-06-15 DIAGNOSIS — I49.1 PREMATURE ATRIAL CONTRACTIONS: ICD-10-CM

## 2022-06-15 DIAGNOSIS — I87.2 VENOUS INSUFFICIENCY OF BOTH LOWER EXTREMITIES: ICD-10-CM

## 2022-06-15 DIAGNOSIS — N40.0 BENIGN PROSTATIC HYPERPLASIA WITHOUT LOWER URINARY TRACT SYMPTOMS: ICD-10-CM

## 2022-06-15 DIAGNOSIS — E78.5 DYSLIPIDEMIA: ICD-10-CM

## 2022-06-15 DIAGNOSIS — E55.9 VITAMIN D DEFICIENCY: ICD-10-CM

## 2022-06-15 DIAGNOSIS — I10 ESSENTIAL HYPERTENSION: Primary | ICD-10-CM

## 2022-06-15 DIAGNOSIS — H35.3132 INTERMEDIATE STAGE NONEXUDATIVE AGE-RELATED MACULAR DEGENERATION OF BOTH EYES: ICD-10-CM

## 2022-06-15 DIAGNOSIS — I49.3 ASYMPTOMATIC PVCS: ICD-10-CM

## 2022-06-15 DIAGNOSIS — I34.0 NONRHEUMATIC MITRAL VALVE REGURGITATION: ICD-10-CM

## 2022-06-15 DIAGNOSIS — I70.219 ATHEROSCLEROTIC PVD WITH INTERMITTENT CLAUDICATION (HCC): ICD-10-CM

## 2022-06-15 PROCEDURE — 99214 OFFICE O/P EST MOD 30 MIN: CPT | Performed by: INTERNAL MEDICINE

## 2022-06-15 PROCEDURE — 93000 ELECTROCARDIOGRAM COMPLETE: CPT | Performed by: INTERNAL MEDICINE

## 2022-06-15 PROCEDURE — 1036F TOBACCO NON-USER: CPT | Performed by: INTERNAL MEDICINE

## 2022-06-15 PROCEDURE — 1160F RVW MEDS BY RX/DR IN RCRD: CPT | Performed by: INTERNAL MEDICINE

## 2022-06-15 RX ORDER — VIT C/VIT E/LUTEIN/MIN/OMEGA-3 100-15-2
1 CAPSULE ORAL DAILY
Qty: 100 CAPSULE | Refills: 5
Start: 2022-06-15

## 2022-06-15 RX ORDER — MULTIVIT-MIN/IRON/FOLIC ACID/K 18-600-40
1 CAPSULE ORAL DAILY
Qty: 100 CAPSULE | Refills: 5
Start: 2022-06-15

## 2022-06-15 NOTE — PATIENT INSTRUCTIONS
Consider obtaining sock Slider device at drug store or from [employer], with help from your daughter  This will make getting the compression socks on much easier  Use compression stockings as much as possible when you are on your feet, which will help with the swelling and discomfort and achiness in your feet  Continue current medication  We will contact you with the results of your latest blood pressure readings  Cardiology follow-up approximately six months with EKG, lipids, CMP

## 2022-06-15 NOTE — PROGRESS NOTES
Office Cardiology Progress Note  Claudell Mormon 80 y o  male MRN: [id number]  06/15/22  12:17 PM      ASSESSMENT:    1   Controlled but persistent morning hypotension with resolved associated dizziness and background of labile essential hypertension with resolved nocturnal surges in systolic blood pressure at 9-11 PM, previously in the 835 systolic range    Has white coat hypertension  Previously with frequent hold of blood pressure medication in the mornings for systolic blood pressures of 120 or less previously being done by patient   Average home blood pressure previously from  12/12 through  12/15/2020 was  127/62  and from 06/06/2021 through 06/10/2021 was 128/61  Current average home blood pressure 128/62 between 6/11 and 06/14/2022 and previously was 126/61 between 12/11 and 12/14/2021  Previous average home blood pressure taken from 12/09 through 12/12/2019 was 115/50   2  Asymptomatic chronic PACs and PVCs, per patient history, and  present on today's exam and EKG  Frequent PACs and sinus arrhythmia on EKG in the past and currently  Cecil Denny has currently suppressed apical systolic ejection murmur, consistent with mild aortic valve stenosis documented on previous echocardiogram 01/08/2020, which also showed 1+ MR/TR, mild MAC, 60% LVEF  Recent Holter monitor on 05/19/2022 showed moderately frequent PACs and PVCs, PACs being more frequent will, with nonsustained atrial runs to a maximum of 12 beats but no atrial fibrillation or flutter  3  History of carvedilol-induced syncope from hypotension on 1/10/17 and 1/20/17 with episode on 9/11/17 from low blood pressure, cause uncertain  4  Stable right calf claudication and bilateral right more than left lower extremity PAD present for 9 years   Last lower arterial duplex scan 07/22/2019 showed right CHU 0 53 and left CHU 0 83, indicating worsening PAD  Does have right leg claudication with walking    5  Grade 1/4 right neck bruit with less than 50% JOVANNI and 50-69% left ICA stenoses on 01/08/2020 carotid duplex     6  Status post repair of right hydrocele on 10/25/2018 and history of chronic incomplete torsion of left testicle with benign cyst adjacent to left testes     7  Treated dyslipidemia, extremely well-controlled as of 11/30/2021     8  Stable chronic BPH     9  Remote history of syncope after Flomax  10  Chronic nocturnal GERD with water brash  11  Multiple basal cell skin cancers with previous excisions from scalp, and previously from right forehead, right cheek, left ear, left wrist, dorsum of left hand dating back to 04/15/2018     12  Nonhealing ulceration above right medial ankle for approximately 4 years, likely venostasis ulcer    13  Venous insufficiency of left more than right lower extremity, with right lower extremity venous insufficiency previously confirmed on venous duplex scan dated 06/21/2019, patient currently being managed with compression hosiery  14  Laryngopharyngeal reflux (LPR), being followed by Dr Tucker  Plan       Patient Instructions     1  Consider obtaining sock slider device at drug store or from [employer], with help from your daughter  This will make getting the compression socks on much easier  2  Use compression stockings as much as possible when you are on your feet, which will help with the swelling and discomfort and achiness in your feet  3  Continue current medication  4  We will contact you with the results of your latest blood pressure readings  5  Cardiology follow-up approximately six months with EKG, lipids, CMP  HPI    This 80 y o  male  denies new cardiopulmonary and medical symptoms  The patient complains of occasional drops in his blood pressure and presents with examples taken several weeks back where systolic blood pressure drop from 138 to level of 96 and another example where systolic blood pressure dropped from 140 to level of 111       He also complains of aching of the dorsum of his feet, especially on the right, aggravated by ambulation  He also claims he cannot walk as long a distance because of pain throughout his right lower extremity  The patient recently did home blood pressure readings between  and 2022 with average overall blood pressure of 128/62  The patient is being seen in follow-up of the below listed diagnoses  Encounter Diagnoses   Name Primary?     Essential hypertension Yes    Premature atrial contractions     Asymptomatic PVCs     Venous insufficiency of both lower extremities     Atherosclerotic PVD with intermittent claudication (HCC)     Nonrheumatic mitral valve regurgitation     Benign prostatic hyperplasia without lower urinary tract symptoms     Dyslipidemia     Intermediate stage nonexudative age-related macular degeneration of both eyes     Vitamin D deficiency         Review of Systems    All other systems negative, except as noted in history of present illness    Historical Information   Past Medical History:   Diagnosis Date    Asymptomatic PVCs     last assessed 18    BPH (benign prostatic hyperplasia)     last assessed 17     High cholesterol     last assessed 17     Hypertension     uncontrolled, last assessed 17     Past Surgical History:   Procedure Laterality Date    CATARACT EXTRACTION      COLONOSCOPY      Complete    OK REMOVAL OF HYDROCELE,TUNICA,UNILAT Right 10/25/2018    Procedure: HYDROCELECTOMY;  Surgeon: Velvet Cline MD;  Location: [address] [place] [address];  Service: Urology    TESTICLE SURGERY      last assessed 3/31/15     Social History     Substance and Sexual Activity   Alcohol Use No     Social History     Substance and Sexual Activity   Drug Use No     Social History     Tobacco Use   Smoking Status Former Smoker    Years: 20     Quit date: 10/18/1965    Years since quittin 7   Smokeless Tobacco Never Used       Family History:  Family History   Problem Relation Age of Onset    Heart disease Mother     Hypertension Mother     Heart disease Father     Hypertension Father     Hypertension Family     Hyperlipidemia Family          Meds/Allergies     Prior to Admission medications    Medication Sig Start Date End Date Taking? Authorizing Provider   amLODIPine (NORVASC) 5 mg tablet Take 1 tablet (5 mg total) by mouth daily 1/25/22  Yes Kike Vazquez MD   ammonium lactate (LAC-HYDRIN) 12 % cream Apply topically as needed for dry skin 10/30/20  Yes Carisa Maddens, DPM   aspirin 81 MG tablet Take 81 mg by mouth daily   Yes Historical Provider, MD   atorvastatin (LIPITOR) 10 mg tablet Take 1 tablet (10 mg total) by mouth daily 11/17/21  Yes Tara Koroma MD   Calcium Carbonate-Vitamin D 500-125 MG-UNIT TABS Take by mouth   Yes Historical Provider, MD   chlorthalidone 25 mg tablet Take 1 tablet (25 mg total) by mouth every other day 12/27/21  Yes Kike Vazquez MD   Cholecalciferol (Vitamin D) 50 MCG (2000 UT) CAPS Take 1 capsule (2,000 Units total) by mouth in the morning 6/15/22  Yes Kike Vazquez MD   finasteride (PROSCAR) 5 mg tablet Take 1 tablet (5 mg total) by mouth daily at bedtime 8/11/21  Yes Tara Koroma MD   lisinopril (ZESTRIL) 5 mg tablet Take 1 tablet in morning and 1 tablet in the evening   10/20/20  Yes Kike Vazquez MD   Multiple Vitamins-Minerals (Ocuvite Adult Formula) CAPS Take 1 capsule by mouth in the morning 6/15/22  Yes Kike Vazquez MD   triamcinolone (KENALOG) 0 1 % cream APPLY TO AFFECTED AREA(S) TWO TIMES A DAY AS DIRECTED 11/11/21  Yes Tara Koroma MD   Multiple Vitamins-Minerals (CENTRUM SILVER ADULT 50+ PO) Take by mouth  6/15/22 Yes Historical Provider, MD   Cholecalciferol (VITAMIN D) 2000 units CAPS Take by mouth  Patient not taking: No sig reported  6/15/22  Historical Provider, MD   triamcinolone (KENALOG) 0 1 % ointment Apply topically 2 (two) times a day  Patient not taking: No sig reported 3/30/22 6/15/22  Siddhartha Jonas MD Allergies   Allergen Reactions    Tamsulosin Syncope         Vitals:    06/15/22 1104   BP: 158/70   BP Location: Right arm   Patient Position: Sitting   Cuff Size: Standard   Pulse: 65   Temp: 98 2 °F (36 8 °C)   SpO2: 97%   Weight: 73 kg (161 lb)   Height: 5' 11" (1 803 m)       Body mass index is 22 45 kg/m²  1 8 pound weight loss in approximately six months and 3 9 pound weight loss in about one year    Physical Exam:    General Appearance:  Alert, oriented, cooperative, no distress, appears stated age  Head:  Normocephalic, without obvious abnormality, atraumatic   Eyes:  PERRL, conjunctiva/corneas clear, EOM's intact,   both eyes   Ears:  Normal TM's and external ear canals, both ears   Nose: Nares normal, septum midline, mucosa normal, no drainage or sinus tenderness   Throat: Lips, mucosa, and tongue normal; teeth and gums normal   Neck: Supple, symmetrical, trachea midline, no adenopathy, thyroid: not enlarged, symmetric, no tenderness/mass/nodules, no carotid bruit or JVD   Back:   Symmetric, no curvature, ROM normal, no CVA tenderness   Lungs:   Clear to auscultation bilaterally, respirations unlabored   Chest Wall:  No tenderness or deformity   Heart: Moderately frequent extrasystoles, S1, S2 normal, no murmur, rub or gallop   Abdomen:   Soft, non-tender, bowel sounds active all four quadrants,  no masses, no organomegaly   Extremities: Extremities normal, atraumatic, no cyanosis with 2-3+ pitting pretibial and ankle edema, left more than right   Pulses: Difficult to palpate pulses, but both feet are warm  Skin: Skin showed normal color, texture, turgor and no rashes or lesions   Lymph nodes: Cervical, supraclavicular, and axillary nodes normal   Neurologic: Normal         Cardiographics    ECG 06/15/22:    Normal sinus rhythm at 65 bpm   Frequent single premature atrial contractions  Otherwise normal ECG, unchanged from 12/14/2021    24 hour Holter monitor 05/19/2022:     Moderate premature atrial and ventricular beats, atrial more frequent than ventricular with 1 9% density  No atrial fibrillation or flutter  Six atrial runs, longest for 12 beats at 141 bpm  No significant bradycardia  No symptoms reported  IMAGING:    No Chest XR results available for this patient            LAB REVIEW:      Lab Results   Component Value Date    SODIUM 137 11/30/2021    K 4 0 11/30/2021     11/30/2021    CO2 27 11/30/2021    BUN 28 (H) 11/30/2021    CREATININE 0 94 11/30/2021    GLUF 82 11/30/2021    CALCIUM 9 5 11/30/2021    AST 18 11/30/2021    ALT 24 11/30/2021    ALKPHOS 64 11/30/2021    EGFR 71 11/30/2021     Lab Results   Component Value Date    CHOLESTEROL 154 11/30/2021    CHOLESTEROL 149 12/09/2020    CHOLESTEROL 158 06/10/2020     Lab Results   Component Value Date    HDL 78 11/30/2021    HDL 81 12/09/2020    HDL 72 06/10/2020       Lab Results   Component Value Date    LDLCALC 64 11/30/2021    LDLCALC 56 12/09/2020    LDLCALC 69 06/10/2020     No components found for: [facility]  Lab Results   Component Value Date    TRIG 61 11/30/2021    TRIG 58 12/09/2020    TRIG 87 06/10/2020               Katarina Hutchinson MD

## 2022-08-09 DIAGNOSIS — N40.0 BENIGN PROSTATIC HYPERPLASIA WITHOUT LOWER URINARY TRACT SYMPTOMS: ICD-10-CM

## 2022-08-09 RX ORDER — FINASTERIDE 5 MG/1
5 TABLET, FILM COATED ORAL
Qty: 90 TABLET | Refills: 3 | Status: SHIPPED | OUTPATIENT
Start: 2022-08-09

## 2022-09-28 DIAGNOSIS — I10 ESSENTIAL HYPERTENSION: ICD-10-CM

## 2022-09-28 RX ORDER — LISINOPRIL 5 MG/1
TABLET ORAL
Qty: 180 TABLET | Refills: 3 | Status: SHIPPED | OUTPATIENT
Start: 2022-09-28

## 2022-09-29 ENCOUNTER — OFFICE VISIT (OUTPATIENT)
Dept: FAMILY MEDICINE CLINIC | Facility: CLINIC | Age: 87
End: 2022-09-29
Payer: COMMERCIAL

## 2022-09-29 VITALS
TEMPERATURE: 98.2 F | WEIGHT: 160.4 LBS | HEART RATE: 76 BPM | SYSTOLIC BLOOD PRESSURE: 170 MMHG | RESPIRATION RATE: 18 BRPM | BODY MASS INDEX: 22.46 KG/M2 | DIASTOLIC BLOOD PRESSURE: 80 MMHG | HEIGHT: 71 IN

## 2022-09-29 DIAGNOSIS — I10 ESSENTIAL HYPERTENSION: ICD-10-CM

## 2022-09-29 DIAGNOSIS — B35.4 TINEA CORPORIS: Primary | ICD-10-CM

## 2022-09-29 PROCEDURE — 1160F RVW MEDS BY RX/DR IN RCRD: CPT | Performed by: FAMILY MEDICINE

## 2022-09-29 PROCEDURE — 99213 OFFICE O/P EST LOW 20 MIN: CPT | Performed by: FAMILY MEDICINE

## 2022-09-29 RX ORDER — FLUCONAZOLE 150 MG/1
150 TABLET ORAL
Qty: 4 TABLET | Refills: 0 | Status: SHIPPED | OUTPATIENT
Start: 2022-09-29 | End: 2022-10-21

## 2022-09-29 RX ORDER — CLOTRIMAZOLE 1 %
CREAM (GRAM) TOPICAL 2 TIMES DAILY
Qty: 30 G | Refills: 0 | Status: SHIPPED | OUTPATIENT
Start: 2022-09-29

## 2022-09-29 NOTE — PROGRESS NOTES
Chief Complaint   Patient presents with    rash on buttocks   f/u HTN     Patient ID: Jose Krueger is a 80 y o  male  HPI  Pt is seeing for a new rash on the buttock x 6+ wks  -  Tried topical steroid cream with no help  -  Has HTN - normal BP at home     The following portions of the patient's history were reviewed and updated as appropriate: allergies, current medications, past family history, past medical history, past social history, past surgical history and problem list     Review of Systems   Constitutional: Negative  Respiratory: Negative  Cardiovascular: Negative  Gastrointestinal: Negative  Genitourinary: Negative  Musculoskeletal: Negative  Skin: Positive for rash  Neurological: Negative  Current Outpatient Medications   Medication Sig Dispense Refill    amLODIPine (NORVASC) 5 mg tablet Take 1 tablet (5 mg total) by mouth daily 90 tablet 3    aspirin 81 MG tablet Take 81 mg by mouth daily      atorvastatin (LIPITOR) 10 mg tablet Take 1 tablet (10 mg total) by mouth daily 90 tablet 3    Calcium Carbonate-Vitamin D 500-125 MG-UNIT TABS Take by mouth      chlorthalidone 25 mg tablet Take 1 tablet (25 mg total) by mouth every other day 45 tablet 3    Cholecalciferol (Vitamin D) 50 MCG (2000 UT) CAPS Take 1 capsule (2,000 Units total) by mouth in the morning 100 capsule 5    finasteride (PROSCAR) 5 mg tablet Take 1 tablet (5 mg total) by mouth daily at bedtime 90 tablet 3    lisinopril (ZESTRIL) 5 mg tablet Take 1 tablet in morning and 1 tablet in the evening   180 tablet 3    Multiple Vitamins-Minerals (Ocuvite Adult Formula) CAPS Take 1 capsule by mouth in the morning 100 capsule 5    triamcinolone (KENALOG) 0 1 % cream APPLY TO AFFECTED AREA(S) TWO TIMES A DAY AS DIRECTED 30 g 6    triamcinolone (KENALOG) 0 1 % ointment       ammonium lactate (LAC-HYDRIN) 12 % cream Apply topically as needed for dry skin 385 g 0     No current facility-administered medications for this visit  Objective:    /80 (BP Location: Left arm, Patient Position: Sitting, Cuff Size: Standard)   Pulse 76   Temp 98 2 °F (36 8 °C)   Resp 18   Ht 5' 11" (1 803 m)   Wt 72 8 kg (160 lb 6 4 oz)   BMI 22 37 kg/m²        Physical Exam  Constitutional:       General: He is not in acute distress  Appearance: He is not ill-appearing  Cardiovascular:      Rate and Rhythm: Normal rate and regular rhythm  Pulmonary:      Effort: Pulmonary effort is normal  No respiratory distress  Breath sounds: No wheezing, rhonchi or rales  Skin:     Findings: Rash present  Rash is papular  Neurological:      General: No focal deficit present  Mental Status: He is alert and oriented to person, place, and time  Assessment/Plan:         Diagnoses and all orders for this visit:    Tinea corporis  -     fluconazole (DIFLUCAN) 150 mg tablet;  Take 1 tablet (150 mg total) by mouth every 7 days for 4 doses  -     clotrimazole (LOTRIMIN) 1 % cream; Apply topically 2 (two) times a day    Essential hypertension    Other orders  -     triamcinolone (KENALOG) 0 1 % ointment         rto in 2 wks                 Reed Andrew

## 2022-10-24 ENCOUNTER — RA CDI HCC (OUTPATIENT)
Dept: OTHER | Facility: HOSPITAL | Age: 87
End: 2022-10-24

## 2022-10-24 NOTE — PROGRESS NOTES
[facility]  coding opportunities       Chart reviewed, no opportunity found:   [address]        Patients Insurance     Medicare Insurance: [employer]

## 2022-10-28 ENCOUNTER — OFFICE VISIT (OUTPATIENT)
Dept: FAMILY MEDICINE CLINIC | Facility: CLINIC | Age: 87
End: 2022-10-28
Payer: COMMERCIAL

## 2022-10-28 VITALS
SYSTOLIC BLOOD PRESSURE: 150 MMHG | BODY MASS INDEX: 22.43 KG/M2 | RESPIRATION RATE: 16 BRPM | WEIGHT: 160.2 LBS | HEIGHT: 71 IN | TEMPERATURE: 97.3 F | HEART RATE: 58 BPM | DIASTOLIC BLOOD PRESSURE: 72 MMHG

## 2022-10-28 DIAGNOSIS — Z23 NEED FOR INFLUENZA VACCINATION: ICD-10-CM

## 2022-10-28 DIAGNOSIS — B35.4 TINEA CORPORIS: Primary | ICD-10-CM

## 2022-10-28 PROCEDURE — 90662 IIV NO PRSV INCREASED AG IM: CPT | Performed by: FAMILY MEDICINE

## 2022-10-28 PROCEDURE — G0008 ADMIN INFLUENZA VIRUS VAC: HCPCS | Performed by: FAMILY MEDICINE

## 2022-10-28 PROCEDURE — 99213 OFFICE O/P EST LOW 20 MIN: CPT | Performed by: FAMILY MEDICINE

## 2022-10-28 NOTE — PROGRESS NOTES
Chief Complaint   Patient presents with   • Follow-up     Rash on backside         Patient ID: Daphne Damon is a 80 y o  male  HPI  Pt is seeing for f/u rash on the buttock area -  Resolved after Tx with oral and top[ical antifungal meds     The following portions of the patient's history were reviewed and updated as appropriate: allergies, current medications, past family history, past medical history, past social history, past surgical history and problem list     Review of Systems   All other systems reviewed and are negative  Current Outpatient Medications   Medication Sig Dispense Refill   • amLODIPine (NORVASC) 5 mg tablet Take 1 tablet (5 mg total) by mouth daily 90 tablet 3   • ammonium lactate (LAC-HYDRIN) 12 % cream Apply topically as needed for dry skin 385 g 0   • aspirin 81 MG tablet Take 81 mg by mouth daily     • atorvastatin (LIPITOR) 10 mg tablet Take 1 tablet (10 mg total) by mouth daily 90 tablet 3   • Calcium Carbonate-Vitamin D 500-125 MG-UNIT TABS Take by mouth     • chlorthalidone 25 mg tablet Take 1 tablet (25 mg total) by mouth every other day 45 tablet 3   • Cholecalciferol (Vitamin D) 50 MCG (2000 UT) CAPS Take 1 capsule (2,000 Units total) by mouth in the morning 100 capsule 5   • clotrimazole (LOTRIMIN) 1 % cream Apply topically 2 (two) times a day 30 g 0   • finasteride (PROSCAR) 5 mg tablet Take 1 tablet (5 mg total) by mouth daily at bedtime 90 tablet 3   • lisinopril (ZESTRIL) 5 mg tablet Take 1 tablet in morning and 1 tablet in the evening  180 tablet 3   • Multiple Vitamins-Minerals (Ocuvite Adult Formula) CAPS Take 1 capsule by mouth in the morning 100 capsule 5   • triamcinolone (KENALOG) 0 1 % cream APPLY TO AFFECTED AREA(S) TWO TIMES A DAY AS DIRECTED (Patient not taking: Reported on 10/28/2022) 30 g 6   • triamcinolone (KENALOG) 0 1 % ointment  (Patient not taking: Reported on 10/28/2022)       No current facility-administered medications for this visit  Objective:    /72 (BP Location: Left arm, Patient Position: Sitting, Cuff Size: Large)   Pulse 58   Temp (!) 97 3 °F (36 3 °C)   Resp 16   Ht 5' 11" (1 803 m)   Wt 72 7 kg (160 lb 3 2 oz)   BMI 22 34 kg/m²        Physical Exam  Constitutional:       Appearance: He is not ill-appearing  Cardiovascular:      Rate and Rhythm: Normal rate  Pulmonary:      Effort: Pulmonary effort is normal  No respiratory distress  Skin:     Findings: No rash  Neurological:      Mental Status: He is alert                   Assessment/Plan:         Diagnoses and all orders for this visit:    Tinea corporis    Resolved   Need for influenza vaccination  -     influenza vaccine, high-dose, PF 0 7 mL (FLUZONE HIGH-DOSE)          rto prn                   Ruslan Rizo

## 2022-11-16 DIAGNOSIS — E78.2 MIXED HYPERLIPIDEMIA: ICD-10-CM

## 2022-11-16 RX ORDER — ATORVASTATIN CALCIUM 10 MG/1
TABLET, FILM COATED ORAL
Qty: 90 TABLET | Refills: 3 | Status: SHIPPED | OUTPATIENT
Start: 2022-11-16

## 2022-12-06 ENCOUNTER — APPOINTMENT (OUTPATIENT)
Dept: LAB | Facility: CLINIC | Age: 87
End: 2022-12-06

## 2022-12-06 DIAGNOSIS — I87.2 VENOUS INSUFFICIENCY OF BOTH LOWER EXTREMITIES: ICD-10-CM

## 2022-12-06 DIAGNOSIS — E78.5 DYSLIPIDEMIA: ICD-10-CM

## 2022-12-06 DIAGNOSIS — I49.1 PREMATURE ATRIAL CONTRACTIONS: ICD-10-CM

## 2022-12-06 DIAGNOSIS — I10 ESSENTIAL HYPERTENSION: ICD-10-CM

## 2022-12-06 DIAGNOSIS — I70.219 ATHEROSCLEROTIC PVD WITH INTERMITTENT CLAUDICATION (HCC): ICD-10-CM

## 2022-12-06 LAB
ALBUMIN SERPL BCP-MCNC: 3.4 G/DL (ref 3.5–5)
ALP SERPL-CCNC: 60 U/L (ref 46–116)
ALT SERPL W P-5'-P-CCNC: 29 U/L (ref 12–78)
ANION GAP SERPL CALCULATED.3IONS-SCNC: 5 MMOL/L (ref 4–13)
AST SERPL W P-5'-P-CCNC: 23 U/L (ref 5–45)
BILIRUB SERPL-MCNC: 0.45 MG/DL (ref 0.2–1)
BUN SERPL-MCNC: 28 MG/DL (ref 5–25)
CALCIUM ALBUM COR SERPL-MCNC: 10.3 MG/DL (ref 8.3–10.1)
CALCIUM SERPL-MCNC: 9.8 MG/DL (ref 8.3–10.1)
CHLORIDE SERPL-SCNC: 108 MMOL/L (ref 96–108)
CHOLEST SERPL-MCNC: 153 MG/DL
CO2 SERPL-SCNC: 27 MMOL/L (ref 21–32)
CREAT SERPL-MCNC: 0.89 MG/DL (ref 0.6–1.3)
GFR SERPL CREATININE-BSD FRML MDRD: 74 ML/MIN/1.73SQ M
GLUCOSE P FAST SERPL-MCNC: 93 MG/DL (ref 65–99)
HDLC SERPL-MCNC: 77 MG/DL
LDLC SERPL CALC-MCNC: 61 MG/DL (ref 0–100)
NONHDLC SERPL-MCNC: 76 MG/DL
POTASSIUM SERPL-SCNC: 4.1 MMOL/L (ref 3.5–5.3)
PROT SERPL-MCNC: 7.2 G/DL (ref 6.4–8.4)
SODIUM SERPL-SCNC: 140 MMOL/L (ref 135–147)
TRIGL SERPL-MCNC: 74 MG/DL

## 2022-12-13 ENCOUNTER — OFFICE VISIT (OUTPATIENT)
Dept: CARDIOLOGY CLINIC | Facility: CLINIC | Age: 87
End: 2022-12-13

## 2022-12-13 VITALS
TEMPERATURE: 98 F | WEIGHT: 166 LBS | OXYGEN SATURATION: 100 % | HEIGHT: 71 IN | DIASTOLIC BLOOD PRESSURE: 60 MMHG | BODY MASS INDEX: 23.24 KG/M2 | SYSTOLIC BLOOD PRESSURE: 132 MMHG | HEART RATE: 66 BPM

## 2022-12-13 DIAGNOSIS — I10 ESSENTIAL HYPERTENSION: ICD-10-CM

## 2022-12-13 DIAGNOSIS — N40.0 BENIGN PROSTATIC HYPERPLASIA WITHOUT LOWER URINARY TRACT SYMPTOMS: ICD-10-CM

## 2022-12-13 DIAGNOSIS — M79.671 RIGHT FOOT PAIN: ICD-10-CM

## 2022-12-13 DIAGNOSIS — I34.0 NONRHEUMATIC MITRAL VALVE REGURGITATION: ICD-10-CM

## 2022-12-13 DIAGNOSIS — I70.219 ATHEROSCLEROTIC PVD WITH INTERMITTENT CLAUDICATION (HCC): ICD-10-CM

## 2022-12-13 DIAGNOSIS — E78.5 DYSLIPIDEMIA: ICD-10-CM

## 2022-12-13 DIAGNOSIS — I87.2 VENOUS INSUFFICIENCY OF BOTH LOWER EXTREMITIES: ICD-10-CM

## 2022-12-13 DIAGNOSIS — I49.1 PREMATURE ATRIAL CONTRACTIONS: Primary | ICD-10-CM

## 2022-12-13 NOTE — PROGRESS NOTES
Office Cardiology Progress Note  Blu Hernandez 80 y o  male MRN: [id number]  12/13/22  12:05 PM      ASSESSMENT:    1   Resolved morning hypotension and associated dizziness and background of labile essential hypertension with resolved nocturnal surges in systolic blood pressure at 9-11 PM, previously in the 012 systolic range    Has “white coat” hypertension  Previously with frequent hold of blood pressure medication in the mornings for systolic blood pressures of 120 or less previously being done by patient   Average home blood pressure previously from  12/12 through  12/15/2020 was  127/62  and from 06/06/2021 through 06/10/2021 was 128/61   Previous average home blood pressure 128/62 between 6/11 and 06/14/2022 and previously was 126/61 between 12/11 and 12/14/2021  Current average home blood pressure from 12/9 through 12/12/2022 was 121/58  Remote average home blood pressure taken from 12/09 through 12/12/2019 was 115/50   2  Asymptomatic chronic PACs and PVCs, per patient history, and  present on today's exam and EKG  Frequent PACs with bigeminy and trigeminy and sinus arrhythmia on EKG in the past and currently  Cristino Carlisle has currently suppressed apical systolic ejection murmur, consistent with mild aortic valve stenosis documented on previous echocardiogram 01/08/2020, which also showed 1+ MR/TR, mild MAC, 60% LVEF  Previous Holter monitor on 05/19/2022 showed moderately frequent PACs and PVCs, PACs being more frequent, with nonsustained atrial runs to a maximum of 12 beats but no atrial fibrillation or flutter  3  History of carvedilol-induced syncope from hypotension on 1/10/17 and 1/20/17 with episode on 9/11/17 from low blood pressure, cause uncertain  4  Stable right calf claudication and bilateral right more than left lower extremity PAD present for 9 5 years   Last lower arterial duplex scan 07/22/2019 showed right CHU 0 53 and left CHU 0 83, indicating worsening PAD    Does have right leg claudication with walking  Now is complaining of recent onset of right dorsum foot pain occurring while trying to sleep in bed for several weeks  5   History of previously heard grade 1/4 right neck bruit with less than 50% JOVANNI and 50-69% left ICA stenoses on 01/08/2020 carotid duplex     6  Status post repair of right hydrocele on 10/25/2018 and history of chronic incomplete torsion of left testicle with benign cyst adjacent to left testes     7  Treated dyslipidemia, extremely well-controlled as of 12/6/2022     8  Stable chronic BPH     9  Remote history of syncope after Flomax  10  Chronic nocturnal GERD with water brash  11  Multiple basal cell skin cancers with previous excisions from scalp, and previously from right forehead, right cheek, left ear, left wrist, dorsum of left hand dating back to 04/15/2018     12  Nonhealing ulceration above right medial ankle for approximately 4 years, likely venostasis ulcer    13   Edema of bilateral lower extremities from venous insufficiency of left more than right lower extremity, with right lower extremity venous insufficiency previously confirmed on venous duplex scan dated 06/21/2019, patient currently being managed with compression hosiery  14  Laryngopharyngeal reflux (LPR), being followed by Dr Tucker  Plan       Patient Instructions     1  Continue present medication  2  For the foot pain on the right that disturbs your sleep, start with your foot doctor appointment next week and consider a vascular surgery reevaluation in the future  3  Cardiology follow-up approximately 6 months with EKG  HPI    This 80 y o  male  denies new cardiopulmonary  symptoms  In the last several weeks, the patient has found that he wakes up with pain in the dorsum of his right foot about after an hour of sleep, requiring him to sleep in a chair the remainder of the night  He apparently has an upcoming podiatry appointment next week, according to his wife    He continues with a stable level of right calf claudication during walks, promptly relieved by rest   This has not progressed or worsened recently  He is otherwise been feeling well and is being seen in follow-up of the below listed diagnoses  Encounter Diagnoses   Name Primary?    • Premature atrial contractions Yes   • Essential hypertension    • Atherosclerotic PVD with intermittent claudication (HCC)    • Venous insufficiency of both lower extremities    • Nonrheumatic mitral valve regurgitation    • Benign prostatic hyperplasia without lower urinary tract symptoms    • Dyslipidemia    • Right foot pain         Review of Systems    All other systems negative, except as noted in history of present illness    Historical Information   Past Medical History:   Diagnosis Date   • Asymptomatic PVCs     last assessed 18   • BPH (benign prostatic hyperplasia)     last assessed 17    • High cholesterol     last assessed 17    • Hypertension     uncontrolled, last assessed 17     Past Surgical History:   Procedure Laterality Date   • CATARACT EXTRACTION     • COLONOSCOPY      Complete   • LA REMOVAL OF HYDROCELE,TUNICA,UNILAT Right 10/25/2018    Procedure: HYDROCELECTOMY;  Surgeon: Omer Arce MD;  Location: [address] [place] [address];  Service: Urology   • TESTICLE SURGERY      last assessed 3/31/15     Social History     Substance and Sexual Activity   Alcohol Use No     Social History     Substance and Sexual Activity   Drug Use No     Social History     Tobacco Use   Smoking Status Former   • Years: 20 00   • Types: Cigarettes   • Quit date: 10/18/1965   • Years since quittin 4   Smokeless Tobacco Never       Family History:  Family History   Problem Relation Age of Onset   • Heart disease Mother    • Hypertension Mother    • Heart disease Father    • Hypertension Father    • Hypertension Family    • Hyperlipidemia Family          Meds/Allergies     Prior to Admission medications    Medication Sig Start Date End Date Taking? Authorizing Provider   amLODIPine (NORVASC) 5 mg tablet Take 1 tablet (5 mg total) by mouth daily 1/25/22  Yes Rachel Carter MD   ammonium lactate (LAC-HYDRIN) 12 % cream Apply topically as needed for dry skin 10/30/20  Yes Katie Devine DPM   aspirin 81 MG tablet Take 81 mg by mouth daily   Yes Historical Provider, MD   atorvastatin (LIPITOR) 10 mg tablet TAKE ONE TABLET BY MOUTH EVERY DAY 11/16/22  Yes Phylicia Pimentel MD   Calcium Carbonate-Vitamin D 500-125 MG-UNIT TABS Take by mouth   Yes Historical Provider, MD   chlorthalidone 25 mg tablet Take 1 tablet (25 mg total) by mouth every other day 12/27/21  Yes Rachel Carter MD   Cholecalciferol (Vitamin D) 50 MCG (2000 UT) CAPS Take 1 capsule (2,000 Units total) by mouth in the morning 6/15/22  Yes Rachel Carter MD   clotrimazole (LOTRIMIN) 1 % cream Apply topically 2 (two) times a day 9/29/22  Yes Phylicia Pimentel MD   finasteride (PROSCAR) 5 mg tablet Take 1 tablet (5 mg total) by mouth daily at bedtime 8/9/22  Yes Phylicia Pimentel MD   lisinopril (ZESTRIL) 5 mg tablet Take 1 tablet in morning and 1 tablet in the evening  9/28/22  Yes Rachel Carter MD   Multiple Vitamins-Minerals (Ocuvite Adult Formula) CAPS Take 1 capsule by mouth in the morning 6/15/22  Yes Rachel Carter MD   triamcinolone (KENALOG) 0 1 % ointment  8/31/22 12/13/22  Historical Provider, MD       Allergies   Allergen Reactions   • Tamsulosin Syncope         Vitals:    12/13/22 1055   BP: 132/60   BP Location: Left arm   Patient Position: Sitting   Cuff Size: Large   Pulse: 66   Temp: 98 °F (36 7 °C)   SpO2: 100%   Weight: 75 3 kg (166 lb)   Height: 5' 11" (1 803 m)       Body mass index is 23 15 kg/m²    5 pound weight gain in approximately 6 months with no significant change in weight over approximately  1 5 years    Physical Exam:    General Appearance:  Alert, cooperative, no distress, appears stated age   Head:  Normocephalic, without obvious abnormality, atraumatic   Eyes:  PERRL, conjunctiva/corneas clear, EOM's intact,   both eyes   Ears:  Normal TM's and external ear canals, both ears   Nose: Nares normal, septum midline, mucosa normal, no drainage or sinus tenderness   Throat: Lips, mucosa, and tongue normal; teeth and gums normal   Neck: Supple, symmetrical, trachea midline, no adenopathy, thyroid: not enlarged, symmetric, no tenderness/mass/nodules, no carotid bruit or JVD   Back:   Symmetric, no curvature, ROM normal, no CVA tenderness   Lungs:   Clear to auscultation bilaterally, respirations unlabored   Chest Wall:  No tenderness or deformity   Heart:  Regular rate and rhythm with frequent extrasystoles, S1, S2 normal, no murmur, rub or gallop   Abdomen:   Soft, non-tender, bowel sounds active all four quadrants,  no masses, no organomegaly   Extremities: Extremities normal, atraumatic, no cyanosis with 2+ pitting bilateral pretibial and ankle edema   Pulses: Bilateral absence of ankle and dorsalis pedis pulses with both feet equally warm   Skin: Skin showed normal color, texture, turgor and no rashes or lesions   Lymph nodes: Cervical, supraclavicular, and axillary nodes normal   Neurologic: Normal         Cardiographics    ECG 12/13/22:    Normal sinus rhythm with average heart rate of 66 bpm  Frequent premature atrial complexes and pattern of bigeminy/trigeminy  Otherwise normal ECG, unchanged from 6/15/2022    IMAGING:    No Chest XR results available for this patient            LAB REVIEW:      Lab Results   Component Value Date    SODIUM 140 12/06/2022    K 4 1 12/06/2022     12/06/2022    CO2 27 12/06/2022    BUN 28 (H) 12/06/2022    CREATININE 0 89 12/06/2022    GLUF 93 12/06/2022    CALCIUM 9 8 12/06/2022    CORRECTEDCA 10 3 (H) 12/06/2022    AST 23 12/06/2022    ALT 29 12/06/2022    ALKPHOS 60 12/06/2022    EGFR 74 12/06/2022     Lab Results   Component Value Date    CHOLESTEROL 153 12/06/2022    CHOLESTEROL 154 11/30/2021 CHOLESTEROL 149 12/09/2020     Lab Results   Component Value Date    HDL 77 12/06/2022    HDL 78 11/30/2021    HDL 81 12/09/2020       Lab Results   Component Value Date    LDLCALC 61 12/06/2022    LDLCALC 64 11/30/2021    LDLCALC 56 12/09/2020     No components found for: [facility]  Lab Results   Component Value Date    TRIG 74 12/06/2022    TRIG 61 11/30/2021    TRIG 58 12/09/2020               Jannie Espinal MD

## 2022-12-13 NOTE — PATIENT INSTRUCTIONS
Continue present medication  For the foot pain on the right that disturbs your sleep, start with your foot doctor appointment next week and consider a vascular surgery reevaluation in the future  Cardiology follow-up approximately 6 months with EKG

## 2022-12-19 DIAGNOSIS — I10 ESSENTIAL HYPERTENSION: ICD-10-CM

## 2022-12-19 RX ORDER — CHLORTHALIDONE 25 MG/1
25 TABLET ORAL EVERY OTHER DAY
Qty: 45 TABLET | Refills: 3 | Status: SHIPPED | OUTPATIENT
Start: 2022-12-19

## 2023-01-11 ENCOUNTER — TELEMEDICINE (OUTPATIENT)
Dept: FAMILY MEDICINE CLINIC | Facility: CLINIC | Age: 88
End: 2023-01-11

## 2023-01-11 DIAGNOSIS — U07.1 COVID-19: Primary | ICD-10-CM

## 2023-01-11 RX ORDER — NIRMATRELVIR AND RITONAVIR 300-100 MG
3 KIT ORAL 2 TIMES DAILY
Qty: 30 TABLET | Refills: 0 | Status: SHIPPED | OUTPATIENT
Start: 2023-01-11 | End: 2023-01-16

## 2023-01-11 NOTE — PROGRESS NOTES
COVID-19 Outpatient Progress Note    Assessment/Plan:    Problem List Items Addressed This Visit    None  Visit Diagnoses     COVID-19    -  Primary    Relevant Medications    nirmatrelvir & ritonavir (Paxlovid, 300/100,) tablet therapy pack   Was advised to hold Lipitor for 5 days    Daily f/u       Disposition:     Patient meets criteria for PAXLOVID and they have been counseled appropriately according to EUA documentation released by the FDA  After discussion, patient agrees to treatment  Jeanine Cardenas is an investigational medicine used to treat mild-to-moderate COVID-19 in adults and children (15years of age and older weighing at least 80 pounds (40 kg)) with positive results of direct SARS-CoV-2 viral testing, and who are at high risk for progression to severe COVID-19, including hospitalization or death  PAXLOVID is investigational because it is still being studied  There is limited information about the safety and effectiveness of using PAXLOVID to treat people with mild-to-moderate COVID-19  The FDA has authorized the emergency use of PAXLOVID for the treatment of mild-tomoderate COVID-19 in adults and children (15years of age and older weighing at least 80 pounds (40 kg)) with a positive test for the virus that causes COVID-19, and who are at high risk for progression to severe COVID-19, including hospitalization or death, under an EUA  What should I tell my healthcare provider before I take PAXLOVID? Tell your healthcare provider if you:  - Have any allergies  - Have liver or kidney disease  - Are pregnant or plan to become pregnant  - Are breastfeeding a child  - Have any serious illnesses    Tell your healthcare provider about all the medicines you take, including prescription and over-the-counter medicines, vitamins, and herbal supplements  Some medicines may interact with PAXLOVID and may cause serious side effects   Keep a list of your medicines to show your healthcare provider and pharmacist when you get a new medicine  You can ask your healthcare provider or pharmacist for a list of medicines that interact with PAXLOVID  Do not start taking a new medicine without telling your healthcare provider  Your healthcare provider can tell you if it is safe to take PAXLOVID with other medicines  Tell your healthcare provider if you are taking combined hormonal contraceptive  PAXLOVID may affect how your birth control pills work  Females who are able to become pregnant should use another effective alternative form of contraception or an additional barrier method of contraception  Talk to your healthcare provider if you have any questions about contraceptive methods that might be right for you  How do I take PAXLOVID? PAXLOVID consists of 2 medicines: nirmatrelvir and ritonavir  - Take 2 pink tablets of nirmatrelvir with 1 white tablet of ritonavir by mouth 2 times each day (in the morning and in the evening) for 5 days  For each dose, take all 3 tablets at the same time  - If you have kidney disease, talk to your healthcare provider  You may need a different dose  - Swallow the tablets whole  Do not chew, break, or crush the tablets  - Take PAXLOVID with or without food  - Do not stop taking PAXLOVID without talking to your healthcare provider, even if you feel better  - If you miss a dose of PAXLOVID within 8 hours of the time it is usually taken, take it as soon as you remember  If you miss a dose by more than 8 hours, skip the missed dose and take the next dose at your regular time  Do not take 2 doses of PAXLOVID at the same time  - If you take too much PAXLOVID, call your healthcare provider or go to the nearest hospital emergency room right away    - If you are taking a ritonavir- or cobicistat-containing medicine to treat hepatitis C or Human Immunodeficiency Virus (HIV), you should continue to take your medicine as prescribed by your healthcare provider   - Talk to your healthcare provider if you do not feel better or if you feel worse after 5 days  Who should generally not take PAXLOVID? Do not take PAXLOVID if:  You are allergic to nirmatrelvir, ritonavir, or any of the ingredients in PAXLOVID  You are taking any of the following medicines:  - Alfuzosin  - Pethidine, piroxicam, propoxyphene  - Ranolazine  - Amiodarone, dronedarone, flecainide, propafenone, quinidine  - Colchicine  - Lurasidone, pimozide, clozapine  - Dihydroergotamine, ergotamine, methylergonovine  - Lovastatin, simvastatin  - Sildenafil (Revatio®) for pulmonary arterial hypertension (PAH)  - Triazolam, oral midazolam  - Apalutamide  - Carbamazepine, phenobarbital, phenytoin  - Rifampin  - St  Leander’s Wort (hypericum perforatum)    What are the important possible side effects of PAXLOVID? Possible side effects of PAXLOVID are:  - Liver Problems  Tell your healthcare provider right away if you have any of these signs and symptoms of liver problems: loss of appetite, yellowing of your skin and the whites of eyes (jaundice), dark-colored urine, pale colored stools and itchy skin, stomach area (abdominal) pain  - Resistance to HIV Medicines  If you have untreated HIV infection, PAXLOVID may lead to some HIV medicines not working as well in the future  - Other possible side effects include: altered sense of taste, diarrhea, high blood pressure, or muscle aches    These are not all the possible side effects of PAXLOVID  Not many people have taken PAXLOVID  Serious and unexpected side effects may happen  Kailyn Hanley is still being studied, so it is possible that all of the risks are not known at this time  What other treatment choices are there? Like Cinthia Mark may allow for the emergency use of other medicines to treat people with COVID-19   Go to https://[employer]/ for information on the emergency use of other medicines that are authorized by FDA to treat people with COVID-19  Your healthcare provider may talk with you about clinical trials for which you may be eligible  It is your choice to be treated or not to be treated with PAXLOVID  Should you decide not to receive it or for your child not to receive it, it will not change your standard medical care  What if I am pregnant or breastfeeding? There is no experience treating pregnant women or breastfeeding mothers with PAXLOVID  For a mother and unborn baby, the benefit of taking PAXLOVID may be greater than the risk from the treatment  If you are pregnant, discuss your options and specific situation with your healthcare provider  It is recommended that you use effective barrier contraception or do not have sexual activity while taking PAXLOVID  If you are breastfeeding, discuss your options and specific situation with your healthcare provider  How do I report side effects with PAXLOVID? Contact your healthcare provider if you have any side effects that bother you or do not go away  Report side effects to FDA MedWatch at www fda gov/medwatch or call 3-329-CAU4635 or you can report side effects to [employer]  at the contact information provided below  Website Fax number Telephone number   [employer] [phone]     How should I store [address]? Store PAXLOVID tablets at room temperature between 68°F to 77°F (20°C to 25°C)  Full fact sheet for patients, parents, and caregivers can be found at: [employer]    I have spent 10 minutes directly with the patient  Greater than 50% of this time was spent in counseling/coordination of care regarding: prognosis and risks and benefits of treatment options         Encounter provider: Shannan Giang MD     Provider located at: [address] [place] [id number]     Recent Visits  No visits were found meeting these conditions  Showing recent visits within past 7 days and meeting all other requirements  Today's Visits  Date Type Provider Dept   01/11/23 Telemedicine Sonia Allen  [facility] today's visits and meeting all other requirements  Future Appointments  No visits were found meeting these conditions  Showing future appointments within next 150 days and meeting all other requirements     This virtual check-in was done via [employer] and patient was informed that this is a secure, HIPAA-compliant platform  He agrees to proceed  Patient agrees to participate in a virtual check in via telephone or video visit instead of presenting to the office to address urgent/immediate medical needs  Patient is aware this is a billable service  He acknowledged consent and understanding of privacy and security of the video platform  The patient has agreed to participate and understands they can discontinue the visit at any time  After connecting through [facility], the patient was identified by name and date of birth  Virgil Cifuentes was informed that this was a telemedicine visit and that the exam was being conducted confidentially over secure lines  My office door was closed  No one else was in the room  Virgil Cifuentes acknowledged consent and understanding of privacy and security of the telemedicine visit  I informed the patient that I have reviewed his record in Epic and presented the opportunity for him to ask any questions regarding the visit today  The patient agreed to participate  Verification of patient location:  Patient is located in the following state in which I hold an active license: [place]    Subjective:   Virgil Cifuentes is a 80 y o  male who is concerned about COVID-19  Patient's symptoms include fatigue, malaise, nasal congestion, rhinorrhea, cough, myalgias and headache   Patient denies fever, chills, sore throat, anosmia, loss of taste, shortness of breath, chest tightness, abdominal pain, nausea, vomiting and diarrhea  - Date of symptom onset: 1/10/2023  - Date of exposure: 1/8/2023      COVID-19 vaccination status: Fully vaccinated with booster    Exposure:   Contact with a person who is under investigation (PUI) for or who is positive for COVID-19 within the last 14 days?: No    Hospitalized recently for fever and/or lower respiratory symptoms?: No      Currently a healthcare worker that is involved in direct patient care?: No      Works in a special setting where the risk of COVID-19 transmission may be high? (this may include long-term care, correctional and intermediate facilities; homeless shelters; assisted-living facilities and group homes ): No      Resident in a special setting where the risk of COVID-19 transmission may be high? (this may include long-term care, correctional and intermediate facilities; homeless shelters; assisted-living facilities and group homes ): No      No results found for: [address] [facility] [address], [address] [facility], [address] [facility] [address], [address] [facility], [facility]    Review of Systems   Constitutional: Positive for fatigue  Negative for chills and fever  HENT: Positive for congestion and rhinorrhea  Negative for sore throat  Respiratory: Positive for cough  Negative for chest tightness and shortness of breath  Gastrointestinal: Negative for abdominal pain, diarrhea, nausea and vomiting  Musculoskeletal: Positive for myalgias  Neurological: Positive for headaches       Current Outpatient Medications on File Prior to Visit   Medication Sig   • amLODIPine (NORVASC) 5 mg tablet Take 1 tablet (5 mg total) by mouth daily   • ammonium lactate (LAC-HYDRIN) 12 % cream Apply topically as needed for dry skin   • aspirin 81 MG tablet Take 81 mg by mouth daily   • atorvastatin (LIPITOR) 10 mg tablet TAKE ONE TABLET BY MOUTH EVERY DAY   • Calcium Carbonate-Vitamin D 500-125 MG-UNIT TABS Take by mouth   • chlorthalidone 25 mg tablet Take 1 tablet (25 mg total) by mouth every other day   • Cholecalciferol (Vitamin D) 50 MCG (2000 UT) CAPS Take 1 capsule (2,000 Units total) by mouth in the morning   • clotrimazole (LOTRIMIN) 1 % cream Apply topically 2 (two) times a day   • finasteride (PROSCAR) 5 mg tablet Take 1 tablet (5 mg total) by mouth daily at bedtime   • lisinopril (ZESTRIL) 5 mg tablet Take 1 tablet in morning and 1 tablet in the evening  • Multiple Vitamins-Minerals (Ocuvite Adult Formula) CAPS Take 1 capsule by mouth in the morning       Objective: There were no vitals taken for this visit  Physical Exam  Constitutional:       Appearance: He is not ill-appearing  Pulmonary:      Effort: Pulmonary effort is normal  No respiratory distress  Neurological:      General: No focal deficit present  Mental Status: He is alert and oriented to person, place, and time     Psychiatric:         Mood and Affect: Mood normal        Jayy Whitten MD

## 2023-01-12 ENCOUNTER — TELEMEDICINE (OUTPATIENT)
Dept: FAMILY MEDICINE CLINIC | Facility: CLINIC | Age: 88
End: 2023-01-12

## 2023-01-12 DIAGNOSIS — U07.1 COVID-19: Primary | ICD-10-CM

## 2023-01-12 NOTE — PROGRESS NOTES
COVID-19 Outpatient Progress Note    Assessment/Plan:    Problem List Items Addressed This Visit    None  Visit Diagnoses     COVID-19    -  Primary   Improving  Was advised to hold Amlodipine for low BP  Phone f/u in 3 days       Disposition:     Patient with moderate or severe illness or has a weakened immune system  They should isolate from others through at least day 10  Isolation can be ended if symptoms are improving and they are fever free for the past 24 hours  If they still have fever or other symptoms have not improved, continue to isolate until they improve  Regardless of when you isolation is ended, avoid being around people who are more likely to get very sick from COVID-19 until at least day 11  I have spent 5 minutes directly with the patient  Encounter provider: Jayy Whitten MD     Provider located at: [address] [place] [id number]     Recent Visits  Date Type Provider Dept   01/11/23 Telemedicine Jayy Whitten  [facility] recent visits within past 7 days and meeting all other requirements  Today's Visits  Date Type Provider Dept   01/12/23 Telemedicine Jayy Whitten  [facility] today's visits and meeting all other requirements  Future Appointments  No visits were found meeting these conditions  Showing future appointments within next 150 days and meeting all other requirements     This virtual check-in was done via telephone and he agrees to proceed  Patient agrees to participate in a virtual check in via telephone or video visit instead of presenting to the office to address urgent/immediate medical needs  Patient is aware this is a billable service  He acknowledged consent and understanding of privacy and security of the video platform  The patient has agreed to participate and understands they can discontinue the visit at any time      After connecting through Telephone, the patient was identified by name and date of birth  Shila Schmitz was informed that this was a telemedicine visit and that the exam was being conducted confidentially over secure lines  My office door was closed  No one else was in the room  Shila Schmitz acknowledged consent and understanding of privacy and security of the telemedicine visit  I informed the patient that I have reviewed his record in Epic and presented the opportunity for him to ask any questions regarding the visit today  The patient agreed to participate  It was my intent to perform this visit via video technology but the patient was not able to do a video connection so the visit was completed via audio telephone only  Verification of patient location:  Patient is located in the following state in which I hold an active license: [place]    Subjective:   Shila Schmitz is a 80 y o  male who has been screened for COVID-19  Symptom change since last report: improving  Patient's symptoms include fatigue, nasal congestion, rhinorrhea and cough  Patient denies fever, chills, malaise, sore throat, anosmia, loss of taste, shortness of breath, chest tightness, abdominal pain, nausea, vomiting, diarrhea, myalgias and headaches  - Date of symptom onset: 1/10/2023  - Date of positive COVID-19 test: 1/11/2023  Type of test: Home antigen  COVID-19 vaccination status: Fully vaccinated with booster    Monse Lu has been staying home and has isolated themselves in his home  He is taking care to not share personal items and is cleaning all surfaces that are touched often, like counters, tabletops, and doorknobs using household cleaning sprays or wipes  He is wearing a mask when he leaves his room  On Paxlovid -  Low BP 99/45    No results found for: Norma Green, SARSCOTATUM, CORONAVIRUSR, SARSCOVAG, [facility]    Review of Systems   Constitutional: Positive for fatigue   Negative for chills and fever    HENT: Positive for congestion and rhinorrhea  Negative for sore throat  Respiratory: Positive for cough  Negative for chest tightness and shortness of breath  Gastrointestinal: Negative for abdominal pain, diarrhea, nausea and vomiting  Musculoskeletal: Negative for myalgias  Neurological: Negative for headaches  Current Outpatient Medications on File Prior to Visit   Medication Sig   • amLODIPine (NORVASC) 5 mg tablet Take 1 tablet (5 mg total) by mouth daily   • ammonium lactate (LAC-HYDRIN) 12 % cream Apply topically as needed for dry skin   • aspirin 81 MG tablet Take 81 mg by mouth daily   • atorvastatin (LIPITOR) 10 mg tablet TAKE ONE TABLET BY MOUTH EVERY DAY   • Calcium Carbonate-Vitamin D 500-125 MG-UNIT TABS Take by mouth   • chlorthalidone 25 mg tablet Take 1 tablet (25 mg total) by mouth every other day   • Cholecalciferol (Vitamin D) 50 MCG (2000 UT) CAPS Take 1 capsule (2,000 Units total) by mouth in the morning   • clotrimazole (LOTRIMIN) 1 % cream Apply topically 2 (two) times a day   • finasteride (PROSCAR) 5 mg tablet Take 1 tablet (5 mg total) by mouth daily at bedtime   • lisinopril (ZESTRIL) 5 mg tablet Take 1 tablet in morning and 1 tablet in the evening  • Multiple Vitamins-Minerals (Ocuvite Adult Formula) CAPS Take 1 capsule by mouth in the morning   • nirmatrelvir & ritonavir (Paxlovid, 300/100,) tablet therapy pack Take 3 tablets by mouth 2 (two) times a day for 5 days Take 2 nirmatrelvir tablets + 1 ritonavir tablet together per dose       Objective: There were no vitals taken for this visit       Physical Exam  Jasper Andre MD

## 2023-01-19 ENCOUNTER — TELEPHONE (OUTPATIENT)
Dept: FAMILY MEDICINE CLINIC | Facility: CLINIC | Age: 88
End: 2023-01-19

## 2023-01-19 DIAGNOSIS — R21 RASH: Primary | ICD-10-CM

## 2023-01-19 RX ORDER — TRIAMCINOLONE ACETONIDE 0.25 MG/G
OINTMENT TOPICAL 2 TIMES DAILY
Qty: 30 G | Refills: 0 | Status: SHIPPED | OUTPATIENT
Start: 2023-01-19 | End: 2023-06-09 | Stop reason: SDUPTHER

## 2023-01-19 NOTE — TELEPHONE ENCOUNTER
Patient is requesting a refill of Triamcinolone 0 1% Cream which is no longer on his current medication list     [facility] [place]

## 2023-03-10 ENCOUNTER — CONSULT (OUTPATIENT)
Dept: VASCULAR SURGERY | Facility: CLINIC | Age: 88
End: 2023-03-10

## 2023-03-10 VITALS
DIASTOLIC BLOOD PRESSURE: 78 MMHG | WEIGHT: 161 LBS | BODY MASS INDEX: 22.54 KG/M2 | HEART RATE: 62 BPM | SYSTOLIC BLOOD PRESSURE: 144 MMHG | HEIGHT: 71 IN

## 2023-03-10 DIAGNOSIS — I70.219 ATHEROSCLEROTIC PVD WITH INTERMITTENT CLAUDICATION (HCC): ICD-10-CM

## 2023-03-10 DIAGNOSIS — I87.2 EDEMA OF BOTH LOWER EXTREMITIES DUE TO PERIPHERAL VENOUS INSUFFICIENCY: Primary | ICD-10-CM

## 2023-03-10 DIAGNOSIS — E78.5 HYPERLIPIDEMIA, UNSPECIFIED HYPERLIPIDEMIA TYPE: ICD-10-CM

## 2023-03-10 DIAGNOSIS — I10 ESSENTIAL HYPERTENSION: ICD-10-CM

## 2023-03-10 NOTE — PATIENT INSTRUCTIONS
Peripheral arterial disease      -Recommend walking 30 minutes at least 4 days weekly if he can do so safely  -Refer for supervised exercise program  -Follow good, heart healthy diet which is low in fat and cholesterol    -Continue with aspirin and atorvastatin 10  -Wear good protective footwear at all times   You should call office if you develop a foot wound  -Update lower extremity arterial duplex study and office visit in about 3 months for recheck

## 2023-03-10 NOTE — PROGRESS NOTES
Assessment/Plan:    Atherosclerotic PVD with intermittent claudication (HCC)  -     VAS lower limb arterial duplex, complete bilateral; Future  -     Ambulatory Referral to Peripheral Artery Disease Supervised Exercise Therapy; Future    -Non-lifestyle limiting RLE claudication  -R LE pain after walking 100' for which he has to stop for 10 minutes worsened since 2019  -Shopping at the [facility] or grocery store for 1 hour fairly comfortably  -Chronic R great toe ingrown toe nail x several years  -R heel pain which is improved with a pad/cushion       -IVANNA 7/22/19:    R 0 53/38/26; > 75% CFA and prox SFA stenosis    L 0 83/138/57; 50-75% distal SFA    Exam:   -2+ femoral pulses bilat  -Monophasic PT/DP signals present which are better over the PT   -Feet are warm and well-perfused  Cap refill is brisk    -R great toe with ingrown toe nail  No open wounds  Plan:  -Recommend walking 30 minutes at least 4 days weekly if he can do so safely  -Refer for supervised exercise program  -Follow good, heart healthy diet which is low in fat and cholesterol    -Continue with aspirin and atorvastatin 10  -Wear good protective footwear at all times  You should call office if you develop a foot wound  -Update lower extremity arterial duplex study and office visit in about 3 months for recheck      Edema of both lower extremities due to peripheral venous insufficiency  -     Compression Stocking  -     Assistive Device (stocking application)  -Mild leg edema which is improved with compression  -VAS venous reflux - Bilateral deep venous incompetence  L GSV incompetence    -Continue with compression stockings, 15-20mmHg with severe PAD      Additional diagnoses:  Essential hypertension    Hyperlipidemia, unspecified hyperlipidemia type       Subjective:      Patient ID: Ekaterina Rivera is a 80 y o  male  Patient presents today to re-establish care for PAD, last seen in 2019  No recent imaging   Patient reports R leg pain w/ walking x 100 ft  Resolves with rest      HPI   Kwasi Juan 89yo M Htn, dyslipidemia, MV regurg, PAD, bilateral venous insufficiency and severe peripheral arterial disease who is referred to re-establish vascular care  Patient was seen in 2019 but Dr Cleophas Mcburney for non-disabling claudication  A lower extremity arterial duplex study on 07/22/2019 showsed significant right common femoral and bilateral SFA occlusive disease  The right CHU was measured at 0 53 and the left CHU 0 83  At that time, he was walking 15 to 20 minutes before onset of leg pain and rested for 20 minutes  He is maintained on aspirin and atorvastatin 10  Compressive therapy and regular walking was recommended  3/10/23:  Mr Macrina Upton comes to vascular surgery for consult regarding peripheral arterial disease  He is concerned due to R LE claudication  "I can only walk 100'" and then develops calf and thigh pain for which he stops for 10 minutes and then he can walk again  He relates claudication to walking out to the mailbox and it affects the entire leg from the calf to the thigh  However, he states that the legs feel "fine" when grocery shopping at the [employer]  He also walks around the [place] for 1 hour and feels ok  He wears compression when he goes shopping  He has no ischemic rest pain or nighttime pain  He has an ingrown toenail for a couple years which occasionally gives him some pain  He developed right heel pain with walking about a year ago for which he heel cushion in the right shoe  He has no heel pain when he uses the pad  He sees podiatry in [facility], Dr Virginia Leong  Mr Macrina Upton feels active for his age  He still drives  He feels that he should have had a vascular intervention in 2019  We reviewed his history thoroughly  He does have significant, bilateral atherosclerosis of the lower extremities, given his age and fairly stable symptoms, recommend regular walking    I did not recommend vascular intervention at this point, unless he were to develop ischemic rest pain or foot wound  We discussed the importance of foot protection and avoiding wounds      -R LE claudication at 100' (long time)  -Legs feel "fine" when at [address], podiatry        VAS venous reflux study 1/19/21  [facility] REPORT  CLINICAL:  Indications:  Patient presents with history of bilateral lower extremity edema  Patient  reports new onset of swelling of the left ankle  Patient has been wearing  compressions stockings for several weeks  Risk Factors: The patient has history of HTN, HLD, PAD and previous smoking (quit >10yrs  ago)  FINDINGS:     Segment         Right                   Left                              Valve      Reflux Time  Valve   Reflux Time    CFV                               1 34                         PFV                                     Reflux         0 77    FV Prox         Reflux            1 67                         FV Dist         Competent         0 36                 0 55    GSV Dist Thigh                          Reflux         3 35    GSV Knee                                               0 62    SSV Mid Calf                      1 00                         SSV Knee                          1 96                         SSV Ankle       Reflux            1 37                         Popliteal                         0 74  Reflux         1 15             CONCLUSION:  Impression:  RIGHT LIMB:  Deep venous incompetence is noted     The great saphenous vein is competent  The great saphenous vein does not remain within the saphenous compartment in  the thigh  The small saphenous vein is incompetent and does not communicate with the  popliteal vein  Thickened walls and calcification noted throughout the small  saphenous vein  There is no evidence of incompetent perforators in the thigh or calf    There is no evidence of deep vein thrombosis in the CFV, the proximal PFV, the  femoral vein and the popliteal vein  LEFT LIMB:  Deep venous incompetence is noted  The great saphenous vein is incompetent  The great saphenous vein remains within the saphenous compartment in the thigh  The small saphenous vein is competent and does not communicate with the  popliteal vein  There is no evidence of incompetent perforators in the thigh or calf  There is no evidence of deep vein thrombosis in the CFV, the proximal PFV, the  femoral vein and the popliteal vein  The following portions of the patient's history were reviewed and updated as appropriate: allergies, current medications, past family history, past medical history, past social history, past surgical history and problem list     Review of Systems   Constitutional: Negative  HENT: Negative  Eyes: Negative  Respiratory: Negative  Cardiovascular: Negative  Gastrointestinal: Negative  Endocrine: Negative  Genitourinary: Negative  Musculoskeletal:        Leg pain   Skin: Negative  Allergic/Immunologic: Negative  Neurological: Negative  Hematological: Negative  Psychiatric/Behavioral: Negative  Objective:      /78 (BP Location: Left arm, Patient Position: Sitting)   Pulse 62   Ht 5' 11" (1 803 m)   Wt 73 kg (161 lb)   BMI 22 45 kg/m²       2+ femoral pulses bilaterally    Monophasic PT/DP signals present which are better over the PT    Feet are warm and well-perfused  Cap refill is brisk  R great toe with ingrown toe nail  No open wounds  Physical Exam  Vitals and nursing note reviewed  Constitutional:       Appearance: He is well-developed  HENT:      Head: Normocephalic and atraumatic  Eyes:      Pupils: Pupils are equal, round, and reactive to light  Neck:      Thyroid: No thyromegaly  Vascular: No JVD  Trachea: Trachea normal    Cardiovascular:      Rate and Rhythm: Normal rate and regular rhythm        Pulses:           Carotid pulses are 2+ on the right side and 2+ on the left side  Radial pulses are 2+ on the right side and 2+ on the left side  Femoral pulses are 2+ on the right side and 2+ on the left side  Dorsalis pedis pulses are detected w/ Doppler on the right side and detected w/ Doppler on the left side  Posterior tibial pulses are detected w/ Doppler on the right side and detected w/ Doppler on the left side  Heart sounds: S1 normal and S2 normal  Murmur heard  Crescendo systolic murmur is present with a grade of 2/6  No friction rub  No gallop  Pulmonary:      Effort: Pulmonary effort is normal  No accessory muscle usage or respiratory distress  Breath sounds: Normal breath sounds  No wheezing or rales  Abdominal:      General: Bowel sounds are normal  There is no distension  Palpations: Abdomen is soft  Tenderness: There is no abdominal tenderness  Musculoskeletal:         General: No deformity  Normal range of motion  Cervical back: Neck supple  Right lower leg: Edema present  Left lower leg: Edema present  Skin:     General: Skin is warm and dry  Findings: No lesion or rash  Nails: There is no clubbing  Neurological:      Mental Status: He is alert and oriented to person, place, and time  Comments: Grossly normal    Psychiatric:         Behavior: Behavior is cooperative  I have reviewed and made appropriate changes to the review of systems input by the medical assistant      Vitals:    03/10/23 1341   BP: 144/78   BP Location: Left arm   Patient Position: Sitting   Pulse: 62   Weight: 73 kg (161 lb)   Height: 5' 11" (1 803 m)       Patient Active Problem List   Diagnosis   • Essential hypertension   • Hyperlipidemia   • Atherosclerotic PVD with intermittent claudication (HCC)   • Benign prostatic hyperplasia without lower urinary tract symptoms   • Edema of both lower extremities due to peripheral venous insufficiency   • Dyslipidemia • Premature atrial contractions   • Nonrheumatic mitral valve regurgitation   • Intermediate stage nonexudative age-related macular degeneration of both eyes   • Vitamin D deficiency   • Right foot pain       Past Surgical History:   Procedure Laterality Date   • CATARACT EXTRACTION     • COLONOSCOPY      Complete   • OK EXCISION HYDROCELE UNILATERAL Right 10/25/2018    Procedure: HYDROCELECTOMY;  Surgeon: Iman Griffin MD;  Location: [address] [place] [address];  Service: Urology   • TESTICLE SURGERY      last assessed 3/31/15       Family History   Problem Relation Age of Onset   • Heart disease Mother    • Hypertension Mother    • Heart disease Father    • Hypertension Father    • Hypertension Family    • Hyperlipidemia Family        Social History     Socioeconomic History   • Marital status: /Civil Union     Spouse name: Not on file   • Number of children: Not on file   • Years of education: Not on file   • Highest education level: Not on file   Occupational History   • Occupation: Retired   Tobacco Use   • Smoking status: Former     Years:      Types: Cigarettes     Quit date: 10/18/1965     Years since quittin 4   • Smokeless tobacco: Never   Vaping Use   • Vaping Use: Never used   Substance and Sexual Activity   • Alcohol use: No   • Drug use: No   • Sexual activity: Not on file   Other Topics Concern   • Not on file   Social History Narrative    Exercises daily     Sleeps 6-7 hrs/day     Social Determinants of Health     Financial Resource Strain: Not on file   Food Insecurity: Not on file   Transportation Needs: Not on file   Physical Activity: Not on file   Stress: Not on file   Social Connections: Not on file   Intimate Partner Violence: Not on file   Housing Stability: Not on file       Allergies   Allergen Reactions   • Tamsulosin Syncope         Current Outpatient Medications:   •  amLODIPine (NORVASC) 5 mg tablet, Take 1 tablet (5 mg total) by mouth daily, Disp: 90 tablet, Rfl: 3  •  ammonium lactate (LAC-HYDRIN) 12 % cream, Apply topically as needed for dry skin, Disp: 385 g, Rfl: 0  •  aspirin 81 MG tablet, Take 81 mg by mouth daily, Disp: , Rfl:   •  atorvastatin (LIPITOR) 10 mg tablet, TAKE ONE TABLET BY MOUTH EVERY DAY, Disp: 90 tablet, Rfl: 3  •  Calcium Carbonate-Vitamin D 500-125 MG-UNIT TABS, Take by mouth, Disp: , Rfl:   •  chlorthalidone 25 mg tablet, Take 1 tablet (25 mg total) by mouth every other day, Disp: 45 tablet, Rfl: 3  •  Cholecalciferol (Vitamin D) 50 MCG (2000 UT) CAPS, Take 1 capsule (2,000 Units total) by mouth in the morning, Disp: 100 capsule, Rfl: 5  •  clotrimazole (LOTRIMIN) 1 % cream, Apply topically 2 (two) times a day, Disp: 30 g, Rfl: 0  •  finasteride (PROSCAR) 5 mg tablet, Take 1 tablet (5 mg total) by mouth daily at bedtime, Disp: 90 tablet, Rfl: 3  •  lisinopril (ZESTRIL) 5 mg tablet, Take 1 tablet in morning and 1 tablet in the evening , Disp: 180 tablet, Rfl: 3  •  Multiple Vitamins-Minerals (Ocuvite Adult Formula) CAPS, Take 1 capsule by mouth in the morning, Disp: 100 capsule, Rfl: 5  •  triamcinolone (KENALOG) 0 025 % ointment, Apply topically 2 (two) times a day, Disp: 30 g, Rfl: 0

## 2023-03-24 ENCOUNTER — HOSPITAL ENCOUNTER (OUTPATIENT)
Dept: RADIOLOGY | Facility: HOSPITAL | Age: 88
Discharge: HOME | End: 2023-03-24

## 2023-03-24 DIAGNOSIS — I70.219 ATHEROSCLEROTIC PVD WITH INTERMITTENT CLAUDICATION (HCC): ICD-10-CM

## 2023-04-24 ENCOUNTER — RA CDI HCC (OUTPATIENT)
Dept: OTHER | Facility: HOSPITAL | Age: 88
End: 2023-04-24

## 2023-04-24 NOTE — PROGRESS NOTES
[facility]  coding opportunities       Chart reviewed, no opportunity found:   [address]        Patients Insurance     Medicare Insurance: [employer] Advantage

## 2023-04-28 ENCOUNTER — OFFICE VISIT (OUTPATIENT)
Dept: FAMILY MEDICINE CLINIC | Facility: CLINIC | Age: 88
End: 2023-04-28

## 2023-04-28 VITALS
WEIGHT: 165.6 LBS | HEART RATE: 84 BPM | SYSTOLIC BLOOD PRESSURE: 158 MMHG | HEIGHT: 71 IN | BODY MASS INDEX: 23.18 KG/M2 | RESPIRATION RATE: 20 BRPM | TEMPERATURE: 97.5 F | DIASTOLIC BLOOD PRESSURE: 82 MMHG

## 2023-04-28 DIAGNOSIS — Z00.00 MEDICARE ANNUAL WELLNESS VISIT, SUBSEQUENT: Primary | ICD-10-CM

## 2023-04-28 DIAGNOSIS — I10 ESSENTIAL HYPERTENSION: ICD-10-CM

## 2023-04-28 NOTE — PROGRESS NOTES
Assessment and Plan:     Problem List Items Addressed This Visit        Cardiovascular and Mediastinum    Essential hypertension    Relevant Orders    Comprehensive metabolic panel   Other Visit Diagnoses     Medicare annual wellness visit, subsequent    -  Primary           Preventive health issues were discussed with patient, and age appropriate screening tests were ordered as noted in patient's After Visit Summary  Personalized health advice and appropriate referrals for health education or preventive services given if needed, as noted in patient's After Visit Summary  History of Present Illness:     Patient presents for a Medicare Wellness Visit    HPI   Patient Care Team:  Devin Brown MD as PCP - General (Family Medicine)  Jaycee Copeland MD (Vascular Surgery)  Juli Perry MD (Vascular Surgery)  MD Nori Rivers MD (Cardiology)     Review of Systems:     Review of Systems   Constitutional: Negative  HENT: Negative  Respiratory: Negative  Cardiovascular: Negative  Gastrointestinal: Negative  Genitourinary: Negative  Neurological: Negative  Psychiatric/Behavioral: Negative           Problem List:     Patient Active Problem List   Diagnosis   • Essential hypertension   • Hyperlipidemia   • Atherosclerotic PVD with intermittent claudication (HCC)   • Benign prostatic hyperplasia without lower urinary tract symptoms   • Edema of both lower extremities due to peripheral venous insufficiency   • Dyslipidemia   • Premature atrial contractions   • Nonrheumatic mitral valve regurgitation   • Intermediate stage nonexudative age-related macular degeneration of both eyes   • Vitamin D deficiency   • Right foot pain      Past Medical and Surgical History:     Past Medical History:   Diagnosis Date   • Asymptomatic PVCs     last assessed 1/18/18   • BPH (benign prostatic hyperplasia)     last assessed 4/11/17    • High cholesterol     last assessed 17    • Hypertension     uncontrolled, last assessed 17     Past Surgical History:   Procedure Laterality Date   • CATARACT EXTRACTION     • COLONOSCOPY      Complete   • MD EXCISION HYDROCELE UNILATERAL Right 10/25/2018    Procedure: HYDROCELECTOMY;  Surgeon: Bianca Gilliam MD;  Location: [address] [place] [address];  Service: Urology   • TESTICLE SURGERY      last assessed 3/31/15      Family History:     Family History   Problem Relation Age of Onset   • Heart disease Mother    • Hypertension Mother    • Heart disease Father    • Hypertension Father    • Hypertension Family    • Hyperlipidemia Family       Social History:     Social History     Socioeconomic History   • Marital status: /Civil Union     Spouse name: None   • Number of children: None   • Years of education: None   • Highest education level: None   Occupational History   • Occupation: Retired   Tobacco Use   • Smoking status: Former     Years:      Types: Cigarettes     Quit date: 10/18/1965     Years since quittin 5   • Smokeless tobacco: Never   Vaping Use   • Vaping Use: Never used   Substance and Sexual Activity   • Alcohol use: No   • Drug use: No   • Sexual activity: None   Other Topics Concern   • None   Social History Narrative    Exercises daily     Sleeps 6-7 hrs/day     Social Determinants of Health     Financial Resource Strain: Low Risk    • Difficulty of Paying Living Expenses: Not hard at all   Food Insecurity: Not on file   Transportation Needs: No Transportation Needs   • Lack of Transportation (Medical): No   • Lack of Transportation (Non-Medical):  No   Physical Activity: Not on file   Stress: Not on file   Social Connections: Not on file   Intimate Partner Violence: Not on file   Housing Stability: Not on file      Medications and Allergies:     Current Outpatient Medications   Medication Sig Dispense Refill   • amLODIPine (NORVASC) 5 mg tablet TAKE ONE TABLET BY MOUTH EVERY DAY 90 tablet 3   • ammonium lactate (LAC-HYDRIN) 12 % cream Apply topically as needed for dry skin 385 g 0   • aspirin 81 MG tablet Take 81 mg by mouth daily     • atorvastatin (LIPITOR) 10 mg tablet TAKE ONE TABLET BY MOUTH EVERY DAY 90 tablet 3   • Calcium Carbonate-Vitamin D 500-125 MG-UNIT TABS Take by mouth     • chlorthalidone 25 mg tablet Take 1 tablet (25 mg total) by mouth every other day 45 tablet 3   • Cholecalciferol (Vitamin D) 50 MCG (2000 UT) CAPS Take 1 capsule (2,000 Units total) by mouth in the morning 100 capsule 5   • clotrimazole (LOTRIMIN) 1 % cream Apply topically 2 (two) times a day 30 g 0   • finasteride (PROSCAR) 5 mg tablet Take 1 tablet (5 mg total) by mouth daily at bedtime 90 tablet 3   • lisinopril (ZESTRIL) 5 mg tablet Take 1 tablet in morning and 1 tablet in the evening  180 tablet 3   • Multiple Vitamins-Minerals (Ocuvite Adult Formula) CAPS Take 1 capsule by mouth in the morning 100 capsule 5   • triamcinolone (KENALOG) 0 025 % ointment Apply topically 2 (two) times a day 30 g 0     No current facility-administered medications for this visit  Allergies   Allergen Reactions   • Tamsulosin Syncope      Immunizations:     Immunization History   Administered Date(s) Administered   • COVID-19 MODERNA VACC 0 5 ML IM 01/20/2021, 02/17/2021, 11/04/2021   • INFLUENZA 10/01/2018, 10/01/2021   • Influenza Split High Dose Preservative Free IM 10/09/2017, 10/18/2019   • Influenza, high dose seasonal 0 7 mL 10/07/2020, 10/28/2022   • Pneumococcal Conjugate 13-Valent 03/05/2019      Health Maintenance: There are no preventive care reminders to display for this patient  Topic Date Due   • COVID-19 Vaccine (4 - Booster for Moderna series) 12/30/2021      Medicare Screening Tests and Risk Assessments:     Mariel Lora is here for his Subsequent Wellness visit  Health Risk Assessment:   Patient rates overall health as good  Patient feels that their physical health rating is same  Patient is satisfied with their life  Eyesight was rated as same  Hearing was rated as same  Patient feels that their emotional and mental health rating is same  Patients states they are never, rarely angry  Patient states they are never, rarely unusually tired/fatigued  Pain experienced in the last 7 days has been some  Patient's pain rating has been 4/10  Patient states that he has experienced no weight loss or gain in last 6 months  Fall Risk Screening: In the past year, patient has experienced: no history of falling in past year      Home Safety:  Patient has trouble with stairs inside or outside of their home  Patient has working smoke alarms and has working carbon monoxide detector  Home safety hazards include: none  Nutrition:   Current diet is Regular  Medications:   Patient is currently taking over-the-counter supplements  OTC medications include: see medication list  Patient is able to manage medications  Activities of Daily Living (ADLs)/Instrumental Activities of Daily Living (IADLs):   Walk and transfer into and out of bed and chair?: Yes  Dress and groom yourself?: Yes    Bathe or shower yourself?: Yes    Feed yourself?  Yes  Do your laundry/housekeeping?: Yes  Manage your money, pay your bills and track your expenses?: Yes  Make your own meals?: Yes    Do your own shopping?: Yes    Previous Hospitalizations:   Any hospitalizations or ED visits within the last 12 months?: No      Advance Care Planning:   Living will: No    Durable POA for healthcare: No    Advanced directive: No    Patient declined ACP directive: Yes      Cognitive Screening:   Provider or family/friend/caregiver concerned regarding cognition?: No    PREVENTIVE SCREENINGS      Cardiovascular Screening:    General: Screening Not Indicated and History Lipid Disorder      Diabetes Screening:     General: Screening Current      Colorectal Cancer Screening:     General: Screening Not Indicated      Prostate Cancer Screening:    General: Screening Not "Indicated      Osteoporosis Screening:    General: Screening Not Indicated      Abdominal Aortic Aneurysm (AAA) Screening:    Risk factors include: tobacco use        General: Screening Not Indicated      Lung Cancer Screening:     General: Screening Not Indicated      Hepatitis C Screening:    General: Screening Not Indicated    Screening, Brief Intervention, and Referral to Treatment (SBIRT)    Screening  Typical number of drinks in a day: 0  Typical number of drinks in a week: 0  Interpretation: Low risk drinking behavior  Single Item Drug Screening:  How often have you used an illegal drug (including marijuana) or a prescription medication for non-medical reasons in the past year? never    Single Item Drug Screen Score: 0  Interpretation: Negative screen for possible drug use disorder    No results found       Physical Exam:     /82 (BP Location: Left arm, Patient Position: Sitting, Cuff Size: Standard)   Pulse 84   Temp 97 5 °F (36 4 °C)   Resp 20   Ht 5' 11\" (1 803 m)   Wt 75 1 kg (165 lb 9 6 oz)   BMI 23 10 kg/m²     Physical Exam     Nery Rosario MD  "

## 2023-04-28 NOTE — PATIENT INSTRUCTIONS
Medicare Preventive Visit Patient Instructions  Thank you for completing your Welcome to Medicare Visit or Medicare Annual Wellness Visit today  Your next wellness visit will be due in one year (4/28/2024)  The screening/preventive services that you may require over the next 5-10 years are detailed below  Some tests may not apply to you based off risk factors and/or age  Screening tests ordered at today's visit but not completed yet may show as past due  Also, please note that scanned in results may not display below  Preventive Screenings:  Service Recommendations Previous Testing/Comments   Colorectal Cancer Screening  · Colonoscopy    · Fecal Occult Blood Test (FOBT)/Fecal Immunochemical Test (FIT)  · Fecal DNA/Cologuard Test  · Flexible Sigmoidoscopy Age: 39-70 years old   Colonoscopy: every 10 years (May be performed more frequently if at higher risk)  OR  FOBT/FIT: every 1 year  OR  Cologuard: every 3 years  OR  Sigmoidoscopy: every 5 years  Screening may be recommended earlier than age 39 if at higher risk for colorectal cancer  Also, an individualized decision between you and your healthcare provider will decide whether screening between the ages of 74-80 would be appropriate   Colonoscopy: Not on file  FOBT/FIT: Not on file  Cologuard: Not on file  Sigmoidoscopy: Not on file          Prostate Cancer Screening Individualized decision between patient and health care provider in men between ages of 53-78   Medicare will cover every 12 months beginning on the day after your 50th birthday PSA: No results in last 5 years           Hepatitis C Screening Once for adults born between [facility]  More frequently in patients at high risk for Hepatitis C Hep C Antibody: Not on file        Diabetes Screening 1-2 times per year if you're at risk for diabetes or have pre-diabetes Fasting glucose: 93 mg/dL (12/6/2022)  A1C: No results in last 5 years (No results in last 5 years)      Cholesterol Screening Once every 5 years if you don't have a lipid disorder  May order more often based on risk factors  Lipid panel: 12/06/2022         Other Preventive Screenings Covered by Medicare:  1  Abdominal Aortic Aneurysm (AAA) Screening: covered once if your at risk  You're considered to be at risk if you have a family history of AAA or a male between the age of 73-68 who smoking at least 100 cigarettes in your lifetime  2  Lung Cancer Screening: covers low dose CT scan once per year if you meet all of the following conditions: (1) Age 50-69; (2) No signs or symptoms of lung cancer; (3) Current smoker or have quit smoking within the last 15 years; (4) You have a tobacco smoking history of at least 20 pack years (packs per day x number of years you smoked); (5) You get a written order from a healthcare provider  3  Glaucoma Screening: covered annually if you're considered high risk: (1) You have diabetes OR (2) Family history of glaucoma OR (3)  aged 48 and older OR (3)  American aged 72 and older  3  Osteoporosis Screening: covered every 2 years if you meet one of the following conditions: (1) Have a vertebral abnormality; (2) On glucocorticoid therapy for more than 3 months; (3) Have primary hyperparathyroidism; (4) On osteoporosis medications and need to assess response to drug therapy  5  HIV Screening: covered annually if you're between the age of 12-76  Also covered annually if you are younger than 13 and older than 72 with risk factors for HIV infection  For pregnant patients, it is covered up to 3 times per pregnancy      Immunizations:  Immunization Recommendations   Influenza Vaccine Annual influenza vaccination during flu season is recommended for all persons aged >= 6 months who do not have contraindications   Pneumococcal Vaccine   * Pneumococcal conjugate vaccine = PCV13 (Prevnar 13), PCV15 (Vaxneuvance), PCV20 (Prevnar 20)  * Pneumococcal polysaccharide vaccine = PPSV23 (Pneumovax) Adults 19-64 years old: 1-3 doses may be recommended based on certain risk factors  Adults 72 years old: 1-2 doses may be recommended based off what pneumonia vaccine you previously received   Hepatitis B Vaccine 3 dose series if at intermediate or high risk (ex: diabetes, end stage renal disease, liver disease)   Tetanus (Td) Vaccine - COST NOT COVERED BY MEDICARE PART B Following completion of primary series, a booster dose should be given every 10 years to maintain immunity against tetanus  Td may also be given as tetanus wound prophylaxis  Tdap Vaccine - COST NOT COVERED BY MEDICARE PART B Recommended at least once for all adults  For pregnant patients, recommended with each pregnancy  Shingles Vaccine (Shingrix) - COST NOT COVERED BY MEDICARE PART B  2 shot series recommended in those aged 48 and above     Health Maintenance Due:  There are no preventive care reminders to display for this patient  Immunizations Due:      Topic Date Due   • COVID-19 Vaccine (4 - Booster for Moderna series) 12/30/2021     Advance Directives   What are advance directives? Advance directives are legal documents that state your wishes and plans for medical care  These plans are made ahead of time in case you lose your ability to make decisions for yourself  Advance directives can apply to any medical decision, such as the treatments you want, and if you want to donate organs  What are the types of advance directives? There are many types of advance directives, and each state has rules about how to use them  You may choose a combination of any of the following:  · Living will: This is a written record of the treatment you want  You can also choose which treatments you do not want, which to limit, and which to stop at a certain time  This includes surgery, medicine, IV fluid, and tube feedings  · Durable power of  for healthcare [facility]):   This is a written record that states who you want to make healthcare choices for you when you are unable to make them for yourself  This person, called a proxy, is usually a family member or a friend  You may choose more than 1 proxy  · Do not resuscitate (DNR) order:  A DNR order is used in case your heart stops beating or you stop breathing  It is a request not to have certain forms of treatment, such as CPR  A DNR order may be included in other types of advance directives  · Medical directive: This covers the care that you want if you are in a coma, near death, or unable to make decisions for yourself  You can list the treatments you want for each condition  Treatment may include pain medicine, surgery, blood transfusions, dialysis, IV or tube feedings, and a ventilator (breathing machine)  · Values history: This document has questions about your views, beliefs, and how you feel and think about life  This information can help others choose the care that you would choose  Why are advance directives important? An advance directive helps you control your care  Although spoken wishes may be used, it is better to have your wishes written down  Spoken wishes can be misunderstood, or not followed  Treatments may be given even if you do not want them  An advance directive may make it easier for your family to make difficult choices about your care  © Copyright [employer] 2018 Information is for End User's use only and may not be sold, redistributed or otherwise used for commercial purposes   All illustrations and images included in CareNotes® are the copyrighted property of [employer]  or [address] [place] [address] [employer]

## 2023-05-12 ENCOUNTER — APPOINTMENT (OUTPATIENT)
Dept: LAB | Facility: CLINIC | Age: 88
End: 2023-05-12

## 2023-05-12 DIAGNOSIS — I10 ESSENTIAL HYPERTENSION: ICD-10-CM

## 2023-05-12 LAB
ALBUMIN SERPL BCP-MCNC: 3.6 G/DL (ref 3.5–5)
ALP SERPL-CCNC: 69 U/L (ref 46–116)
ALT SERPL W P-5'-P-CCNC: 22 U/L (ref 12–78)
ANION GAP SERPL CALCULATED.3IONS-SCNC: 3 MMOL/L (ref 4–13)
AST SERPL W P-5'-P-CCNC: 20 U/L (ref 5–45)
BILIRUB SERPL-MCNC: 0.45 MG/DL (ref 0.2–1)
BUN SERPL-MCNC: 27 MG/DL (ref 5–25)
CALCIUM SERPL-MCNC: 9.7 MG/DL (ref 8.3–10.1)
CHLORIDE SERPL-SCNC: 105 MMOL/L (ref 96–108)
CO2 SERPL-SCNC: 28 MMOL/L (ref 21–32)
CREAT SERPL-MCNC: 0.83 MG/DL (ref 0.6–1.3)
GFR SERPL CREATININE-BSD FRML MDRD: 76 ML/MIN/1.73SQ M
GLUCOSE P FAST SERPL-MCNC: 89 MG/DL (ref 65–99)
POTASSIUM SERPL-SCNC: 4.1 MMOL/L (ref 3.5–5.3)
PROT SERPL-MCNC: 7.2 G/DL (ref 6.4–8.4)
SODIUM SERPL-SCNC: 136 MMOL/L (ref 135–147)

## 2023-06-06 ENCOUNTER — OFFICE VISIT (OUTPATIENT)
Dept: CARDIOLOGY CLINIC | Facility: CLINIC | Age: 88
End: 2023-06-06
Payer: COMMERCIAL

## 2023-06-06 VITALS
WEIGHT: 163 LBS | DIASTOLIC BLOOD PRESSURE: 82 MMHG | OXYGEN SATURATION: 97 % | BODY MASS INDEX: 22.82 KG/M2 | HEIGHT: 71 IN | HEART RATE: 72 BPM | SYSTOLIC BLOOD PRESSURE: 140 MMHG

## 2023-06-06 DIAGNOSIS — I10 ESSENTIAL HYPERTENSION: Primary | ICD-10-CM

## 2023-06-06 DIAGNOSIS — K21.9 LARYNGOPHARYNGEAL REFLUX (LPR): ICD-10-CM

## 2023-06-06 DIAGNOSIS — Z85.828 HX OF SKIN CANCER, BASAL CELL: ICD-10-CM

## 2023-06-06 DIAGNOSIS — I65.23 ASYMPTOMATIC BILATERAL CAROTID ARTERY STENOSIS: ICD-10-CM

## 2023-06-06 DIAGNOSIS — I49.3 ASYMPTOMATIC PVCS: ICD-10-CM

## 2023-06-06 DIAGNOSIS — R42 POSTURAL DIZZINESS: ICD-10-CM

## 2023-06-06 DIAGNOSIS — N40.0 BPH WITHOUT OBSTRUCTION/LOWER URINARY TRACT SYMPTOMS: ICD-10-CM

## 2023-06-06 DIAGNOSIS — E78.5 DYSLIPIDEMIA: ICD-10-CM

## 2023-06-06 DIAGNOSIS — L97.919 VENOUS STASIS ULCER OF RIGHT LOWER EXTREMITY (HCC): ICD-10-CM

## 2023-06-06 DIAGNOSIS — I83.019 VENOUS STASIS ULCER OF RIGHT LOWER EXTREMITY (HCC): ICD-10-CM

## 2023-06-06 DIAGNOSIS — I70.219 ATHEROSCLEROTIC PVD WITH INTERMITTENT CLAUDICATION (HCC): ICD-10-CM

## 2023-06-06 DIAGNOSIS — I49.1 PREMATURE ATRIAL CONTRACTIONS: ICD-10-CM

## 2023-06-06 DIAGNOSIS — K21.9 GASTROESOPHAGEAL REFLUX DISEASE, UNSPECIFIED WHETHER ESOPHAGITIS PRESENT: ICD-10-CM

## 2023-06-06 PROCEDURE — 93000 ELECTROCARDIOGRAM COMPLETE: CPT | Performed by: INTERNAL MEDICINE

## 2023-06-06 PROCEDURE — 99214 OFFICE O/P EST MOD 30 MIN: CPT | Performed by: INTERNAL MEDICINE

## 2023-06-06 RX ORDER — AMLODIPINE BESYLATE 2.5 MG/1
2.5 TABLET ORAL DAILY
Qty: 30 TABLET | Refills: 5 | Status: SHIPPED | OUTPATIENT
Start: 2023-06-06

## 2023-06-06 NOTE — PROGRESS NOTES
Office Cardiology Progress Note  Zora Arellano 80 y o  male MRN: [id number]  06/06/23  12:21 PM      ASSESSMENT:    1   Recurrent morning hypotension and associated dizziness and background of labile essential hypertension with resolved nocturnal surges in systolic blood pressure at 9-11 PM, previously in the 485 systolic range    Has “white coat” hypertension  Previously with frequent hold of blood pressure medication in the mornings for systolic blood pressures of 120 or less previously being done by patient   Average home blood pressure previously from  12/12 through  12/15/2020 was  127/62  and from 06/06/2021 through 06/10/2021 was 128/61   Previous average home blood pressure 128/62 between 6/11 and 06/14/2022 and previously was 126/61 between 12/11 and 12/14/2021  Current average home blood pressure from 6/2/2023 through 6/5/2023 was 116/61 with previous average blood pressure 12/9 through 12/12/2022 was 121/58  Remote average home blood pressure taken from 12/09 through 12/12/2019 was 115/50   2  Asymptomatic chronic PACs and PVCs, per patient history, and  present on today's exam and EKG  Frequent PACs with bigeminy and trigeminy and sinus arrhythmia on EKG in the past and currently  Maurice Coppola has currently suppressed apical systolic ejection murmur, consistent with mild aortic valve stenosis documented on previous echocardiogram 01/08/2020, which also showed 1+ MR/TR, mild MAC, 60% LVEF      Previous Holter monitor on 05/19/2022 showed moderately frequent PACs and PVCs, PACs being more frequent, with nonsustained atrial runs to a maximum of 12 beats but no atrial fibrillation or flutter  3  History of carvedilol-induced syncope from hypotension on 1/10/17 and 1/20/17 with episode on 9/11/17 from low blood pressure, cause uncertain     4  Stable right calf claudication and bilateral right more than left lower extremity PAD present for 10 years   Last lower arterial duplex scan 3/20/2023 showed right CHU of 0 32, decreased from prior 0 53 and left CHU of 71, decreased from 0 83, indicating worsening PAD   Does have right leg claudication with walking  Has less frequent recent onset of right dorsum foot pain occurring while trying to sleep in bed for several weeks  5   History of previously heard grade 1/4 right neck bruit with less than 50% JOVANNI and 50-69% left ICA stenoses on 01/08/2020 carotid duplex     6  Status post repair of right hydrocele on 10/25/2018 and history of chronic incomplete torsion of left testicle with benign cyst adjacent to left testes     7  Treated dyslipidemia, extremely well-controlled as of 12/6/2022     8  Stable chronic BPH     9  Remote history of syncope after Flomax  10  Chronic nocturnal GERD with water brash  11  Multiple basal cell skin cancers with previous excisions from scalp, and previously from right forehead, right cheek, left ear, left wrist, dorsum of left hand dating back to 04/15/2018     12  Nonhealing ulceration above right medial ankle for approximately 4 5 years, likely venostasis ulcer    13   Edema of bilateral lower extremities from venous insufficiency of left more than right lower extremity, with right lower extremity venous insufficiency previously confirmed on venous duplex scan dated 06/21/2019, patient currently being managed with compression hosiery  14   Stable laryngopharyngeal reflux (LPR), being followed by Dr Tucker  Plan       Patient Instructions     1  Reduce amlodipine to 2 5 mg every evening and discontinue the 5 mg tablet  This should help with the morning low blood pressure readings  2   Continue all other medication as presently using  3   After you are on the amlodipine 2 5 mg for about 1 month, repeat your home blood pressures for 4 straight days mid-to-late morning and in the evening, 3 readings at a time 1 or 2 minutes apart and drop off the paper at our office for Dr Xiomara Simpson to review    4   Cardiology follow-up approximately 6 months with EKG  HPI    This 80 y o  male  denies new cardiopulmonary and medical symptoms  The patient complains of significant drop in blood pressure in the mornings about 1 hour after awakening with associated lightheadedness and dizziness  He has a chronic stable level of right calf claudication after walking about 100 feet  He has intermittent bruising of the left forearm  He has had less frequent awakening with pain in the dorsum of his right foot recently  The patient's average blood pressure at home between 6/2 and 6/5/2023 was 116/61 with relatively low readings in the morning  The patient is also being seen in follow-up of the below listed diagnoses  Encounter Diagnoses   Name Primary?    • Essential hypertension Yes   • Dyslipidemia    • Gastroesophageal reflux disease, unspecified whether esophagitis present    • Laryngopharyngeal reflux (LPR)    • Venous stasis ulcer of right lower extremity (HCC)    • Hx of skin cancer, basal cell    • Postural dizziness    • Asymptomatic bilateral carotid artery stenosis    • BPH without obstruction/lower urinary tract symptoms    • Atherosclerotic PVD with intermittent claudication (HCC)    • Premature atrial contractions    • Asymptomatic PVCs         Review of Systems    All other systems negative, except as noted in history of present illness    Historical Information   Past Medical History:   Diagnosis Date   • Asymptomatic PVCs     last assessed 1/18/18   • BPH (benign prostatic hyperplasia)     last assessed 4/11/17    • High cholesterol     last assessed 4/11/17    • Hypertension     uncontrolled, last assessed 4/11/17     Past Surgical History:   Procedure Laterality Date   • CATARACT EXTRACTION     • COLONOSCOPY      Complete   • OR EXCISION HYDROCELE UNILATERAL Right 10/25/2018    Procedure: HYDROCELECTOMY;  Surgeon: Aidee Arredondo MD;  Location: [address] [place] [address];  Service: Urology   • TESTICLE SURGERY      last assessed 3/31/15     Social History     Substance and Sexual Activity   Alcohol Use No     Social History     Substance and Sexual Activity   Drug Use No     Social History     Tobacco Use   Smoking Status Former   • Years: 20 00   • Types: Cigarettes   • Quit date: 10/18/1965   • Years since quittin 11   Smokeless Tobacco Never       Family History:  Family History   Problem Relation Age of Onset   • Heart disease Mother    • Hypertension Mother    • Heart disease Father    • Hypertension Father    • Hypertension Family    • Hyperlipidemia Family          Meds/Allergies     Prior to Admission medications    Medication Sig Start Date End Date Taking? Authorizing Provider   amLODIPine (NORVASC) 2 5 mg tablet Take 1 tablet (2 5 mg total) by mouth daily In the evening  23  Yes Oriana Carrillo MD   ammonium lactate (LAC-HYDRIN) 12 % cream Apply topically as needed for dry skin 10/30/20  Yes Natalie Berrios DPM   aspirin 81 MG tablet Take 81 mg by mouth daily   Yes Historical Provider, MD   atorvastatin (LIPITOR) 10 mg tablet TAKE ONE TABLET BY MOUTH EVERY DAY 22  Yes Cleo Montenegro MD   Calcium Carbonate-Vitamin D 500-125 MG-UNIT TABS Take by mouth   Yes Historical Provider, MD   chlorthalidone 25 mg tablet Take 1 tablet (25 mg total) by mouth every other day 22  Yes Oriana Carrillo MD   Cholecalciferol (Vitamin D) 50 MCG (2000 UT) CAPS Take 1 capsule (2,000 Units total) by mouth in the morning 6/15/22  Yes Oriana Carrillo MD   clotrimazole (LOTRIMIN) 1 % cream Apply topically 2 (two) times a day 22  Yes Cleo Montenegro MD   finasteride (PROSCAR) 5 mg tablet Take 1 tablet (5 mg total) by mouth daily at bedtime 22  Yes Cleo Montenegro MD   lisinopril (ZESTRIL) 5 mg tablet Take 1 tablet in morning and 1 tablet in the evening   22  Yes Oriana Carrillo MD   Multiple Vitamins-Minerals (Ocuvite Adult Formula) CAPS Take 1 capsule by mouth in the morning 6/15/22  Yes Oriana Carrillo MD "  triamcinolone (KENALOG) 0 025 % ointment Apply topically 2 (two) times a day 1/19/23  Yes Jimmy Stokes MD   amLODIPine (NORVASC) 5 mg tablet TAKE ONE TABLET BY MOUTH EVERY DAY 4/13/23 6/6/23 Yes Kassandra Shafer MD       Allergies   Allergen Reactions   • Tamsulosin Syncope         Vitals:    06/06/23 1049   BP: 140/82   BP Location: Left arm   Patient Position: Sitting   Cuff Size: Standard   Pulse: 72   SpO2: 97%   Weight: 73 9 kg (163 lb)   Height: 5' 11\" (1 803 m)       Body mass index is 22 73 kg/m²    3 pound weight loss in approximately 6 months, 2 pound weight gain in approximately 1 year and no change in weight in approximately 2 years    Physical Exam:    General Appearance:  Alert, cooperative, no distress, appears stated age   Head:  Normocephalic, without obvious abnormality, atraumatic   Eyes:  PERRL, conjunctiva/corneas clear, EOM's intact,   both eyes   Ears:  Normal TM's and external ear canals, both ears   Nose: Nares normal, septum midline, mucosa normal, no drainage or sinus tenderness   Throat: Lips, mucosa, and tongue normal; teeth and gums normal   Neck: Supple, symmetrical, trachea midline, no adenopathy, thyroid: not enlarged, symmetric, no tenderness/mass/nodules, no carotid bruit or JVD   Back:   Symmetric, no curvature, ROM normal, no CVA tenderness   Lungs:   Clear to auscultation bilaterally, respirations unlabored   Chest Wall:  No tenderness or deformity   Heart:  Regular rate and rhythm with frequent extrasystoles, S1, S2 normal, no murmur, rub or gallop   Abdomen:   Soft, non-tender, bowel sounds active all four quadrants,  no masses, no organomegaly   Extremities: Extremities normal, atraumatic, no cyanosis with trace bilateral pretibial and ankle edema   Pulses: Difficult to palpate pedal and ankle pulses bilaterally with both feet equally warm   Skin: Skin showed normal color, texture, turgor and no rashes or lesions   Lymph nodes: Cervical, supraclavicular, and axillary " "nodes normal   Neurologic: Normal         Cardiographics    ECG 06/06/23:    Normal sinus rhythm at 62 bpm  Atrial bigeminy and trigeminy  Abnormal ECG  Otherwise unchanged from 12/13/2022  IMAGING:    No Chest XR results available for this patient  Bilateral lower extremity arterial duplex scan 3/20/2023:    CHU of 0 32 on the right compared to prior level of 0 53  Dampened right PVR/PPG tracings  Absent metatarsal and great toe pressures on the right  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    Greater than 75% stenosis in the left distal superficial femoral artery and 50-75% stenosis in profunda and mid left superficial femoral artery    Left CHU 0 71 with dampened PVR/PPG tracings on the left and metatarsal and great toe pressures 172 and 71 mmHg  Collaterals noted in left lower    Compared to prior study of 7/22/2019 there is bilateral progression of disease      LAB REVIEW:      Lab Results   Component Value Date    ALKPHOS 69 05/12/2023    ALT 22 05/12/2023    AST 20 05/12/2023    BUN 27 (H) 05/12/2023    CALCIUM 9 7 05/12/2023     05/12/2023    CO2 28 05/12/2023    CORRECTEDCA 10 3 (H) 12/06/2022    CREATININE 0 83 05/12/2023    EGFR 76 05/12/2023    GLUF 89 05/12/2023    K 4 1 05/12/2023    SODIUM 136 05/12/2023     Lab Results   Component Value Date    CHOLESTEROL 153 12/06/2022    CHOLESTEROL 154 11/30/2021    CHOLESTEROL 149 12/09/2020     Lab Results   Component Value Date    HDL 77 12/06/2022    HDL 78 11/30/2021    HDL 81 12/09/2020       Lab Results   Component Value Date    LDLCALC 61 12/06/2022    LDLCALC 64 11/30/2021    LDLCALC 56 12/09/2020     No components found for: \"DIRECTLDLREFLEX\"  Lab Results   Component Value Date    TRIG 74 12/06/2022    TRIG 61 11/30/2021    TRIG 58 12/09/2020               Coretta Weaver MD  "

## 2023-06-06 NOTE — PATIENT INSTRUCTIONS
1   Reduce amlodipine to 2 5 mg every evening and discontinue the 5 mg tablet  This should help with the morning low blood pressure readings  2   Continue all other medication as presently using  3   After you are on the amlodipine 2 5 mg for about 1 month, repeat your home blood pressures for 4 straight days mid-to-late morning and in the evening, 3 readings at a time 1 or 2 minutes apart and drop off the paper at our office for Dr Jessica Skinner to review  4   Cardiology follow-up approximately 6 months with EKG

## 2023-06-09 DIAGNOSIS — R21 RASH: ICD-10-CM

## 2023-06-09 RX ORDER — TRIAMCINOLONE ACETONIDE 0.25 MG/G
OINTMENT TOPICAL 2 TIMES DAILY
Qty: 30 G | Refills: 0 | Status: SHIPPED | OUTPATIENT
Start: 2023-06-09

## 2023-06-16 ENCOUNTER — OFFICE VISIT (OUTPATIENT)
Dept: VASCULAR SURGERY | Facility: CLINIC | Age: 88
End: 2023-06-16
Payer: COMMERCIAL

## 2023-06-16 VITALS
HEIGHT: 71 IN | WEIGHT: 165 LBS | RESPIRATION RATE: 16 BRPM | BODY MASS INDEX: 23.1 KG/M2 | DIASTOLIC BLOOD PRESSURE: 86 MMHG | HEART RATE: 90 BPM | SYSTOLIC BLOOD PRESSURE: 134 MMHG

## 2023-06-16 DIAGNOSIS — E78.5 DYSLIPIDEMIA: ICD-10-CM

## 2023-06-16 DIAGNOSIS — I65.23 ASYMPTOMATIC BILATERAL CAROTID ARTERY STENOSIS: ICD-10-CM

## 2023-06-16 DIAGNOSIS — I70.219 ATHEROSCLEROTIC PVD WITH INTERMITTENT CLAUDICATION (HCC): Primary | ICD-10-CM

## 2023-06-16 DIAGNOSIS — I87.2 EDEMA OF BOTH LOWER EXTREMITIES DUE TO PERIPHERAL VENOUS INSUFFICIENCY: ICD-10-CM

## 2023-06-16 DIAGNOSIS — I10 ESSENTIAL HYPERTENSION: ICD-10-CM

## 2023-06-16 PROCEDURE — 99213 OFFICE O/P EST LOW 20 MIN: CPT | Performed by: PHYSICIAN ASSISTANT

## 2023-06-16 NOTE — PROGRESS NOTES
Assessment:    Atherosclerotic PVD with intermittent claudication (HCC)  -     VAS lower limb arterial duplex, complete bilateral; Future  -Some L calf claudication  -No lifestyle limiting claudication; no ischemic rest pain, nighttime pain or wounds    -IVANNA 3/24/23    R 0 32/0/0; severe v total occlusion of the R CFA  Diffuse disease throughout with possible iliac disease    L 0 71/172/71; >75% dSFA, 50-75% PFA and mSFA    -IVANNA 7/22/19:    R 0 53/38/26; > 75% CFA and prox SFA stenosis    L 0 83/138/57; 50-75% distal SFA     Plan: Patient with significant arterial occlusive disease which IVANNA shows on the right there is severe stenosis versus total occlusion of the right common femoral and proximal superficial femoral  The right side CHU is low at 0 32 and metatarsal and great toe pressures are flat-lined  On the left, there is 50-75% profunda femoral and mid SFA stenosis and >75% distal SFA stenosis  The left side CHU is 0 71 with metatarsal and great toe pressures 172 and 71 respectively  He is completing activity of daily living comfortably and without an acceleration of symptoms  He has no ischemic rest pain or wounds  We had a detailed discussion regarding his vascular disease  It was explained to him, that he does have severe vascular disease in both legs, more so on the right  Recommend continued medical therapy and monitoring  He is asked to maintain good foot care and monitor his feet daily for any changes  We discussed that should he develop worsening calf claudication or any new foot pain, discoloration or wounds then he should call our office immediately      -Remains activity completing ADLS - walking and shopping despite age  -Continue with regular walking as tolerated  -Continue with aspirin and atorvastatin 10  -Wear good protective footwear; avoid wounds and check feet daily for any changes  -Continue with regular podiatry care  -You must call your podiatry and vascular right away if you develop "any toe/foot pain or wound   -Check IVANNA in 6 months (Sept '23) with office visit      Asymptomatic bilateral carotid artery stenosis  -     VAS carotid complete study; Future  -asymptomatic mild to moderate BOLIVAR  -CV duplex 1/8/20 R 1-49% (134/30); 50-69% (170-37)  -Patient education regarding stroke  -He would like to continue surveillance at this time  -Follow up duplex      Additional diagnoses:  Edema of both lower extremities due to peripheral venous insufficiency  -mild to moderate ankle edema; no skin changes   -wears compression without leg pain  -instructed to d/c compression if any leg pain    Essential hypertension    Dyslipidemia      Subjective:      Patient ID: Yamilet Chavez is a 80 y o  male  Patient had a IVANNA on 3/24/23  Pt states that he started exercise and is feeling better  Pt states that he has mostly R calf claudication at 100 feet and has to rest for 2 minutes  Pt denies rest pain or wound  Pt is on ASA 81 mg and Atorvastatin  HPI    Yamilet Chavez 81yo M Htn, dyslipidemia, MV regurg, PAD, bilateral venous insufficiency and severe peripheral arterial disease who follows with vascular surgery for non-disabling calf claudication  In 2019, he was walking 15 to 20 minutes before onset of leg pain and rested for 20 minutes  He is maintained on aspirin and atorvastatin 10  Compressive therapy and regular walking was recommended  3/10/23:  Mr Chris Baca comes to vascular surgery for consult regarding peripheral arterial disease  He is concerned due to R LE claudication  \"I can only walk 100'\" and then develops calf and thigh pain for which he stops for 10 minutes and then he can walk again  He relates claudication to walking out to the mailbox and it affects the entire leg from the calf to the thigh  However, he states that the legs feel \"fine\" when grocery shopping at the [employer]  He also walks around the [facility] for 1 hour and feels ok   He wears compression when he goes " "shopping  He has no ischemic rest pain or nighttime pain  He has an ingrown toenail for a couple years which occasionally gives him some pain  He developed right heel pain with walking about a year ago for which he heel cushion in the right shoe  He has no heel pain when he uses the pad  He sees podiatry in [facility], Dr John Bae  Mr Luiz Anaya feels active for his age  He still drives  He feels that he should have had a vascular intervention in 2019  We reviewed his history thoroughly  He does have significant, bilateral atherosclerosis of the lower extremities, given his age and fairly stable symptoms, recommend regular walking  I did not recommend vascular intervention at this point, unless he were to develop ischemic rest pain or foot wound  We discussed the importance of foot protection and avoiding wounds       -R LE claudication at 100' (long time)  -Legs feel \"fine\" when at [address] Foster, podiatry        6/16/23: Mr Luiz Anaya returns to the office with wife for recheck and to review IVANNA  Since he was last seen, he feels that his function status has been good, or even better than it was when I last saw him  He has some calf pain walking out to the mailbox (110') but recovers within 2 minutes  He is still walking around the [address] and [employer] with wife comfortably  He does not feel his claudication is accelerating or debilitating  He is able to stop and recover and then continue to walk fairly quickly  He is back to exercising which he thinks is helping  He has no ischemic rest pain, nighttime pain or tissue loss          100' \"feel it\"; sdit down  Exercising and feeling better  Lowered amlodipine 5 > 2 5  LDL 61  Aspirin and atorvastatin 10        The following portions of the patient's history were reviewed and updated as appropriate: allergies, current medications, past family history, past medical history, past social history, past surgical history and problem " "list     Review of Systems   Musculoskeletal: Positive for arthralgias and back pain  Leg pain when walking   Skin: Negative  Objective:    /86 (BP Location: Left arm, Patient Position: Sitting)   Pulse 90   Resp 16   Ht 5' 11\" (1 803 m)   Wt 74 8 kg (165 lb)   BMI 23 01 kg/m²         Feet are warm and well-perfused  Motor sensor intact bilaterally  Toe fungus with cracked toenails  Left hallux chronic discoloration but no change x 2 years (follows with podiatry)  Left toes slightly ruborous     Physical Exam  Vitals and nursing note reviewed  Constitutional:       Appearance: Normal appearance  He is well-developed  Comments: Elderly   HENT:      Head: Normocephalic and atraumatic  Eyes:      Pupils: Pupils are equal, round, and reactive to light  Neck:      Thyroid: No thyromegaly  Vascular: No carotid bruit or JVD  Trachea: Trachea normal    Cardiovascular:      Rate and Rhythm: Normal rate and regular rhythm  Pulses:           Carotid pulses are 2+ on the right side and 2+ on the left side  Radial pulses are 2+ on the right side and 2+ on the left side  Dorsalis pedis pulses are 1+ on the left side  Heart sounds: S1 normal and S2 normal  Murmur heard  Systolic murmur is present with a grade of 2/6  No friction rub  No gallop  Comments: Trace L DP pulse    Mild B ankle edema      Pulmonary:      Effort: Pulmonary effort is normal  No accessory muscle usage or respiratory distress  Breath sounds: Normal breath sounds  No wheezing or rales  Abdominal:      General: Bowel sounds are normal  There is no distension  Palpations: Abdomen is soft  Tenderness: There is no abdominal tenderness  Musculoskeletal:         General: No deformity  Normal range of motion  Cervical back: Neck supple  Skin:     General: Skin is warm and dry  Findings: No lesion or rash  Nails: There is no clubbing   " Neurological:      Mental Status: He is alert and oriented to person, place, and time  Comments: Grossly normal    Psychiatric:         Behavior: Behavior is cooperative  IVANNA 3/24/23  CLINICAL:  Indications: Atherosclerosis with Claudication [I70 [id number]]  Patient presents with pain in the right leg after walking less then a block  Denies any open wounds or ulcers at this time  Operative History:  Patient denies any cardiovascular surgeries  Risk Factors  The patient has history of HTN, Hyperlipidemia, PAD and previous smoking (quit  >10yrs ago)  Clinical  Right Pressure:  144/ mm Hg, Left Pressure:  154/ mm Hg  FINDINGS:     Right                  Impression       Waveform    PSV (cm/s)    Common Femoral Artery  Occluded                              0    Prox Profunda          Diffuse Disease                     117    Prox SFA                                                    33    Mid SFA                                                     32    Dist SFA                                                    31    Proximal Pop                                                77    Distal Pop                                                  42    Tibioperoneal                                               24    Prox Post Tibial                        Continuous          27    Dist Post Tibial                                            53    Prox  Ant  Tibial                       Continuous          20    Dist  Ant   Tibial                                           23    Prox Peroneal                           Continuous          18    Dist Peroneal                                               23       Left                   Impression       Waveform    PSV (cm/s)    Common Femoral Artery  Diffuse Disease                     116    Prox Profunda          50-75%                              273    Prox SFA                                                   140    Mid SFA                50-75% "                             245    Dist SFA               >75%                                306    Proximal Pop                                                83    Distal Pop                                                  41    Tibioperoneal                                               46    Prox Post Tibial                                            57    Dist Post Tibial                        Continuous          58    Prox  Ant  Tibial                                           17    Dist  Ant  Tibial                       Continuous         114    Prox Peroneal                                               26    Dist Peroneal                           Continuous          26             CONCLUSION:  Impression:  RIGHT LOWER LIMB:  Evidence of severe stenosis versus total occlusion of the common femoral  artery  Diffuse disease throughout with possible iliac disease  Ankle/Brachial index: 0 32 which is in the ischemic disease category (Prior  0 53 )  PVR/ PPG tracings are dampened  Metatarsal pressure of  00mmHg ([id number])  Great toe pressure of  00mmHg, below the healing range(Prior 26mmHg)     LEFT LOWER LIMB:  Evidence of >75% stenosis in the distal superficial femoral artery  50-75% stenosis in the profunda and mid superficial femoral artery  Ankle/Brachial index: 0 71 which is in the moderate disease category (Prior  0 83 )  PVR/ PPG tracings are dampened  Metatarsal pressure of  172mmHg (Prior 138mmHg)  Great toe pressure of  71mmHg, within the normal healing range(Prior 57mmHg)  Collaterals seen in lower extremity  Compared to previous study on 7/22/2019, there is progression of disease  bilaterally with new findings  I have reviewed and made appropriate changes to the review of systems input by the medical assistant      Vitals:    06/16/23 1402   BP: 134/86   BP Location: Left arm   Patient Position: Sitting   Pulse: 90   Resp: 16   Weight: 74 8 kg (165 lb)   Height: 5' 11\" " (1 803 m)       Patient Active Problem List   Diagnosis   • Essential hypertension   • Hyperlipidemia   • Atherosclerotic PVD with intermittent claudication (HCC)   • BPH without obstruction/lower urinary tract symptoms   • Edema of both lower extremities due to peripheral venous insufficiency   • Dyslipidemia   • Premature atrial contractions   • Nonrheumatic mitral valve regurgitation   • Intermediate stage nonexudative age-related macular degeneration of both eyes   • Vitamin D deficiency   • Right foot pain   • Gastroesophageal reflux disease   • Laryngopharyngeal reflux (LPR)   • Venous stasis ulcer of right lower extremity (HCC)   • Hx of skin cancer, basal cell   • Postural dizziness   • Asymptomatic bilateral carotid artery stenosis   • Asymptomatic PVCs       Past Surgical History:   Procedure Laterality Date   • CATARACT EXTRACTION     • COLONOSCOPY      Complete   • IA EXCISION HYDROCELE UNILATERAL Right 10/25/2018    Procedure: HYDROCELECTOMY;  Surgeon: Savannah Norwood MD;  Location: [address] [place] [address];  Service: Urology   • TESTICLE SURGERY      last assessed 3/31/15       Family History   Problem Relation Age of Onset   • Heart disease Mother    • Hypertension Mother    • Heart disease Father    • Hypertension Father    • Hypertension Family    • Hyperlipidemia Family        Social History     Socioeconomic History   • Marital status: /Civil Union     Spouse name: Not on file   • Number of children: Not on file   • Years of education: Not on file   • Highest education level: Not on file   Occupational History   • Occupation: Retired   Tobacco Use   • Smoking status: Former     Years: 20 00     Types: Cigarettes     Quit date: 10/18/1965     Years since quittin 11   • Smokeless tobacco: Never   Vaping Use   • Vaping Use: Never used   Substance and Sexual Activity   • Alcohol use: No   • Drug use: No   • Sexual activity: Not on file   Other Topics Concern   • Not on file   Social History Narrative Exercises daily     Sleeps 6-7 hrs/day     Social Determinants of Health     Financial Resource Strain: Low Risk  (4/28/2023)    Overall Financial Resource Strain (CARDIA)    • Difficulty of Paying Living Expenses: Not hard at all   Food Insecurity: Not on file   Transportation Needs: No Transportation Needs (4/28/2023)    PRAPARE - Transportation    • Lack of Transportation (Medical): No    • Lack of Transportation (Non-Medical):  No   Physical Activity: Not on file   Stress: Not on file   Social Connections: Not on file   Intimate Partner Violence: Not on file   Housing Stability: Not on file       Allergies   Allergen Reactions   • Tamsulosin Syncope         Current Outpatient Medications:   •  amLODIPine (NORVASC) 2 5 mg tablet, Take 1 tablet (2 5 mg total) by mouth daily In the evening , Disp: 30 tablet, Rfl: 5  •  ammonium lactate (LAC-HYDRIN) 12 % cream, Apply topically as needed for dry skin, Disp: 385 g, Rfl: 0  •  aspirin 81 MG tablet, Take 81 mg by mouth daily, Disp: , Rfl:   •  atorvastatin (LIPITOR) 10 mg tablet, TAKE ONE TABLET BY MOUTH EVERY DAY, Disp: 90 tablet, Rfl: 3  •  Calcium Carbonate-Vitamin D 500-125 MG-UNIT TABS, Take by mouth, Disp: , Rfl:   •  chlorthalidone 25 mg tablet, Take 1 tablet (25 mg total) by mouth every other day, Disp: 45 tablet, Rfl: 3  •  Cholecalciferol (Vitamin D) 50 MCG (2000 UT) CAPS, Take 1 capsule (2,000 Units total) by mouth in the morning, Disp: 100 capsule, Rfl: 5  •  finasteride (PROSCAR) 5 mg tablet, Take 1 tablet (5 mg total) by mouth daily at bedtime, Disp: 90 tablet, Rfl: 3  •  lisinopril (ZESTRIL) 5 mg tablet, Take 1 tablet in morning and 1 tablet in the evening , Disp: 180 tablet, Rfl: 3  •  Multiple Vitamins-Minerals (Ocuvite Adult Formula) CAPS, Take 1 capsule by mouth in the morning, Disp: 100 capsule, Rfl: 5  •  clotrimazole (LOTRIMIN) 1 % cream, Apply topically 2 (two) times a day (Patient not taking: Reported on 6/16/2023), Disp: 30 g, Rfl: 0  • triamcinolone (KENALOG) 0 025 % ointment, Apply topically 2 (two) times a day (Patient not taking: Reported on 6/16/2023), Disp: 30 g, Rfl: 0

## 2023-06-16 NOTE — PATIENT INSTRUCTIONS
PAD    -Continue with regular walking as tolerated  -Continue with aspirin and atorvastatin 10  -Wear good protective footwear; avoid wounds and check feet daily for any changes  -You must call your podiatry and vascular right away if you develop any toe/foot pain or wound   -Check IVANNA in 6 months (Sept '23) with office visit      Carotid artery stenosis  -follow up duplex        Symptoms of stroke:  - Unable to speak or understand speech  - Unable to move one side of the body (arm or leg)  - Visual changes  - Call 911 for any symptoms of stroke

## 2023-07-05 ENCOUNTER — TELEPHONE (OUTPATIENT)
Dept: FAMILY MEDICINE CLINIC | Facility: CLINIC | Age: 88
End: 2023-07-05

## 2023-07-07 DIAGNOSIS — R21 RASH: ICD-10-CM

## 2023-07-07 RX ORDER — TRIAMCINOLONE ACETONIDE 0.25 MG/G
OINTMENT TOPICAL 2 TIMES DAILY
Qty: 30 G | Refills: 0 | Status: SHIPPED | OUTPATIENT
Start: 2023-07-07

## 2023-08-01 DIAGNOSIS — N40.0 BENIGN PROSTATIC HYPERPLASIA WITHOUT LOWER URINARY TRACT SYMPTOMS: ICD-10-CM

## 2023-08-01 RX ORDER — FINASTERIDE 5 MG/1
5 TABLET, FILM COATED ORAL
Qty: 90 TABLET | Refills: 3 | Status: SHIPPED | OUTPATIENT
Start: 2023-08-01

## 2023-08-11 ENCOUNTER — TELEPHONE (OUTPATIENT)
Dept: FAMILY MEDICINE CLINIC | Facility: CLINIC | Age: 88
End: 2023-08-11

## 2023-09-20 ENCOUNTER — HOSPITAL ENCOUNTER (OUTPATIENT)
Dept: RADIOLOGY | Facility: HOSPITAL | Age: 88
Discharge: HOME | End: 2023-09-20
Payer: COMMERCIAL

## 2023-09-20 DIAGNOSIS — I65.23 ASYMPTOMATIC BILATERAL CAROTID ARTERY STENOSIS: ICD-10-CM

## 2023-09-20 DIAGNOSIS — I70.219 ATHEROSCLEROTIC PVD WITH INTERMITTENT CLAUDICATION (HCC): ICD-10-CM

## 2023-09-20 PROCEDURE — 93925 LOWER EXTREMITY STUDY: CPT

## 2023-09-20 PROCEDURE — 93923 UPR/LXTR ART STDY 3+ LVLS: CPT

## 2023-09-20 PROCEDURE — 93880 EXTRACRANIAL BILAT STUDY: CPT

## 2023-09-21 PROCEDURE — 93880 EXTRACRANIAL BILAT STUDY: CPT | Performed by: SURGERY

## 2023-09-25 PROCEDURE — 93925 LOWER EXTREMITY STUDY: CPT | Performed by: SURGERY

## 2023-09-25 PROCEDURE — 93923 UPR/LXTR ART STDY 3+ LVLS: CPT | Performed by: SURGERY

## 2023-10-04 DIAGNOSIS — I10 ESSENTIAL HYPERTENSION: ICD-10-CM

## 2023-10-04 RX ORDER — LISINOPRIL 5 MG/1
TABLET ORAL
Qty: 180 TABLET | Refills: 3 | Status: SHIPPED | OUTPATIENT
Start: 2023-10-04

## 2023-10-05 NOTE — PROGRESS NOTES
Assessment/Plan:    Atherosclerosis of native arteries of extremities with rest pain, right leg (HCC)  Atherosclerotic occlusive disease with rest pain and ulceration right lower extremity. Unfortunately appears there has been significant disease progression when she now has a small wound on the distal right first toe and has to sleep in a sitting position due to rest pain. We did discuss the pathophysiology of arterial occlusive disease and the significance of rest pain. At this point I would advise further evaluation in preparation for revascularization. In this regard he will require CT angiogram and cardiac evaluation and clearance. Unfortunately based upon previous imaging study from 2021 there is a highly calcified plaque causing near occlusion/occlusion of the common femoral artery which would require surgical endarterectomy. Following cardiac evaluation and CT angiogram he will return the office for further recommendations. Asymptomatic bilateral carotid artery stenosis  Asymptomatic left greater than right carotid stenosis. At this point no further intervention is necessary other than continued medical management and annual duplex follow-up. Diagnoses and all orders for this visit:    Atherosclerosis of native arteries of extremities with rest pain, right leg ([address])  -     CTA abdominal w run off w wo contrast; Future  -     Ambulatory referral to Cardiology; Future    Asymptomatic bilateral carotid artery stenosis  -     VAS carotid complete study; Future          Subjective:      Patient ID: Inez Cisse is a 80 y.o. male. Patient presents today to review IVANNA and carotid duplex done 9/20/23.     80year-old presents in follow-up of recent arterial imaging studies. He presents with his wife. Of note he does live independently and remains very active with no specific limitations.   His biggest complaint is of pain in the right leg with short distance ambulation of 100 feet or less.  He also notes a small wound on the distal aspect of his first toe and associated pain. This pain is severe with elevation and resolves with dependency so he now sleeps in a chair. He notes no significant symptoms on the left. Arterial duplex 9/20/2023. On the right there is a common femoral artery occlusion. There is a moderate stenosis of the common iliac artery and a superficial femoral artery occlusion with ankle-brachial index of 0.29 and toe pressure of 0. On the left there are 50-75% stenoses of the deep femoral and superficial femoral artery as well as the popliteal artery with ankle-brachial index of 0.91 and toe pressure 45. CT scan 1/15/2021 with highly calcified plaque obscuring the lumen of the right common femoral artery. On the left there is also significant calcified plaque creating a significant stenosis in the common femoral artery and the deep femoral artery. Carotid duplex 9/20/2023 with less than 50% right carotid artery stenosis and 50 to 69% stenosis on the left.       The following portions of the patient's history were reviewed and updated as appropriate: allergies, current medications, past family history, past medical history, past social history, past surgical history and problem list     Past Medical History:  Past Medical History:   Diagnosis Date   • Asymptomatic PVCs     last assessed 1/18/18   • BPH (benign prostatic hyperplasia)     last assessed 4/11/17    • High cholesterol     last assessed 4/11/17    • Hypertension     uncontrolled, last assessed 4/11/17       Past Surgical History:  Past Surgical History:   Procedure Laterality Date   • CATARACT EXTRACTION     • COLONOSCOPY      Complete   • [place] EXCISION HYDROCELE UNILATERAL Right 10/25/2018    Procedure: HYDROCELECTOMY;  Surgeon: Cyril Shipman MD;  Location: [facility];  Service: Urology   • TESTICLE SURGERY      last assessed 3/31/15       Social History:  Social History     Substance and Sexual Activity Alcohol Use No     Social History     Substance and Sexual Activity   Drug Use No     Social History     Tobacco Use   Smoking Status Former   • Years: 20.00   • Types: Cigarettes   • Quit date: 10/18/1965   • Years since quittin.0   Smokeless Tobacco Never       Family History:  Family History   Problem Relation Age of Onset   • Heart disease Mother    • Hypertension Mother    • Heart disease Father    • Hypertension Father    • Hypertension Family    • Hyperlipidemia Family        Allergies:   Allergies   Allergen Reactions   • Tamsulosin Syncope       Medications:    Current Outpatient Medications:   •  amLODIPine (NORVASC) 2.5 mg tablet, Take 1 tablet (2.5 mg total) by mouth daily In the evening., Disp: 30 tablet, Rfl: 5  •  ammonium lactate (LAC-HYDRIN) 12 % cream, Apply topically as needed for dry skin, Disp: 385 g, Rfl: 0  •  aspirin 81 MG tablet, Take 81 mg by mouth daily, Disp: , Rfl:   •  atorvastatin (LIPITOR) 10 mg tablet, TAKE ONE TABLET BY MOUTH EVERY DAY, Disp: 90 tablet, Rfl: 3  •  Calcium Carbonate-Vitamin D 500-125 MG-UNIT TABS, Take by mouth, Disp: , Rfl:   •  chlorthalidone 25 mg tablet, Take 1 tablet (25 mg total) by mouth every other day, Disp: 45 tablet, Rfl: 3  •  Cholecalciferol (Vitamin D) 50 MCG (2000 UT) CAPS, Take 1 capsule (2,000 Units total) by mouth in the morning, Disp: 100 capsule, Rfl: 5  •  finasteride (PROSCAR) 5 mg tablet, Take 1 tablet (5 mg total) by mouth daily at bedtime, Disp: 90 tablet, Rfl: 3  •  lisinopril (ZESTRIL) 5 mg tablet, Take 1 tablet in morning and 1 tablet in the evening., Disp: 180 tablet, Rfl: 3  •  Multiple Vitamins-Minerals (Ocuvite Adult Formula) CAPS, Take 1 capsule by mouth in the morning, Disp: 100 capsule, Rfl: 5  •  triamcinolone (KENALOG) 0.025 % ointment, Apply topically 2 (two) times a day, Disp: 30 g, Rfl: 0  •  clotrimazole (LOTRIMIN) 1 % cream, Apply topically 2 (two) times a day (Patient not taking: Reported on 2023), Disp: 30 g, Rfl: 0    Vitals:  /70 (10/06/23 1029)    Temp      Pulse 64 (10/06/23 1029)   Resp      SpO2        Lab Results and Cultures:   CBC with diff:   Lab Results   Component Value Date    WBC 11.95 (H) 01/15/2021    HGB 12.4 01/15/2021    HCT 38.9 01/15/2021    MCV 94 01/15/2021     01/15/2021    RBC 4.15 01/15/2021    MCH 29.9 01/15/2021    MCHC 31.9 01/15/2021    RDW 12.3 01/15/2021    MPV 9.4 01/15/2021    NRBC 0 01/15/2021   ,   BMP/CMP:  Lab Results   Component Value Date     10/11/2017    K 4.1 05/12/2023    K 4.0 10/11/2017     05/12/2023    CL 92 (L) 10/11/2017    CO2 28 05/12/2023    CO2 25 10/11/2017    BUN 27 (H) 05/12/2023    BUN 15 10/11/2017    CREATININE 0.83 05/12/2023    CREATININE 0.84 10/11/2017    CALCIUM 9.7 05/12/2023    CALCIUM 9.8 10/11/2017    AST 20 05/12/2023    ALT 22 05/12/2023    ALKPHOS 69 05/12/2023    EGFR 76 05/12/2023   ,   Lipid Panel: No results found for: "CHOL",   Coags: No results found for: "PT", "PTT", "INR",     . Review of Systems   Constitutional: Negative. HENT: Negative. Eyes: Negative. Respiratory: Negative. Cardiovascular: Negative. Gastrointestinal: Negative. Endocrine: Negative. Genitourinary: Negative. Musculoskeletal: Negative. Skin: Negative. Allergic/Immunologic: Negative. Neurological: Negative. Hematological: Negative. Psychiatric/Behavioral: Negative. Objective:      /70 (BP Location: Right arm, Patient Position: Sitting)   Pulse 64   Ht 5' 11" (1.803 m)   Wt 73 kg (161 lb)   BMI 22.45 kg/m²          Physical Exam  Constitutional:       Appearance: Normal appearance. Cardiovascular:      Rate and Rhythm: Normal rate. Pulses:           Dorsalis pedis pulses are 0 on the right side and 0 on the left side. Posterior tibial pulses are 0 on the right side and 0 on the left side.       Comments: Right forefoot is ruborous on dependency  Pulmonary:      Effort: Pulmonary effort is normal.   Musculoskeletal:         General: Swelling (Mild bilateral ankle and lower calf swelling) and tenderness (Tenderness of the right first toe) present. Normal range of motion. Skin:     General: Skin is warm. Comments: Distal right first toe with a small eschar 1-2 mm. This area is tender with rubor of the forefoot. Neurological:      General: No focal deficit present. Mental Status: He is alert and oriented to person, place, and time. Sensory: No sensory deficit. Motor: No weakness.       Gait: Gait normal.   Psychiatric:         Mood and Affect: Mood normal.

## 2023-10-06 ENCOUNTER — OFFICE VISIT (OUTPATIENT)
Dept: VASCULAR SURGERY | Facility: CLINIC | Age: 88
End: 2023-10-06
Payer: COMMERCIAL

## 2023-10-06 ENCOUNTER — TELEPHONE (OUTPATIENT)
Dept: VASCULAR SURGERY | Facility: CLINIC | Age: 88
End: 2023-10-06

## 2023-10-06 VITALS
HEART RATE: 64 BPM | SYSTOLIC BLOOD PRESSURE: 124 MMHG | BODY MASS INDEX: 22.54 KG/M2 | DIASTOLIC BLOOD PRESSURE: 70 MMHG | WEIGHT: 161 LBS | HEIGHT: 71 IN

## 2023-10-06 DIAGNOSIS — I65.23 ASYMPTOMATIC BILATERAL CAROTID ARTERY STENOSIS: ICD-10-CM

## 2023-10-06 DIAGNOSIS — I70.221 ATHEROSCLEROSIS OF NATIVE ARTERIES OF EXTREMITIES WITH REST PAIN, RIGHT LEG (HCC): Primary | Chronic | ICD-10-CM

## 2023-10-06 PROCEDURE — 99214 OFFICE O/P EST MOD 30 MIN: CPT | Performed by: SURGERY

## 2023-10-06 NOTE — PATIENT INSTRUCTIONS
Atherosclerosis of native arteries of extremities with rest pain, right leg (HCC)  Atherosclerotic occlusive disease with rest pain and ulceration right lower extremity. Unfortunately appears there has been significant disease progression when she now has a small wound on the distal right first toe and has to sleep in a sitting position due to rest pain. We did discuss the pathophysiology of arterial occlusive disease and the significance of rest pain. At this point I would advise further evaluation in preparation for revascularization. In this regard he will require CT angiogram and cardiac evaluation and clearance. Unfortunately based upon previous imaging study from 2021 there is a highly calcified plaque causing near occlusion/occlusion of the common femoral artery which would require surgical endarterectomy. Following cardiac evaluation and CT angiogram he will return the office for further recommendations. Asymptomatic bilateral carotid artery stenosis  Asymptomatic left greater than right carotid stenosis. At this point no further intervention is necessary other than continued medical management and annual duplex follow-up.

## 2023-10-06 NOTE — LETTER
October 6, 2023     [facility]    Patient: Bj Rondon   YOB: 1930   Date of Visit: 10/6/2023       Dear Dr. Santiago Morgan: Thank you for referring Elliot Jurado to me for evaluation. Below are the relevant portions of my assessment and plan of care. Atherosclerosis of native arteries of extremities with rest pain, right leg (HCC)  Atherosclerotic occlusive disease with rest pain and ulceration right lower extremity. Unfortunately appears there has been significant disease progression when she now has a small wound on the distal right first toe and has to sleep in a sitting position due to rest pain. We did discuss the pathophysiology of arterial occlusive disease and the significance of rest pain. At this point I would advise further evaluation in preparation for revascularization. In this regard he will require CT angiogram and cardiac evaluation and clearance. Unfortunately based upon previous imaging study from 2021 there is a highly calcified plaque causing near occlusion/occlusion of the common femoral artery which would require surgical endarterectomy. Following cardiac evaluation and CT angiogram he will return the office for further recommendations. Asymptomatic bilateral carotid artery stenosis  Asymptomatic left greater than right carotid stenosis. At this point no further intervention is necessary other than continued medical management and annual duplex follow-up. If you have questions, please do not hesitate to call me. I look forward to following Leela Cadrenas along with you.          Sincerely,        Helder Foster MD        CC: Todd Neil MD

## 2023-10-06 NOTE — ASSESSMENT & PLAN NOTE
Asymptomatic left greater than right carotid stenosis. At this point no further intervention is necessary other than continued medical management and annual duplex follow-up.

## 2023-10-06 NOTE — ASSESSMENT & PLAN NOTE
Atherosclerotic occlusive disease with rest pain and ulceration right lower extremity. Unfortunately appears there has been significant disease progression when she now has a small wound on the distal right first toe and has to sleep in a sitting position due to rest pain. We did discuss the pathophysiology of arterial occlusive disease and the significance of rest pain. At this point I would advise further evaluation in preparation for revascularization. In this regard he will require CT angiogram and cardiac evaluation and clearance. Unfortunately based upon previous imaging study from 2021 there is a highly calcified plaque causing near occlusion/occlusion of the common femoral artery which would require surgical endarterectomy. Following cardiac evaluation and CT angiogram he will return the office for further recommendations.

## 2023-10-06 NOTE — TELEPHONE ENCOUNTER
This is a reminder; patient is due for OFFICE VISIT TO DISCUSS SURGERY  . Please call patient and schedule the following by the dates provided. Patient's appointment(s) are due on or after 10/24/23.     Dopplers  [] Abdominal Aorta Iliac (AOIL)  [] Carotid (CV)   [] Celiac and/or Mesenteric  [] Endovascular Aortic Repair (EVAR)   [] Hemodialysis Access (HD)   [] Lower Limb Arterial (IVANNA)  [] Lower Limb Venous (LEV)  [] Lower Limb Venous Duplex with Reflux (LEVDR)  [] Renal Artery  [] Upper Limb Arterial (UEA)    [] Upper Limb Venous (UEV)              [] CHU and Waveform analysis     Advanced Imaging   [] CTA head/neck    [x] CTA abdominal w run off w wo contrast scheduled for 10/17/23    [] CTA abdomen & pelvis    [] CT abdomen with/ without contrast  [] CT abdomen with contrast  [] CT abdomen without contrast    [] CT abdomen & pelvis with/ without contrast  [] CT abdomen & pelvis with contrast  [] CT abdomen & pelvis without contrast    Office Visit   [] New patient, patient last seen over 3 years ago  [] Risk factor modification (RFM)   [x] Follow up after Cardiology appt 10/24 w Dr. Reuben Linares- L/S 10/06/23 [facility] - [address]  [] Lost to follow up (LTFU)

## 2023-10-11 ENCOUNTER — APPOINTMENT (OUTPATIENT)
Dept: LAB | Facility: CLINIC | Age: 88
End: 2023-10-11
Payer: COMMERCIAL

## 2023-10-11 DIAGNOSIS — I70.221 ATHEROSCLEROSIS OF NATIVE ARTERIES OF EXTREMITIES WITH REST PAIN, RIGHT LEG (HCC): Chronic | ICD-10-CM

## 2023-10-11 LAB
ANION GAP SERPL CALCULATED.3IONS-SCNC: 9 MMOL/L
BUN SERPL-MCNC: 27 MG/DL (ref 5–25)
CALCIUM SERPL-MCNC: 9.7 MG/DL (ref 8.4–10.2)
CHLORIDE SERPL-SCNC: 102 MMOL/L (ref 96–108)
CO2 SERPL-SCNC: 28 MMOL/L (ref 21–32)
CREAT SERPL-MCNC: 0.83 MG/DL (ref 0.6–1.3)
GFR SERPL CREATININE-BSD FRML MDRD: 75 ML/MIN/1.73SQ M
GLUCOSE P FAST SERPL-MCNC: 97 MG/DL (ref 65–99)
POTASSIUM SERPL-SCNC: 3.8 MMOL/L (ref 3.5–5.3)
SODIUM SERPL-SCNC: 139 MMOL/L (ref 135–147)

## 2023-10-11 PROCEDURE — 36415 COLL VENOUS BLD VENIPUNCTURE: CPT

## 2023-10-11 PROCEDURE — 80048 BASIC METABOLIC PNL TOTAL CA: CPT

## 2023-10-17 ENCOUNTER — HOSPITAL ENCOUNTER (OUTPATIENT)
Dept: RADIOLOGY | Facility: HOSPITAL | Age: 88
Discharge: HOME | End: 2023-10-17
Attending: SURGERY
Payer: COMMERCIAL

## 2023-10-17 ENCOUNTER — TELEPHONE (OUTPATIENT)
Dept: VASCULAR SURGERY | Facility: CLINIC | Age: 88
End: 2023-10-17

## 2023-10-17 DIAGNOSIS — I70.221 ATHEROSCLEROSIS OF NATIVE ARTERIES OF EXTREMITIES WITH REST PAIN, RIGHT LEG (HCC): Chronic | ICD-10-CM

## 2023-10-17 PROCEDURE — 75635 CT ANGIO ABDOMINAL ARTERIES: CPT

## 2023-10-17 PROCEDURE — G1004 CDSM NDSC: HCPCS

## 2023-10-17 RX ADMIN — IOHEXOL 150 ML: 350 INJECTION, SOLUTION INTRAVENOUS at 11:40

## 2023-10-17 NOTE — TELEPHONE ENCOUNTER
Received call from [address] [place] [address] in [address] [place] [address]. Pt had CTA abd w/ runoff today and there are significant findings. Please review results and advise. Pt does not have any follow up scheduled. Will CC call center.

## 2023-10-18 ENCOUNTER — TELEPHONE (OUTPATIENT)
Dept: VASCULAR SURGERY | Facility: CLINIC | Age: 88
End: 2023-10-18

## 2023-10-18 NOTE — TELEPHONE ENCOUNTER
10/18/23    Re: Cardiology  Clearance    Patient Name: Danny Matthews   Patient : 1930   Patient MRN: [id number]   Patient Phone: [phone]     Dear Dr. Radha Whitten ,    Dr. Shirlene Griffith, DO is requesting clearance for above patient, prior to proceeding with carotid endarterectomy (CEA) / patch angioplasty . Patient's procedure will be scheduled at To be determined once clearance has been obtained. Patient will be given  pending recommendations . We spoke with your office today regarding clearance request.   Yina Dura has scheduled patient's clearance appointment for 10/24/23 at 1:00 PM in your [address] [place] [address] office. Please fax your recommendations, including any medication recommendations, to [phone], [facility] nursing. Or route your recommendations to [facility] pool [facility] Clearance Pool [[id number]]. Please reach out with any questions or concerns.     Sincerely,    [facility] [place]

## 2023-10-18 NOTE — TELEPHONE ENCOUNTER
We are working on scheduling him with Dr Fabiano Tsang on 10/27/23 just need to find an opening.   Patient aware

## 2023-10-19 ENCOUNTER — RA CDI HCC (OUTPATIENT)
Dept: OTHER | Facility: HOSPITAL | Age: 88
End: 2023-10-19

## 2023-10-19 NOTE — PROGRESS NOTES
[address] [facility] coding opportunities       Chart reviewed, no opportunity found: [id number]        Patients Insurance     Medicare Insurance: [employer]

## 2023-10-24 ENCOUNTER — OFFICE VISIT (OUTPATIENT)
Dept: CARDIOLOGY CLINIC | Facility: CLINIC | Age: 88
End: 2023-10-24
Payer: COMMERCIAL

## 2023-10-24 VITALS
WEIGHT: 160 LBS | SYSTOLIC BLOOD PRESSURE: 120 MMHG | BODY MASS INDEX: 22.4 KG/M2 | OXYGEN SATURATION: 99 % | HEIGHT: 71 IN | DIASTOLIC BLOOD PRESSURE: 60 MMHG | HEART RATE: 71 BPM

## 2023-10-24 DIAGNOSIS — R07.89 ATYPICAL CHEST PAIN: ICD-10-CM

## 2023-10-24 DIAGNOSIS — I49.1 PREMATURE ATRIAL CONTRACTIONS: ICD-10-CM

## 2023-10-24 DIAGNOSIS — I70.221 ATHEROSCLEROSIS OF NATIVE ARTERIES OF EXTREMITIES WITH REST PAIN, RIGHT LEG (HCC): Primary | Chronic | ICD-10-CM

## 2023-10-24 DIAGNOSIS — M79.671 RIGHT FOOT PAIN: ICD-10-CM

## 2023-10-24 DIAGNOSIS — E78.5 DYSLIPIDEMIA: ICD-10-CM

## 2023-10-24 DIAGNOSIS — I70.219 ATHEROSCLEROTIC PVD WITH INTERMITTENT CLAUDICATION (HCC): ICD-10-CM

## 2023-10-24 DIAGNOSIS — I10 ESSENTIAL HYPERTENSION: ICD-10-CM

## 2023-10-24 PROCEDURE — 99214 OFFICE O/P EST MOD 30 MIN: CPT | Performed by: INTERNAL MEDICINE

## 2023-10-24 PROCEDURE — 93000 ELECTROCARDIOGRAM COMPLETE: CPT | Performed by: INTERNAL MEDICINE

## 2023-10-24 NOTE — PATIENT INSTRUCTIONS
We have ordered and will help you schedule a Lexiscan nuclear stress study to be done at [address] [place] [address] you wear your compression hosiery on a daily basis and elevate your legs as much as possible when you are not up and about. This will help with the leg swelling. Continue present medication. We will give you a call after we get the report on the Buster Henle nuclear stress study with the results and further recommendations. If you do not hear from us within a week of having the nuclear stress test, give us a call. Routine cardiology follow-up approximately 6 months with EKG.

## 2023-10-24 NOTE — TELEPHONE ENCOUNTER
10/24 spoke to patient and scheduled office visit for 11/24 @ 1pm with Dr. Carly Vizcaino at the [facility]. Patient was offered 10/27 for an 8am appt, but per patient needed something later in the day.

## 2023-10-25 NOTE — TELEPHONE ENCOUNTER
Patient is having cardiac testing on 11/2, unable to schedule for 10/27/23.   He is currently scheduled with Dr Sung Crow on 11/24/23 in [facility]

## 2023-11-02 ENCOUNTER — HOSPITAL ENCOUNTER (INPATIENT)
Dept: NON INVASIVE DIAGNOSTICS | Facility: HOSPITAL | Age: 88
LOS: 1 days | Discharge: HOME | DRG: 287 | End: 2023-11-03
Attending: INTERNAL MEDICINE | Admitting: INTERNAL MEDICINE
Payer: COMMERCIAL

## 2023-11-02 ENCOUNTER — PREP FOR PROCEDURE (OUTPATIENT)
Dept: CARDIOLOGY CLINIC | Facility: CLINIC | Age: 88
End: 2023-11-02

## 2023-11-02 ENCOUNTER — HOSPITAL ENCOUNTER (OUTPATIENT)
Dept: RADIOLOGY | Facility: HOSPITAL | Age: 88
Discharge: HOME | End: 2023-11-02
Attending: INTERNAL MEDICINE
Payer: COMMERCIAL

## 2023-11-02 DIAGNOSIS — I70.221 ATHEROSCLEROSIS OF NATIVE ARTERIES OF EXTREMITIES WITH REST PAIN, RIGHT LEG (HCC): Chronic | ICD-10-CM

## 2023-11-02 DIAGNOSIS — R07.9 CHEST PAIN, UNSPECIFIED TYPE: Primary | ICD-10-CM

## 2023-11-02 DIAGNOSIS — E78.5 DYSLIPIDEMIA: ICD-10-CM

## 2023-11-02 DIAGNOSIS — I70.219 ATHEROSCLEROTIC PVD WITH INTERMITTENT CLAUDICATION (HCC): ICD-10-CM

## 2023-11-02 DIAGNOSIS — R07.9 CHEST PAIN, UNSPECIFIED TYPE: ICD-10-CM

## 2023-11-02 DIAGNOSIS — I25.85 CHRONIC CORONARY MICROVASCULAR DYSFUNCTION: Primary | ICD-10-CM

## 2023-11-02 DIAGNOSIS — I25.10 CAD (CORONARY ARTERY DISEASE): ICD-10-CM

## 2023-11-02 DIAGNOSIS — L97.919 VENOUS STASIS ULCER OF RIGHT LOWER EXTREMITY (HCC): ICD-10-CM

## 2023-11-02 DIAGNOSIS — R07.89 ATYPICAL CHEST PAIN: ICD-10-CM

## 2023-11-02 DIAGNOSIS — M79.671 RIGHT FOOT PAIN: ICD-10-CM

## 2023-11-02 DIAGNOSIS — I83.019 VENOUS STASIS ULCER OF RIGHT LOWER EXTREMITY (HCC): ICD-10-CM

## 2023-11-02 LAB
ANION GAP SERPL CALCULATED.3IONS-SCNC: 9 MMOL/L
APTT PPP: 90 SECONDS (ref 23–37)
BUN SERPL-MCNC: 24 MG/DL (ref 5–25)
CALCIUM SERPL-MCNC: 9.7 MG/DL (ref 8.4–10.2)
CHLORIDE SERPL-SCNC: 98 MMOL/L (ref 96–108)
CO2 SERPL-SCNC: 28 MMOL/L (ref 21–32)
CREAT SERPL-MCNC: 0.78 MG/DL (ref 0.6–1.3)
ERYTHROCYTE [DISTWIDTH] IN BLOOD BY AUTOMATED COUNT: 13 % (ref 11.6–15.1)
EST. AVERAGE GLUCOSE BLD GHB EST-MCNC: 114 MG/DL
GFR SERPL CREATININE-BSD FRML MDRD: 77 ML/MIN/1.73SQ M
GLUCOSE SERPL-MCNC: 93 MG/DL (ref 65–140)
HBA1C MFR BLD: 5.6 %
HCT VFR BLD AUTO: 36.5 % (ref 36.5–49.3)
HGB BLD-MCNC: 12.7 G/DL (ref 12–17)
INR PPP: 1.14 (ref 0.84–1.19)
MAX HR PERCENT: 96 %
MAX HR: 123 BPM
MCH RBC QN AUTO: 31.8 PG (ref 26.8–34.3)
MCHC RBC AUTO-ENTMCNC: 34.8 G/DL (ref 31.4–37.4)
MCV RBC AUTO: 91 FL (ref 82–98)
NUC STRESS EJECTION FRACTION: 65 %
PLATELET # BLD AUTO: 269 THOUSANDS/UL (ref 149–390)
PMV BLD AUTO: 8.9 FL (ref 8.9–12.7)
POTASSIUM SERPL-SCNC: 3.8 MMOL/L (ref 3.5–5.3)
PROTHROMBIN TIME: 14.9 SECONDS (ref 11.6–14.5)
RATE PRESSURE PRODUCT: NORMAL
RBC # BLD AUTO: 4 MILLION/UL (ref 3.88–5.62)
SL CV REST NUCLEAR ISOTOPE DOSE: 10.68 MCI
SL CV STRESS NUCLEAR ISOTOPE DOSE: 32.9 MCI
SL CV STRESS RECOVERY BP: NORMAL MMHG
SL CV STRESS RECOVERY HR: 78 BPM
SL CV STRESS RECOVERY O2 SAT: 100 %
SODIUM SERPL-SCNC: 135 MMOL/L (ref 135–147)
STRESS ANGINA INDEX: 0
STRESS BASELINE BP: NORMAL MMHG
STRESS BASELINE HR: 83 BPM
STRESS O2 SAT REST: 99 %
STRESS PEAK HR: 99 BPM
STRESS POST ESTIMATED WORKLOAD: 1.6 METS
STRESS POST EXERCISE DUR MIN: 3 MIN
STRESS POST O2 SAT PEAK: 100 %
STRESS POST PEAK BP: 124 MMHG
STRESS/REST PERFUSION RATIO: 1.2
WBC # BLD AUTO: 6.25 THOUSAND/UL (ref 4.31–10.16)

## 2023-11-02 PROCEDURE — B2111ZZ FLUOROSCOPY OF MULTIPLE CORONARY ARTERIES USING LOW OSMOLAR CONTRAST: ICD-10-PCS | Performed by: INTERNAL MEDICINE

## 2023-11-02 PROCEDURE — A9502 TC99M TETROFOSMIN: HCPCS

## 2023-11-02 PROCEDURE — 93454 CORONARY ARTERY ANGIO S&I: CPT | Performed by: INTERNAL MEDICINE

## 2023-11-02 PROCEDURE — G1004 CDSM NDSC: HCPCS

## 2023-11-02 PROCEDURE — 99152 MOD SED SAME PHYS/QHP 5/>YRS: CPT | Performed by: INTERNAL MEDICINE

## 2023-11-02 PROCEDURE — 93016 CV STRESS TEST SUPVJ ONLY: CPT | Performed by: INTERNAL MEDICINE

## 2023-11-02 PROCEDURE — 99223 1ST HOSP IP/OBS HIGH 75: CPT | Performed by: INTERNAL MEDICINE

## 2023-11-02 PROCEDURE — 93017 CV STRESS TEST TRACING ONLY: CPT

## 2023-11-02 PROCEDURE — 78452 HT MUSCLE IMAGE SPECT MULT: CPT

## 2023-11-02 PROCEDURE — 85027 COMPLETE CBC AUTOMATED: CPT | Performed by: PHYSICIAN ASSISTANT

## 2023-11-02 PROCEDURE — 99239 HOSP IP/OBS DSCHRG MGMT >30: CPT | Performed by: INTERNAL MEDICINE

## 2023-11-02 PROCEDURE — NC001 PR NO CHARGE: Performed by: INTERNAL MEDICINE

## 2023-11-02 PROCEDURE — 85730 THROMBOPLASTIN TIME PARTIAL: CPT | Performed by: PHYSICIAN ASSISTANT

## 2023-11-02 PROCEDURE — C1769 GUIDE WIRE: HCPCS | Performed by: INTERNAL MEDICINE

## 2023-11-02 PROCEDURE — 85610 PROTHROMBIN TIME: CPT | Performed by: PHYSICIAN ASSISTANT

## 2023-11-02 PROCEDURE — C1894 INTRO/SHEATH, NON-LASER: HCPCS | Performed by: INTERNAL MEDICINE

## 2023-11-02 PROCEDURE — 83036 HEMOGLOBIN GLYCOSYLATED A1C: CPT | Performed by: INTERNAL MEDICINE

## 2023-11-02 PROCEDURE — 93018 CV STRESS TEST I&R ONLY: CPT | Performed by: INTERNAL MEDICINE

## 2023-11-02 PROCEDURE — 78452 HT MUSCLE IMAGE SPECT MULT: CPT | Performed by: INTERNAL MEDICINE

## 2023-11-02 PROCEDURE — 80048 BASIC METABOLIC PNL TOTAL CA: CPT | Performed by: PHYSICIAN ASSISTANT

## 2023-11-02 PROCEDURE — 99153 MOD SED SAME PHYS/QHP EA: CPT | Performed by: INTERNAL MEDICINE

## 2023-11-02 RX ORDER — CHLORTHALIDONE 25 MG/1
25 TABLET ORAL EVERY OTHER DAY
Status: DISCONTINUED | OUTPATIENT
Start: 2023-11-03 | End: 2023-11-03 | Stop reason: HOSPADM

## 2023-11-02 RX ORDER — DOCUSATE SODIUM 100 MG/1
100 CAPSULE, LIQUID FILLED ORAL 2 TIMES DAILY
Status: DISCONTINUED | OUTPATIENT
Start: 2023-11-02 | End: 2023-11-03 | Stop reason: HOSPADM

## 2023-11-02 RX ORDER — HEPARIN SODIUM 5000 [USP'U]/ML
5000 INJECTION, SOLUTION INTRAVENOUS; SUBCUTANEOUS EVERY 8 HOURS SCHEDULED
Status: DISCONTINUED | OUTPATIENT
Start: 2023-11-02 | End: 2023-11-02

## 2023-11-02 RX ORDER — HEPARIN SODIUM 1000 [USP'U]/ML
4000 INJECTION, SOLUTION INTRAVENOUS; SUBCUTANEOUS ONCE
Status: COMPLETED | OUTPATIENT
Start: 2023-11-02 | End: 2023-11-02

## 2023-11-02 RX ORDER — ACETAMINOPHEN 325 MG/1
650 TABLET ORAL EVERY 6 HOURS PRN
Status: DISCONTINUED | OUTPATIENT
Start: 2023-11-02 | End: 2023-11-03 | Stop reason: HOSPADM

## 2023-11-02 RX ORDER — LIDOCAINE WITH 8.4% SOD BICARB 0.9%(10ML)
SYRINGE (ML) INJECTION CODE/TRAUMA/SEDATION MEDICATION
Status: DISCONTINUED | OUTPATIENT
Start: 2023-11-02 | End: 2023-11-02 | Stop reason: HOSPADM

## 2023-11-02 RX ORDER — ATORVASTATIN CALCIUM 40 MG/1
40 TABLET, FILM COATED ORAL
Status: DISCONTINUED | OUTPATIENT
Start: 2023-11-02 | End: 2023-11-03 | Stop reason: HOSPADM

## 2023-11-02 RX ORDER — AMMONIUM LACTATE 12 G/100G
CREAM TOPICAL 2 TIMES DAILY PRN
Status: DISCONTINUED | OUTPATIENT
Start: 2023-11-02 | End: 2023-11-03 | Stop reason: HOSPADM

## 2023-11-02 RX ORDER — FINASTERIDE 5 MG/1
5 TABLET, FILM COATED ORAL
Status: DISCONTINUED | OUTPATIENT
Start: 2023-11-02 | End: 2023-11-03 | Stop reason: HOSPADM

## 2023-11-02 RX ORDER — ASPIRIN 81 MG/1
81 TABLET, CHEWABLE ORAL
Status: DISCONTINUED | OUTPATIENT
Start: 2023-11-02 | End: 2023-11-03 | Stop reason: HOSPADM

## 2023-11-02 RX ORDER — POLYETHYLENE GLYCOL 3350 17 G/17G
17 POWDER, FOR SOLUTION ORAL DAILY
Status: DISCONTINUED | OUTPATIENT
Start: 2023-11-03 | End: 2023-11-03 | Stop reason: HOSPADM

## 2023-11-02 RX ORDER — SODIUM CHLORIDE 9 MG/ML
50 INJECTION, SOLUTION INTRAVENOUS CONTINUOUS
Status: DISPENSED | OUTPATIENT
Start: 2023-11-02 | End: 2023-11-02

## 2023-11-02 RX ORDER — NITROGLYCERIN 20 MG/100ML
INJECTION INTRAVENOUS CODE/TRAUMA/SEDATION MEDICATION
Status: DISCONTINUED | OUTPATIENT
Start: 2023-11-02 | End: 2023-11-02 | Stop reason: HOSPADM

## 2023-11-02 RX ORDER — HEPARIN SODIUM 1000 [USP'U]/ML
INJECTION, SOLUTION INTRAVENOUS; SUBCUTANEOUS CODE/TRAUMA/SEDATION MEDICATION
Status: DISCONTINUED | OUTPATIENT
Start: 2023-11-02 | End: 2023-11-02 | Stop reason: HOSPADM

## 2023-11-02 RX ORDER — FENTANYL CITRATE 50 UG/ML
INJECTION, SOLUTION INTRAMUSCULAR; INTRAVENOUS CODE/TRAUMA/SEDATION MEDICATION
Status: DISCONTINUED | OUTPATIENT
Start: 2023-11-02 | End: 2023-11-02 | Stop reason: HOSPADM

## 2023-11-02 RX ORDER — HEPARIN SODIUM 5000 [USP'U]/ML
5000 INJECTION, SOLUTION INTRAVENOUS; SUBCUTANEOUS EVERY 8 HOURS SCHEDULED
Status: DISCONTINUED | OUTPATIENT
Start: 2023-11-02 | End: 2023-11-03 | Stop reason: HOSPADM

## 2023-11-02 RX ORDER — HEPARIN SODIUM 10000 [USP'U]/100ML
3-20 INJECTION, SOLUTION INTRAVENOUS
Status: DISCONTINUED | OUTPATIENT
Start: 2023-11-02 | End: 2023-11-03

## 2023-11-02 RX ORDER — REGADENOSON 0.08 MG/ML
0.4 INJECTION, SOLUTION INTRAVENOUS ONCE
Status: COMPLETED | OUTPATIENT
Start: 2023-11-02 | End: 2023-11-02

## 2023-11-02 RX ORDER — LISINOPRIL 5 MG/1
5 TABLET ORAL DAILY
Status: DISCONTINUED | OUTPATIENT
Start: 2023-11-03 | End: 2023-11-03 | Stop reason: HOSPADM

## 2023-11-02 RX ORDER — AMLODIPINE BESYLATE 2.5 MG/1
2.5 TABLET ORAL DAILY
Status: DISCONTINUED | OUTPATIENT
Start: 2023-11-03 | End: 2023-11-03 | Stop reason: HOSPADM

## 2023-11-02 RX ORDER — VERAPAMIL HCL 2.5 MG/ML
AMPUL (ML) INTRAVENOUS CODE/TRAUMA/SEDATION MEDICATION
Status: DISCONTINUED | OUTPATIENT
Start: 2023-11-02 | End: 2023-11-02 | Stop reason: HOSPADM

## 2023-11-02 RX ORDER — LANOLIN ALCOHOL/MO/W.PET/CERES
1 CREAM (GRAM) TOPICAL
Status: DISCONTINUED | OUTPATIENT
Start: 2023-11-03 | End: 2023-11-03 | Stop reason: HOSPADM

## 2023-11-02 RX ADMIN — Medication: at 16:26

## 2023-11-02 RX ADMIN — DOCUSATE SODIUM 100 MG: 100 CAPSULE, LIQUID FILLED ORAL at 16:53

## 2023-11-02 RX ADMIN — METOPROLOL TARTRATE 25 MG: 25 TABLET, FILM COATED ORAL at 22:00

## 2023-11-02 RX ADMIN — ASPIRIN 81 MG CHEWABLE TABLET 81 MG: 81 TABLET CHEWABLE at 22:07

## 2023-11-02 RX ADMIN — HEPARIN SODIUM 12 UNITS/KG/HR: 10000 INJECTION, SOLUTION INTRAVENOUS at 22:08

## 2023-11-02 RX ADMIN — REGADENOSON 0.4 MG: 0.08 INJECTION, SOLUTION INTRAVENOUS at 11:08

## 2023-11-02 RX ADMIN — HEPARIN SODIUM 4000 UNITS: 1000 INJECTION INTRAVENOUS; SUBCUTANEOUS at 22:05

## 2023-11-02 RX ADMIN — ATORVASTATIN CALCIUM 40 MG: 40 TABLET, FILM COATED ORAL at 16:53

## 2023-11-02 RX ADMIN — SODIUM CHLORIDE 50 ML/HR: 0.9 INJECTION, SOLUTION INTRAVENOUS at 16:54

## 2023-11-02 RX ADMIN — FINASTERIDE 5 MG: 5 TABLET, FILM COATED ORAL at 22:07

## 2023-11-02 NOTE — Clinical Note
procedure completed by Dr Trisha Anglin. Pt tolerated. Education provided. TR to R radial access site, 13cc in band.  Handoff report given, transferred back to floor with x2 RN and telemetry

## 2023-11-02 NOTE — ASSESSMENT & PLAN NOTE
Follows outpatient vascular surgery  Underwent abdominal angiography w/ runoff on 10/17/23 noting: "Heavily diseased and calcified mid and distal common femoral artery with occlusion and questionable small short segments of distal reconstitution. Nirali Lackey Occlusion of the proximal right superficial femoral artery at the origin (approximately 1 to 1.5 cm) with distal reconstitution. Nirali Lackey Heavily diseased right superficial femoral artery with short segment multifocal areas of moderate, high-grade and questionable occlusions of the mid and distal artery. Nirali Lackey The right mid and distal anterior tibial artery has multifocal areas of occlusion and reconstitution along with distal reconstitution near the level of the ankle, however evaluation is limited secondary to heavily calcified artery. Nirali Lackey Multifocal short segment moderate and high-grade stenosis throughout the mid and distal left superficial femoral artery and proximal popliteal artery. 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

## 2023-11-02 NOTE — CONSULTS
Consultation - Cardiology   [facility]     Nydia Mcwilliams 80 y.o. male MRN: [id number]  : 1930  Unit/Bed#: [address] [place] [address] Encounter: [id number]      Assessment & Plan   Chest pain. - Presented on 23 for scheduled outpatient nuclear stress test for preoperative risk stratification for planned left CEA/patch angioplasty with vascular surgery as per 10/18/23 note. Patient reported feeling his baseline health this morning. Underwent nuclear stress test today, reported chest pain and noted to have significant ST changes during test.  Patient reports he periodically has episodes of chest pain. He states that they are very infrequent but when he does have chest pain its localized to the center of his chest and quickly resolves. He denies any associating symptoms. He denies experiencing palpitations, shortness of breath, lightheadedness, dizziness, headache, nausea, vomiting or diaphoresis with episodes of chest pain.  - Dr. Alba Aviles discussed with patient recommendations for admission and cardiac catheterization for further evaluation. Patient is agreeable to proceed. - Internal Medicine admission.   - Booked for cardiac catheterization on 23.   - Follow up 23 nuclear stress test report. - 20 TTE: LVEF 55-60%. Left ventricle diastolic function parameters were normal for patient's age. Mitral valve with mild regurgitation. Aortic valve with mild stenosis. Tricuspid valve with mild regurgitation.  - Follow up repeat TTE.   - Continue home medication of aspirin 81 mg daily and Lipitor 10 mg daily. Hypertension.    - SBP since admission 160-172. - Review of home medications notes that patient is on amlodipine 2.5 mg daily in the evening, hydrochlorothiazide 25 mg every other day, and lisinopril 5 mg daily in evening. Restart on admission.  - Review of chart notes that patient had syncope while on carvedilol. Not currently on beta-blocker therapy.   - Continue to monitor BP. Hyperlipidemia. - Review home medications notes patient is on Lipitor 10 mg daily. Recommend restarting on admission. - 12/06/22 lipid panel: Cholesterol 153, triglycerides 74, HDL 77, LDL 61.    4.  Valvular heart disease.    - Mild mitral valve regurgitation. Mild aortic valve stenosis. Mild tricuspid valve regurgitation. - 1/08/20 TTE:     MITRAL VALVE: There was mild annular calcification. There was normal leaflet separation. DOPPLER: The transmitral velocity was within the normal range. There was no evidence for stenosis. There was mild regurgitation. AORTIC VALVE: The valve was functionally bicuspid. Leaflets exhibited moderately increased thickness, mild to moderate calcification, and mildly reduced cuspal separation. DOPPLER: Transaortic velocity was increased due to valvular  stenosis. There was mild stenosis. JENNIFER 1.45 cm2, with peak velocity 2 m/sec and peak gradient 20 mm of hg There was no significant regurgitation. TRICUSPID VALVE: DOPPLER: There was mild regurgitation. Estimated peak PA pressure was 40 mmHg. PULMONIC VALVE: DOPPLER: There was no significant regurgitation.     - Follow up repeat TTE. 5. Bilateral carotid stenosis. - Follows outpatient with [facility] Vascular surgery. - 9/20/23 VAS carotid complete study:     RIGHT: There is <50% stenosis noted in the internal carotid artery. Plaque is heterogenous and smooth. Vertebral artery flow is antegrade. There is no significant subclavian artery disease. LEFT: There is 50-69% stenosis noted in the internal carotid artery. Plaque is heterogenous and irregular. Vertebral artery flow is antegrade. There is no significant subclavian artery disease. 6. Peripheral arterial disease.    - Patient with known history of peripheral arterial disease. He reports that he is able to walk approximately 100 feet before developing claudication.   - 10/17/23 CTA abdominal with runoff with and without contrast:  Heavily diseased and calcified mid and distal common femoral artery with occlusion and questionable small short segments of distal reconstitution. Occlusion of the proximal right superficial femoral artery at the origin (approximately 1 to 1.5 cm) with distal reconstitution. Heavily diseased right superficial femoral artery with short segment multifocal areas of moderate, high-grade and questionable occlusions of the mid and distal artery. The right mid and distal anterior tibial artery has multifocal areas of occlusion and reconstitution along with distal reconstitution near the level of the ankle, however evaluation is limited secondary to heavily calcified artery. Multifocal short segment moderate and high-grade stenosis throughout the mid and distal left superficial femoral artery and proximal popliteal artery. 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 - Follows outpatient with [facility] Vascular surgery. 7. BPH.    - Review of home medications notes that patient is on finasteride 5 mg daily. Summary of Recommendations: Thank you for your consultation. Physician Requesting Consult: Farzaneh Macias MD    Reason for Consult / Principal Problem: Chest pain, EKG changes. Consults    HPI: Eugene Rice is a 80y.o. year old male with PMHx with PMHx of HTN, HLD, B/L carotid stenosis, PAD, BPH who presented on 11/02/23 for scheduled outpatient nuclear stress test for preoperative risk stratification for planned left CEA/patch angioplasty with vascular surgery as per 10/18/23 note. Patient reported feeling his baseline health this morning. Underwent nuclear stress test today, reported chest pain and noted to have significant ST changes during test.  Patient reports he periodically has episodes of chest pain.   He states that they are very infrequent but when he does have chest pain its localized to the center of his chest and quickly resolves. He denies any associating symptoms. He denies experiencing palpitations, shortness of breath, lightheadedness, dizziness, headache, nausea, vomiting or diaphoresis with episodes of chest pain. Dr. Beatrice Gaxiola discussed with patient recommendations for admission and cardiac catheterization for further evaluation. Patient is agreeable to proceed. Review of Systems   Constitutional:  Negative for activity change, appetite change, chills, diaphoresis, fatigue, fever and unexpected weight change. Respiratory:  Positive for chest tightness. Negative for cough, shortness of breath and wheezing. Cardiovascular:  Positive for chest pain. Negative for palpitations and leg swelling. Gastrointestinal:  Negative for abdominal distention, abdominal pain, constipation, diarrhea, nausea and vomiting. Skin: Negative. Neurological:  Negative for dizziness, syncope, weakness, light-headedness, numbness and headaches.        Historical Information   Past Medical History:   Diagnosis Date    Asymptomatic PVCs     last assessed 18    BPH (benign prostatic hyperplasia)     last assessed 17     High cholesterol     last assessed 17     Hypertension     uncontrolled, last assessed 17     Past Surgical History:   Procedure Laterality Date    CATARACT EXTRACTION      COLONOSCOPY      Complete    WY EXCISION HYDROCELE UNILATERAL Right 10/25/2018    Procedure: HYDROCELECTOMY;  Surgeon: Burgess Antony MD;  Location: [facility];  Service: Urology    TESTICLE SURGERY      last assessed 3/31/15     Social History     Substance and Sexual Activity   Alcohol Use No     Social History     Substance and Sexual Activity   Drug Use No     Social History     Tobacco Use   Smoking Status Former    Years: 20.00    Types: Cigarettes    Quit date: 10/18/1965    Years since quittin.0   Smokeless Tobacco Never     Family History:   Family History   Problem Relation Age of Onset    Heart disease Mother     Hypertension Mother     Heart disease Father     Hypertension Father     Hypertension Family     Hyperlipidemia Family        Meds/Allergies    PTA meds:    Medications Prior to Admission   Medication    amLODIPine (NORVASC) 2.5 mg tablet    ammonium lactate (LAC-HYDRIN) 12 % cream    aspirin 81 MG tablet    atorvastatin (LIPITOR) 10 mg tablet    Calcium Carbonate-Vitamin D 500-125 MG-UNIT TABS    chlorthalidone 25 mg tablet    Cholecalciferol (Vitamin D) 50 MCG (2000 UT) CAPS    finasteride (PROSCAR) 5 mg tablet    lisinopril (ZESTRIL) 5 mg tablet    Multiple Vitamins-Minerals (Ocuvite Adult Formula) CAPS    triamcinolone (KENALOG) 0.025 % ointment      Allergies   Allergen Reactions    Tamsulosin Syncope       Current Facility-Administered Medications:     heparin (porcine) subcutaneous injection 5,000 Units, 5,000 Units, Subcutaneous, Q8H [facility] & half-way **AND** Platelet count, , , Once, Sagar Laughlin MD    VTE Pharmacologic Prophylaxis:   Heparin    Objective:   Vitals: Blood pressure 160/70, pulse 83, resp. rate 20, height 5' 11" (1.803 m), SpO2 99 %. Body mass index is 22.32 kg/m². Wt Readings from Last 3 Encounters:   10/24/23 72.6 kg (160 lb)   10/06/23 73 kg (161 lb)   06/16/23 74.8 kg (165 lb)     BP Readings from Last 3 Encounters:   11/02/23 160/70   10/24/23 120/60   10/06/23 124/70     Orthostatic Blood Pressures      Flowsheet Row Most Recent Value   Blood Pressure 160/70 filed at 11/02/2023 1100          No intake or output data in the 24 hours ending 11/02/23 1354    Invasive Devices       Peripheral Intravenous Line  Duration             Peripheral IV 11/02/23 Distal;Dorsal (posterior); Right Forearm <1 day                      Physical Exam:   Physical Exam  Vitals reviewed. Constitutional:       General: He is not in acute distress. Cardiovascular:      Rate and Rhythm: Normal rate and regular rhythm. Pulses: Normal pulses. Heart sounds: Murmur heard. Pulmonary:      Effort: Pulmonary effort is normal. No respiratory distress. Breath sounds: Normal breath sounds. Abdominal:      General: Abdomen is flat. There is no distension. Palpations: Abdomen is soft. Tenderness: There is no abdominal tenderness. Musculoskeletal:      Left lower leg: No edema. Skin:     General: Skin is warm and dry. Coloration: Skin is pale. Neurological:      Mental Status: He is alert and oriented to person, place, and time. Labs:   Troponins:      CBC with diff:       Invalid input(s): "TOTALCELLSCOUNTED", "SEGS%", "GRANS%", "LYMPHS%", "EOS%", "BASO%", "ABNEUT", "ABGRANS", "ABLYMPHS", "ABMOMOS", "ABEOS", "ABBASO"    CMP:       Magnesium:     Coags:     TSH:      No components found for: "TSH3"  Lipid Profile:     Lipid Profile:   Lab Results   Component Value Date    CHOLESTEROL 153 2022    HDL 77 2022    LDLCALC 61 2022    TRIG 74 2022     Hgb A1c:     NT-proBNP: No results for input(s): "NTBNP" in the last 72 hours.      Imaging & Testing     Cardiac testing:   Results for orders placed during the hospital encounter of 20    Echo complete with contrast if indicated    Narrative  [address] [place] [address]  [phone]    Transthoracic Echocardiogram  2D, M-mode, Doppler, and Color Doppler    Study date:  2020    Patient: Deirdre Parmar  MR number: [id number]  Account number: [de-identified]  : 06-Aug-1930  Age: 80 years  Gender: Male  Status: Outpatient  Location: Echo lab  Height: 70 in  Weight: 166.8 lb  BP: 138/ 84 mmHg    Indications: Mitral Valve Disease    Diagnoses: 394.9 - MITRAL VALVE DIS NEC/NOS    Sonographer:  NNEKA Collins  Primary Physician:  Phuc Frederick MD  Referring Physician:  Waldemar Morales MD  Group:  [facility]  Interpreting Physician:  Rosa M Sun MD    SUMMARY    LEFT VENTRICLE:  Systolic function was normal. Ejection fraction was estimated in the range of 55 % to 60 % to be 60 %. There were no regional wall motion abnormalities. Wall thickness was at the upper limits of normal.    LEFT ATRIUM:  The atrium was mildly dilated. MITRAL VALVE:  There was mild annular calcification. There was mild regurgitation. AORTIC VALVE:  The valve was functionally bicuspid. Leaflets exhibited moderately increased thickness, mild to moderate calcification, and mildly reduced cuspal separation. Transaortic velocity was increased due to valvular stenosis. There was mild stenosis. JENNIFER 1.45 cm2, with peak velocity 2 m/sec and peak gradient 20 mm of hg    TRICUSPID VALVE:  There was mild regurgitation. Estimated peak PA pressure was 40 mmHg. HISTORY: PRIOR HISTORY: HTN, HLD, PAD, BPH    PROCEDURE: The procedure was performed in the echo lab. This was a routine study. The transthoracic approach was used. The study included complete 2D imaging, M-mode, complete spectral Doppler, and color Doppler. The heart rate was 64 bpm,  at the start of the study. Image quality was adequate. LEFT VENTRICLE: Size was normal. Systolic function was normal. Ejection fraction was estimated in the range of 55 % to 60 % to be 60 %. There were no regional wall motion abnormalities. Wall thickness was at the upper limits of normal.  DOPPLER: Left ventricular diastolic function parameters were normal for the patient's age. RIGHT VENTRICLE: The size was normal. Systolic function was normal.    LEFT ATRIUM: The atrium was mildly dilated. RIGHT ATRIUM: Size was at the upper limits of normal.    MITRAL VALVE: There was mild annular calcification. There was normal leaflet separation. DOPPLER: The transmitral velocity was within the normal range. There was no evidence for stenosis. There was mild regurgitation. AORTIC VALVE: The valve was functionally bicuspid.  Leaflets exhibited moderately increased thickness, mild to moderate calcification, and mildly reduced cuspal separation. DOPPLER: Transaortic velocity was increased due to valvular  stenosis. There was mild stenosis. JENNIFER 1.45 cm2, with peak velocity 2 m/sec and peak gradient 20 mm of hg There was no significant regurgitation. TRICUSPID VALVE: DOPPLER: There was mild regurgitation. Estimated peak PA pressure was 40 mmHg. PULMONIC VALVE: DOPPLER: There was no significant regurgitation. PERICARDIUM: There was no thickening or calcification. There was no pericardial effusion. AORTA: The root exhibited normal size. SYSTEMIC VEINS: IVC: The inferior vena cava was normal in size. Respirophasic changes were normal.    SYSTEM MEASUREMENT TABLES    2D mode  AoR Diam 2D: 3.4 cm  LA Diam (2D): 3.6 cm  LA/Ao (2D): 1.06  FS (2D Teich): 26.1 %  IVSd (2D): 0.99 cm  LVDEV: 71.3 cmï¾³  LVESV: 34.4 cmï¾³  LVIDd(2D): 4.03 cm  LVISd (2D): 2.98 cm  LVOT Area 2D: 2.84 cmï¾²  LVPWd (2D): 0.9 cm  SV (Teich): 36.9 cmï¾³    Apical four chamber  LVEF A4C: 56 %    Unspecified Scan Mode  JENNIFER Cont Eq (Peak Carlitos): 1.62 cmï¾²  JENNIFER Cont Eq (VTI): 1.43 cmï¾²  LVOT (VTI): 24.7 cm  LVOT Diam.: 1.9 cm  LVOT Vmax: 1120 mm/s  LVOT Vmax; Mean: 1120 mm/s  Peak Grad.; Mean: 5 mm[Hg]  SV (LVOT): 70 cmï¾³  VTI;Mean: 2 mm[Hg]  MV Peak A Carlitos: 663 mm/s  MV Peak E Carlitos. Mean: 821 mm/s  MVA (PHT): 3.49 cmï¾²  PHT: 63 ms  Max P mm[Hg]  V Max: 2920 mm/s  Vmax: 2920 mm/s  RA Area: 18.4 cmï¾²  RA Volume: 55.6 cmï¾³  TAPSE: 2.1 cm    [employer] Accredited Echocardiography Laboratory    Prepared and electronically signed by    Rosa M Sun MD  Signed 2020 11:12:11      Imaging: I have personally reviewed pertinent reports. CTA abdominal w run off w wo contrast    Result Date: 10/17/2023    Impression: 1. Heavily diseased and calcified mid and distal common femoral artery with occlusion and questionable small short segments of distal reconstitution.  2.  Occlusion of the proximal right superficial femoral artery at the origin (approximately 1 to 1.5 cm) with distal reconstitution. 3.  Heavily diseased right superficial femoral artery with short segment multifocal areas of moderate, high-grade and questionable occlusions of the mid and distal artery. 4.  The right mid and distal anterior tibial artery has multifocal areas of occlusion and reconstitution along with distal reconstitution near the level of the ankle, however evaluation is limited secondary to heavily calcified artery. 5.   Multifocal short segment moderate and high-grade stenosis throughout the mid and distal left superficial femoral artery and proximal popliteal artery. 6.  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 EKG/ Monitor: Personally reviewed.        Code Status: Level 1 - Full Code  Advance Directive and Living Will:      POLST:        Bert Rodriguez PA-C

## 2023-11-02 NOTE — ASSESSMENT & PLAN NOTE
Resume Zestril/Chlorthalidone tomorrow (day after heart catheterization)  Continue Norvasc - initiated Lopressor  Sodium restriction

## 2023-11-02 NOTE — H&P
[address]  H&P  Name: Malik Davison 80 y.o. male I MRN: [id number]  Unit/Bed#: 4 [place] 414-01 I Date of Admission: 11/2/2023   Date of Service: 11/2/2023 I Hospital Day: 0        Assessment & Plan:    * CAD (coronary artery disease)  Assessment & Plan  Had an outpatient elective stress test earlier today which was abnormal due to complaints of chest discomfort with ST depressions and ECG evidence of ischemia  Directly admitted and subsequently underwent cardiac catheterization which revealed severe RCA and left main CAD - deemed not a surgical candidate with cardiology recommendation of medical management optimization  Continue ASA - increased statin to high intensity dosing - resume Zestril - add beta-blocker (Lopressor)  Initiated on a heparin drip per cardiology    Atherosclerosis of native arteries of extremities with rest pain, right leg ([address])  Assessment & Plan  Follows outpatient vascular surgery  Underwent abdominal angiography w/ runoff on 10/17/23 noting: "Heavily diseased and calcified mid and distal common femoral artery with occlusion and questionable small short segments of distal reconstitution. Nicolas Spine Nicolas Spine Occlusion of the proximal right superficial femoral artery at the origin (approximately 1 to 1.5 cm) with distal reconstitution. Nicolas Spine Nicolas Spine Heavily diseased right superficial femoral artery with short segment multifocal areas of moderate, high-grade and questionable occlusions of the mid and distal artery. Nicolas Spine Nicolas Spine The right mid and distal anterior tibial artery has multifocal areas of occlusion and reconstitution along with distal reconstitution near the level of the ankle, however evaluation is limited secondary to heavily calcified artery. Nicolas Spine Nicolas Spine Multifocal short segment moderate and high-grade stenosis throughout the mid and distal left superficial femoral artery and proximal popliteal artery. Nicolas Spine Nicolas Spine Diseased left anterior tibial artery with multifocal short segment high-grade stenosis and occlusion of the distal artery."  Continue ASA/statin   Vascular surgery planning eventual revascularization once cardiac status optimized, due to nonhealing wound of right hallux    BPH (benign prostatic hyperplasia)  Assessment & Plan  Continue Proscar    Hyperlipidemia  Assessment & Plan  Continue statin -> increased to high intensity dosing due to above issue    Essential hypertension  Assessment & Plan  Resume Zestril/Chlorthalidone tomorrow (day after heart catheterization)  Continue Norvasc - initiated Lopressor  Sodium restriction      DVT Prophylaxis:  Heparin SC    Code Status:  DNR/DNI    Discussion with:  Patient, along with wife and daughter at bedside today    Anticipated Length of Stay:  Patient will be admitted on an Inpatient basis with an anticipated length of stay of greater than 2 midnights. Justification for Hospital Stay: Chest pain due to CAD with abnormal stress test and cardiac catheterization. Total Time for Visit, including Counseling / Coordination of Care: 75 minutes. More than 50% of total time spent on counseling and coordination of care, on one or more of the following: performing physical exam; counseling and coordination of care; obtaining or reviewing history; documenting in the medical record; reviewing/ordering tests, medications or procedures; communicating with other healthcare professionals and discussing with patient's family/caregivers. Chief Complaint:  Chest pain      History of Present Illness:    Inez Cisse is a 80 y.o. male who initially presented for an elective outpatient stress test for cardiac recertification for an upcoming vascular revascularization procedure. Reportedly, his specialist was deemed abnormal due to intermittent chest discomfort that he developed along with evidence of ST depressions and ischemia on ECG.   He was directly admitted to the medical floor and subsequently underwent an expedited cardiac catheterization which revealed evidence of dual vessel progression of CAD. He was deemed not a surgical candidate with recommendation of medical optimization by cardiology. At the time my encounter post catheterization, he is resting in bed without complaints of chest comfort at rest.  His family is present at bedside. Other than some weakness/fatigue, postprocedural, he denies any new complaints. Does acknowledge some acute on chronic right toe pain/discomfort. Review of Systems:    Review of Systems - A thorough 12 point review systems was conducted. Pertinent positives and negatives are mentioned in the history of present illness. Past Medical and Surgical History:     Past Medical History:   Diagnosis Date    Asymptomatic PVCs     last assessed 1/18/18    BPH (benign prostatic hyperplasia)     last assessed 4/11/17     High cholesterol     last assessed 4/11/17     Hypertension     uncontrolled, last assessed 4/11/17       Past Surgical History:   Procedure Laterality Date    CATARACT EXTRACTION      COLONOSCOPY      Complete    IA EXCISION HYDROCELE UNILATERAL Right 10/25/2018    Procedure: HYDROCELECTOMY;  Surgeon: Arnol Trevino MD;  Location: [facility];  Service: Urology    TESTICLE SURGERY      last assessed 3/31/15         Medications & Allergies:    Prior to Admission medications    Medication Sig Start Date End Date Taking? Authorizing Provider   amLODIPine (NORVASC) 2.5 mg tablet Take 1 tablet (2.5 mg total) by mouth daily In the evening.  6/6/23  Yes Odalis Quintero MD   aspirin 81 MG tablet Take 81 mg by mouth daily At night   Yes Historical Provider, MD   atorvastatin (LIPITOR) 10 mg tablet TAKE ONE TABLET BY MOUTH EVERY DAY 11/16/22  Yes Davi Martínez MD   Calcium Carbonate-Vitamin D 500-125 MG-UNIT TABS Take by mouth   Yes Historical Provider, MD   chlorthalidone 25 mg tablet Take 1 tablet (25 mg total) by mouth every other day 12/19/22  Yes Odalis Quintero MD   Cholecalciferol (Vitamin D) 50 MCG (2000 UT) CAPS Take 1 capsule (2,000 Units total) by mouth in the morning 6/15/22  Yes Kaitlin Tran MD   finasteride (PROSCAR) 5 mg tablet Take 1 tablet (5 mg total) by mouth daily at bedtime 23  Yes Melina Lopez MD   lisinopril (ZESTRIL) 5 mg tablet Take 1 tablet in morning and 1 tablet in the evening. 10/4/23  Yes Kaitlin Tran MD   Multiple Vitamins-Minerals (Ocuvite Adult Formula) CAPS Take 1 capsule by mouth in the morning 6/15/22  Yes Kaitlin Tran MD   ammonium lactate (LAC-HYDRIN) 12 % cream Apply topically as needed for dry skin 10/30/20   Bell Anaya DPM   triamcinolone (KENALOG) 0.025 % ointment Apply topically 2 (two) times a day 23   Melina Lopez MD         Allergies: Allergies   Allergen Reactions    Tamsulosin Syncope         Social History:    Substance Use History:   Social History     Substance and Sexual Activity   Alcohol Use Not Currently     Social History     Tobacco Use   Smoking Status Former    Years: 20.00    Types: Cigarettes    Quit date: 10/18/1965    Years since quittin.0   Smokeless Tobacco Never     Social History     Substance and Sexual Activity   Drug Use No         Family History:    Per medical chart, significant for heart disease and hypertension in mother and father.       Physical Exam:     Vitals:   Blood Pressure: (!) 172/78 (23 1354)  Pulse: 65 (23 1354)  Temperature: (!) 97.3 °F (36.3 °C) (23 1354)  Temp Source: Oral (23 1354)  Respirations: 17 (23 1354)  Height: 5' 11" (180.3 cm) (23 1100)  Weight - Scale: 73.4 kg (161 lb 12.8 oz) (23 1354)  SpO2: 98 % (23 1451)      GENERAL Weak/fatigued   HEAD   Normocephalic - atraumatic   EYES   PERRL - EOMI    MOUTH   Mucosa moist   NECK   Supple - full range of motion   CARDIAC Rate controlled - S1/S2 positive   PULMONARY Clear to auscultation - nonlabored respirations at rest   ABDOMEN   Soft - nontender/nondistended - active bowel sounds   MUSCULOSKELETAL   Motor strength/range of motion deconditioned   NEUROLOGIC   Alert/oriented at baseline   SKIN   Chronic wrinkles/blemishes - nonhealing right hallux ulcer noted   PSYCHIATRIC   Mood/affect stable         Additional Data:     Labs & Recent Cultures:    Results from last 7 days   Lab Units 11/02/23  1412   WBC Thousand/uL 6.25   HEMOGLOBIN g/dL 12.7   HEMATOCRIT % 36.5   PLATELETS Thousands/uL 269     Results from last 7 days   Lab Units 11/02/23  1412   SODIUM mmol/L 135   POTASSIUM mmol/L 3.8   CHLORIDE mmol/L 98   CO2 mmol/L 28   BUN mg/dL 24   CREATININE mg/dL 0.78   ANION GAP mmol/L 9   CALCIUM mg/dL 9.7   GLUCOSE RANDOM mg/dL 93     Results from last 7 days   Lab Units 11/02/23  1605   INR  1.14                             Lines/Drains:  Invasive Devices       Peripheral Intravenous Line  Duration             Peripheral IV 11/02/23 Left Arm <1 day                      Imaging:     CTA abdominal w run off w wo contrast    Result Date: 10/17/2023  Narrative: CT ANGIOGRAM OF THE AORTA AND LOWER EXTREMITIES WITH IV CONTRAST INDICATION: Peripheral arterial disease with right lower extremity rest pain COMPARISON: CT of the abdomen and pelvis performed January 15, 2021 TECHNIQUE:  CT angiogram examination of the abdomen, pelvis, and lower extremities was performed according to standard protocol with intravenous contrast.  This examination, like all CT scans performed in the [facility], was performed utilizing techniques to minimize radiation dose exposure, including the use of iterative reconstruction and automated exposure control. 3D reconstructions were performed an independent workstation, and are supplied for review. Rad dose 1989 mGy-cm IV Contrast:  150 mL of iohexol (OMNIPAQUE) FINDINGS: VASCULAR STRUCTURES:   Atherosclerosis. No abdominal aortic aneurysm, dissection or intramural hematoma. The celiac, superior mesenteric and inferior mesenteric arteries are patent.  Bilateral renal arteries are patent. Left lower extremity: Atherosclerosis of the left common iliac artery without significant stenosis. The left internal iliac artery is patent with high-grade stenosis at the origin. The left external iliac artery is patent. The left common femoral artery is patent with mild less than 50% stenosis in the distal artery secondary to atherosclerosis. Moderate stenosis at the origin of the profunda femoris artery on the left secondary to atherosclerosis. The profunda femoris artery is patent. Multifocal short segment moderate and high-grade stenosis throughout the mid and distal superficial femoral artery. Multifocal short segment high-grade and moderate stenosis of the P1 and P2 segments of the left popliteal artery. The tibioperoneal trunk is patent. The proximal and mid anterior tibial artery are diseased though patent. Multifocal short segment high-grade stenosis and occlusion of the distal anterior tibial artery. The peroneal artery is patent. The posterior tibial artery is patent. Right lower extremity: Atherosclerosis of the common, internal and external iliac arteries without significant stenosis. Heavily diseased and calcified mid and distal common femoral artery with with occlusion and questionable small short segments of distal reconstitution. The profunda femoris artery is patent with heavily calcified origin. The proximal superficial femoral artery is occluded at the origin for approximately 1 to 1.5 cm with distal reconstitution. Heavily diseased superficial femoral artery with multifocal areas of moderate and high-grade stenosis as well as questionable short segment occlusions of the mid and distal artery. Multiple areas of mild and mild to moderate stenosis throughout the P1 and P2 segments of the popliteal artery. The tibioperoneal trunk, posterior tibial and peroneal artery are patent. The proximal anterior tibial artery is diseased though patent.  The mid and distal anterior tibial artery is occluded with multifocal segments of distal reconstitution along with distal reconstitution near the level of the ankle, however evaluation is limited secondary to heavily calcified artery. OTHER FINDINGS ABDOMEN LOWER CHEST:  No significant abnormality in the lung bases. LIVER/BILIARY TREE:  Unremarkable. GALLBLADDER:  No calcified gallstones. No pericholecystic inflammatory change. SPLEEN:  Unremarkable. Normal size. PANCREAS:  Unremarkable. ADRENAL GLANDS: Unremarkable. KIDNEYS/URETERS:  No solid renal mass. No hydronephrosis. No urinary tract calculi. PELVIS REPRODUCTIVE ORGANS: Unchanged rim calcified focus within the left scrotum. URINARY BLADDER:  Unremarkable. ADDITIONAL ABDOMINAL AND PELVIC STRUCTURES STOMACH AND BOWEL: Colonic diverticulosis, no acute diverticulitis. ABDOMINOPELVIC CAVITY:   No pathologically enlarged mesenteric or retroperitoneal lymph nodes. No ascites or free intraperitoneal air. ABDOMINAL WALL/INGUINAL REGIONS:  Unremarkable. OSSEOUS STRUCTURES:  No acute fracture or destructive osseous lesion. Degenerative changes of the lumbar spine. Soft tissues: Bilateral lower extremity soft tissue edema. Impression: 1. Heavily diseased and calcified mid and distal common femoral artery with occlusion and questionable small short segments of distal reconstitution. 2.  Occlusion of the proximal right superficial femoral artery at the origin (approximately 1 to 1.5 cm) with distal reconstitution. 3.  Heavily diseased right superficial femoral artery with short segment multifocal areas of moderate, high-grade and questionable occlusions of the mid and distal artery. 4.  The right mid and distal anterior tibial artery has multifocal areas of occlusion and reconstitution along with distal reconstitution near the level of the ankle, however evaluation is limited secondary to heavily calcified artery.  5.   Multifocal short segment moderate and high-grade stenosis throughout the mid and distal left superficial femoral artery and proximal popliteal artery. 6.  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 Workstation performed: [id number]                   ** Please Note: This note is constructed using a voice recognition dictation system. An occasional wrong word/phrase or “sound-a-like” substitution may have been picked up by dictation device due to the inherent limitations of voice recognition software. Read the chart carefully and recognize, using reasonable context, where substitutions may have occurred. **

## 2023-11-02 NOTE — DISCHARGE INSTRUCTIONS
Cardiac Catheterization     WHAT YOU NEED TO KNOW:   A cardiac catheterization is a procedure to look at your heart and its blood vessels. Healthcare providers can measure oxygen levels and pressures in your heart. They can also fix problems with your valves, blood vessels, or the walls of your heart. You may need this procedure if you have chest pain, heart disease, or your heart is not working properly. DISCHARGE INSTRUCTIONS:     No driving for 24 hours, because you were sedated. Sedation: Avoid making any legal/major decisions today, as the sedation may inhibit your decision making. Also avoid alcohol today, as the sedation may heighten the effects of the alcohol. Apply firm, steady pressure directly over the wound if bleeding occurs. A small amount of bleeding from your wound is possible. Apply pressure with a clean gauze or towel for 5 to 10 minutes. Call 911 if bleeding becomes heavy or does not stop. Do not attempt to drive yourself to the hospital.    Bathing: You will be able to shower the day after your procedure. Remove your bandage before you shower. Carefully wash the wound with soap and water. Pat the area dry and apply a clean band-aid. Do not take baths or go in hot tubs or pools for about one week. Care for your wound as directed: Keep your dressing clean and dry. Monitor your wound everyday for signs of infection such as redness, swelling, or pus. Mild bruising is normal and expected. Do not put powders, lotions, or creams on your wound for about one week. Do not lift anything heavier than 5 pounds for about 5 days. Heavy lifting can put stress on your wound and cause bleeding. If an artery in your wrist was used, do not push or pull with the arm that was used for the procedure - pretend like your wrist is broken. Do not do vigorous activity for at least 48 hours. Vigorous activity may cause bleeding from your wound. Rest and do quiet activities.  Short walks to the bathroom and around the house are okay. Ask your healthcare provider when you can return to your normal activities. Drink liquids to flush the contrast liquid from your body and prevent blood clots. Ask how much liquid to drink each day and which liquids are best for you. Call 911 for any of the following: You have any of the following signs of a heart attack:    Squeezing, pressure, or pain in your chest that lasts longer than 5 minutes or returns   Discomfort or pain in your back, neck, jaw, stomach, or arm    Trouble breathing   Nausea or vomiting   Lightheadedness or a sudden cold sweat, especially with chest pain or trouble breathing     You have any of the following signs of a stroke:    Numbness or drooping on one side of your face    Weakness in an arm or leg   Confusion or difficulty speaking   Dizziness, a severe headache, or vision loss     You feel lightheaded, short of breath, and have chest pain. You cough up blood. You have trouble breathing. You cannot stop the bleeding from your wound even after you hold firm pressure for 10 minutes. Seek care immediately if:   Blood soaks through your bandage. Your stitches come apart. Your arm or leg feels numb, cool, or looks pale. Your wound gets swollen quickly. Contact your healthcare provider if:   You have a fever or chills. Your wound is red, swollen, or draining pus. Your wound looks more bruised or there is new bruising on the side of your leg or arm. You have nausea or are vomiting. Your skin is itchy, swollen, or you have a rash. You have questions or concerns about your condition or care. Procedural Sedation   WHAT YOU NEED TO KNOW:   Procedural sedation is medicine used during procedures to help you feel relaxed and calm. You will remember little to none of the procedure. After sedation you may feel tired, weak, or unsteady on your feet.  You may also have trouble concentrating or short-term memory loss. These symptoms should go away in 24 hours or less. DISCHARGE INSTRUCTIONS:     Call 911 or have someone else call for any of the following: You have sudden trouble breathing. You cannot be woken. Contact Interventional Radiology at [phone]   [facility] PATIENTS: Contact Interventional Radiology at [phone]) Sergei Matson PATIENTS: Contact Interventional Radiology at [phone]) if any of the following occur: You have a severe headache or dizziness. Your heart is beating faster than usual.    You have a fever or chills. Your skin is itchy, swollen, or you have a rash. You have nausea or are vomiting for more than 8 hours after the procedure. You have questions or concerns about your condition or care. Self-care:   Have someone stay with you for 24 hours. This person can drive you to errands and help you do things around the house. This person can also watch for problems. Rest and do quiet activities for 24 hours. Do not exercise, ride a bike, or play sports. Stand up slowly to prevent dizziness and falls. Take short walks around the house with another person. Slowly return to your usual activities the next day. Do not drive or use dangerous machines or tools for 24 hours. You may injure yourself or others. Examples include a lawnmower, saw, or drill. Do not return to work for 24 hours if you use dangerous machines or tools for work. Do not make important decisions for 24 hours. For example, do not sign important papers or invest money. Drink liquids as directed. Liquids help flush the sedation medicine out of your body. Ask how much liquid to drink each day and which liquids are best for you. Eat small, frequent meals to prevent nausea and vomiting. Start with clear liquids such as juice or broth. If you do not vomit after clear liquids, you can eat your usual foods. Do not drink alcohol or take medicines that make you drowsy.  This includes medicines that help you sleep and anxiety medicines. Ask your healthcare provider if it is safe for you to take pain medicine. Follow up with your healthcare provider as directed: Write down your questions so you remember to ask them during your visits.

## 2023-11-02 NOTE — PLAN OF CARE
Problem: PAIN - ADULT  Goal: Verbalizes/displays adequate comfort level or baseline comfort level  Description: Interventions:  - Encourage patient to monitor pain and request assistance  - Assess pain using appropriate pain scale  - Administer analgesics based on type and severity of pain and evaluate response  - Implement non-pharmacological measures as appropriate and evaluate response  - Consider cultural and social influences on pain and pain management  - Notify physician/advanced practitioner if interventions unsuccessful or patient reports new pain  Outcome: Progressing     Problem: INFECTION - ADULT  Goal: Absence or prevention of progression during hospitalization  Description: INTERVENTIONS:  - Assess and monitor for signs and symptoms of infection  - Monitor lab/diagnostic results  - Monitor all insertion sites, i.e. indwelling lines, tubes, and drains  - Monitor endotracheal if appropriate and nasal secretions for changes in amount and color  - [place] appropriate cooling/warming therapies per order  - Administer medications as ordered  - Instruct and encourage patient and family to use good hand hygiene technique  - Identify and instruct in appropriate isolation precautions for identified infection/condition  Outcome: Progressing     Problem: SAFETY ADULT  Goal: Patient will remain free of falls  Description: INTERVENTIONS:  - Educate patient/family on patient safety including physical limitations  - Instruct patient to call for assistance with activity   - Consult OT/PT to assist with strengthening/mobility   - Keep Call bell within reach  - Keep bed low and locked with side rails adjusted as appropriate  - Keep care items and personal belongings within reach  - Initiate and maintain comfort rounds  - Make Fall Risk Sign visible to staff  - Offer Toileting every 2 Hours, in advance of need  - Initiate/Maintain bed/chair alarm  - Obtain necessary fall risk management equipment: bed/chair alarm  - Apply yellow socks and bracelet for high fall risk patients  - Consider moving patient to room near nurses station  Outcome: Progressing  Goal: Maintain or return to baseline ADL function  Description: INTERVENTIONS:  -  Assess patient's ability to carry out ADLs; assess patient's baseline for ADL function and identify physical deficits which impact ability to perform ADLs (bathing, care of mouth/teeth, toileting, grooming, dressing, etc.)  - Assess/evaluate cause of self-care deficits   - Assess range of motion  - Assess patient's mobility; develop plan if impaired  - Assess patient's need for assistive devices and provide as appropriate  - Encourage maximum independence but intervene and supervise when necessary  - Involve family in performance of ADLs  - Assess for home care needs following discharge   - Consider OT consult to assist with ADL evaluation and planning for discharge  - Provide patient education as appropriate  Outcome: Progressing  Goal: Maintains/Returns to pre admission functional level  Description: INTERVENTIONS:  - Perform BMAT or MOVE assessment daily.   - Set and communicate daily mobility goal to care team and patient/family/caregiver. - Collaborate with rehabilitation services on mobility goals if consulted  - Perform Range of Motion 3 times a day. - Reposition patient every 3 hours.   - Dangle patient 3 times a day  - Stand patient 3 times a day  - Ambulate patient 3 times a day  - Out of bed to chair 3 times a day   - Out of bed for meals 3 times a day  - Out of bed for toileting  - Record patient progress and toleration of activity level   Outcome: Progressing     Problem: DISCHARGE PLANNING  Goal: Discharge to home or other facility with appropriate resources  Description: INTERVENTIONS:  - Identify barriers to discharge w/patient and caregiver  - Arrange for needed discharge resources and transportation as appropriate  - Identify discharge learning needs (meds, wound care, etc.)  - Arrange for interpretive services to assist at discharge as needed  - Refer to Case Management Department for coordinating discharge planning if the patient needs post-hospital services based on physician/advanced practitioner order or complex needs related to functional status, cognitive ability, or social support system  Outcome: Progressing     Problem: Knowledge Deficit  Goal: Patient/family/caregiver demonstrates understanding of disease process, treatment plan, medications, and discharge instructions  Description: Complete learning assessment and assess knowledge base.   Interventions:  - Provide teaching at level of understanding  - Provide teaching via preferred learning methods  Outcome: Progressing

## 2023-11-02 NOTE — ASSESSMENT & PLAN NOTE
Had an outpatient elective stress test earlier today which was abnormal due to complaints of chest discomfort with ST depressions and ECG evidence of ischemia  Directly admitted and subsequently underwent cardiac catheterization which revealed severe RCA and left main CAD - deemed not a surgical candidate with cardiology recommendation of medical management optimization  Continue ASA - increased statin to high intensity dosing - resume Zestril - add beta-blocker (Lopressor)  Initiated on a heparin drip per cardiology

## 2023-11-03 ENCOUNTER — TELEPHONE (OUTPATIENT)
Dept: CARDIOLOGY CLINIC | Facility: CLINIC | Age: 88
End: 2023-11-03

## 2023-11-03 ENCOUNTER — TRANSITIONAL CARE MANAGEMENT (OUTPATIENT)
Dept: FAMILY MEDICINE CLINIC | Facility: CLINIC | Age: 88
End: 2023-11-03

## 2023-11-03 ENCOUNTER — APPOINTMENT (INPATIENT)
Dept: NON INVASIVE DIAGNOSTICS | Facility: HOSPITAL | Age: 88
DRG: 287 | End: 2023-11-03
Payer: COMMERCIAL

## 2023-11-03 VITALS
TEMPERATURE: 97.5 F | OXYGEN SATURATION: 99 % | SYSTOLIC BLOOD PRESSURE: 153 MMHG | DIASTOLIC BLOOD PRESSURE: 67 MMHG | HEART RATE: 62 BPM | WEIGHT: 161 LBS | RESPIRATION RATE: 19 BRPM | HEIGHT: 71 IN | BODY MASS INDEX: 22.54 KG/M2

## 2023-11-03 LAB
ANION GAP SERPL CALCULATED.3IONS-SCNC: 7 MMOL/L
AORTIC ROOT: 3.5 CM
AORTIC VALVE MEAN VELOCITY: 14.5 M/S
APICAL FOUR CHAMBER EJECTION FRACTION: 63 %
APTT PPP: 68 SECONDS (ref 23–37)
APTT PPP: >210 SECONDS (ref 23–37)
AV AREA BY CONTINUOUS VTI: 1.2 CM2
AV AREA PEAK VELOCITY: 1.2 CM2
AV LVOT MEAN GRADIENT: 1 MMHG
AV LVOT PEAK GRADIENT: 3 MMHG
AV MEAN GRADIENT: 9 MMHG
AV PEAK GRADIENT: 17 MMHG
AV VALVE AREA: 1.17 CM2
AV VELOCITY RATIO: 0.43
BASOPHILS # BLD AUTO: 0.04 THOUSANDS/ÂΜL (ref 0–0.1)
BASOPHILS NFR BLD AUTO: 1 % (ref 0–1)
BUN SERPL-MCNC: 17 MG/DL (ref 5–25)
CALCIUM SERPL-MCNC: 9.2 MG/DL (ref 8.4–10.2)
CHEST PAIN STATEMENT: NORMAL
CHLORIDE SERPL-SCNC: 101 MMOL/L (ref 96–108)
CHOLEST SERPL-MCNC: 137 MG/DL
CO2 SERPL-SCNC: 28 MMOL/L (ref 21–32)
CREAT SERPL-MCNC: 0.66 MG/DL (ref 0.6–1.3)
DOP CALC AO PEAK VEL: 2.05 M/S
DOP CALC AO VTI: 45.81 CM
DOP CALC LVOT AREA: 2.83 CM2
DOP CALC LVOT CARDIAC INDEX: 1.63 L/MIN/M2
DOP CALC LVOT CARDIAC OUTPUT: 3.15 L/MIN
DOP CALC LVOT DIAMETER: 1.9 CM
DOP CALC LVOT PEAK VEL VTI: 18.98 CM
DOP CALC LVOT PEAK VEL: 0.89 M/S
DOP CALC LVOT STROKE INDEX: 29 ML/M2
DOP CALC LVOT STROKE VOLUME: 53.79
E WAVE DECELERATION TIME: 180 MS
E/A RATIO: 1.07
EOSINOPHIL # BLD AUTO: 0.08 THOUSAND/ÂΜL (ref 0–0.61)
EOSINOPHIL NFR BLD AUTO: 1 % (ref 0–6)
ERYTHROCYTE [DISTWIDTH] IN BLOOD BY AUTOMATED COUNT: 12.8 % (ref 11.6–15.1)
FRACTIONAL SHORTENING: 15 (ref 28–44)
GFR SERPL CREATININE-BSD FRML MDRD: 83 ML/MIN/1.73SQ M
GLUCOSE SERPL-MCNC: 90 MG/DL (ref 65–140)
HCT VFR BLD AUTO: 34.8 % (ref 36.5–49.3)
HDLC SERPL-MCNC: 73 MG/DL
HGB BLD-MCNC: 12 G/DL (ref 12–17)
IMM GRANULOCYTES # BLD AUTO: 0.02 THOUSAND/UL (ref 0–0.2)
IMM GRANULOCYTES NFR BLD AUTO: 0 % (ref 0–2)
INTERVENTRICULAR SEPTUM IN DIASTOLE (PARASTERNAL SHORT AXIS VIEW): 1.2 CM
INTERVENTRICULAR SEPTUM: 1.2 CM (ref 0.6–1.1)
LAAS-AP2: 20.7 CM2
LAAS-AP4: 19.5 CM2
LDLC SERPL CALC-MCNC: 53 MG/DL (ref 0–100)
LEFT ATRIUM SIZE: 3 CM
LEFT ATRIUM VOLUME (MOD BIPLANE): 65 ML
LEFT ATRIUM VOLUME INDEX (MOD BIPLANE): 33.7 ML/M2
LEFT INTERNAL DIMENSION IN SYSTOLE: 3.3 CM (ref 2.1–4)
LEFT VENTRICULAR INTERNAL DIMENSION IN DIASTOLE: 3.9 CM (ref 3.5–6)
LEFT VENTRICULAR POSTERIOR WALL IN END DIASTOLE: 1.2 CM
LEFT VENTRICULAR STROKE VOLUME: 24 ML
LVSV (TEICH): 24 ML
LYMPHOCYTES # BLD AUTO: 0.8 THOUSANDS/ÂΜL (ref 0.6–4.47)
LYMPHOCYTES NFR BLD AUTO: 14 % (ref 14–44)
MAGNESIUM SERPL-MCNC: 1.8 MG/DL (ref 1.9–2.7)
MAX DIASTOLIC BP: 70 MMHG
MAX PREDICTED HEART RATE: 127 BPM
MCH RBC QN AUTO: 30.9 PG (ref 26.8–34.3)
MCHC RBC AUTO-ENTMCNC: 34.5 G/DL (ref 31.4–37.4)
MCV RBC AUTO: 90 FL (ref 82–98)
MONOCYTES # BLD AUTO: 0.61 THOUSAND/ÂΜL (ref 0.17–1.22)
MONOCYTES NFR BLD AUTO: 11 % (ref 4–12)
MV E'TISSUE VEL-SEP: 7 CM/S
MV PEAK A VEL: 0.81 M/S
MV PEAK E VEL: 87 CM/S
MV STENOSIS PRESSURE HALF TIME: 52 MS
MV VALVE AREA P 1/2 METHOD: 4.23
NEUTROPHILS # BLD AUTO: 4.24 THOUSANDS/ÂΜL (ref 1.85–7.62)
NEUTS SEG NFR BLD AUTO: 73 % (ref 43–75)
NONHDLC SERPL-MCNC: 64 MG/DL
NRBC BLD AUTO-RTO: 0 /100 WBCS
PLATELET # BLD AUTO: 231 THOUSANDS/UL (ref 149–390)
PMV BLD AUTO: 9 FL (ref 8.9–12.7)
POTASSIUM SERPL-SCNC: 3.6 MMOL/L (ref 3.5–5.3)
PROTOCOL NAME: NORMAL
RBC # BLD AUTO: 3.88 MILLION/UL (ref 3.88–5.62)
REASON FOR TERMINATION: NORMAL
RIGHT ATRIUM AREA SYSTOLE A4C: 18.9 CM2
RIGHT VENTRICLE ID DIMENSION: 4 CM
SL CV LEFT ATRIUM LENGTH A2C: 4.8 CM
SL CV LV EF: 55
SL CV PED ECHO LEFT VENTRICLE DIASTOLIC VOLUME (MOD BIPLANE) 2D: 67 ML
SL CV PED ECHO LEFT VENTRICLE SYSTOLIC VOLUME (MOD BIPLANE) 2D: 43 ML
SODIUM SERPL-SCNC: 136 MMOL/L (ref 135–147)
STRESS POST EXERCISE DUR MIN: 3 MIN
STRESS POST EXERCISE DUR SEC: 0 SEC
STRESS POST PEAK HR: 123 BPM
STRESS POST PEAK SYSTOLIC BP: 160 MMHG
TARGET HR FORMULA: NORMAL
TEST INDICATION: NORMAL
TR MAX PG: 30 MMHG
TR PEAK VELOCITY: 2.7 M/S
TRICUSPID ANNULAR PLANE SYSTOLIC EXCURSION: 2.1 CM
TRICUSPID VALVE PEAK REGURGITATION VELOCITY: 2.74 M/S
TRIGL SERPL-MCNC: 53 MG/DL
WBC # BLD AUTO: 5.79 THOUSAND/UL (ref 4.31–10.16)

## 2023-11-03 PROCEDURE — 85025 COMPLETE CBC W/AUTO DIFF WBC: CPT | Performed by: INTERNAL MEDICINE

## 2023-11-03 PROCEDURE — 80048 BASIC METABOLIC PNL TOTAL CA: CPT | Performed by: INTERNAL MEDICINE

## 2023-11-03 PROCEDURE — 80061 LIPID PANEL: CPT | Performed by: INTERNAL MEDICINE

## 2023-11-03 PROCEDURE — 83735 ASSAY OF MAGNESIUM: CPT | Performed by: INTERNAL MEDICINE

## 2023-11-03 PROCEDURE — 93306 TTE W/DOPPLER COMPLETE: CPT

## 2023-11-03 PROCEDURE — 93306 TTE W/DOPPLER COMPLETE: CPT | Performed by: INTERNAL MEDICINE

## 2023-11-03 PROCEDURE — 85730 THROMBOPLASTIN TIME PARTIAL: CPT | Performed by: INTERNAL MEDICINE

## 2023-11-03 PROCEDURE — 99232 SBSQ HOSP IP/OBS MODERATE 35: CPT | Performed by: INTERNAL MEDICINE

## 2023-11-03 RX ORDER — RANOLAZINE 500 MG/1
500 TABLET, EXTENDED RELEASE ORAL EVERY 12 HOURS SCHEDULED
Qty: 60 TABLET | Refills: 0 | Status: SHIPPED | OUTPATIENT
Start: 2023-11-03

## 2023-11-03 RX ORDER — ATORVASTATIN CALCIUM 40 MG/1
40 TABLET, FILM COATED ORAL
Qty: 30 TABLET | Refills: 0 | Status: SHIPPED | OUTPATIENT
Start: 2023-11-03

## 2023-11-03 RX ORDER — RANOLAZINE 500 MG/1
500 TABLET, EXTENDED RELEASE ORAL EVERY 12 HOURS SCHEDULED
Status: DISCONTINUED | OUTPATIENT
Start: 2023-11-03 | End: 2023-11-03 | Stop reason: HOSPADM

## 2023-11-03 RX ORDER — OXYCODONE HYDROCHLORIDE 5 MG/1
2.5 TABLET ORAL EVERY 6 HOURS PRN
Qty: 15 TABLET | Refills: 0 | Status: SHIPPED | OUTPATIENT
Start: 2023-11-03

## 2023-11-03 RX ORDER — OXYCODONE HYDROCHLORIDE 5 MG/1
5 TABLET ORAL EVERY 6 HOURS PRN
Status: DISCONTINUED | OUTPATIENT
Start: 2023-11-03 | End: 2023-11-03 | Stop reason: HOSPADM

## 2023-11-03 RX ADMIN — OXYCODONE HYDROCHLORIDE 5 MG: 5 TABLET ORAL at 11:13

## 2023-11-03 RX ADMIN — Medication 1 TABLET: at 08:35

## 2023-11-03 RX ADMIN — CHLORTHALIDONE 25 MG: 25 TABLET ORAL at 08:35

## 2023-11-03 RX ADMIN — AMLODIPINE BESYLATE 2.5 MG: 2.5 TABLET ORAL at 08:36

## 2023-11-03 RX ADMIN — ACETAMINOPHEN 650 MG: 325 TABLET ORAL at 08:34

## 2023-11-03 RX ADMIN — LISINOPRIL 5 MG: 5 TABLET ORAL at 08:35

## 2023-11-03 RX ADMIN — HEPARIN SODIUM 9 UNITS/KG/HR: 10000 INJECTION, SOLUTION INTRAVENOUS at 04:02

## 2023-11-03 RX ADMIN — METOPROLOL TARTRATE 25 MG: 25 TABLET, FILM COATED ORAL at 08:35

## 2023-11-03 RX ADMIN — MORPHINE SULFATE 2 MG: 2 INJECTION, SOLUTION INTRAMUSCULAR; INTRAVENOUS at 13:05

## 2023-11-03 NOTE — PROGRESS NOTES
11/03/23 1109 11/03/23 1111 11/03/23 1113   Vitals   Blood Pressure 152/61 146/67 153/67   MAP (mmHg)  --   --  96   Patient Position - Orthostatic VS Lying - Orthostatic VS Sitting - Orthostatic VS Standing - Orthostatic VS

## 2023-11-03 NOTE — PROGRESS NOTES
Progress Note - Cardiology   [facility]     Kristen Hirsch 80 y.o. male MRN: [id number]  : 1930  Unit/Bed#: [address] [place], [place] [id number] Encounter: [id number]    Assessment and Plan:   Coronary artery disease.    - Presented on 23 for scheduled outpatient nuclear stress test for preoperative risk stratification for planned left CEA/patch angioplasty with vascular surgery as per 10/18/23 note. Patient reported feeling his baseline health this morning. Underwent nuclear stress test today, reported chest pain and noted to have significant ST changes during test.  Patient reports he periodically has episodes of chest pain. He states that they are very infrequent but when he does have chest pain its localized to the center of his chest and quickly resolves. He denies any associating symptoms. He denies experiencing palpitations, shortness of breath, lightheadedness, dizziness, headache, nausea, vomiting or diaphoresis with episodes of chest pain. - 23 nuclear stress test: LVEF 65%. There is a left ventricular perfusion defect that is medium in size present in the apical location(s) that is partially reversible. Markedly abnormal nuclear stress. SSS 5 SRS 4 SDS 1. Patient with chest pain and st depressions with vasodilation   - 23 cardiac catheterization: final report pending, on review severe RCA CAD and left main CAD.   - Dr. Sixto Cuevas discussed with patient and family patient does not want interventional/surgical candidate. Recommending medical management optimization. - 20 TTE: LVEF 55-60%. Left ventricle diastolic function parameters were normal for patient's age. Mitral valve with mild regurgitation. Aortic valve with mild stenosis. Tricuspid valve with mild regurgitation.  - Follow up repeat TTE.   - Currently on heparin drip. Will discuss with attending when to discontinue heparin drip.   - Patient may benefit from xeralto 2.5 mg and antiplatelet therapy, reference COMPASS trial.   - Continue aspirin 81 mg daily and Lipitor 40 mg daily. - Continue amlodipine 2.5 mg daily, hydrochlorothiazide 25 mg every other day, lisinopril 5 mg daily, and Lopressor 25 mg twice daily. - Continue telemetry. Hypertension.     - SBP since admission 129-172. - Patient with documented white coat syndrome.  - Continue amlodipine 2.5 mg daily, hydrochlorothiazide 25 mg every other day, lisinopril 5 mg daily, and Lopressor 25 mg twice daily. - Continue to monitor BP.  - Follow up orthostatic vital signs. Hyperlipidemia.     - Continue lipitor 40 mg daily. - 12/06/22 lipid panel: Cholesterol 153, triglycerides 74, HDL 77, LDL 61.     4.  Valvular heart disease.     - Mild mitral valve regurgitation. Mild aortic valve stenosis. Mild tricuspid valve regurgitation. - 1/08/20 TTE:      MITRAL VALVE: There was mild annular calcification. There was normal leaflet separation. DOPPLER: The transmitral velocity was within the normal range. There was no evidence for stenosis. There was mild regurgitation. AORTIC VALVE: The valve was functionally bicuspid. Leaflets exhibited moderately increased thickness, mild to moderate calcification, and mildly reduced cuspal separation. DOPPLER: Transaortic velocity was increased due to valvular  stenosis. There was mild stenosis. JENNIFER 1.45 cm2, with peak velocity 2 m/sec and peak gradient 20 mm of hg There was no significant regurgitation. TRICUSPID VALVE: DOPPLER: There was mild regurgitation. Estimated peak PA pressure was 40 mmHg. PULMONIC VALVE: DOPPLER: There was no significant regurgitation.     - Follow up repeat TTE. 5. Bilateral carotid stenosis. - Follows outpatient with [facility]. - 9/20/23 VAS carotid complete study:      RIGHT: There is <50% stenosis noted in the internal carotid artery. Plaque is heterogenous and smooth. Vertebral artery flow is antegrade.  There is no significant subclavian artery disease. LEFT: There is 50-69% stenosis noted in the internal carotid artery. Plaque is heterogenous and irregular. Vertebral artery flow is antegrade. There is no significant subclavian artery disease. 6. Peripheral arterial disease.     - Patient with known history of peripheral arterial disease. He reports that he is able to walk approximately 100 feet before developing claudication. - 10/17/23 CTA abdominal with runoff with and without contrast:  Heavily diseased and calcified mid and distal common femoral artery with occlusion and questionable small short segments of distal reconstitution. Occlusion of the proximal right superficial femoral artery at the origin (approximately 1 to 1.5 cm) with distal reconstitution. Heavily diseased right superficial femoral artery with short segment multifocal areas of moderate, high-grade and questionable occlusions of the mid and distal artery. The right mid and distal anterior tibial artery has multifocal areas of occlusion and reconstitution along with distal reconstitution near the level of the ankle, however evaluation is limited secondary to heavily calcified artery. Multifocal short segment moderate and high-grade stenosis throughout the mid and distal left superficial femoral artery and proximal popliteal artery. 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 - Follows outpatient with [facility] surgery. 7. BPH.     - Currently on finasteride 5 mg daily. Subjective / Objective:          Patient underwent cardiac catheterization yesterday 11/02/23 after reporting chest pain and noting ST depressions on EKG during nuclear stress test.  Cardiac catheterization noted severe RCA and left main CAD. Dr. Khalida Lozada discussed with patient and family patient does not want interventional/surgical candidate. Recommending medical management optimization.       Patient reports feeling okay this morning. He states that he has significant right foot pain. He denies experiencing chest pain, palpitations, shortness of breath, lightheadedness, dizziness, headache, nausea, or vomiting. Vitals: Blood pressure 164/79, pulse 65, temperature 97.5 °F (36.4 °C), resp. rate 19, height 5' 11" (1.803 m), weight 73.4 kg (161 lb 12.8 oz), SpO2 95 %. Vitals:    11/02/23 1354   Weight: 73.4 kg (161 lb 12.8 oz)     Body mass index is 22.57 kg/m². BP Readings from Last 3 Encounters:   11/03/23 164/79   10/24/23 120/60   10/06/23 124/70     Orthostatic Blood Pressures      Flowsheet Row Most Recent Value   Blood Pressure 164/79 filed at 11/03/2023 0744   Patient Position - Orthostatic VS Sitting filed at 11/02/2023 1354          I/O         11/01 0701  11/02 0700 11/02 0701  11/03 0700 11/03 0701  11/04 0700    Urine (mL/kg/hr)  1000     Total Output  1000     Net  -1000                  Invasive Devices       Peripheral Intravenous Line  Duration             Peripheral IV 11/02/23 Left Arm <1 day                      Intake/Output Summary (Last 24 hours) at 11/3/2023 0908  Last data filed at 11/3/2023 0604  Gross per 24 hour   Intake --   Output 1000 ml   Net -1000 ml         Physical Exam:   Physical Exam  Vitals reviewed. Constitutional:       General: He is not in acute distress. Cardiovascular:      Rate and Rhythm: Normal rate and regular rhythm. Pulses: Normal pulses. Heart sounds: Murmur heard. Pulmonary:      Effort: Pulmonary effort is normal. No respiratory distress. Breath sounds: Decreased breath sounds present. Abdominal:      General: Abdomen is flat. There is no distension. Palpations: Abdomen is soft. Tenderness: There is no abdominal tenderness. Musculoskeletal:      Right lower leg: No edema. Left lower leg: No edema. Skin:     General: Skin is warm and dry. Comments: Right wrist access site: Palpable right radial pulse.   No hematoma or ecchymosis noted. Nontender to palpation. Neurological:      Mental Status: He is alert and oriented to person, place, and time.        Medications/ Allergies:     Current Facility-Administered Medications   Medication Dose Route Frequency Provider Last Rate    acetaminophen  650 mg Oral Q6H PRN Kayla Romero MD      amLODIPine  2.5 mg Oral Daily Kayla Romero MD      ammonium lactate   Topical BID PRN Kayla Romero MD      aspirin  81 mg Oral HS Kayla Romero MD      atorvastatin  40 mg Oral Daily With Maureen Urena MD      calcium carbonate-vitamin D  1 tablet Oral Daily With Breakfast Kayla Romero MD      chlorthalidone  25 mg Oral Every Other Day Kayla Romero MD      docusate sodium  100 mg Oral BID aKyla Romero MD      finasteride  5 mg Oral HS Kayla Romero MD      heparin (porcine)  3-20 Units/kg/hr (Order-Specific) Intravenous Titrated Rowan Billings PA-C 9 Units/kg/hr (11/03/23 0402)    heparin (porcine)  5,000 Units Subcutaneous Q8H [address] Kayla Romero MD      lisinopril  5 mg Oral Daily Kayla Romero MD      metoprolol tartrate  25 mg Oral Q12H [address] Kayla Romero MD      multivitamin-minerals  1 tablet Oral Daily Kayla Romero MD      polyethylene glycol  17 g Oral Daily Kayla Romero MD       acetaminophen, 650 mg, Q6H PRN  ammonium lactate, , BID PRN      Allergies   Allergen Reactions    Tamsulosin Syncope       VTE Pharmacologic Prophylaxis:   Heparin    Labs:   Troponins:        CBC with diff:  Results from last 7 days   Lab Units 11/03/23  0602 11/02/23  1412   WBC Thousand/uL 5.79 6.25   HEMOGLOBIN g/dL 12.0 12.7   HEMATOCRIT % 34.8* 36.5   MCV fL 90 91   PLATELETS Thousands/uL 231 269   RBC Million/uL 3.88 4.00   MCH pg 30.9 31.8   MCHC g/dL 34.5 34.8   RDW % 12.8 13.0   MPV fL 9.0 8.9   NRBC AUTO /100 WBCs 0  --        CMP:  Results from last 7 days   Lab Units 11/03/23  0602 11/02/23  1412   SODIUM mmol/L 136 135   POTASSIUM mmol/L 3.6 3.8   CHLORIDE mmol/L 101 98   CO2 mmol/L 28 28   ANION GAP mmol/L 7 9   BUN mg/dL 17 24   CREATININE mg/dL 0.66 0.78   CALCIUM mg/dL 9.2 9.7   EGFR ml/min/1.73sq m 83 77       Magnesium:  Results from last 7 days   Lab Units 11/03/23  0602   MAGNESIUM mg/dL 1.8*     Coags:  Results from last 7 days   Lab Units 11/03/23  0148 11/02/23  1605   PTT seconds >210* 90*   INR   --  1.14     TSH:    No components found for: "TSH3"  Lipid Profile:  Results from last 7 days   Lab Units 11/03/23  0602   CHOLESTEROL mg/dL 137   TRIGLYCERIDES mg/dL 53   HDL mg/dL 73   LDL CALC mg/dL 53     Hgb A1c:  Results from last 7 days   Lab Units 11/02/23  1412   HEMOGLOBIN A1C % 5.6     NT-proBNP: No results for input(s): "NTBNP" in the last 72 hours. Imaging & Testing   I have personally reviewed pertinent reports. CTA abdominal w run off w wo contrast    Result Date: 10/17/2023    Impression: 1. Heavily diseased and calcified mid and distal common femoral artery with occlusion and questionable small short segments of distal reconstitution. 2.  Occlusion of the proximal right superficial femoral artery at the origin (approximately 1 to 1.5 cm) with distal reconstitution. 3.  Heavily diseased right superficial femoral artery with short segment multifocal areas of moderate, high-grade and questionable occlusions of the mid and distal artery. 4.  The right mid and distal anterior tibial artery has multifocal areas of occlusion and reconstitution along with distal reconstitution near the level of the ankle, however evaluation is limited secondary to heavily calcified artery. 5.   Multifocal short segment moderate and high-grade stenosis throughout the mid and distal left superficial femoral artery and proximal popliteal artery. 6.  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 EKG / Monitor: Personally reviewed.       Telemetry reviewed: Currently sinus rhythm, 62 bpm.    Cardiac testing: NM myocardial perfusion spect (rx stress and/or rest)    Result Date: 2023  Narrative:   Stress Function: Left ventricular function post-stress is normal. Stress ejection fraction is 65 %. Perfusion Defect Conclusion: The stress/rest perfusion ratio is 1.20 . There is evidence of transient ischemic dilation (TID). Perfusion: There is a left ventricular perfusion defect that is medium in size present in the apical location(s) that is partially reversible. Stress Combined Conclusion: Left ventricular perfusion is abnormal.   Stress ECG: ST depression is noted. The ECG was positive for ischemia. The ECG was not diagnostic due to pharmacological (vasodilator) stress. The stress ECG is consistent with ischemia after pharmacologic vasodilation, with reproduction of symptoms. Stress ECG: The patient after exercising for 3 min and had a maximal HR of 123 bpm (96 % of MPHR) and 1.6 METS. Markedly abnormal nuclear stress. SSS 5 SRS 4 SDS 1. Patient with chest pain and st depressions with vasodilation.      Results for orders placed during the hospital encounter of 20    Echo complete with contrast if indicated    Narrative  [address] [place] [address]  [phone]    Transthoracic Echocardiogram  2D, M-mode, Doppler, and Color Doppler    Study date:  2020    Patient: Maynor Lanza  MR number: [id number]  Account number: [de-identified]  : 06-Aug-1930  Age: 80 years  Gender: Male  Status: Outpatient  Location: Echo lab  Height: 70 in  Weight: 166.8 lb  BP: 138/ 84 mmHg    Indications: Mitral Valve Disease    Diagnoses: 394.9 - MITRAL VALVE DIS NEC/NOS    Sonographer:  NNEKA Goyal  Primary Physician:  Davi Martínez MD  Referring Physician:  Odalis Quintero MD  Group:  [facility]  Interpreting Physician:  Ninoska Montgomery MD    SUMMARY    LEFT VENTRICLE:  Systolic function was normal. Ejection fraction was estimated in the range of 55 % to 60 % to be 60 %. There were no regional wall motion abnormalities. Wall thickness was at the upper limits of normal.    LEFT ATRIUM:  The atrium was mildly dilated. MITRAL VALVE:  There was mild annular calcification. There was mild regurgitation. AORTIC VALVE:  The valve was functionally bicuspid. Leaflets exhibited moderately increased thickness, mild to moderate calcification, and mildly reduced cuspal separation. Transaortic velocity was increased due to valvular stenosis. There was mild stenosis. JENNIFER 1.45 cm2, with peak velocity 2 m/sec and peak gradient 20 mm of hg    TRICUSPID VALVE:  There was mild regurgitation. Estimated peak PA pressure was 40 mmHg. HISTORY: PRIOR HISTORY: HTN, HLD, PAD, BPH    PROCEDURE: The procedure was performed in the echo lab. This was a routine study. The transthoracic approach was used. The study included complete 2D imaging, M-mode, complete spectral Doppler, and color Doppler. The heart rate was 64 bpm,  at the start of the study. Image quality was adequate. LEFT VENTRICLE: Size was normal. Systolic function was normal. Ejection fraction was estimated in the range of 55 % to 60 % to be 60 %. There were no regional wall motion abnormalities. Wall thickness was at the upper limits of normal.  DOPPLER: Left ventricular diastolic function parameters were normal for the patient's age. RIGHT VENTRICLE: The size was normal. Systolic function was normal.    LEFT ATRIUM: The atrium was mildly dilated. RIGHT ATRIUM: Size was at the upper limits of normal.    MITRAL VALVE: There was mild annular calcification. There was normal leaflet separation. DOPPLER: The transmitral velocity was within the normal range. There was no evidence for stenosis. There was mild regurgitation. AORTIC VALVE: The valve was functionally bicuspid.  Leaflets exhibited moderately increased thickness, mild to moderate calcification, and mildly reduced cuspal separation. DOPPLER: Transaortic velocity was increased due to valvular  stenosis. There was mild stenosis. JENNIFER 1.45 cm2, with peak velocity 2 m/sec and peak gradient 20 mm of hg There was no significant regurgitation. TRICUSPID VALVE: DOPPLER: There was mild regurgitation. Estimated peak PA pressure was 40 mmHg. PULMONIC VALVE: DOPPLER: There was no significant regurgitation. PERICARDIUM: There was no thickening or calcification. There was no pericardial effusion. AORTA: The root exhibited normal size. SYSTEMIC VEINS: IVC: The inferior vena cava was normal in size. Respirophasic changes were normal.    SYSTEM MEASUREMENT TABLES    2D mode  AoR Diam 2D: 3.4 cm  LA Diam (2D): 3.6 cm  LA/Ao (2D): 1.06  FS (2D Teich): 26.1 %  IVSd (2D): 0.99 cm  LVDEV: 71.3 cmï¾³  LVESV: 34.4 cmï¾³  LVIDd(2D): 4.03 cm  LVISd (2D): 2.98 cm  LVOT Area 2D: 2.84 cmï¾²  LVPWd (2D): 0.9 cm  SV (Teich): 36.9 cmï¾³    Apical four chamber  LVEF A4C: 56 %    Unspecified Scan Mode  JENNIFER Cont Eq (Peak Carlitos): 1.62 cmï¾²  JENNIFER Cont Eq (VTI): 1.43 cmï¾²  LVOT (VTI): 24.7 cm  LVOT Diam.: 1.9 cm  LVOT Vmax: 1120 mm/s  LVOT Vmax; Mean: 1120 mm/s  Peak Grad.; Mean: 5 mm[Hg]  SV (LVOT): 70 cmï¾³  VTI;Mean: 2 mm[Hg]  MV Peak A Carlitos: 663 mm/s  MV Peak E Carlitos.  Mean: 821 mm/s  MVA (PHT): 3.49 cmï¾²  PHT: 63 ms  Max P mm[Hg]  V Max: 2920 mm/s  Vmax: 2920 mm/s  RA Area: 18.4 cmï¾²  RA Volume: 55.6 cmï¾³  TAPSE: 2.1 cm    [employer] Accredited Echocardiography Laboratory    Prepared and electronically signed by    Melanie Arcos MD  Signed 2020 11:12:11              Abdulkadir Hagan PA-C

## 2023-11-03 NOTE — DISCHARGE SUMMARY
Discharge Summary - [facility]  Patient: Randle Scheuermann 80 y.o. male   MRN: [id number]  PCP: Isha Lopez MD  Unit/Bed#: [address] [place] [address] Encounter: [id number]            Discharging Physician / Practitioner: Latasha Colon MD  PCP: Isha Lopez MD  Admission Date:   Admission Orders (From admission, onward)       Ordered        11/02/23 1326  Inpatient Admission  Once                          Discharge Date: 11/03/23      Reason for Admission:  Abnormal stress test      Discharge Diagnoses:     Principal Problem:    CAD (coronary artery disease)    Active Problems:    Atherosclerosis of native arteries of extremities with rest pain, right leg (HCC)    BPH (benign prostatic hyperplasia)    Hyperlipidemia    Essential hypertension      Consultations During Hospital Stay:  Cardiology      Hospital Course:     Randle Scheuermann is a 80 y.o. male patient who originally presented to the hospital on 11/2/2023 as a direct admission after an normal stress test with elicited chest discomfort and ST depressions. He underwent an expedited heart catheterization which did reveal severe RCA and left main artery disease. He was deemed not a surgical candidate by cardiology after discussion with CT surgery, with recommendation to optimize medical management. His home regimen was continued with increase of his statin to high intensity therapy, addition of beta-blockade therapy, addition of Ranexa by cardiology, and transition of the heparin drip to low-dose Xarelto per cardiology recommendation. He will follow-up in the outpatient setting with cardiology, along with vascular surgery, as he is being evaluated for right lower extremity vascular intervention due to a nonhealing right hallux wound. He will be discharged  home today.       Condition at Discharge: fair       Discharge Day Visit / Exam:     Vitals:  Blood Pressure: 153/67 (11/03/23 1113)  Pulse: 62 (11/03/23 1113)  Temperature: 97.5 °F (36.4 °C) (11/03/23 0744)  Temp Source: Oral (11/02/23 1354)  Respirations: 19 (11/03/23 0744)  Height: 5' 11" (180.3 cm) (11/03/23 1005)  Weight - Scale: 73 kg (161 lb) (11/03/23 1005)  SpO2: 99 % (11/03/23 1113)      Physical exam:  I had a face-to-face encounter with the patient on day of discharge. Discussion with Patient and/or Family:  The patient has been advised to return to the ER immediately if any symptoms recur or worsen. Discharge instructions/Information to Patient and/or Family:   See after visit summary for information provided to patient and/or family. Provisions for Follow-Up Care:  See after visit summary for information related to follow-up care and any pertinent home health orders. Disposition:   Home      Discharge Medications:  See after visit summary for reconciled discharge medications provided to patient and/or family. Discharge Statement:  I spent 38 minutes discharging the patient. This time was spent on the day of discharge. I had direct contact with the patient on the day of discharge. Greater than 50% of the total time was spent examining patient, answering all patient questions, arranging and discussing plan of care with patient as well as directly providing post-discharge instructions. Additional time then spent on discharge activities. ** Please Note: This note is constructed using a voice recognition dictation system. An occasional wrong word/phrase or “sound-a-like” substitution may have been picked up by dictation device due to the inherent limitations of voice recognition software. Read the chart carefully and recognize, using reasonable context, where substitutions may have occurred. **

## 2023-11-03 NOTE — UTILIZATION REVIEW
Initial Clinical Review    Admission: Date/Time/Statement:   Admission Orders (From admission, onward)       Ordered        11/02/23 1326  Inpatient Admission  Once                          Orders Placed This Encounter   Procedures    Inpatient Admission     Standing Status:   Standing     Number of Occurrences:   1     Order Specific Question:   Level of Care     Answer:   Med Surg [16]     Order Specific Question:   Estimated length of stay     Answer:   More than 2 Midnights     Order Specific Question:   Certification     Answer:   I certify that inpatient services are medically necessary for this patient for a duration of greater than two midnights. See H&P and MD Progress Notes for additional information about the patient's course of treatment. Initial Presentation: admitted from stress test  80 y.o. male who initially presented for an elective outpatient stress test for cardiac recertification for an upcoming vascular revascularization procedure. Reportedly, his specialist was deemed abnormal due to intermittent chest discomfort that he developed along with evidence of ST depressions and ischemia on ECG. He was directly admitted to the medical floor and subsequently underwent an expedited cardiac catheterization which revealed evidence of dual vessel progression of CAD. He was deemed not a surgical candidate with recommendation of medical optimization by cardiology. Admitted to inpatient status for CAD. Started on weight based IV Heparin gtt. Date: 11/3/23   Day 2:   Per cardio:  S/p cardiac catheterization yesterday 11/02/23 after reporting chest pain and noting ST depressions on EKG during nuclear stress test.  Cardiac catheterization noted severe RCA and left main CAD. Remains on IV Heparin gtt at this time. Scheduled for echo. Continue telemetry.     ED Triage Vitals   Temperature Pulse Respirations Blood Pressure SpO2   11/02/23 1354 11/02/23 1100 11/02/23 1100 11/02/23 1100 11/02/23 1100   (!) 97.3 °F (36.3 °C) 83 20 160/70 99 %      Temp Source Heart Rate Source Patient Position - Orthostatic VS BP Location FiO2 (%)   11/02/23 1354 -- 11/02/23 1354 11/02/23 1354 --   Oral  Sitting Right arm       Pain Score       11/02/23 1451       No Pain          Wt Readings from Last 1 Encounters:   11/02/23 73.4 kg (161 lb 12.8 oz)     Additional Vital Signs:   Date/Time Temp Pulse Resp BP MAP (mmHg) SpO2 O2 Device Patient Position - Orthostatic VS   11/03/23 03:10:19 97.2 °F (36.2 °C) Abnormal  65 16 161/80 107 97 % -- --   11/02/23 22:26:19 97.5 °F (36.4 °C) 64 -- 147/69 95 97 % -- --   11/02/23 22:19:25 97.2 °F (36.2 °C) Abnormal  70 18 147/70 96 97 % -- --   11/02/23 19:26:58 97.4 °F (36.3 °C) Abnormal  63 -- 129/63 85 98 % -- --   11/02/23 18:46:27 -- 71 -- 129/63 85 97 % -- --   11/02/23 14:51:29 -- -- -- -- -- 98 % Nasal cannula --   11/02/23 1354 97.3 °F (36.3 °C) Abnormal  65 17 172/78 Abnormal  109 99 % None (Room air) Sitting   11/02/23 1100 -- 83 20 160/70 -- 99 % -- --     Pertinent Labs/Diagnostic Test Results:   Prior to admission- 11/2  NM myocardial perfusion spect (rx stress and/or rest)     Stress Function: Left ventricular function post-stress is normal. Stress ejection fraction is 65 %. Perfusion Defect Conclusion: The stress/rest perfusion ratio is 1.20 . There is evidence of transient ischemic dilation (TID). Perfusion: There is a left ventricular perfusion defect that is medium in size present in the apical location(s) that is partially reversible. Stress Combined Conclusion: Left ventricular perfusion is abnormal.    Stress ECG: ST depression is noted. The ECG was positive for ischemia. The ECG was not diagnostic due to pharmacological (vasodilator) stress. The stress ECG is consistent with ischemia after pharmacologic vasodilation, with reproduction of symptoms. Stress ECG: The patient after exercising for 3 min and had a maximal HR of 123 bpm (96 % of MPHR) and 1.6 METS. Markedly abnormal nuclear stress. SSS 5 SRS 4 SDS 1. Patient with chest pain and st depressions with vasodilation    Results from last 7 days   Lab Units 11/03/23  0602 11/02/23  1412   WBC Thousand/uL 5.79 6.25   HEMOGLOBIN g/dL 12.0 12.7   HEMATOCRIT % 34.8* 36.5   PLATELETS Thousands/uL 231 269   NEUTROS ABS Thousands/µL 4.24  --      Results from last 7 days   Lab Units 11/03/23  0602 11/02/23  1412   SODIUM mmol/L 136 135   POTASSIUM mmol/L 3.6 3.8   CHLORIDE mmol/L 101 98   CO2 mmol/L 28 28   ANION GAP mmol/L 7 9   BUN mg/dL 17 24   CREATININE mg/dL 0.66 0.78   EGFR ml/min/1.73sq m 83 77   CALCIUM mg/dL 9.2 9.7   MAGNESIUM mg/dL 1.8*  --      Results from last 7 days   Lab Units 11/03/23  0602 11/02/23  1412   GLUCOSE RANDOM mg/dL 90 93     Results from last 7 days   Lab Units 11/02/23  1412   HEMOGLOBIN A1C % 5.6   EAG mg/dl 114     Results from last 7 days   Lab Units 11/03/23  0148 11/02/23  1605   PROTIME seconds  --  14.9*   INR   --  1.14   PTT seconds >210* 90*       Past Medical History:   Diagnosis Date    Asymptomatic PVCs     last assessed 1/18/18    BPH (benign prostatic hyperplasia)     last assessed 4/11/17     High cholesterol     last assessed 4/11/17     Hypertension     uncontrolled, last assessed 4/11/17     Present on Admission:   Hyperlipidemia   Essential hypertension   BPH (benign prostatic hyperplasia)   Atherosclerosis of native arteries of extremities with rest pain, right leg (HCC)      Admitting Diagnosis: Atherosclerosis of native arteries of extremities with rest pain, right leg (HCC) [I70.221]  Atypical chest pain [R07.89]  Dyslipidemia [E78.5]  Atherosclerotic PVD with intermittent claudication ([address]) [I70.219]  Right foot pain [M79.671]  Age/Sex: 80 y.o. male  Admission Orders:  Consult cardio  Telemetry  Cath site care:1) Dress puncture site with clear dressing or bandage. 2) Do not use sandbag or apply pressure dressing.      Scheduled Medications:  amLODIPine, 2.5 mg, Oral, Daily  aspirin, 81 mg, Oral, HS  atorvastatin, 40 mg, Oral, Daily With Dinner  calcium carbonate-vitamin D, 1 tablet, Oral, Daily With Breakfast  chlorthalidone, 25 mg, Oral, Every Other Day  docusate sodium, 100 mg, Oral, BID  finasteride, 5 mg, Oral, HS  heparin (porcine), 5,000 Units, Subcutaneous, Q8H LASHAY  lisinopril, 5 mg, Oral, Daily  metoprolol tartrate, 25 mg, Oral, Q12H LASHAY  multivitamin-minerals, 1 tablet, Oral, Daily  polyethylene glycol, 17 g, Oral, Daily    Continuous IV Infusions:  heparin (porcine), 3-20 Units/kg/hr (Order-Specific), Intravenous, Titrated    PRN Meds:  acetaminophen, 650 mg, Oral, Q6H PRN  ammonium lactate, , Topical, BID PRN    Network Utilization Review Department  ATTENTION: Please call with any questions or concerns to [phone] and carefully listen to the prompts so that you are directed to the right person. All voicemails are confidential.   For Discharge needs, contact Care Management DC Support Team at [phone] opt. 2  Send all requests for admission clinical reviews, approved or denied determinations and any other requests to dedicated fax number below belonging to the campus where the patient is receiving treatment.  List of dedicated fax numbers for the Facilities:  [place] DENIALS (Administrative/Medical Necessity) [phone]   DISCHARGE SUPPORT TEAM (NETWORK) [address] (Maternity/NICU/Pediatrics) [phone]   [address] [place] [address] [phone]   [facility] [address] [facility] [phone]   [facility] [address] - [facility]  [address] [phone]

## 2023-11-03 NOTE — PLAN OF CARE
Problem: PAIN - ADULT  Goal: Verbalizes/displays adequate comfort level or baseline comfort level  Description: Interventions:  - Encourage patient to monitor pain and request assistance  - Assess pain using appropriate pain scale  - Administer analgesics based on type and severity of pain and evaluate response  - Implement non-pharmacological measures as appropriate and evaluate response  - Consider cultural and social influences on pain and pain management  - Notify physician/advanced practitioner if interventions unsuccessful or patient reports new pain  Outcome: Progressing     Problem: INFECTION - ADULT  Goal: Absence or prevention of progression during hospitalization  Description: INTERVENTIONS:  - Assess and monitor for signs and symptoms of infection  - Monitor lab/diagnostic results  - Monitor all insertion sites, i.e. indwelling lines, tubes, and drains  - Monitor endotracheal if appropriate and nasal secretions for changes in amount and color  - [place] appropriate cooling/warming therapies per order  - Administer medications as ordered  - Instruct and encourage patient and family to use good hand hygiene technique  - Identify and instruct in appropriate isolation precautions for identified infection/condition  Outcome: Progressing     Problem: SAFETY ADULT  Goal: Patient will remain free of falls  Description: INTERVENTIONS:  - Educate patient/family on patient safety including physical limitations  - Instruct patient to call for assistance with activity   - Consult OT/PT to assist with strengthening/mobility   - Keep Call bell within reach  - Keep bed low and locked with side rails adjusted as appropriate  - Keep care items and personal belongings within reach  - Initiate and maintain comfort rounds  - Make Fall Risk Sign visible to staff  - Offer Toileting every 2 Hours, in advance of need  - Initiate/Maintain bed/chair alarm  - Obtain necessary fall risk management equipment: bed/chair alarm  - Apply yellow socks and bracelet for high fall risk patients  - Consider moving patient to room near nurses station  Outcome: Progressing  Goal: Maintain or return to baseline ADL function  Description: INTERVENTIONS:  -  Assess patient's ability to carry out ADLs; assess patient's baseline for ADL function and identify physical deficits which impact ability to perform ADLs (bathing, care of mouth/teeth, toileting, grooming, dressing, etc.)  - Assess/evaluate cause of self-care deficits   - Assess range of motion  - Assess patient's mobility; develop plan if impaired  - Assess patient's need for assistive devices and provide as appropriate  - Encourage maximum independence but intervene and supervise when necessary  - Involve family in performance of ADLs  - Assess for home care needs following discharge   - Consider OT consult to assist with ADL evaluation and planning for discharge  - Provide patient education as appropriate  Outcome: Progressing  Goal: Maintains/Returns to pre admission functional level  Description: INTERVENTIONS:  - Perform BMAT or MOVE assessment daily.   - Set and communicate daily mobility goal to care team and patient/family/caregiver. - Collaborate with rehabilitation services on mobility goals if consulted  - Perform Range of Motion 3 times a day. - Reposition patient every 3 hours.   - Dangle patient 3 times a day  - Stand patient 3 times a day  - Ambulate patient 3 times a day  - Out of bed to chair 3 times a day   - Out of bed for meals 3 times a day  - Out of bed for toileting  - Record patient progress and toleration of activity level   Outcome: Progressing     Problem: DISCHARGE PLANNING  Goal: Discharge to home or other facility with appropriate resources  Description: INTERVENTIONS:  - Identify barriers to discharge w/patient and caregiver  - Arrange for needed discharge resources and transportation as appropriate  - Identify discharge learning needs (meds, wound care, etc.)  - Arrange for interpretive services to assist at discharge as needed  - Refer to Case Management Department for coordinating discharge planning if the patient needs post-hospital services based on physician/advanced practitioner order or complex needs related to functional status, cognitive ability, or social support system  Outcome: Progressing     Problem: Knowledge Deficit  Goal: Patient/family/caregiver demonstrates understanding of disease process, treatment plan, medications, and discharge instructions  Description: Complete learning assessment and assess knowledge base. Interventions:  - Provide teaching at level of understanding  - Provide teaching via preferred learning methods  Outcome: Progressing     Problem: MOBILITY - ADULT  Goal: Maintain or return to baseline ADL function  Description: INTERVENTIONS:  -  Assess patient's ability to carry out ADLs; assess patient's baseline for ADL function and identify physical deficits which impact ability to perform ADLs (bathing, care of mouth/teeth, toileting, grooming, dressing, etc.)  - Assess/evaluate cause of self-care deficits   - Assess range of motion  - Assess patient's mobility; develop plan if impaired  - Assess patient's need for assistive devices and provide as appropriate  - Encourage maximum independence but intervene and supervise when necessary  - Involve family in performance of ADLs  - Assess for home care needs following discharge   - Consider OT consult to assist with ADL evaluation and planning for discharge  - Provide patient education as appropriate  Outcome: Progressing  Goal: Maintains/Returns to pre admission functional level  Description: INTERVENTIONS:  - Perform BMAT or MOVE assessment daily.   - Set and communicate daily mobility goal to care team and patient/family/caregiver. - Collaborate with rehabilitation services on mobility goals if consulted  - Perform Range of Motion 3 times a day. - Reposition patient every 3 hours.   - Dangle patient 3 times a day  - Stand patient 3 times a day  - Ambulate patient 3 times a day  - Out of bed to chair 3 times a day   - Out of bed for meals 3 times a day  - Out of bed for toileting  - Record patient progress and toleration of activity level   Outcome: Progressing     Problem: Prexisting or High Potential for Compromised Skin Integrity  Goal: Skin integrity is maintained or improved  Description: INTERVENTIONS:  - Identify patients at risk for skin breakdown  - Assess and monitor skin integrity  - Assess and monitor nutrition and hydration status  - Monitor labs   - Assess for incontinence   - Turn and reposition patient  - Assist with mobility/ambulation  - Relieve pressure over bony prominences  - Avoid friction and shearing  - Provide appropriate hygiene as needed including keeping skin clean and dry  - Evaluate need for skin moisturizer/barrier cream  - Collaborate with interdisciplinary team   - Patient/family teaching  - Consider wound care consult   Outcome: Progressing

## 2023-11-05 NOTE — RESULT ENCOUNTER NOTE
Notify patient or his wife that the nuclear stress test from 11/2/2023 showed a mild abnormality in a small segment of the heart. .  It does not require a change in treatment at this time, but notify us if the patient starts to complain of more frequent episodes of chest discomfort.     Dr. Kelly Fresh

## 2023-11-06 ENCOUNTER — TELEPHONE (OUTPATIENT)
Dept: CARDIOLOGY CLINIC | Facility: CLINIC | Age: 88
End: 2023-11-06

## 2023-11-06 NOTE — TELEPHONE ENCOUNTER
Pt.made aware of NM Stress Test results. Pt.advised to call the office for any Chest Pain. Pt.verbalized understanding.

## 2023-11-06 NOTE — TELEPHONE ENCOUNTER
----- Message from Glenys Navarro MD sent at 11/5/2023  2:32 PM EST -----  Notify patient or his wife that the nuclear stress test from 11/2/2023 showed a mild abnormality in a small segment of the heart. .  It does not require a change in treatment at this time, but notify us if the patient starts to complain of more frequent episodes of chest discomfort.     Dr. Jorge Alberto Vargas

## 2023-11-09 NOTE — UTILIZATION REVIEW
NOTIFICATION OF ADMISSION DISCHARGE   This is a Notification of Discharge from [address]. Please be advised that this patient has been discharge from our facility. Below you will find the admission and discharge date and time including the patient’s disposition. UTILIZATION REVIEW CONTACT:  [facility]  Utilization   Network Utilization Review Department  Phone: [phone] x carefully listen to the prompts. All voicemails are confidential.  Email: [email or web address]     ADMISSION INFORMATION  PRESENTATION DATE: 11/2/2023  1:50 PM  OBERVATION ADMISSION DATE:   INPATIENT ADMISSION DATE: 11/2/23  1:50 PM   DISCHARGE DATE: 11/3/2023  3:49 PM   DISPOSITION:Home/Self Care    Network Utilization Review Department  ATTENTION: Please call with any questions or concerns to [phone] and carefully listen to the prompts so that you are directed to the right person. All voicemails are confidential.   For Discharge needs, contact Care Management DC Support Team at [phone] opt. 2  Send all requests for admission clinical reviews, approved or denied determinations and any other requests to dedicated fax number below belonging to the campus where the patient is receiving treatment.  List of dedicated fax numbers for the Facilities:  [place] DENIALS (Administrative/Medical Necessity) [phone]   DISCHARGE SUPPORT TEAM (Network) [phone] [facility] ([facility]/P[facility]s) [phone]   [address] [facility] [address] [facility] [address] [place] [address] [id number] [place] [address] [phone]   [facility] [address] [facility] [phone]   [address] [place] [address]  [address]  [phone]

## 2023-11-13 ENCOUNTER — OFFICE VISIT (OUTPATIENT)
Dept: CARDIOLOGY CLINIC | Facility: CLINIC | Age: 88
End: 2023-11-13
Payer: COMMERCIAL

## 2023-11-13 VITALS
SYSTOLIC BLOOD PRESSURE: 120 MMHG | WEIGHT: 170 LBS | OXYGEN SATURATION: 100 % | BODY MASS INDEX: 23.8 KG/M2 | HEART RATE: 76 BPM | HEIGHT: 71 IN | DIASTOLIC BLOOD PRESSURE: 68 MMHG

## 2023-11-13 DIAGNOSIS — I49.3 ASYMPTOMATIC PVCS: ICD-10-CM

## 2023-11-13 DIAGNOSIS — I87.2 EDEMA OF BOTH LOWER EXTREMITIES DUE TO PERIPHERAL VENOUS INSUFFICIENCY: ICD-10-CM

## 2023-11-13 DIAGNOSIS — E78.5 DYSLIPIDEMIA: ICD-10-CM

## 2023-11-13 DIAGNOSIS — I49.1 PREMATURE ATRIAL CONTRACTIONS: ICD-10-CM

## 2023-11-13 DIAGNOSIS — I25.118 CORONARY ARTERY DISEASE OF NATIVE ARTERY OF NATIVE HEART WITH STABLE ANGINA PECTORIS (HCC): Primary | ICD-10-CM

## 2023-11-13 DIAGNOSIS — Z86.79 HISTORY OF HYPOTENSION: ICD-10-CM

## 2023-11-13 DIAGNOSIS — I65.23 ASYMPTOMATIC BILATERAL CAROTID ARTERY STENOSIS: ICD-10-CM

## 2023-11-13 DIAGNOSIS — Z87.19 HISTORY OF GASTROESOPHAGEAL REFLUX (GERD): ICD-10-CM

## 2023-11-13 DIAGNOSIS — L97.519 CHRONIC ULCER OF GREAT TOE OF RIGHT FOOT, UNSPECIFIED ULCER STAGE (HCC): ICD-10-CM

## 2023-11-13 DIAGNOSIS — I34.0 NONRHEUMATIC MITRAL VALVE REGURGITATION: ICD-10-CM

## 2023-11-13 DIAGNOSIS — N40.0 BENIGN PROSTATIC HYPERPLASIA, UNSPECIFIED WHETHER LOWER URINARY TRACT SYMPTOMS PRESENT: ICD-10-CM

## 2023-11-13 DIAGNOSIS — I70.219 ATHEROSCLEROTIC PVD WITH INTERMITTENT CLAUDICATION (HCC): ICD-10-CM

## 2023-11-13 DIAGNOSIS — I10 ESSENTIAL HYPERTENSION: ICD-10-CM

## 2023-11-13 PROCEDURE — 99214 OFFICE O/P EST MOD 30 MIN: CPT | Performed by: INTERNAL MEDICINE

## 2023-11-13 PROCEDURE — 93000 ELECTROCARDIOGRAM COMPLETE: CPT | Performed by: INTERNAL MEDICINE

## 2023-11-13 RX ORDER — ATORVASTATIN CALCIUM 40 MG/1
40 TABLET, FILM COATED ORAL
Qty: 30 TABLET | Refills: 5 | Status: SHIPPED | OUTPATIENT
Start: 2023-11-13

## 2023-11-13 RX ORDER — LISINOPRIL 5 MG/1
TABLET ORAL
Qty: 180 TABLET | Refills: 3 | Status: SHIPPED | OUTPATIENT
Start: 2023-11-13

## 2023-11-13 RX ORDER — ASPIRIN 81 MG/1
81 TABLET ORAL DAILY
Qty: 100 TABLET | Refills: 5
Start: 2023-11-13

## 2023-11-13 RX ORDER — RANOLAZINE 500 MG/1
500 TABLET, EXTENDED RELEASE ORAL EVERY 12 HOURS SCHEDULED
Qty: 60 TABLET | Refills: 5 | Status: SHIPPED | OUTPATIENT
Start: 2023-11-13

## 2023-11-13 NOTE — PROGRESS NOTES
Office Cardiology Progress Note  Cesia King 80 y.o. male MRN: [id number]  11/13/23  4:26 PM      ASSESSMENT:      1. Recent exertional retrosternal chest pain with angiographically proven triple-vessel CAD, not amenable to bypass or intervention, with medical therapy planned. 2. Asymptomatic chronic PACs and PVCs, per patient history, and PACs are present on current exam and EKG. Frequent PACs with bigeminy and trigeminy and sinus arrhythmia on EKG in the past.  Also has current aortic systolic ejection murmur, consistent with moderate aortic valve stenosis documented on recent echocardiogram 11/3/2023, worsened compared to 1/8/2020, which also showed 1+ MR/TR, mild MAC, 60% LVEF. Previous Holter monitor on 05/19/2022 showed moderately frequent PACs and PVCs, PACs being more frequent, with nonsustained atrial runs to a maximum of 12 beats but no atrial fibrillation or flutter. 3. History of carvedilol-induced syncope from hypotension on 1/10/17 and 1/20/17 with episode on 9/11/17 from low blood pressure, cause uncertain. 4. Stable right calf claudication and bilateral right more than left lower extremity PAD present for 10.5 years. Last lower arterial duplex scan 9/20/2023 showed right CHU of 0.29,  decreased from prior level of 0.32 on 3/24/2023 and left CHU of 0.91, increased from 0.71 indicating worsening PAD. Does have right leg claudication with walking. Has less frequent recent onset of right dorsum foot pain occurring while trying to sleep in bed for several weeks. Has poorly healing right great toe hallux wound, which is painful. 5.  History of previously heard grade 1/4 right neck bruit with less than 50% JOVANNI and 50-69% left ICA stenoses on 09/20/2023 carotid duplex, unchanged from prior scan. 6. Status post repair of right hydrocele on 10/25/2018 and history of chronic incomplete torsion of left testicle with benign cyst adjacent to left testes.     7. Treated dyslipidemia, extremely well-controlled as of 11/3/2023  8. Stable chronic BPH. 9. Remote history of syncope after Flomax. 10. Chronic nocturnal GERD with water brash. 11. Multiple basal cell skin cancers with previous excisions from scalp, and previously from right forehead, right cheek, left ear, left wrist, dorsum of left hand dating back to 04/15/2018. 12. Nonhealing ulceration above right medial ankle for approximately 5 years, likely venostasis ulcer. Poorly healing lesion of right great toe, likely an ischemic ulceration with severe PAD in that right lower extremity. 13.  Worsened edema of bilateral lower extremities from venous insufficiency of left more than right lower extremity, with right lower extremity venous insufficiency previously confirmed on venous duplex scan dated 06/21/2019, patient currently nonadherent to compression hosiery and frequent elevation. 14.  Stable laryngopharyngeal reflux (LPR), being followed by Dr. Carrie Spear. 15.  Severe bilateral PAD, right much worse than left. 16.  Control of recurrent morning hypotension and associated dizziness and background of labile essential hypertension with resolved nocturnal surges in systolic blood pressure at 9-11 PM, previously in the 333 systolic range. Has “white coat” hypertension. Previously with frequent hold of blood pressure medication in the mornings for systolic blood pressures of 120 or less previously being done by patient. Average home blood pressure previously from  12/12 through  12/15/2020 was  127/62  and from 06/06/2021 through 06/10/2021 was 128/61. Previous average home blood pressure 128/62 between 6/11 and 06/14/2022 and previously was 126/61 between 12/11 and 12/14/2021. Current average home blood pressure from 6/2/2023 through 6/5/2023 was 116/61 with previous average blood pressure 12/9 through 12/12/2022 was 121/58. Remote average home blood pressure taken from 12/09 through 12/12/2019 was 115/50.     Plan Patient Instructions     Resume aspirin 81 mg 1 tablet at bedtime daily. We discussed timing of all other medication. Give us a call if you have any further questions on that. Avoid overexertion, especially outdoors. Cardiology follow-up approximately 3 months with EKG. We renewed four prescriptions today to the [facility], including atorvastatin, metoprolol, ranolazine, and Xarelto. HPI    This 80 y.o. male  denies new cardiopulmonary and medical symptoms. The patient noticed mild substernal chest pain after trying to bring in a [employer] Day flag from his yard to his home yesterday. He has had no chest pain at rest.    The patient was hospitalized at [address] [place] [address] from 11/2 through 11/3/2023 because of an abnormal stress test with chest discomfort and ST depressions. There is a relatively small apical partially reversible perfusion defect. Coronary angiography showed triple-vessel CAD with chronic total occlusions, extensive calcifications and left to left collaterals in the LAD and LCx. Severe ostial disease of the left main and RCA were also noted. After discussion with cardiothoracic surgery, it was elected to treat this patient medically. He was discharged home with low-dose Xarelto 2.5 mg twice daily, aspirin 81 mg daily, ranolazine 500 mg twice daily metoprolol 25 mg twice daily, amlodipine 2.5 mg daily and increased atorvastatin 40 mg daily. The patient is also complaining of persistent right great toe pain from a nonhealing wound on the hallux of his right great toe. The patient has follow-up scheduled with vascular surgery regarding the nonhealing wound on the right great toe on 11/24/2023. The patient is also being seen in follow-up of the below listed diagnoses. Encounter Diagnoses   Name Primary?     Coronary artery disease of native artery of native heart with stable angina pectoris ([address]) Yes    Essential hypertension     Dyslipidemia 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, unspecified ulcer stage (HCC)     Atherosclerotic PVD with intermittent claudication (HCC)     Edema of both lower extremities due to peripheral venous insufficiency     Premature atrial contractions     Asymptomatic PVCs     Nonrheumatic mitral valve regurgitation     Asymptomatic bilateral carotid artery stenosis     History of hypotension     Benign prostatic hyperplasia, unspecified whether lower urinary tract symptoms present     History of gastroesophageal reflux (GERD)         Review of Systems    All other systems negative, except as noted in history of present illness    Historical Information   Past Medical History:   Diagnosis Date    Asymptomatic PVCs     last assessed 18    BPH (benign prostatic hyperplasia)     last assessed 17     High cholesterol     last assessed 17     Hypertension     uncontrolled, last assessed 17     Past Surgical History:   Procedure Laterality Date    CATARACT EXTRACTION      COLONOSCOPY      Complete    CO EXCISION HYDROCELE UNILATERAL Right 10/25/2018    Procedure: HYDROCELECTOMY;  Surgeon: David Campos MD;  Location: [facility]  Service: Urology    TESTICLE SURGERY      last assessed 3/31/15     Social History     Substance and Sexual Activity   Alcohol Use Not Currently     Social History     Substance and Sexual Activity   Drug Use No     Social History     Tobacco Use   Smoking Status Former    Years: 20.00    Types: Cigarettes    Quit date: 10/18/1965    Years since quittin.2   Smokeless Tobacco Never       Family History:  Family History   Problem Relation Age of Onset    Heart disease Mother     Hypertension Mother     Heart disease Father     Hypertension Father     Hypertension Family     Hyperlipidemia Family          Meds/Allergies     Prior to Admission medications    Medication Sig Start Date End Date Taking?  Authorizing Provider   amLODIPine (NORVASC) 2.5 mg tablet Take 1 tablet (2.5 mg total) by mouth daily In the evening. 6/6/23  Yes Fco Frederick MD   aspirin (ECOTRIN LOW STRENGTH) 81 mg EC tablet Take 1 tablet (81 mg total) by mouth daily 11/13/23  Yes Fco rFederick MD   atorvastatin (LIPITOR) 40 mg tablet Take 1 tablet (40 mg total) by mouth daily with dinner 11/13/23  Yes Fco Frederick MD   Calcium Carbonate-Vitamin D 500-125 MG-UNIT TABS Take by mouth   Yes Kiko Barrientos MD   chlorthalidone 25 mg tablet Take 1 tablet (25 mg total) by mouth every other day 12/19/22  Yes Fco Frederick MD   Cholecalciferol (Vitamin D) 50 MCG (2000 UT) CAPS Take 1 capsule (2,000 Units total) by mouth in the morning 6/15/22  Yes Fco Frederick MD   finasteride (PROSCAR) 5 mg tablet Take 1 tablet (5 mg total) by mouth daily at bedtime 8/1/23  Yes Hema Sr MD   lisinopril (ZESTRIL) 5 mg tablet Take  1 tablet in the evening. 11/13/23  Yes Fco Frederick MD   metoprolol tartrate (LOPRESSOR) 25 mg tablet Take 1 tablet (25 mg total) by mouth every 12 (twelve) hours 11/13/23  Yes Fco Frederick MD   Multiple Vitamins-Minerals (Ocuvite Adult Formula) CAPS Take 1 capsule by mouth in the morning 6/15/22  Yes Fco Frederick MD   oxyCODONE (ROXICODONE) 5 immediate release tablet Take 0.5 tablets (2.5 mg total) by mouth every 6 (six) hours as needed for moderate pain or severe pain Max Daily Amount: 10 mg 11/3/23  Yes Sherwood Soulier, MD   ranolazine (RANEXA) 500 mg 12 hr tablet Take 1 tablet (500 mg total) by mouth every 12 (twelve) hours 11/13/23  Yes Fco Frederick MD   rivaroxaban ([place] [employer]) 2.5 mg tablet Take 1 tablet (2.5 mg total) by mouth 2 (two) times a day 11/13/23  Yes Fco Frederick MD   atorvastatin (LIPITOR) 40 mg tablet Take 1 tablet (40 mg total) by mouth daily with dinner 11/3/23 11/13/23 Yes Sherwood Soulier, MD   lisinopril (ZESTRIL) 5 mg tablet Take 1 tablet in morning and 1 tablet in the evening.  10/4/23 11/13/23 Yes Fco Frederick MD   metoprolol tartrate (LOPRESSOR) 25 mg tablet Take 1 tablet (25 mg total) by mouth every 12 (twelve) hours 11/3/23 11/13/23 Yes Farzaneh Macias MD   ranolazine (RANEXA) 500 mg 12 hr tablet Take 1 tablet (500 mg total) by mouth every 12 (twelve) hours 11/3/23 11/13/23 Yes Farzaneh Macias MD   rivaroxaban (Shellia Willard) 2.5 mg tablet Take 1 tablet (2.5 mg total) by mouth 2 (two) times a day 11/3/23 11/13/23 Yes Michael Hope PA-C   ammonium lactate (LAC-HYDRIN) 12 % cream Apply topically as needed for dry skin  Patient not taking: Reported on 11/13/2023 10/30/20   Flavia Jones DPM   triamcinolone (KENALOG) 0.025 % ointment Apply topically 2 (two) times a day  Patient not taking: Reported on 11/13/2023 7/7/23   Orly Bolanos MD   aspirin 81 MG tablet Take 81 mg by mouth daily At night  Patient not taking: Reported on 11/13/2023 11/13/23  Historical Provider, MD       Allergies   Allergen Reactions    Tamsulosin Syncope         Vitals:    11/13/23 1359   BP: 120/68   BP Location: Right arm   Patient Position: Sitting   Cuff Size: Large   Pulse: 76   SpO2: 100%   Weight: 77.1 kg (170 lb)   Height: 5' 11" (1.803 m)       Body mass index is 23.71 kg/m². Questionable 10 pound weight gain in approximately 3 weeks, assuming weight was taken correctly.   This weight gain, if accurate, follows a 3 pound weight loss in 4-1/2+ months and a 6 pound weight loss in approximately 10-1/2+ months  Physical Exam:    General Appearance:  Alert, cooperative, no distress, appears stated age   Head:  Normocephalic, without obvious abnormality, atraumatic   Eyes:  PERRL, conjunctiva/corneas clear, EOM's intact,   both eyes   Ears:  Normal TM's and external ear canals, both ears   Nose: Nares normal, septum midline, mucosa normal, no drainage or sinus tenderness   Throat: Lips, mucosa, and tongue normal; teeth and gums normal   Neck: Supple, symmetrical, trachea midline, no adenopathy, thyroid: not enlarged, symmetric, no tenderness/mass/nodules, no carotid bruit or JVD   Back:   Symmetric, no curvature, ROM normal, no CVA tenderness   Lungs:   Clear to auscultation bilaterally, respirations unlabored   Chest Wall:  No tenderness or deformity   Heart:  Frequent extrasystoles with underlying regular rhythm, S1, S2 normal, grade 3/6 aortic systolic ejection murmur radiating along right slightly more than left sternal border, to manubrium and bilateral clavicles with intact A2, and no rub or gallop   Abdomen:   Soft, non-tender, bowel sounds active all four quadrants,  no masses, no organomegaly   Extremities: Extremities normal, atraumatic, no cyanosis with mild bilateral pretibial, ankle and pedal edema with bandages over the right great toe. Pulses: Nonpalpable bilateral ankle and pedal pulses with both feet warm. Skin: Skin showed normal color, texture, turgor and no rashes or lesions   Lymph nodes: Cervical, supraclavicular, and axillary nodes normal   Neurologic: Normal         Cardiographics    ECG 11/13/23:    Normal sinus rhythm at 70 bpm  Frequent single and paired PACs  Nonspecific ST abnormalities but less ST depression compared to 11/2/2023  Abnormal ECG  New frequent PACs and no other changes since 10/24/2023    IMAGING:    No Chest XR results available for this patient. Coronary angiography 11/2/2023:    Severe ostial left main coronary artery obstruction with severe calcification of left main  Severe diffuse disease throughout LAD with chronic total occlusion in mid LAD and heavy calcification of vessel with left to left collaterals noted to the posterolateral distribution  Chronic total occlusion of LCx with left to left collaterals from LAD  Severe diffuse disease throughout RCA with severe ostial disease of RCA and extensive vessel calcification    Lexiscan nuclear stress study 11/2/2023:    Post-stress LVEF 65%  Positive for transient ischemic dilatation  Medium size apical partially reversible perfusion defect  ST depression noted on stress ECG with some chest discomfort. Patient reached 96% of maximum predicted heart rate      Transthoracic echocardiogram 11/3/2023:    Mild concentric LVH with 55% LVEF, grossly normal wall motion and diastolic function  Mild left and right atrial enlargement  Aortic valve functionally bicuspid with commissural fusion of left noncoronary cusp with moderate thickening and calcification of aortic valve leaflets  Mild to moderate aortic valve stenosis with aortic valve area calculated at 1.17 cm2  1+ PI/trace TR    Abdominal CTA 10/17/2023:    Occlusion and questionable short segments of distal reconstitution involving mid and distal common femoral artery on the right  Occlusion of proximal right SFA at origin with distal reconstitution  Multifocal short segment high-grade stenoses and occlusions of mid and distal right SFA  Multifocal areas of occlusion and reconstitution of right mid and distal anterior tibial artery  Moderate and high-grade stenoses throughout mid and distal less SFA and proximal popliteal artery  Multifocal high-grade stenosis and occlusion of distal left anterior tibial artery    Arterial duplex scan lower extremities 9/20/2023: Moderate stenosis of distal right common iliac artery  Severe stenosis or occlusion of right common femoral artery with recanalization at proximal right SFA  Severe stenosis or total occlusion of distal right SFA with recanalization and proximal popliteal artery  Right CHU 0.29, which is in rest pain/tissue loss disease category.   Prior level was 0.32 metatarsal and great toe pressures of 0 mmHg below healing range    Moderate stenoses throughout femoral and popliteal arteries extending into the left mid superficial femoral artery and popliteal artery  Less than 50% stenosis of left profundofemoral artery and distal SFA  Left common iliac and bilateral external iliac arteries are patent  Left CHU 0.91, within normal range with prior level of 0.71  Metatarsal and great toe pressures are within normal healing range    Compared to prior study dated 3/24/2023, there is progressive disease in right lower extremity    Carotid duplex scan 9/20/2023:    Less than 50% stenosis of right internal carotid artery  50-69% stenosis of left internal carotid artery  Normal bilateral vertebral and subclavian hemodynamics  Compared to 1/8/2020, no significant changes      LAB REVIEW:      Lab Results   Component Value Date    SODIUM 136 11/03/2023    K 3.6 11/03/2023     11/03/2023    CO2 28 11/03/2023    BUN 17 11/03/2023    CREATININE 0.66 11/03/2023    GLUF 97 10/11/2023    CALCIUM 9.2 11/03/2023    CORRECTEDCA 10.3 (H) 12/06/2022    AST 20 05/12/2023    ALT 22 05/12/2023    ALKPHOS 69 05/12/2023    EGFR 83 11/03/2023   Glucose 11/3/2023: 90  Lab Results   Component Value Date    CHOLESTEROL 137 11/03/2023    CHOLESTEROL 153 12/06/2022    CHOLESTEROL 154 11/30/2021     Lab Results   Component Value Date    HDL 73 11/03/2023    HDL 77 12/06/2022    HDL 78 11/30/2021       Lab Results   Component Value Date    LDLCALC 53 11/03/2023    LDLCALC 61 12/06/2022    LDLCALC 64 11/30/2021     No components found for: "DIRECTLDLREFLEX"  Lab Results   Component Value Date    TRIG 53 11/03/2023    TRIG 74 12/06/2022    TRIG 61 11/30/2021     CBC 11/3/2023: H/H 12.0/34.8 with normal WBC and platelets; HCT is slightly low  Magnesium 11/3/2023: 1.8, decreased  A1c 11/2/2023: Normal        Kaitlin Tran MD

## 2023-11-13 NOTE — PATIENT INSTRUCTIONS
Resume aspirin 81 mg 1 tablet at bedtime daily. We discussed timing of all other medication. Give us a call if you have any further questions on that. Avoid overexertion, especially outdoors. Cardiology follow-up approximately 3 months with EKG. We renewed for prescriptions today to the [facility], including atorvastatin, metoprolol, ranolazine, and Xarelto.

## 2023-11-14 ENCOUNTER — OFFICE VISIT (OUTPATIENT)
Dept: FAMILY MEDICINE CLINIC | Facility: CLINIC | Age: 88
End: 2023-11-14
Payer: COMMERCIAL

## 2023-11-14 ENCOUNTER — TELEPHONE (OUTPATIENT)
Dept: CARDIOLOGY CLINIC | Facility: CLINIC | Age: 88
End: 2023-11-14

## 2023-11-14 VITALS
HEART RATE: 99 BPM | OXYGEN SATURATION: 55 % | BODY MASS INDEX: 23.72 KG/M2 | HEIGHT: 71 IN | WEIGHT: 169.4 LBS | DIASTOLIC BLOOD PRESSURE: 72 MMHG | TEMPERATURE: 98.6 F | SYSTOLIC BLOOD PRESSURE: 136 MMHG | RESPIRATION RATE: 16 BRPM

## 2023-11-14 DIAGNOSIS — L97.519 CHRONIC ULCER OF GREAT TOE OF RIGHT FOOT, UNSPECIFIED ULCER STAGE (HCC): ICD-10-CM

## 2023-11-14 DIAGNOSIS — Z00.00 MEDICARE ANNUAL WELLNESS VISIT, SUBSEQUENT: Primary | ICD-10-CM

## 2023-11-14 DIAGNOSIS — I10 ESSENTIAL HYPERTENSION: ICD-10-CM

## 2023-11-14 DIAGNOSIS — E78.5 HYPERLIPIDEMIA, UNSPECIFIED HYPERLIPIDEMIA TYPE: ICD-10-CM

## 2023-11-14 DIAGNOSIS — Z23 ENCOUNTER FOR IMMUNIZATION: ICD-10-CM

## 2023-11-14 PROBLEM — R07.89 ATYPICAL CHEST PAIN: Status: RESOLVED | Noted: 2023-10-24 | Resolved: 2023-11-14

## 2023-11-14 PROCEDURE — G0008 ADMIN INFLUENZA VIRUS VAC: HCPCS | Performed by: FAMILY MEDICINE

## 2023-11-14 PROCEDURE — 90662 IIV NO PRSV INCREASED AG IM: CPT | Performed by: FAMILY MEDICINE

## 2023-11-14 PROCEDURE — G0439 PPPS, SUBSEQ VISIT: HCPCS | Performed by: FAMILY MEDICINE

## 2023-11-14 NOTE — TELEPHONE ENCOUNTER
Pharmacy called to let us know pt has Level two mederate interaction ranolazine with xarelto , increases risk of bleeding. Secondly they would like to verify dose of lisinopril, it was only sent for once daily. Pt was taking it bid. Thank you.

## 2023-11-14 NOTE — PATIENT INSTRUCTIONS
Medicare Preventive Visit Patient Instructions  Thank you for completing your Welcome to Medicare Visit or Medicare Annual Wellness Visit today. Your next wellness visit will be due in one year (11/14/2024). The screening/preventive services that you may require over the next 5-10 years are detailed below. Some tests may not apply to you based off risk factors and/or age. Screening tests ordered at today's visit but not completed yet may show as past due. Also, please note that scanned in results may not display below. Preventive Screenings:  Service Recommendations Previous Testing/Comments   Colorectal Cancer Screening  Colonoscopy    Fecal Occult Blood Test (FOBT)/Fecal Immunochemical Test (FIT)  Fecal DNA/Cologuard Test  Flexible Sigmoidoscopy Age: 43-73 years old   Colonoscopy: every 10 years (May be performed more frequently if at higher risk)  OR  FOBT/FIT: every 1 year  OR  Cologuard: every 3 years  OR  Sigmoidoscopy: every 5 years  Screening may be recommended earlier than age 39 if at higher risk for colorectal cancer. Also, an individualized decision between you and your healthcare provider will decide whether screening between the ages of 77-80 would be appropriate. Colonoscopy: Not on file  FOBT/FIT: Not on file  Cologuard: Not on file  Sigmoidoscopy: Not on file          Prostate Cancer Screening Individualized decision between patient and health care provider in men between ages of 53-66   Medicare will cover every 12 months beginning on the day after your 50th birthday PSA: No results in last 5 years           Hepatitis C Screening Once for adults born between 80 and 1965  More frequently in patients at high risk for Hepatitis C Hep C Antibody: Not on file        Diabetes Screening 1-2 times per year if you're at risk for diabetes or have pre-diabetes Fasting glucose: 97 mg/dL (10/11/2023)  A1C: 5.6 % (11/2/2023)      Cholesterol Screening Once every 5 years if you don't have a lipid disorder. May order more often based on risk factors. Lipid panel: 11/03/2023         Other Preventive Screenings Covered by Medicare:  Abdominal Aortic Aneurysm (AAA) Screening: covered once if your at risk. You're considered to be at risk if you have a family history of AAA or a male between the age of 70-76 who smoking at least 100 cigarettes in your lifetime. Lung Cancer Screening: covers low dose CT scan once per year if you meet all of the following conditions: (1) Age 48-67; (2) No signs or symptoms of lung cancer; (3) Current smoker or have quit smoking within the last 15 years; (4) You have a tobacco smoking history of at least 20 pack years (packs per day x number of years you smoked); (5) You get a written order from a healthcare provider. Glaucoma Screening: covered annually if you're considered high risk: (1) You have diabetes OR (2) Family history of glaucoma OR (3)  aged 48 and older OR (3)  American aged 72 and older  Osteoporosis Screening: covered every 2 years if you meet one of the following conditions: (1) Have a vertebral abnormality; (2) On glucocorticoid therapy for more than 3 months; (3) Have primary hyperparathyroidism; (4) On osteoporosis medications and need to assess response to drug therapy. HIV Screening: covered annually if you're between the age of 14-79. Also covered annually if you are younger than 13 and older than 72 with risk factors for HIV infection. For pregnant patients, it is covered up to 3 times per pregnancy.     Immunizations:  Immunization Recommendations   Influenza Vaccine Annual influenza vaccination during flu season is recommended for all persons aged >= 6 months who do not have contraindications   Pneumococcal Vaccine   * Pneumococcal conjugate vaccine = PCV13 (Prevnar 13), PCV15 (Vaxneuvance), PCV20 (Prevnar 20)  * Pneumococcal polysaccharide vaccine = PPSV23 (Pneumovax) Adults 75-26 yo with certain risk factors or if 69+ yo  If never received any pneumonia vaccine: recommend Prevnar 21 (PCV20)  Give PCV20 if previously received 1 dose of PCV13 or PPSV23   Hepatitis B Vaccine 3 dose series if at intermediate or high risk (ex: diabetes, end stage renal disease, liver disease)   Respiratory syncytial virus (RSV) Vaccine - COVERED BY MEDICARE PART D  * RSVPreF3 (Arexvy) CDC recommends that adults 61years of age and older may receive a single dose of RSV vaccine using shared clinical decision-making (SCDM)   Tetanus (Td) Vaccine - COST NOT COVERED BY MEDICARE PART B Following completion of primary series, a booster dose should be given every 10 years to maintain immunity against tetanus. Td may also be given as tetanus wound prophylaxis. Tdap Vaccine - COST NOT COVERED BY MEDICARE PART B Recommended at least once for all adults. For pregnant patients, recommended with each pregnancy. Shingles Vaccine (Shingrix) - COST NOT COVERED BY MEDICARE PART B  2 shot series recommended in those 19 years and older who have or will have weakened immune systems or those 50 years and older     Health Maintenance Due:  There are no preventive care reminders to display for this patient. Immunizations Due:      Topic Date Due   • COVID-19 Vaccine (4 - Moderna series) 12/30/2021   • Influenza Vaccine (1) 09/01/2023     Advance Directives   What are advance directives? Advance directives are legal documents that state your wishes and plans for medical care. These plans are made ahead of time in case you lose your ability to make decisions for yourself. Advance directives can apply to any medical decision, such as the treatments you want, and if you want to donate organs. What are the types of advance directives? There are many types of advance directives, and each state has rules about how to use them. You may choose a combination of any of the following:  Living will: This is a written record of the treatment you want.  You can also choose which treatments you do not want, which to limit, and which to stop at a certain time. This includes surgery, medicine, IV fluid, and tube feedings. Durable power of  for [facility]): This is a written record that states who you want to make healthcare choices for you when you are unable to make them for yourself. This person, called a proxy, is usually a family member or a friend. You may choose more than 1 proxy. Do not resuscitate (DNR) order:  A DNR order is used in case your heart stops beating or you stop breathing. It is a request not to have certain forms of treatment, such as CPR. A DNR order may be included in other types of advance directives. Medical directive: This covers the care that you want if you are in a coma, near death, or unable to make decisions for yourself. You can list the treatments you want for each condition. Treatment may include pain medicine, surgery, blood transfusions, dialysis, IV or tube feedings, and a ventilator (breathing machine). Values history: This document has questions about your views, beliefs, and how you feel and think about life. This information can help others choose the care that you would choose. Why are advance directives important? An advance directive helps you control your care. Although spoken wishes may be used, it is better to have your wishes written down. Spoken wishes can be misunderstood, or not followed. Treatments may be given even if you do not want them. An advance directive may make it easier for your family to make difficult choices about your care. © Copyright [employer] 2018 Information is for End User's use only and may not be sold, redistributed or otherwise used for commercial purposes.  All illustrations and images included in CareNotes® are the copyrighted property of A.D.A.M., Inc. or [employer]

## 2023-11-14 NOTE — PROGRESS NOTES
Assessment and Plan:     Problem List Items Addressed This Visit        Cardiovascular and Mediastinum    Essential hypertension       Other    Hyperlipidemia    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 [facility])    Relevant Orders    Ambulatory Referral to Wound Care   Other Visit Diagnoses     Medicare annual wellness visit, subsequent    -  Primary    Encounter for immunization        Relevant Orders    influenza vaccine, high-dose, PF 0.7 mL (FLUZONE HIGH-DOSE) (Completed)           Preventive health issues were discussed with patient, and age appropriate screening tests were ordered as noted in patient's After Visit Summary. Personalized health advice and appropriate referrals for health education or preventive services given if needed, as noted in patient's After Visit Summary.      History of Present Illness:     Patient presents for a Medicare Wellness Visit    HPI   Patient Care Team:  Jac Hernández MD as PCP - General (Family Medicine)  Audie Sandoval MD (Vascular Surgery)  Jefferson Ayala MD (Vascular Surgery)  MD Shawna Hairston MD (Cardiology)     Review of Systems:     Review of Systems     Problem List:     Patient Active Problem List   Diagnosis   • Atherosclerotic PVD with intermittent claudication [facility])   • Essential hypertension   • Hyperlipidemia   • Atherosclerosis of native arteries of extremities with rest pain, right leg (HCC)   • BPH (benign prostatic hyperplasia)   • Edema of both lower extremities due to peripheral venous insufficiency   • Dyslipidemia   • Premature atrial contractions   • Nonrheumatic mitral valve regurgitation   • Intermediate stage nonexudative age-related macular degeneration of both eyes   • Vitamin D deficiency   • Right foot pain   • Gastroesophageal reflux disease   • Laryngopharyngeal reflux (LPR)   • Venous stasis ulcer of right lower extremity (HCC)   • Hx of skin cancer, basal cell   • Postural dizziness   • Asymptomatic bilateral carotid artery stenosis   • Asymptomatic PVCs   • CAD (coronary artery disease)   • 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 (HCC)   • History of gastroesophageal reflux (GERD)      Past Medical and Surgical History:     Past Medical History:   Diagnosis Date   • Asymptomatic PVCs     last assessed 18   • BPH (benign prostatic hyperplasia)     last assessed 17    • High cholesterol     last assessed 17    • Hypertension     uncontrolled, last assessed 17     Past Surgical History:   Procedure Laterality Date   • CATARACT EXTRACTION     • COLONOSCOPY      Complete   • MD EXCISION HYDROCELE UNILATERAL Right 10/25/2018    Procedure: HYDROCELECTOMY;  Surgeon: Landon Navarro MD;  Location: [facility];  Service: Urology   • TESTICLE SURGERY      last assessed 3/31/15      Family History:     Family History   Problem Relation Age of Onset   • Heart disease Mother    • Hypertension Mother    • Heart disease Father    • Hypertension Father    • Hypertension Family    • Hyperlipidemia Family       Social History:     Social History     Socioeconomic History   • Marital status: /Civil Union     Spouse name: None   • Number of children: None   • Years of education: None   • Highest education level: None   Occupational History   • Occupation: Retired   Tobacco Use   • Smoking status: Former     Years: 20.00     Types: Cigarettes     Quit date: 10/18/1965     Years since quittin.1   • Smokeless tobacco: Never   Vaping Use   • Vaping Use: Never used   Substance and Sexual Activity   • Alcohol use: Not Currently   • Drug use: No   • Sexual activity: None   Other Topics Concern   • None   Social History Narrative    Exercises daily     Sleeps 6-7 hrs/day     Social Determinants of Health     Financial Resource Strain: Low Risk  (2023)    Overall Financial Resource Strain (CARDIA)    • Difficulty of Paying Living Expenses: Not hard at all   Food Insecurity: Not on file   Transportation Needs: No Transportation Needs (11/14/2023)    PRAPARE - Transportation    • Lack of Transportation (Medical): No    • Lack of Transportation (Non-Medical): No   Physical Activity: Not on file   Stress: Not on file   Social Connections: Not on file   Intimate Partner Violence: Not on file   Housing Stability: Not on file      Medications and Allergies:     Current Outpatient Medications   Medication Sig Dispense Refill   • amLODIPine (NORVASC) 2.5 mg tablet Take 1 tablet (2.5 mg total) by mouth daily In the evening. 30 tablet 5   • ammonium lactate (LAC-HYDRIN) 12 % cream Apply topically as needed for dry skin 385 g 0   • aspirin (ECOTRIN LOW STRENGTH) 81 mg EC tablet Take 1 tablet (81 mg total) by mouth daily 100 tablet 5   • atorvastatin (LIPITOR) 40 mg tablet Take 1 tablet (40 mg total) by mouth daily with dinner 30 tablet 5   • Calcium Carbonate-Vitamin D 500-125 MG-UNIT TABS Take by mouth     • chlorthalidone 25 mg tablet Take 1 tablet (25 mg total) by mouth every other day 45 tablet 3   • Cholecalciferol (Vitamin D) 50 MCG (2000 UT) CAPS Take 1 capsule (2,000 Units total) by mouth in the morning 100 capsule 5   • finasteride (PROSCAR) 5 mg tablet Take 1 tablet (5 mg total) by mouth daily at bedtime 90 tablet 3   • lisinopril (ZESTRIL) 5 mg tablet Take  1 tablet in the evening.  180 tablet 3   • metoprolol tartrate (LOPRESSOR) 25 mg tablet Take 1 tablet (25 mg total) by mouth every 12 (twelve) hours 60 tablet 5   • Multiple Vitamins-Minerals (Ocuvite Adult Formula) CAPS Take 1 capsule by mouth in the morning 100 capsule 5   • ranolazine (RANEXA) 500 mg 12 hr tablet Take 1 tablet (500 mg total) by mouth every 12 (twelve) hours 60 tablet 5   • rivaroxaban (XARELTO) 2.5 mg tablet Take 1 tablet (2.5 mg total) by mouth 2 (two) times a day 60 tablet 5   • triamcinolone (KENALOG) 0.025 % ointment Apply topically 2 (two) times a day 30 g 0   • oxyCODONE (ROXICODONE) 5 immediate release tablet Take 0.5 tablets (2.5 mg total) by mouth every 6 (six) hours as needed for moderate pain or severe pain Max Daily Amount: 10 mg (Patient not taking: Reported on 11/14/2023) 15 tablet 0     No current facility-administered medications for this visit. Allergies   Allergen Reactions   • Tamsulosin Syncope      Immunizations:     Immunization History   Administered Date(s) Administered   • COVID-19 MODERNA VACC 0.5 ML IM 01/20/2021, 02/17/2021, 11/04/2021   • INFLUENZA 10/01/2018, 10/01/2021   • Influenza Split High Dose Preservative Free IM 10/09/2017, 10/18/2019   • Influenza, high dose seasonal 0.7 mL 10/07/2020, 10/28/2022, 11/14/2023   • Pneumococcal Conjugate 13-Valent 03/05/2019      Health Maintenance: There are no preventive care reminders to display for this patient. Topic Date Due   • COVID-19 Vaccine (4 - Moderna series) 12/30/2021      Medicare Screening Tests and Risk Assessments:     Ottis Litten is here for his Subsequent Wellness visit. Health Risk Assessment:   Patient rates overall health as good. Patient feels that their physical health rating is slightly worse. Patient is dissatisfied with their life. Eyesight was rated as same. Hearing was rated as slightly worse. Patient feels that their emotional and mental health rating is same. Patients states they are never, rarely angry. Patient states they are sometimes unusually tired/fatigued. Pain experienced in the last 7 days has been a lot. Patient's pain rating has been 10/10. Patient states that he has experienced no weight loss or gain in last 6 months. Depression Screening:   PHQ-2 Score: 1      Fall Risk Screening: In the past year, patient has experienced: no history of falling in past year      Home Safety:  Patient has trouble with stairs inside or outside of their home. Patient has working smoke alarms and has working carbon monoxide detector. Home safety hazards include: none. Nutrition:   Current diet is Regular.      Medications:   Patient is not currently taking any over-the-counter supplements. Patient is able to manage medications. Activities of Daily Living (ADLs)/Instrumental Activities of Daily Living (IADLs):   Walk and transfer into and out of bed and chair?: Yes  Dress and groom yourself?: Yes    Bathe or shower yourself?: Yes    Feed yourself? Yes  Do your laundry/housekeeping?: Yes  Manage your money, pay your bills and track your expenses?: Yes  Make your own meals?: Yes    Do your own shopping?: Yes    Previous Hospitalizations:   Any hospitalizations or ED visits within the last 12 months?: Yes    How many hospitalizations have you had in the last year?: 1-2    Advance Care Planning:   Living will: No    Durable POA for healthcare: No    Advanced directive: No    Advanced directive counseling given: Yes    ACP document given: Yes      Cognitive Screening:   Provider or family/friend/caregiver concerned regarding cognition?: No    PREVENTIVE SCREENINGS      Cardiovascular Screening:    General: Screening Not Indicated and History Lipid Disorder      Diabetes Screening:     General: Screening Current      Colorectal Cancer Screening:     General: Screening Not Indicated      Prostate Cancer Screening:    General: Screening Not Indicated      Osteoporosis Screening:    General: Screening Not Indicated      Abdominal Aortic Aneurysm (AAA) Screening:    Risk factors include: tobacco use        Lung Cancer Screening:     General: Screening Not Indicated      Hepatitis C Screening:    General: Screening Not Indicated    Screening, Brief Intervention, and Referral to Treatment (SBIRT)    Screening  Typical number of drinks in a day: 0  Typical number of drinks in a week: 0  Interpretation: Low risk drinking behavior.     Single Item Drug Screening:  How often have you used an illegal drug (including marijuana) or a prescription medication for non-medical reasons in the past year? never    Single Item Drug Screen Score: 0  Interpretation: Negative screen for possible drug use disorder    No results found.      Physical Exam:     /72 (BP Location: Left arm, Patient Position: Sitting, Cuff Size: Large)   Pulse 99   Temp 98.6 °F (37 °C)   Resp 16   Ht 5' 11" (1.803 m)   Wt 76.8 kg (169 lb 6.4 oz)   SpO2 (!) 55%   BMI 23.63 kg/m²     Physical Exam     Kayleigh Escalante MD

## 2023-11-21 ENCOUNTER — OFFICE VISIT (OUTPATIENT)
Dept: WOUND CARE | Facility: HOSPITAL | Age: 88
End: 2023-11-21
Payer: COMMERCIAL

## 2023-11-21 VITALS
TEMPERATURE: 97.6 F | HEART RATE: 73 BPM | BODY MASS INDEX: 23.1 KG/M2 | SYSTOLIC BLOOD PRESSURE: 166 MMHG | WEIGHT: 165 LBS | HEIGHT: 71 IN | DIASTOLIC BLOOD PRESSURE: 73 MMHG

## 2023-11-21 DIAGNOSIS — S91.101A OPEN WOUND OF RIGHT GREAT TOE, INITIAL ENCOUNTER: ICD-10-CM

## 2023-11-21 DIAGNOSIS — I70.219 ATHEROSCLEROTIC PVD WITH INTERMITTENT CLAUDICATION (HCC): Primary | ICD-10-CM

## 2023-11-21 DIAGNOSIS — L97.911 NON-PRESSURE CHRONIC ULCER OF RIGHT LOWER LEG, LIMITED TO BREAKDOWN OF SKIN (HCC): ICD-10-CM

## 2023-11-21 DIAGNOSIS — I87.2 EDEMA OF BOTH LOWER EXTREMITIES DUE TO PERIPHERAL VENOUS INSUFFICIENCY: ICD-10-CM

## 2023-11-21 PROCEDURE — 97597 DBRDMT OPN WND 1ST 20 CM/<: CPT | Performed by: STUDENT IN AN ORGANIZED HEALTH CARE EDUCATION/TRAINING PROGRAM

## 2023-11-21 PROCEDURE — 99213 OFFICE O/P EST LOW 20 MIN: CPT | Performed by: STUDENT IN AN ORGANIZED HEALTH CARE EDUCATION/TRAINING PROGRAM

## 2023-11-21 PROCEDURE — 99204 OFFICE O/P NEW MOD 45 MIN: CPT | Performed by: STUDENT IN AN ORGANIZED HEALTH CARE EDUCATION/TRAINING PROGRAM

## 2023-11-21 RX ORDER — LIDOCAINE HYDROCHLORIDE 40 MG/ML
5 SOLUTION TOPICAL ONCE
Status: COMPLETED | OUTPATIENT
Start: 2023-11-21 | End: 2023-11-21

## 2023-11-21 RX ADMIN — LIDOCAINE HYDROCHLORIDE 5 ML: 40 SOLUTION TOPICAL at 14:16

## 2023-11-21 NOTE — PATIENT INSTRUCTIONS
Orders Placed This Encounter   Procedures    Wound cleansing and dressings     Right lower leg wound:    Wash your hands with soap and water. Remove old dressing, discard into plastic bag and place in trash. Cleanse the wound with soap and water prior to applying a clean dressing. Do not use tissue or cotton balls. Do not scrub the wound. Pat dry using gauze. Shower yes - take dressings off & cleanse with mild soap. Do not soak or submerge in water. Apply moisturizer to skin surrounding wound  Apply calcium alginate with silver (used Maxorb Ag+ to the wound). Cover with ABD pad, allison & tape. Change dressing 3x/week. This was done today        Right 1st toe wound:    Wash your hands with soap and water. Remove old dressing, discard into plastic bag and place in trash. Cleanse the wound with soap and water prior to applying a clean dressing. Do not use tissue or cotton balls. Do not scrub the wound. Pat dry using gauze. Shower yes - take dressings off and cleanse with mild soap. Do not soak or submerge wound in water. Apply Aquacel Ag+ Rope to the wound. Cover with Allevyn bordered 2x2" foam dressing. Change dressing 3x/week. This was done today. Standing Status:   Future     Standing Expiration Date:   11/21/2024    Wound miscellaneous orders     Be sure to eat plenty of protein with each meal.    Stay well hydrated with water daily - if you have a fluid restriction, be sure to stay within your restriction. Standing Status:   Future     Standing Expiration Date:   11/21/2024    Wound compression and edema control     You cannot wear compression due to your vascular status. Elevate legs to heart level or higher as tolerated when sitting to reduce swelling.      Standing Status:   Future     Standing Expiration Date:   11/21/2024

## 2023-11-21 NOTE — PROGRESS NOTES
Patient ID: Willie Mckeon is a 80 y.o. 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)  -     lidocaine (XYLOCAINE) 4 % topical solution 5 mL  -     Wound cleansing and dressings; Future  -     Wound miscellaneous orders; Future  -     Cancel: Wound home care; Future  -     Wound compression and edema control; Future    Open wound of right great toe, initial encounter  -     lidocaine (XYLOCAINE) 4 % topical solution 5 mL  -     Wound cleansing and dressings; Future  -     Wound miscellaneous orders; Future  -     Cancel: Wound home care; Future  -     Wound compression and edema control; Future    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 Risks and benefits: risks, benefits and alternatives were discussed  Consent given by: patient  Time out: Immediately prior to procedure a "time out" was called to verify the correct patient, procedure, equipment, support staff and site/side marked as required.   Patient identity confirmed: verbally with patient    Performed by: physician  Debridement type: selective  Pain control: lidocaine 4%  Post-debridement measurements  Length (cm): 0.4  Width (cm): 0.5  Depth (cm): 0.1  Percent debrided: 40%  Surface Area (cm^2): 0.2  Area debrided (cm^2): 0.08  Volume (cm^3): 0.02  Devitalized tissue debrided: biofilm, exudate and fibrin  Instrument(s) utilized: curette  Bleeding: small  Hemostasis obtained with: pressure  Procedural pain (0-10): 1  Post-procedural pain: 0   Response to treatment: procedure was tolerated well    Debridement   Wound 11/21/23 Pretibial Right    Universal Protocol:  Consent: Verbal consent obtained. Risks and benefits: risks, benefits and alternatives were discussed  Consent given by: patient  Time out: Immediately prior to procedure a "time out" was called to verify the correct patient, procedure, equipment, support staff and site/side marked as required. Patient identity confirmed: verbally with patient    Performed by: physician  Debridement type: selective  Pain control: lidocaine 4%  Post-debridement measurements  Length (cm): 0.6  Width (cm): 0.6  Depth (cm): 0.1  Percent debrided: 90%  Surface Area (cm^2): 0.36  Area debrided (cm^2): 0.32  Volume (cm^3): 0.04  Devitalized tissue debrided: biofilm and exudate  Instrument(s) utilized: curette  Bleeding: small  Hemostasis obtained with: pressure  Procedural pain (0-10): 1  Post-procedural pain: 0   Response to treatment: procedure was tolerated well        Plan: It was a pleasure to see [facility] for wound care consult today  Selective debridement performed today as above  Start plan of care as noted below with ordered foam to toe wound and alginate to lower extremity wound. Patient understands that these wounds will be difficult to heal given his poor arterial supply. Given his severe vascular disease, no compression will be applied. Patient has significant rest pain which makes it difficult for him to elevate his legs.   He does have an appointment with vascular surgery to decide a plan of care regarding his severe peripheral vascular disease on December 20  No signs or symptoms of infection today. Patient understands that if any signs of infection start (such as increased redness, drainage, pain, fever, chills, diaphoresis), they should call our office or proceed to the ER or Urgent Care. Patient should continue a high protein diet to facilitate wound healing  Patient is advised to not submerge wound or leave wound open to air. Follow up in 1 weeks  Given the multi-factorial nature of wound care, additional time was taken to review patient's treatment plan with other specialties and most recent pertinent lab work and imaging. All plans of care discussed with patient at bedside who verbalized understanding with treatment plan. Wound 11/21/23 Toe (Comment  which one) Anterior;Right (Active)   Wound Image Images linked 11/21/23 1409   Wound Description Granulation tissue; Beefy red;Slough; Yellow 11/21/23 1409   Nicolette-wound Assessment Edema; Erythema 11/21/23 1409   Wound Length (cm) 0.4 cm 11/21/23 1409   Wound Width (cm) 0.5 cm 11/21/23 1409   Wound Depth (cm) 0.1 cm 11/21/23 1409   Wound Surface Area (cm^2) 0.2 cm^2 11/21/23 1409   Wound Volume (cm^3) 0.02 cm^3 11/21/23 1409   Calculated Wound Volume (cm^3) 0.02 cm^3 11/21/23 1409   Drainage Amount Moderate 11/21/23 1409   Non-staged Wound Description Full thickness 11/21/23 1409   Treatments Cleansed 11/21/23 1409   Dressing Status Old drainage; Intact (upon arrival) 11/21/23 1409       Wound 11/21/23 Pretibial Right (Active)   Wound Image Images linked 11/21/23 1412   Wound Description Granulation tissue;Pink;Slough; Yellow 11/21/23 1412   Nicolette-wound Assessment Edema 11/21/23 1412   Wound Length (cm) 0.6 cm 11/21/23 1412   Wound Width (cm) 0.6 cm 11/21/23 1412   Wound Depth (cm) 0.1 cm 11/21/23 1412   Wound Surface Area (cm^2) 0.36 cm^2 11/21/23 1412   Wound Volume (cm^3) 0.036 cm^3 11/21/23 1412   Calculated Wound Volume (cm^3) 0.04 cm^3 11/21/23 1412   Drainage Amount Moderate 11/21/23 1412   Drainage Description Serous;Clear 11/21/23 1412   Non-staged Wound Description Full thickness 11/21/23 1412   Dressing Status -- (no drsg on arrival) 11/21/23 1412       Wound 11/21/23 Toe (Comment  which one) Anterior;Right (Active)   Date First Assessed/Time First Assessed: 11/21/23 1352   Location: Toe (Comment  which one)  Wound Location Orientation: Anterior;Right  Wound Description (Comments): 11/21/23 R 1st toe       Wound 11/21/23 Pretibial Right (Active)   Date First Assessed/Time First Assessed: 11/21/23 1352   Location: Pretibial  Wound Location Orientation: Right       [REMOVED] Incision 10/25/18 Scrotum Right (Removed)   Resolved Date: 11/21/23  Date First Assessed/Time First Assessed: 10/25/18 0816   Location: Scrotum  Wound Location Orientation: Right  Wound Outcome: Healed       [REMOVED] Wound 11/02/23 Incision Wrist Right (Removed)   Resolved Date: 11/21/23  Date First Assessed/Time First Assessed: 11/02/23 1525   Primary Wound Type: Incision  Location: Wrist  Wound Location Orientation: Right  Incision's 1st Dressing: [facility] puncture site  Wound Outcome: Healed       [REMOVED] Wound 11/02/23 Traumatic Abrasion(s) Toe (Comment  which one) (Removed)   Resolved Date: 11/21/23  Date First Assessed/Time First Assessed: 11/02/23 1759   Present on Original Admission: Yes  Primary Wound Type: Traumatic  Traumatic Wound Type: Abrasion(s)  Location: (c) Toe (Comment  which one)  Dressing Status: Clean;Dry;... Subjective:      .    11/21/23: Lindsey Blum is a pleasant 60-year-old male with a past medical history of sensory peripheral arterial disease worse on the right than the left with rest pain, bilateral chronic venous hypertension, coronary artery disease not a surgical candidate here for initial wound care consult. Patient was referred by his primary care provider Dr. Terrell Hayden. Per chart review patient has had a right hallux wound for quite some time. Following with podiatry Dr. Mansoor Roldan, he last saw in October 2023. No mention of wound at that visit or chart review. Patient notes that podiatry did note the wound and advised patient to take care of it. No specific instructions at that time. Patient notes that he has been covering it with gauze and not leaving open to air. Denies any symptoms of infection today including fever chills diaphoresis. BL LEADS 9/20/23:  RIGHT LOWER LIMB:  Diffuse disease noted throughout the femoral-popliteal arteries with a 50-75%  stenosis in the distal MARY BETH. Severe stenosis versus total occlusion in the CFA with recanalization at the  proximal SFA . Severe stenosis versus total occlusion in the distal SFA with recanalization in  the proximal popliteal artery. Ankle/Brachial index: 0.29 which is in the rest pain, tissue loss disease  category (Prior 0.32 )  PVR/ PPG tracings are dampened. Metatarsal pressure of  00mmHg  Great toe pressure of 00mmHg, within the below healing range, waveform was  flatline. LEFT LOWER LIMB:  Diffuse disease noted throughout the femoral-popliteal arteries with a 50-75%  stenosis in the mid SFA and Popliteal A. There is a <50% stenosis in the profunda artery and distal SFA. The MARY BETH and bilateral EIA are patent. Ankle/Brachial index: 0.91 which is in the normal disease category (Prior 0.71  )  PVR/ PPG tracings are dampened. Metatarsal pressure of  117mmHg  Great toe pressure of  45mmHg, within the normal healing range     Compared to previous study on 3/24/2023 , there is progression of disease in  the right lower extremity.           The following portions of the patient's history were reviewed and updated as appropriate: allergies, current medications, past family history, past medical history, past social history, past surgical history, and problem list.    Review of Systems   Constitutional:  Negative for chills, diaphoresis, fatigue and fever. Skin:  Positive for wound. All other systems reviewed and are negative. Objective:       Wound 11/21/23 Toe (Comment  which one) Anterior;Right (Active)   Wound Image Images linked 11/21/23 1409   Wound Description Granulation tissue; Beefy red;Slough; Yellow 11/21/23 1409   Nicolette-wound Assessment Edema; Erythema 11/21/23 1409   Wound Length (cm) 0.4 cm 11/21/23 1409   Wound Width (cm) 0.5 cm 11/21/23 1409   Wound Depth (cm) 0.1 cm 11/21/23 1409   Wound Surface Area (cm^2) 0.2 cm^2 11/21/23 1409   Wound Volume (cm^3) 0.02 cm^3 11/21/23 1409   Calculated Wound Volume (cm^3) 0.02 cm^3 11/21/23 1409   Drainage Amount Moderate 11/21/23 1409   Non-staged Wound Description Full thickness 11/21/23 1409   Treatments Cleansed 11/21/23 1409   Dressing Status Old drainage; Intact (upon arrival) 11/21/23 1409       Wound 11/21/23 Pretibial Right (Active)   Wound Image Images linked 11/21/23 1412   Wound Description Granulation tissue;Pink;Slough; Yellow 11/21/23 1412   Nicolette-wound Assessment Edema 11/21/23 1412   Wound Length (cm) 0.6 cm 11/21/23 1412   Wound Width (cm) 0.6 cm 11/21/23 1412   Wound Depth (cm) 0.1 cm 11/21/23 1412   Wound Surface Area (cm^2) 0.36 cm^2 11/21/23 1412   Wound Volume (cm^3) 0.036 cm^3 11/21/23 1412   Calculated Wound Volume (cm^3) 0.04 cm^3 11/21/23 1412   Drainage Amount Moderate 11/21/23 1412   Drainage Description Serous;Clear 11/21/23 1412   Non-staged Wound Description Full thickness 11/21/23 1412   Dressing Status -- (no drsg on arrival) 11/21/23 1412       /73   Pulse 73   Temp 97.6 °F (36.4 °C) (Temporal)   Ht 5' 11" (1.803 m)   Wt 74.8 kg (165 lb)   BMI 23.01 kg/m²     Physical Exam  Vitals reviewed. Constitutional:       Appearance: Normal appearance. HENT:      Head: Normocephalic and atraumatic. Eyes:      Extraocular Movements: Extraocular movements intact.    Pulmonary:      Effort: Pulmonary effort is normal. Musculoskeletal:      Cervical back: Neck supple. Right lower leg: Edema present. Left lower leg: Edema present. Skin:     Comments: Still on the right great toe with small wound opening. Fibrin deposition in the wound bed. Very tender. Anterior distal right lower extremity wounds with some fibrin deposition in the wound bed. Healthy wound bed tissue. No signs of infection. Minimal erythema   Neurological:      Mental Status: He is alert. Psychiatric:         Mood and Affect: Mood normal.                 Wound Instructions:  Orders Placed This Encounter   Procedures    Wound cleansing and dressings     Right lower leg wound:    Wash your hands with soap and water. Remove old dressing, discard into plastic bag and place in trash. Cleanse the wound with soap and water prior to applying a clean dressing. Do not use tissue or cotton balls. Do not scrub the wound. Pat dry using gauze. Shower yes - take dressings off & cleanse with mild soap. Do not soak or submerge in water. Apply moisturizer to skin surrounding wound  Apply calcium alginate with silver (used Maxorb Ag+ to the wound). Cover with ABD pad, allison & tape. Change dressing 3x/week. This was done today        Right 1st toe wound:    Wash your hands with soap and water. Remove old dressing, discard into plastic bag and place in trash. Cleanse the wound with soap and water prior to applying a clean dressing. Do not use tissue or cotton balls. Do not scrub the wound. Pat dry using gauze. Shower yes - take dressings off and cleanse with mild soap. Do not soak or submerge wound in water. Apply Aquacel Ag+ Rope to the wound. Cover with Allevyn bordered 2x2" foam dressing. Change dressing 3x/week. This was done today.      Standing Status:   Future     Standing Expiration Date:   11/21/2024    Wound miscellaneous orders     Be sure to eat plenty of protein with each meal.    Stay well hydrated with water daily - if you have a fluid restriction, be sure to stay within your restriction. Standing Status:   Future     Standing Expiration Date:   11/21/2024    Wound compression and edema control     You cannot wear compression due to your vascular status. Elevate legs to heart level or higher as tolerated when sitting to reduce swelling. Standing Status:   Future     Standing Expiration Date:   11/21/2024        Diagnosis ICD-10-CM Associated Orders   1. Atherosclerotic PVD with intermittent claudication ([facility])  I70.219 lidocaine (XYLOCAINE) 4 % topical solution 5 mL     Wound cleansing and dressings     Wound miscellaneous orders     Wound compression and edema control      2. 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 Open wound of right great toe, initial encounter  S91.101A lidocaine (XYLOCAINE) 4 % topical solution 5 mL     Wound cleansing and dressings     Wound miscellaneous orders     Wound compression and edema control      4. 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 Therefore there may be syntax, spelling, and/or grammatical errors. Occasional wrong word or "sound alike" substitutions may have occurred due to the inherent limitations of voice recognition software. Read the chart carefully and recognize, using context, where substitutions have occurred.  Please call if you have any questions."

## 2023-11-21 NOTE — LETTER
[facility] WOUND CARE  [address]  [address] [facility] [address]  Phone#  [phone]  Fax#  [phone]    Patient:  Dia Escobedo  YOB: 1930  Phone:  [phone]  Date of Visit:  11/21/2023    Orders Placed This Encounter   Procedures   • Wound cleansing and dressings     Right lower leg wound:    Wash your hands with soap and water. Remove old dressing, discard into plastic bag and place in trash. Cleanse the wound with soap and water prior to applying a clean dressing. Do not use tissue or cotton balls. Do not scrub the wound. Pat dry using gauze. Shower yes - take dressings off & cleanse with mild soap. Do not soak or submerge in water. Apply moisturizer to skin surrounding wound  Apply calcium alginate with silver (used Maxorb Ag+ to the wound). Cover with ABD pad, allison & tape. Change dressing 3x/week. This was done today        Right 1st toe wound:    Wash your hands with soap and water. Remove old dressing, discard into plastic bag and place in trash. Cleanse the wound with soap and water prior to applying a clean dressing. Do not use tissue or cotton balls. Do not scrub the wound. Pat dry using gauze. Shower yes - take dressings off and cleanse with mild soap. Do not soak or submerge wound in water. Apply Aquacel Ag+ Rope to the wound. Cover with Allevyn bordered 2x2" foam dressing. Change dressing 3x/week. This was done today. Standing Status:   Future     Standing Expiration Date:   11/21/2024   • Wound miscellaneous orders     Be sure to eat plenty of protein with each meal.    Stay well hydrated with water daily - if you have a fluid restriction, be sure to stay within your restriction.      Standing Status:   Future     Standing Expiration Date:   11/21/2024   • Wound home care     [facility] for wound dressing changes 3x/week     Standing Status:   Future     Standing Expiration Date:   11/21/2024   • Wound compression and edema control     You cannot wear compression due to your vascular status. Elevate legs to heart level or higher as tolerated when sitting to reduce swelling.      Standing Status:   Future     Standing Expiration Date:   11/21/2024         Electronically signed by Anthony Baird MD

## 2023-11-28 ENCOUNTER — OFFICE VISIT (OUTPATIENT)
Dept: WOUND CARE | Facility: HOSPITAL | Age: 88
End: 2023-11-28
Payer: COMMERCIAL

## 2023-11-28 VITALS
HEART RATE: 88 BPM | RESPIRATION RATE: 15 BRPM | DIASTOLIC BLOOD PRESSURE: 81 MMHG | SYSTOLIC BLOOD PRESSURE: 156 MMHG | TEMPERATURE: 97.4 F

## 2023-11-28 DIAGNOSIS — I87.2 EDEMA OF BOTH LOWER EXTREMITIES DUE TO PERIPHERAL VENOUS INSUFFICIENCY: ICD-10-CM

## 2023-11-28 DIAGNOSIS — L97.911 NON-PRESSURE CHRONIC ULCER OF RIGHT LOWER LEG, LIMITED TO BREAKDOWN OF SKIN (HCC): ICD-10-CM

## 2023-11-28 DIAGNOSIS — I70.219 ATHEROSCLEROTIC PVD WITH INTERMITTENT CLAUDICATION (HCC): ICD-10-CM

## 2023-11-28 DIAGNOSIS — S91.101A OPEN WOUND OF RIGHT GREAT TOE, INITIAL ENCOUNTER: Primary | ICD-10-CM

## 2023-11-28 PROCEDURE — 97597 DBRDMT OPN WND 1ST 20 CM/<: CPT | Performed by: STUDENT IN AN ORGANIZED HEALTH CARE EDUCATION/TRAINING PROGRAM

## 2023-11-28 RX ORDER — LIDOCAINE HYDROCHLORIDE 40 MG/ML
5 SOLUTION TOPICAL ONCE
Status: COMPLETED | OUTPATIENT
Start: 2023-11-28 | End: 2023-11-28

## 2023-11-28 RX ADMIN — LIDOCAINE HYDROCHLORIDE 5 ML: 40 SOLUTION TOPICAL at 14:16

## 2023-11-28 NOTE — PROGRESS NOTES
Patient ID: Lyndol Phalen is a 80 y.o. 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)  -     lidocaine (XYLOCAINE) 4 % topical solution 5 mL  -     Wound cleansing and dressings; Future  -     Wound miscellaneous orders; Future  -     Wound compression and edema control; Future  -     Wound cleansing and dressings; Future  -     Debridement    Atherosclerotic PVD with intermittent claudication (HCC)  -     lidocaine (XYLOCAINE) 4 % topical solution 5 mL  -     Wound cleansing and dressings; Future  -     Wound miscellaneous orders; Future  -     Wound compression and edema control; Future  -     Wound cleansing and dressings; Future    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   Risks and benefits: risks, benefits and alternatives were discussed  Consent given by: patient  Time out: Immediately prior to procedure a "time out" was called to verify the correct patient, procedure, equipment, support staff and site/side marked as required. Patient identity confirmed: verbally with patient    Performed by: physician  Debridement type: selective  Pain control: lidocaine 4%  Post-debridement measurements  Length (cm): 0.5  Width (cm): 0.5  Depth (cm): 0.1  Percent debrided: 90%  Surface Area (cm^2): 0.25  Area debrided (cm^2): 0.23  Volume (cm^3): 0.03  Devitalized tissue debrided: biofilm, exudate and fibrin  Instrument(s) utilized: curette  Bleeding: small  Hemostasis obtained with: pressure  Procedural pain (0-10): 1  Post-procedural pain: 0   Response to treatment: procedure was tolerated well    Debridement   Wound 11/21/23 Pretibial Right    Universal Protocol:  Consent: Verbal consent obtained. Risks and benefits: risks, benefits and alternatives were discussed  Consent given by: patient  Time out: Immediately prior to procedure a "time out" was called to verify the correct patient, procedure, equipment, support staff and site/side marked as required. Patient identity confirmed: verbally with patient    Performed by: physician  Debridement type: selective  Pain control: lidocaine 4%  Post-debridement measurements  Length (cm): 0.4  Width (cm): 0.3  Depth (cm): 0.1  Percent debrided: 90%  Surface Area (cm^2): 0.12  Area debrided (cm^2): 0.11  Volume (cm^3): 0.01  Devitalized tissue debrided: biofilm and exudate  Instrument(s) utilized: curette  Bleeding: small  Hemostasis obtained with: pressure  Procedural pain (0-10): 1  Post-procedural pain: 0   Response to treatment: procedure was tolerated well        Plan: It was a pleasure to see Eugene Rice for wound care follow up today  Selective debridement performed today as above  Wound is improving   Continue plan of care as noted below with change to medihoney for both wounds  No signs or symptoms of infection today.  Patient understands that if any signs of infection start (such as increased redness, drainage, pain, fever, chills, diaphoresis), they should call our office or proceed to the ER or Urgent Care. Patient should continue a high protein diet to facilitate wound healing  Patient is advised to not submerge wound or leave wound open to air. Follow up in 1 weeks  Given the multi-factorial nature of wound care, additional time was taken to review patient's treatment plan with other specialties and most recent pertinent lab work and imaging. All plans of care discussed with patient at bedside who verbalized understanding with treatment plan. Wound 11/21/23 Toe (Comment  which one) Anterior;Right (Active)   Wound Image Images linked 11/28/23 1414   Wound Description Beefy red; White;Pink;Epithelialization 11/28/23 1414   Nicolette-wound Assessment Edema;Pink 11/28/23 1414   Wound Length (cm) 0.5 cm 11/28/23 1414   Wound Width (cm) 0.5 cm 11/28/23 1414   Wound Depth (cm) 0.1 cm 11/28/23 1414   Wound Surface Area (cm^2) 0.25 cm^2 11/28/23 1414   Wound Volume (cm^3) 0.025 cm^3 11/28/23 1414   Calculated Wound Volume (cm^3) 0.03 cm^3 11/28/23 1414   Change in Wound Size % -50 11/28/23 1414   Drainage Amount Scant 11/28/23 1414   Drainage Description Serosanguineous 11/28/23 1414   Non-staged Wound Description Full thickness 11/28/23 1414   Dressing Status Intact (upon arrival) 11/28/23 1414       Wound 11/21/23 Pretibial Right (Active)   Wound Image Images linked 11/28/23 1412   Wound Description Dry;Black; Bernadette Slot; Other (Comment) (scab) 11/28/23 1411   Nicolette-wound Assessment Edema; Erythema 11/28/23 1411   Wound Length (cm) 0.4 cm 11/28/23 1411   Wound Width (cm) 0.3 cm 11/28/23 1411   Wound Depth (cm) 0.1 cm 11/28/23 1411   Wound Surface Area (cm^2) 0.12 cm^2 11/28/23 1411   Wound Volume (cm^3) 0.012 cm^3 11/28/23 1411   Calculated Wound Volume (cm^3) 0.01 cm^3 11/28/23 1411   Change in Wound Size % 75 11/28/23 1411   Drainage Amount None 11/28/23 1411   Non-staged Wound Description Full thickness 11/28/23 1411   Dressing Status Intact (upon arrival) 11/28/23 1411       Wound 11/21/23 Toe (Comment  which one) Anterior;Right (Active)   Date First Assessed/Time First Assessed: 11/21/23 1352   Location: Toe (Comment  which one)  Wound Location Orientation: Anterior;Right  Wound Description (Comments): 11/21/23 R 1st toe       Wound 11/21/23 Pretibial Right (Active)   Date First Assessed/Time First Assessed: 11/21/23 1352   Location: Pretibial  Wound Location Orientation: Right       [REMOVED] Incision 10/25/18 Scrotum Right (Removed)   Resolved Date: 11/21/23  Date First Assessed/Time First Assessed: 10/25/18 0816   Location: Scrotum  Wound Location Orientation: Right  Wound Outcome: Healed       [REMOVED] Wound 11/02/23 Incision Wrist Right (Removed)   Resolved Date: 11/21/23  Date First Assessed/Time First Assessed: 11/02/23 1525   Primary Wound Type: Incision  Location: Wrist  Wound Location Orientation: Right  Incision's 1st Dressing: LHC puncture site  Wound Outcome: Healed       [REMOVED] Wound 11/02/23 Traumatic Abrasion(s) Toe (Comment  which one) (Removed)   Resolved Date: 11/21/23  Date First Assessed/Time First Assessed: 11/02/23 1759   Present on Original Admission: Yes  Primary Wound Type: Traumatic  Traumatic Wound Type: Abrasion(s)  Location: (c) Toe (Comment  which one)  Dressing Status: Clean;Dry;... Subjective:      .    11/28/23: Wound care as directed. Note that wounds are very dry. No symptoms of infection today. 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 Patient was referred by his primary care provider Dr. Janneth Hines. Per chart review patient has had a right hallux wound for quite some time. Following with podiatry Dr. Melanie Crespo, he last saw in October 2023.   No mention of wound at that visit or chart review. Patient notes that podiatry did note the wound and advised patient to take care of it. No specific instructions at that time. Patient notes that he has been covering it with gauze and not leaving open to air. Denies any symptoms of infection today including fever chills diaphoresis. BL LEADS 9/20/23:  RIGHT LOWER LIMB:  Diffuse disease noted throughout the femoral-popliteal arteries with a 50-75%  stenosis in the distal MARY BETH. Severe stenosis versus total occlusion in the CFA with recanalization at the  proximal SFA . Severe stenosis versus total occlusion in the distal SFA with recanalization in  the proximal popliteal artery. Ankle/Brachial index: 0.29 which is in the rest pain, tissue loss disease  category (Prior 0.32 )  PVR/ PPG tracings are dampened. Metatarsal pressure of  00mmHg  Great toe pressure of 00mmHg, within the below healing range, waveform was  flatline. LEFT LOWER LIMB:  Diffuse disease noted throughout the femoral-popliteal arteries with a 50-75%  stenosis in the mid SFA and Popliteal A. There is a <50% stenosis in the profunda artery and distal SFA. The MARY BETH and bilateral EIA are patent. Ankle/Brachial index: 0.91 which is in the normal disease category (Prior 0.71  )  PVR/ PPG tracings are dampened. Metatarsal pressure of  117mmHg  Great toe pressure of  45mmHg, within the normal healing range     Compared to previous study on 3/24/2023 , there is progression of disease in  the right lower extremity. The following portions of the patient's history were reviewed and updated as appropriate: allergies, current medications, past family history, past medical history, past social history, past surgical history, and problem list.    Review of Systems   Constitutional:  Negative for chills, diaphoresis, fatigue and fever. Skin:  Positive for wound. All other systems reviewed and are negative.         Objective:       Wound 11/21/23 Toe (Comment  which one) Anterior;Right (Active)   Wound Image Images linked 11/28/23 1414   Wound Description Beefy red; White;Pink;Epithelialization 11/28/23 1414   Nicolette-wound Assessment Edema;Pink 11/28/23 1414   Wound Length (cm) 0.5 cm 11/28/23 1414   Wound Width (cm) 0.5 cm 11/28/23 1414   Wound Depth (cm) 0.1 cm 11/28/23 1414   Wound Surface Area (cm^2) 0.25 cm^2 11/28/23 1414   Wound Volume (cm^3) 0.025 cm^3 11/28/23 1414   Calculated Wound Volume (cm^3) 0.03 cm^3 11/28/23 1414   Change in Wound Size % -50 11/28/23 1414   Drainage Amount Scant 11/28/23 1414   Drainage Description Serosanguineous 11/28/23 1414   Non-staged Wound Description Full thickness 11/28/23 1414   Dressing Status Intact (upon arrival) 11/28/23 1414       Wound 11/21/23 Pretibial Right (Active)   Wound Image Images linked 11/28/23 1412   Wound Description Dry;Black; Georganne How; Other (Comment) (scab) 11/28/23 1411   Nicolette-wound Assessment Edema; Erythema 11/28/23 1411   Wound Length (cm) 0.4 cm 11/28/23 1411   Wound Width (cm) 0.3 cm 11/28/23 1411   Wound Depth (cm) 0.1 cm 11/28/23 1411   Wound Surface Area (cm^2) 0.12 cm^2 11/28/23 1411   Wound Volume (cm^3) 0.012 cm^3 11/28/23 1411   Calculated Wound Volume (cm^3) 0.01 cm^3 11/28/23 1411   Change in Wound Size % 75 11/28/23 1411   Drainage Amount None 11/28/23 1411   Non-staged Wound Description Full thickness 11/28/23 1411   Dressing Status Intact (upon arrival) 11/28/23 1411       /81   Pulse 88   Temp (!) 97.4 °F (36.3 °C)   Resp 15     Physical Exam  Vitals reviewed. Constitutional:       Appearance: Normal appearance. HENT:      Head: Normocephalic and atraumatic. Eyes:      Extraocular Movements: Extraocular movements intact. Pulmonary:      Effort: Pulmonary effort is normal.   Musculoskeletal:      Cervical back: Neck supple. Right lower leg: Edema present. Skin:     Comments: Right to wound and right leg wound both slightly smaller than last exam.  Fibrin deposition in both wounds.   No signs of infection. Neurological:      Mental Status: He is alert. Psychiatric:         Mood and Affect: Mood normal.                 Wound Instructions:  Orders Placed This Encounter   Procedures    Wound cleansing and dressings     Right lower leg wound:     Wash your hands with soap and water. Remove old dressing, discard into plastic bag and place in trash. Cleanse the wound with soap and water prior to applying a clean dressing. Do not use tissue or cotton balls. Do not scrub the wound. Pat dry using gauze. Shower yes - take dressings off & cleanse with mild soap. Do not soak or submerge in water. Apply moisturizer to skin surrounding wound  Apply MediHoney to the leg  wound  Cover with ABD pad, allison & tape. Change dressing 3x/week. This was done today     Standing Status:   Future     Standing Expiration Date:   11/28/2024    Wound miscellaneous orders     Be sure to eat plenty of protein with each meal.     Stay well hydrated with water daily - if you have a fluid restriction, be sure to stay within your restriction. Standing Status:   Future     Standing Expiration Date:   11/28/2024    Wound compression and edema control     Wound compression and edema control        You cannot wear compression due to your vascular status. Elevate legs to heart level or higher as tolerated when sitting to reduce sw     Standing Status:   Future     Standing Expiration Date:   11/28/2024    Wound cleansing and dressings     Right first toe (big toe): Wash your hands with soap and water. Remove old dressing, discard into plastic bag and place in trash. Cleanse the wound with soap and water prior to applying a clean dressing. Do not use tissue or cotton balls. Do not scrub the wound. Pat dry using gauze. Shower yes - take dressings off and cleanse with mild soap. Do not soak or submerge wound in water. Apply MediHoney to the wound.     Cover with Allevyn bordered 2x2" foam dressing. Change dressing 3x/week. This was done today. Standing Status:   Future     Standing Expiration Date:   11/28/2024    Debridement     This order was created via procedure documentation    Debridement     This order was created via procedure documentation        Diagnosis ICD-10-CM Associated Orders   1. Open wound of right great toe, initial encounter  S91.101A lidocaine (XYLOCAINE) 4 % topical solution 5 mL     Wound cleansing and dressings     Wound miscellaneous orders     Wound compression and edema control     Wound cleansing and dressings     Debridement      2. 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 Atherosclerotic PVD with intermittent claudication ([facility])  I70.219 lidocaine (XYLOCAINE) 4 % topical solution 5 mL     Wound cleansing and dressings     Wound miscellaneous orders     Wound compression and edema control     Wound cleansing and dressings      4. 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 Therefore there may be syntax, spelling, and/or grammatical errors. Occasional wrong word or "sound alike" substitutions may have occurred due to the inherent limitations of voice recognition software. Read the chart carefully and recognize, using context, where substitutions have occurred.  Please call if you have any questions."

## 2023-11-28 NOTE — PATIENT INSTRUCTIONS
Orders Placed This Encounter   Procedures    Wound cleansing and dressings     Right lower leg wound:     Wash your hands with soap and water. Remove old dressing, discard into plastic bag and place in trash. Cleanse the wound with soap and water prior to applying a clean dressing. Do not use tissue or cotton balls. Do not scrub the wound. Pat dry using gauze. Shower yes - take dressings off & cleanse with mild soap. Do not soak or submerge in water. Apply moisturizer to skin surrounding wound  Apply MediHoney to the leg  wound  Cover with ABD pad, allison & tape. Change dressing 3x/week. This was done today     Standing Status:   Future     Standing Expiration Date:   11/28/2024    Wound miscellaneous orders     Be sure to eat plenty of protein with each meal.     Stay well hydrated with water daily - if you have a fluid restriction, be sure to stay within your restriction. Standing Status:   Future     Standing Expiration Date:   11/28/2024    Wound compression and edema control     Wound compression and edema control        You cannot wear compression due to your vascular status. Elevate legs to heart level or higher as tolerated when sitting to reduce sw     Standing Status:   Future     Standing Expiration Date:   11/28/2024    Wound cleansing and dressings     Right first toe (big toe): Wash your hands with soap and water. Remove old dressing, discard into plastic bag and place in trash. Cleanse the wound with soap and water prior to applying a clean dressing. Do not use tissue or cotton balls. Do not scrub the wound. Pat dry using gauze. Shower yes - take dressings off and cleanse with mild soap. Do not soak or submerge wound in water. Apply MediHoney to the wound. Cover with Allevyn bordered 2x2" foam dressing. Change dressing 3x/week. This was done today.      Standing Status:   Future     Standing Expiration Date:   11/28/2024    Debridement     This order was created via procedure documentation

## 2023-12-04 DIAGNOSIS — R21 RASH: ICD-10-CM

## 2023-12-04 RX ORDER — TRIAMCINOLONE ACETONIDE 0.25 MG/G
OINTMENT TOPICAL 2 TIMES DAILY
Qty: 30 G | Refills: 2 | Status: SHIPPED | OUTPATIENT
Start: 2023-12-04

## 2023-12-04 NOTE — TELEPHONE ENCOUNTER
Reason for call:   [x] Refill   [] Prior Auth  [] Other:     Office:   [x] PCP/Provider Kalli Garcia  [] Specialty/Provider -     Medication: triamcinolone     Dose/Frequency: 0.025%    Quantity: BID    Pharmacy: [facility]    Does the patient have enough for 3 days?    [x] Yes   [] No - Send as HP to POD

## 2023-12-07 ENCOUNTER — OFFICE VISIT (OUTPATIENT)
Dept: WOUND CARE | Facility: HOSPITAL | Age: 88
End: 2023-12-07
Payer: COMMERCIAL

## 2023-12-07 VITALS
TEMPERATURE: 97.6 F | SYSTOLIC BLOOD PRESSURE: 170 MMHG | DIASTOLIC BLOOD PRESSURE: 93 MMHG | HEART RATE: 64 BPM | RESPIRATION RATE: 20 BRPM

## 2023-12-07 DIAGNOSIS — I70.219 ATHEROSCLEROTIC PVD WITH INTERMITTENT CLAUDICATION (HCC): ICD-10-CM

## 2023-12-07 DIAGNOSIS — L97.911 NON-PRESSURE CHRONIC ULCER OF RIGHT LOWER LEG, LIMITED TO BREAKDOWN OF SKIN (HCC): ICD-10-CM

## 2023-12-07 DIAGNOSIS — S51.012A SKIN TEAR OF ELBOW WITHOUT COMPLICATION, LEFT, INITIAL ENCOUNTER: ICD-10-CM

## 2023-12-07 DIAGNOSIS — I87.311 CHRONIC VENOUS HYPERTENSION (IDIOPATHIC) WITH ULCER OF RIGHT LOWER EXTREMITY (CODE) (HCC): ICD-10-CM

## 2023-12-07 DIAGNOSIS — S91.101A OPEN WOUND OF RIGHT GREAT TOE, INITIAL ENCOUNTER: Primary | ICD-10-CM

## 2023-12-07 PROCEDURE — 97597 DBRDMT OPN WND 1ST 20 CM/<: CPT | Performed by: STUDENT IN AN ORGANIZED HEALTH CARE EDUCATION/TRAINING PROGRAM

## 2023-12-07 PROCEDURE — 97598 DBRDMT OPN WND ADDL 20CM/<: CPT | Performed by: STUDENT IN AN ORGANIZED HEALTH CARE EDUCATION/TRAINING PROGRAM

## 2023-12-07 RX ORDER — LIDOCAINE HYDROCHLORIDE 40 MG/ML
5 SOLUTION TOPICAL ONCE
Status: COMPLETED | OUTPATIENT
Start: 2023-12-07 | End: 2023-12-07

## 2023-12-07 RX ADMIN — LIDOCAINE HYDROCHLORIDE 5 ML: 40 SOLUTION TOPICAL at 11:08

## 2023-12-07 NOTE — PROGRESS NOTES
Patient ID: Kristen Hirsch is a 80 y.o. male Date of Birth 8/6/1930     Chief Complaint  Chief Complaint   Patient presents with    Follow Up Wound Care Visit     Right great toe wound       Allergies  Tamsulosin    Assessment:     Diagnoses and all orders for this visit:    Open wound of right great toe, initial encounter  -     lidocaine (XYLOCAINE) 4 % topical solution 5 mL  -     Wound cleansing and dressings; Future  -     Wound cleansing and dressings; Future  -     Wound miscellaneous orders; Future  -     Wound cleansing and dressings; Future  -     Wound compression and edema control; Future  -     collagenase (SANTYL) ointment; Apply topically daily  -     Debridement    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, limited to breakdown of skin (HCC)  -     lidocaine (XYLOCAINE) 4 % topical solution 5 mL  -     Wound cleansing and dressings; Future  -     Wound cleansing and dressings; Future  -     Wound miscellaneous orders; Future  -     Wound cleansing and dressings; Future  -     Wound compression and edema control; Future  -     collagenase (SANTYL) ointment; Apply topically daily  -     Debridement    Chronic venous hypertension (idiopathic) with ulcer of right lower extremity (CODE) (HCC)    Atherosclerotic PVD with intermittent claudication (HCC)    Skin tear of elbow without complication, left, initial encounter  -     Debridement    Other orders  -     Debridement              Debridement   Wound 11/21/23 Toe (Comment  which one) Anterior;Right    Universal Protocol:  Consent: Verbal consent obtained. Risks and benefits: risks, benefits and alternatives were discussed  Consent given by: patient  Time out: Immediately prior to procedure a "time out" was called to verify the correct patient, procedure, equipment, support staff and site/side marked as required.   Patient identity confirmed: verbally with patient    Performed by: physician  Debridement type: selective  Pain control: lidocaine 4%  Post-debridement measurements  Length (cm): 0.5  Width (cm): 0.5  Depth (cm): 0.1  Percent debrided: 90%  Surface Area (cm^2): 0.25  Area debrided (cm^2): 0.23  Volume (cm^3): 0.03  Devitalized tissue debrided: biofilm and exudate  Instrument(s) utilized: curette  Bleeding: small  Hemostasis obtained with: pressure  Procedural pain (0-10): 1  Post-procedural pain: 0   Response to treatment: procedure was tolerated well    Debridement   Wound 11/21/23 Pretibial Right    Universal Protocol:  Consent: Verbal consent obtained. Risks and benefits: risks, benefits and alternatives were discussed  Consent given by: patient  Time out: Immediately prior to procedure a "time out" was called to verify the correct patient, procedure, equipment, support staff and site/side marked as required. Patient identity confirmed: verbally with patient    Performed by: physician  Debridement type: selective  Pain control: lidocaine 4%  Post-debridement measurements  Length (cm): 0.6  Width (cm): 0.5  Depth (cm): 0.1  Percent debrided: 90%  Surface Area (cm^2): 0.3  Area debrided (cm^2): 0.27  Volume (cm^3): 0.03  Devitalized tissue debrided: biofilm and exudate  Instrument(s) utilized: curette  Bleeding: small  Hemostasis obtained with: pressure  Procedural pain (0-10): 1  Post-procedural pain: 0   Response to treatment: procedure was tolerated well    Debridement   Wound 12/07/23 Traumatic Arm Anterior;Left;Lower    Universal Protocol:  Consent: Verbal consent obtained. Risks and benefits: risks, benefits and alternatives were discussed  Consent given by: patient  Time out: Immediately prior to procedure a "time out" was called to verify the correct patient, procedure, equipment, support staff and site/side marked as required.   Patient identity confirmed: verbally with patient    Performed by: physician  Debridement type: selective  Pain control: lidocaine 4%  Post-debridement measurements  Length (cm): 4.3  Width (cm): 6  Depth (cm): 0.1  Percent debrided: 90%  Surface Area (cm^2): 25.8  Area debrided (cm^2): 23.22  Volume (cm^3): 2.58  Devitalized tissue debrided: biofilm and exudate  Instrument(s) utilized: curette  Bleeding: small  Hemostasis obtained with: pressure  Procedural pain (0-10): 1  Post-procedural pain: 0   Response to treatment: procedure was tolerated well        Plan: It was a pleasure to see Maurice Nunez for wound care follow up today  Selective debridement performed today as above  Wounds are not improving   Continue plan of care as noted below with consult to right lower extremity and right toe wound. Will use Medihoney on new wound of left elbow. Gentle compression with Spandi  only. Patient advised to elevate his legs whenever possible  No signs or symptoms of infection today. Patient understands that if any signs of infection start (such as increased redness, drainage, pain, fever, chills, diaphoresis), they should call our office or proceed to the ER or Urgent Care. Patient should continue a high protein diet to facilitate wound healing  Patient is advised to not submerge wound or leave wound open to air. Follow up in 1 weeks  Given the multi-factorial nature of wound care, additional time was taken to review patient's treatment plan with other specialties and most recent pertinent lab work and imaging. All plans of care discussed with patient at bedside who verbalized understanding with treatment plan. Wound 11/21/23 Toe (Comment  which one) Anterior;Right (Active)   Wound Image Images linked 12/07/23 1100   Wound Description Beefy red; White;Pink;Epithelialization;Yellow 12/07/23 1100   Nicolette-wound Assessment Edema; Erythema 12/07/23 1100   Wound Length (cm) 0.5 cm 12/07/23 1100   Wound Width (cm) 0.5 cm 12/07/23 1100   Wound Depth (cm) 0.1 cm 12/07/23 1100   Wound Surface Area (cm^2) 0.25 cm^2 12/07/23 1100   Wound Volume (cm^3) 0.025 cm^3 12/07/23 1100   Calculated Wound Volume (cm^3) 0.03 cm^3 12/07/23 1100   Change in Wound Size % -50 12/07/23 1100   Drainage Amount Moderate 12/07/23 1100   Drainage Description Serosanguineous 12/07/23 1100   Dressing Status Intact 12/07/23 1100       Wound 11/21/23 Pretibial Right (Active)   Wound Image Images linked 12/07/23 1102   Wound Description Yellow;Slough;Pink;Black 12/07/23 1102   Nicolette-wound Assessment Edema; Erythema;Dry 12/07/23 1102   Wound Length (cm) 0.6 cm 12/07/23 1102   Wound Width (cm) 0.5 cm 12/07/23 1102   Wound Depth (cm) 0.1 cm 12/07/23 1102   Wound Surface Area (cm^2) 0.3 cm^2 12/07/23 1102   Wound Volume (cm^3) 0.03 cm^3 12/07/23 1102   Calculated Wound Volume (cm^3) 0.03 cm^3 12/07/23 1102   Change in Wound Size % 25 12/07/23 1102   Drainage Amount Scant 12/07/23 1102   Drainage Description Yellow 12/07/23 1102   Non-staged Wound Description Full thickness 12/07/23 1102   Dressing Status Intact 12/07/23 1102       Wound 12/07/23 Traumatic Arm Anterior;Left;Lower (Active)   Wound Image Images linked 12/07/23 1106   Wound Description Epithelialization;Pink; Other (Comment); Bleeding (black dried blood) 12/07/23 1105   Nicolette-wound Assessment Edema;Purple;Pink; Other (Comment);Dry (ecchymosis) 12/07/23 1105   Wound Length (cm) 4.3 cm 12/07/23 1105   Wound Width (cm) 6 cm 12/07/23 1105   Wound Depth (cm) 0.1 cm 12/07/23 1105   Wound Surface Area (cm^2) 25.8 cm^2 12/07/23 1105   Wound Volume (cm^3) 2.58 cm^3 12/07/23 1105   Calculated Wound Volume (cm^3) 2.58 cm^3 12/07/23 1105   Drainage Amount Small 12/07/23 1105   Drainage Description Bloody 12/07/23 1105   Non-staged Wound Description Full thickness 12/07/23 1105   Dressing Status Intact 12/07/23 1105       Wound 11/21/23 Toe (Comment  which one) Anterior;Right (Active)   Date First Assessed/Time First Assessed: 11/21/23 1352   Location: Toe (Comment  which one)  Wound Location Orientation: Anterior;Right  Wound Description (Comments): 11/21/23 R 1st toe       Wound 11/21/23 Pretibial Right (Active)   Date First Assessed/Time First Assessed: 11/21/23 1352   Location: Pretibial  Wound Location Orientation: Right       Wound 12/07/23 Traumatic Arm Anterior;Left;Lower (Active)   Date First Assessed/Time First Assessed: 12/07/23 1103   Primary Wound Type: Traumatic  Location: Arm  Wound Location Orientation: Anterior;Left;Lower       [REMOVED] Incision 10/25/18 Scrotum Right (Removed)   Resolved Date: 11/21/23  Date First Assessed/Time First Assessed: 10/25/18 0816   Location: Scrotum  Wound Location Orientation: Right  Wound Outcome: Healed       [REMOVED] Wound 11/02/23 Incision Wrist Right (Removed)   Resolved Date: 11/21/23  Date First Assessed/Time First Assessed: 11/02/23 1525   Primary Wound Type: Incision  Location: Wrist  Wound Location Orientation: Right  Incision's 1st Dressing: [facility] puncture site  Wound Outcome: Healed       [REMOVED] Wound 11/02/23 Traumatic Abrasion(s) Toe (Comment  which one) (Removed)   Resolved Date: 11/21/23  Date First Assessed/Time First Assessed: 11/02/23 1759   Present on Original Admission: Yes  Primary Wound Type: Traumatic  Traumatic Wound Type: Abrasion(s)  Location: (c) Toe (Comment  which one)  Dressing Status: Clean;Dry;... Subjective:      .    12/7/23: Notes that legs are more edematous today he has been more active with the holidays. Did have a mechanical fall and landed on his elbow. Notices no change in range of motion just some bruising on the skin itself. Does have a skin tear to the left elbow today. No symptoms of infection. Particularly notes that he did not fall and land with any impact to his head. 11/28/23: Wound care as directed. Note that wounds are very dry. No symptoms of infection today.      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 Patient was referred by his primary care provider Dr. Orly Bolanos. Per chart review patient has had a right hallux wound for quite some time. Following with podiatry Dr. Tasha Galaviz, he last saw in October 2023. No mention of wound at that visit or chart review. Patient notes that podiatry did note the wound and advised patient to take care of it. No specific instructions at that time. Patient notes that he has been covering it with gauze and not leaving open to air. Denies any symptoms of infection today including fever chills diaphoresis. The following portions of the patient's history were reviewed and updated as appropriate: allergies, current medications, past family history, past medical history, past social history, past surgical history, and problem list.    Review of Systems   Constitutional:  Negative for chills, diaphoresis and fever. Skin:  Positive for wound. All other systems reviewed and are negative. Objective:       Wound 11/21/23 Toe (Comment  which one) Anterior;Right (Active)   Wound Image Images linked 12/07/23 1100   Wound Description Beefy red; White;Pink;Epithelialization;Yellow 12/07/23 1100   Nicolette-wound Assessment Edema; Erythema 12/07/23 1100   Wound Length (cm) 0.5 cm 12/07/23 1100   Wound Width (cm) 0.5 cm 12/07/23 1100   Wound Depth (cm) 0.1 cm 12/07/23 1100   Wound Surface Area (cm^2) 0.25 cm^2 12/07/23 1100   Wound Volume (cm^3) 0.025 cm^3 12/07/23 1100   Calculated Wound Volume (cm^3) 0.03 cm^3 12/07/23 1100   Change in Wound Size % -50 12/07/23 1100   Drainage Amount Moderate 12/07/23 1100   Drainage Description Serosanguineous 12/07/23 1100   Dressing Status Intact 12/07/23 1100       Wound 11/21/23 Pretibial Right (Active)   Wound Image Images linked 12/07/23 1102   Wound Description Yellow;Slough;Pink;Black 12/07/23 1102   Nicolette-wound Assessment Edema; Erythema;Dry 12/07/23 1102   Wound Length (cm) 0.6 cm 12/07/23 1102   Wound Width (cm) 0.5 cm 12/07/23 1102   Wound Depth (cm) 0.1 cm 12/07/23 1102   Wound Surface Area (cm^2) 0.3 cm^2 12/07/23 1102   Wound Volume (cm^3) 0.03 cm^3 12/07/23 1102   Calculated Wound Volume (cm^3) 0.03 cm^3 12/07/23 1102   Change in Wound Size % 25 12/07/23 1102   Drainage Amount Scant 12/07/23 1102   Drainage Description Yellow 12/07/23 1102   Non-staged Wound Description Full thickness 12/07/23 1102   Dressing Status Intact 12/07/23 1102       Wound 12/07/23 Traumatic Arm Anterior;Left;Lower (Active)   Wound Image Images linked 12/07/23 1106   Wound Description Epithelialization;Pink; Other (Comment); Bleeding (black dried blood) 12/07/23 1105   Nicolette-wound Assessment Edema;Purple;Pink; Other (Comment);Dry (ecchymosis) 12/07/23 1105   Wound Length (cm) 4.3 cm 12/07/23 1105   Wound Width (cm) 6 cm 12/07/23 1105   Wound Depth (cm) 0.1 cm 12/07/23 1105   Wound Surface Area (cm^2) 25.8 cm^2 12/07/23 1105   Wound Volume (cm^3) 2.58 cm^3 12/07/23 1105   Calculated Wound Volume (cm^3) 2.58 cm^3 12/07/23 1105   Drainage Amount Small 12/07/23 1105   Drainage Description Bloody 12/07/23 1105   Non-staged Wound Description Full thickness 12/07/23 1105   Dressing Status Intact 12/07/23 1105       /93   Pulse 64   Temp 97.6 °F (36.4 °C)   Resp 20     Physical Exam  Vitals reviewed. Constitutional:       Appearance: Normal appearance. HENT:      Head: Normocephalic and atraumatic. Eyes:      Extraocular Movements: Extraocular movements intact. Pulmonary:      Effort: Pulmonary effort is normal.   Musculoskeletal:      Cervical back: Neck supple. Right lower leg: Edema present. Left lower leg: Edema present. Skin:     Comments: Lower extremity wound and toe wound slightly larger than last exam.  No signs of infection. Heavy fibrin deposition in the wound bed. Some fading ecchymosis over lateral left elbow. 2 discrete wound openings. No signs of infection. Neurological:      Mental Status: He is alert. Psychiatric:         Mood and Affect: Mood normal.                     Wound Instructions:  Orders Placed This Encounter   Procedures    Debridement     This order was created via procedure documentation    Wound cleansing and dressings     Right lower leg wound, right great toe wound:     Wash your hands with soap and water. Remove old dressing, discard into plastic bag and place in trash. Cleanse the wound with soap and water prior to applying a clean dressing. Do not use tissue or cotton balls. Do not scrub the wound. Pat dry using gauze. Shower yes - take dressings off & cleanse with mild soap. Do not soak or submerge in water. Apply moisturizer to skin surrounding wound  Apply SANTYL to the leg and right great toewound  Cover with ABD pad, allison & tape. Change dressing daily     Medihoney was used today     Standing Status:   Future     Standing Expiration Date:   12/7/2024    Wound cleansing and dressings     RIGHT GREAT TOE wound:  Wash your hands with soap and water. Remove old dressing, discard into plastic bag and place in trash. Cleanse the wound with soap and water prior to applying a clean dressing. Do not use tissue or cotton balls. Do not scrub the wound. Pat dry using gauze. Shower yes - take dressings off and cleanse with mild soap. Do not soak or submerge wound in water. Apply SANTYL to the wound. Cover with Allevyn bordered 2x2" foam dressing. Change dressing daily     This was done today. Standing Status:   Future     Standing Expiration Date:   12/7/2024    Wound miscellaneous orders     Be sure to eat plenty of protein with each meal.     Stay well hydrated with water daily - if you have a fluid restriction, be sure to stay within your restriction.     Mihai Stpehanie is being sent to your pharmacy     Standing Status:   Future     Standing Expiration Date:   12/7/2024    Wound cleansing and dressings     LEFT ARM WOUND:  Medihoney cover with gauze and secure with rolled gauze and tape. Change dressing every other day. Standing Status:   Future     Standing Expiration Date:   12/7/2024    Wound compression and edema control     Circ-Aid / Elastic Tubular Stocking/ Compression Stocking Instructions Spandagrip size G due to cardiovascular status    Elastic Tubular Stocking    Tubular elastic bandage: Apply from base of toes to behind the knee. Apply in AM, may remove for sleep. Avoid prolonged standing in one place. Elevate leg(s) above the level of the heart when sitting or as much as possible. Standing Status:   Future     Standing Expiration Date:   12/7/2024    Debridement     This order was created via procedure documentation    Debridement     This order was created via procedure documentation    Debridement     This order was created via procedure documentation        Diagnosis ICD-10-CM Associated Orders   1. Open wound of right great toe, initial encounter  S91.101A lidocaine (XYLOCAINE) 4 % topical solution 5 mL     Wound cleansing and dressings     Wound cleansing and dressings     Wound miscellaneous orders     Wound cleansing and dressings     Wound compression and edema control     collagenase (SANTYL) ointment     Debridement      2. 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 Chronic venous hypertension (idiopathic) with ulcer of right lower extremity (CODE) ([facility])  I87.311       4. Atherosclerotic PVD with intermittent claudication ([facility])  I70.219       5.  Skin tear of elbow without complication, left, initial encounter  S51.012A Debridement          --  Renard Espinal MD    "This note has been constructed using a voice recognition system. Therefore there may be syntax, spelling, and/or grammatical errors. Occasional wrong word or "sound alike" substitutions may have occurred due to the inherent limitations of voice recognition software. Read the chart carefully and recognize, using context, where substitutions have occurred.  Please call if you have any questions."

## 2023-12-07 NOTE — PATIENT INSTRUCTIONS
Orders Placed This Encounter   Procedures    Debridement     This order was created via procedure documentation    Wound cleansing and dressings     Right lower leg wound, right great toe wound:     Wash your hands with soap and water. Remove old dressing, discard into plastic bag and place in trash. Cleanse the wound with soap and water prior to applying a clean dressing. Do not use tissue or cotton balls. Do not scrub the wound. Pat dry using gauze. Shower yes - take dressings off & cleanse with mild soap. Do not soak or submerge in water. Apply moisturizer to skin surrounding wound  Apply SANTYL to the leg and right great toewound  Cover with ABD pad, allison & tape. Change dressing daily     Medihoney was used today     Standing Status:   Future     Standing Expiration Date:   12/7/2024    Wound cleansing and dressings     RIGHT GREAT TOE wound:  Wash your hands with soap and water. Remove old dressing, discard into plastic bag and place in trash. Cleanse the wound with soap and water prior to applying a clean dressing. Do not use tissue or cotton balls. Do not scrub the wound. Pat dry using gauze. Shower yes - take dressings off and cleanse with mild soap. Do not soak or submerge wound in water. Apply SANTYL to the wound. Cover with Allevyn bordered 2x2" foam dressing. Change dressing daily     This was done today. Standing Status:   Future     Standing Expiration Date:   12/7/2024    Wound miscellaneous orders     Be sure to eat plenty of protein with each meal.     Stay well hydrated with water daily - if you have a fluid restriction, be sure to stay within your restriction. Melissa Tirado is being sent to your pharmacy     Standing Status:   Future     Standing Expiration Date:   12/7/2024    Wound cleansing and dressings     LEFT ARM WOUND:  Medihoney cover with gauze and secure with rolled gauze and tape. Change dressing every other day.      Standing Status:   Future     Standing Expiration Date:   12/7/2024    Wound compression and edema control     Circ-Aid / Elastic Tubular Stocking/ Compression Stocking Instructions Spandagrip size G due to cardiovascular status    Elastic Tubular Stocking    Tubular elastic bandage: Apply from base of toes to behind the knee. Apply in AM, may remove for sleep. Avoid prolonged standing in one place. Elevate leg(s) above the level of the heart when sitting or as much as possible.      Standing Status:   Future     Standing Expiration Date:   12/7/2024

## 2023-12-14 ENCOUNTER — OFFICE VISIT (OUTPATIENT)
Dept: WOUND CARE | Facility: HOSPITAL | Age: 88
End: 2023-12-14
Payer: COMMERCIAL

## 2023-12-14 VITALS — RESPIRATION RATE: 15 BRPM | TEMPERATURE: 97.4 F

## 2023-12-14 DIAGNOSIS — S91.101A OPEN WOUND OF RIGHT GREAT TOE, INITIAL ENCOUNTER: Primary | ICD-10-CM

## 2023-12-14 DIAGNOSIS — L97.911 NON-PRESSURE CHRONIC ULCER OF RIGHT LOWER LEG, LIMITED TO BREAKDOWN OF SKIN (HCC): ICD-10-CM

## 2023-12-14 DIAGNOSIS — I87.311 CHRONIC VENOUS HYPERTENSION (IDIOPATHIC) WITH ULCER OF RIGHT LOWER EXTREMITY (CODE) (HCC): ICD-10-CM

## 2023-12-14 DIAGNOSIS — S51.012A SKIN TEAR OF ELBOW WITHOUT COMPLICATION, LEFT, INITIAL ENCOUNTER: ICD-10-CM

## 2023-12-14 DIAGNOSIS — I70.219 ATHEROSCLEROTIC PVD WITH INTERMITTENT CLAUDICATION (HCC): ICD-10-CM

## 2023-12-14 PROCEDURE — 97597 DBRDMT OPN WND 1ST 20 CM/<: CPT | Performed by: STUDENT IN AN ORGANIZED HEALTH CARE EDUCATION/TRAINING PROGRAM

## 2023-12-14 PROCEDURE — 99213 OFFICE O/P EST LOW 20 MIN: CPT | Performed by: STUDENT IN AN ORGANIZED HEALTH CARE EDUCATION/TRAINING PROGRAM

## 2023-12-14 RX ORDER — LIDOCAINE HYDROCHLORIDE 40 MG/ML
5 SOLUTION TOPICAL ONCE
Status: COMPLETED | OUTPATIENT
Start: 2023-12-14 | End: 2023-12-14

## 2023-12-14 RX ADMIN — LIDOCAINE HYDROCHLORIDE 5 ML: 40 SOLUTION TOPICAL at 11:15

## 2023-12-14 NOTE — PATIENT INSTRUCTIONS
Orders Placed This Encounter   Procedures    Wound cleansing and dressings     Right lower leg wound, right great toe wound:     Wash your hands with soap and water. Remove old dressing, discard into plastic bag and place in trash. Cleanse the wound with soap and water prior to applying a clean dressing. Do not use tissue or cotton balls. Do not scrub the wound. Pat dry using gauze. Shower yes - take dressings off & cleanse with mild soap. Do not soak or submerge in water. Apply moisturizer to skin surrounding wound  Apply nickel thick SANTYL to the leg and right great toe wound  Cover with gauze, allison & tape. Change dressing daily     Medihoney was used today     Standing Status:   Future     Standing Expiration Date:   12/14/2024    Wound miscellaneous orders     Be sure to eat plenty of protein with each meal.     Stay well hydrated with water daily - if you have a fluid restriction, be sure to stay within your restriction. Standing Status:   Future     Standing Expiration Date:   12/14/2024    Wound compression and edema control     Circ-Aid / Elastic Tubular Stocking/ Compression Stocking Instructions Spandagrip size G due to cardiovascular status     Elastic Tubular Stocking     Tubular elastic bandage: Apply from base of toes to behind the knee. Apply in AM, may remove for sleep. Avoid prolonged standing in one place. Elevate leg(s) above the level of the heart when sitting or as much as possible. Standing Status:   Future     Standing Expiration Date:   12/14/2024    Wound cleansing and dressings Traumatic Anterior;Left;Lower Arm     Left Arm Wound:    Wash your hands with soap and water. Remove old dressing, discard into plastic bag and place in trash. Cleanse the wound with soap and water prior to applying a clean dressing. Do not use tissue or cotton balls. Do not scrub the wound. Pat dry using gauze.   Shower yes   Apply moisturizer to skin surrounding wound  Apply MediHoney to the arm wound. Cover with gauze. Secure with rolled gauze and tape. Change dressing every other day. This was done today. ·     Standing Status:   Future     Standing Expiration Date:   12/14/2024    Wound miscellaneous orders     Please have an xray of your right foot done to rule out infection.      Standing Status:   Future     Standing Expiration Date:   12/14/2024

## 2023-12-14 NOTE — PROGRESS NOTES
Patient ID: Galina Aguilar is a 80 y.o. male Date of Birth 8/6/1930     Chief Complaint  Chief Complaint   Patient presents with    Follow Up Wound Care Visit     Right toe       Allergies  Tamsulosin    Assessment:     Diagnoses and all orders for this visit:    Open wound of right great toe, initial encounter  -     lidocaine (XYLOCAINE) 4 % topical solution 5 mL  -     Wound cleansing and dressings; Future  -     Wound miscellaneous orders; Future  -     Wound compression and edema control; Future  -     Wound cleansing and dressings Traumatic Anterior;Left;Lower Arm; Future  -     Wound miscellaneous orders; Future  -     XR foot 3+ vw right; Future  -     Debridement    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, limited to breakdown of skin (HCC)  -     lidocaine (XYLOCAINE) 4 % topical solution 5 mL  -     Wound cleansing and dressings; Future  -     Wound miscellaneous orders; Future  -     Wound compression and edema control; Future  -     Wound cleansing and dressings Traumatic Anterior;Left;Lower Arm; Future  -     Wound miscellaneous orders; Future    Skin tear of elbow without complication, left, initial encounter  -     lidocaine (XYLOCAINE) 4 % topical solution 5 mL  -     Wound cleansing and dressings; Future  -     Wound miscellaneous orders; Future  -     Wound compression and edema control; Future  -     Wound cleansing and dressings Traumatic Anterior;Left;Lower Arm; Future  -     Wound miscellaneous orders; Future    Chronic venous hypertension (idiopathic) with ulcer of right lower extremity (CODE) (HCC)  -     lidocaine (XYLOCAINE) 4 % topical solution 5 mL  -     Wound cleansing and dressings; Future  -     Wound miscellaneous orders; Future  -     Wound compression and edema control; Future  -     Wound cleansing and dressings Traumatic Anterior;Left;Lower Arm; Future  -     Wound miscellaneous orders;  Future    Atherosclerotic PVD with intermittent claudication ([facility])  - lidocaine (XYLOCAINE) 4 % topical solution 5 mL  -     Wound cleansing and dressings; Future  -     Wound miscellaneous orders; Future  -     Wound compression and edema control; Future  -     Wound cleansing and dressings Traumatic Anterior;Left;Lower Arm; Future  -     Wound miscellaneous orders; Future    Other orders  -     Debridement              Debridement   Wound 11/21/23 Toe (Comment  which one) Anterior;Right    Universal Protocol:  Consent: Verbal consent obtained. Risks and benefits: risks, benefits and alternatives were discussed  Consent given by: patient  Time out: Immediately prior to procedure a "time out" was called to verify the correct patient, procedure, equipment, support staff and site/side marked as required. Patient identity confirmed: verbally with patient    Debridement Details  Performed by: physician  Debridement type: selective  Pain control: lidocaine 4%      Post-debridement measurements  Length (cm): 0.5  Width (cm): 0.6  Depth (cm): 0.1  Percent debrided: 90%  Surface Area (cm^2): 0.3  Area Debrided (cm^2): 0.27  Volume (cm^3): 0.03    Devitalized tissue debrided: biofilm, exudate and fibrin  Instrument(s) utilized: curette  Bleeding: small  Hemostasis obtained with: pressure  Procedural pain (0-10): 1  Post-procedural pain: 0   Response to treatment: procedure was tolerated well    Debridement   Wound 11/21/23 Pretibial Right    Universal Protocol:  Consent: Verbal consent obtained. Risks and benefits: risks, benefits and alternatives were discussed  Consent given by: patient  Time out: Immediately prior to procedure a "time out" was called to verify the correct patient, procedure, equipment, support staff and site/side marked as required.   Patient identity confirmed: verbally with patient    Debridement Details  Performed by: physician  Debridement type: selective  Pain control: lidocaine 4%      Post-debridement measurements  Length (cm): 0.7  Width (cm): 0.5  Depth (cm): 0.1  Percent debrided: 90%  Surface Area (cm^2): 0.35  Area Debrided (cm^2): 0.32  Volume (cm^3): 0.03    Devitalized tissue debrided: biofilm and exudate  Instrument(s) utilized: curette  Bleeding: small  Hemostasis obtained with: pressure  Procedural pain (0-10): 1  Post-procedural pain: 0   Response to treatment: procedure was tolerated well        Plan: It was a pleasure to see Maurice Nunez for wound care follow up today  Selective debridement performed today as above right lower extremity wound and right foot wound. Left elbow wound cleansed with saline and gauze today. Wound is not improving   Continue plan of care as noted below with right lower extremity and 2 wounds. Medihoney to left elbow wound. Patient has appointment with vascular surgery next week. Also advised to complete x-ray of right foot ordered for him today as I cannot fully appreciate the depth of this wound due to significant devitalized tissue. No signs or symptoms of infection today. Patient understands that if any signs of infection start (such as increased redness, drainage, pain, fever, chills, diaphoresis), they should call our office or proceed to the ER or Urgent Care. Patient should continue a high protein diet to facilitate wound healing  Patient is advised to not submerge wound or leave wound open to air. Follow up in 3 weeks as patient is  not able to make it before this  Given the multi-factorial nature of wound care, additional time was taken to review patient's treatment plan with other specialties and most recent pertinent lab work and imaging. All plans of care discussed with patient at bedside who verbalized understanding with treatment plan. Wound 11/21/23 Toe (Comment  which one) Anterior;Right (Active)   Wound Image Images linked 12/14/23 1113   Wound Description Yellow;Pink;Slough;Pale 12/14/23 1113   Nicolette-wound Assessment Edema; Erythema 12/14/23 1113   Wound Length (cm) 0.5 cm 12/14/23 1113   Wound Width (cm) 0.6 cm 12/14/23 1113   Wound Depth (cm) 0.1 cm 12/14/23 1113   Wound Surface Area (cm^2) 0.3 cm^2 12/14/23 1113   Wound Volume (cm^3) 0.03 cm^3 12/14/23 1113   Calculated Wound Volume (cm^3) 0.03 cm^3 12/14/23 1113   Change in Wound Size % -50 12/14/23 1113   Drainage Amount Small 12/14/23 1113   Drainage Description Serosanguineous 12/14/23 1113   Non-staged Wound Description Full thickness 12/14/23 1113   Dressing Status Intact (upon arrival) 12/14/23 1113       Wound 11/21/23 Pretibial Right (Active)   Wound Image Images linked 12/14/23 1112   Wound Description Yellow;Slough;Pink 12/14/23 1112   Nicolette-wound Assessment Edema;Dry;Pink 12/14/23 1112   Wound Length (cm) 0.7 cm 12/14/23 1112   Wound Width (cm) 0.5 cm 12/14/23 1112   Wound Depth (cm) 0.1 cm 12/14/23 1112   Wound Surface Area (cm^2) 0.35 cm^2 12/14/23 1112   Wound Volume (cm^3) 0.035 cm^3 12/14/23 1112   Calculated Wound Volume (cm^3) 0.04 cm^3 12/14/23 1112   Change in Wound Size % 0 12/14/23 1112   Drainage Amount Scant 12/14/23 1112   Drainage Description Yellow 12/14/23 1112   Non-staged Wound Description Full thickness 12/14/23 1112   Dressing Status Intact (upon arrival) 12/14/23 1112       Wound 12/07/23 Traumatic Arm Anterior;Left;Lower (Active)   Wound Image Images linked 12/14/23 1110   Wound Description Epithelialization;Pink; Other (Comment); Bleeding (bleeding) 12/14/23 1109   Nicolette-wound Assessment [place] [place] 12/14/23 1109   Wound Length (cm) 4.3 cm 12/14/23 1109   Wound Width (cm) 6 cm 12/14/23 1109   Wound Depth (cm) 0.1 cm 12/14/23 1109   Wound Surface Area (cm^2) 25.8 cm^2 12/14/23 1109   Wound Volume (cm^3) 2.58 cm^3 12/14/23 1109   Calculated Wound Volume (cm^3) 2.58 cm^3 12/14/23 1109   Change in Wound Size % 0 12/14/23 1109   Drainage Amount Scant 12/14/23 1109   Drainage Description Bloody 12/14/23 1109   Non-staged Wound Description Full thickness 12/14/23 1109   Dressing Status Intact (upon arrival) 12/14/23 1109       Wound 11/21/23 Toe (Comment  which one) Anterior;Right (Active)   Date First Assessed/Time First Assessed: 11/21/23 1352   Location: Toe (Comment  which one)  Wound Location Orientation: Anterior;Right  Wound Description (Comments): 11/21/23 R 1st toe       Wound 11/21/23 Pretibial Right (Active)   Date First Assessed/Time First Assessed: 11/21/23 1352   Location: Pretibial  Wound Location Orientation: Right       Wound 12/07/23 Traumatic Arm Anterior;Left;Lower (Active)   Date First Assessed/Time First Assessed: 12/07/23 1103   Primary Wound Type: Traumatic  Location: Arm  Wound Location Orientation: Anterior;Left;Lower       [REMOVED] Incision 10/25/18 Scrotum Right (Removed)   Resolved Date: 11/21/23  Date First Assessed/Time First Assessed: 10/25/18 0816   Location: Scrotum  Wound Location Orientation: Right  Wound Outcome: Healed       [REMOVED] Wound 11/02/23 Incision Wrist Right (Removed)   Resolved Date: 11/21/23  Date First Assessed/Time First Assessed: 11/02/23 1525   Primary Wound Type: Incision  Location: Wrist  Wound Location Orientation: Right  Incision's 1st Dressing: [facility] puncture site  Wound Outcome: Healed       [REMOVED] Wound 11/02/23 Traumatic Abrasion(s) Toe (Comment  which one) (Removed)   Resolved Date: 11/21/23  Date First Assessed/Time First Assessed: 11/02/23 1759   Present on Original Admission: Yes  Primary Wound Type: Traumatic  Traumatic Wound Type: Abrasion(s)  Location: (c) Toe (Comment  which one)  Dressing Status: Clean;Dry;... Subjective:      .    12/14/23: Wounds are the same. No symptoms of infection. 12/7/23: Notes that legs are more edematous today he has been more active with the holidays. Did have a mechanical fall and landed on his elbow. Notices no change in range of motion just some bruising on the skin itself. Does have a skin tear to the left elbow today. No symptoms of infection. Particularly notes that he did not fall and land with any impact to his head. 11/28/23: Wound care as directed. Note that wounds are very dry. No symptoms of infection today. 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 Patient was referred by his primary care provider Dr. Phuc Frederick. Per chart review patient has had a right hallux wound for quite some time. Following with podiatry Dr. Opal Bentley, he last saw in October 2023. No mention of wound at that visit or chart review. Patient notes that podiatry did note the wound and advised patient to take care of it. No specific instructions at that time. Patient notes that he has been covering it with gauze and not leaving open to air. Denies any symptoms of infection today including fever chills diaphoresis. The following portions of the patient's history were reviewed and updated as appropriate: allergies, current medications, past family history, past medical history, past social history, past surgical history, and problem list.    Review of Systems   Constitutional:  Negative for chills, diaphoresis and fever. Skin:  Positive for wound. All other systems reviewed and are negative. Objective:       Wound 11/21/23 Toe (Comment  which one) Anterior;Right (Active)   Wound Image Images linked 12/14/23 1113   Wound Description Yellow;Pink;Slough;Pale 12/14/23 1113   Nicolette-wound Assessment Edema; Erythema 12/14/23 1113   Wound Length (cm) 0.5 cm 12/14/23 1113   Wound Width (cm) 0.6 cm 12/14/23 1113   Wound Depth (cm) 0.1 cm 12/14/23 1113   Wound Surface Area (cm^2) 0.3 cm^2 12/14/23 1113   Wound Volume (cm^3) 0.03 cm^3 12/14/23 1113   Calculated Wound Volume (cm^3) 0.03 cm^3 12/14/23 1113   Change in Wound Size % -50 12/14/23 1113   Drainage Amount Small 12/14/23 1113   Drainage Description Serosanguineous 12/14/23 1113   Non-staged Wound Description Full thickness 12/14/23 1113   Dressing Status Intact (upon arrival) 12/14/23 1113       Wound 11/21/23 Pretibial Right (Active)   Wound Image Images linked 12/14/23 1112   Wound Description Yellow;Slough;Pink 12/14/23 1112   Nicolette-wound Assessment Edema;Dry;Pink 12/14/23 1112   Wound Length (cm) 0.7 cm 12/14/23 1112   Wound Width (cm) 0.5 cm 12/14/23 1112   Wound Depth (cm) 0.1 cm 12/14/23 1112   Wound Surface Area (cm^2) 0.35 cm^2 12/14/23 1112   Wound Volume (cm^3) 0.035 cm^3 12/14/23 1112   Calculated Wound Volume (cm^3) 0.04 cm^3 12/14/23 1112   Change in Wound Size % 0 12/14/23 1112   Drainage Amount Scant 12/14/23 1112   Drainage Description Yellow 12/14/23 1112   Non-staged Wound Description Full thickness 12/14/23 1112   Dressing Status Intact (upon arrival) 12/14/23 1112       Wound 12/07/23 Traumatic Arm Anterior;Left;Lower (Active)   Wound Image Images linked 12/14/23 1110   Wound Description Epithelialization;Pink; Other (Comment); Bleeding (bleeding) 12/14/23 1109   Nicolette-wound Assessment [place] 12/14/23 1109   Wound Length (cm) 4.3 cm 12/14/23 1109   Wound Width (cm) 6 cm 12/14/23 1109   Wound Depth (cm) 0.1 cm 12/14/23 1109   Wound Surface Area (cm^2) 25.8 cm^2 12/14/23 1109   Wound Volume (cm^3) 2.58 cm^3 12/14/23 1109   Calculated Wound Volume (cm^3) 2.58 cm^3 12/14/23 1109   Change in Wound Size % 0 12/14/23 1109   Drainage Amount Scant 12/14/23 1109   Drainage Description Bloody 12/14/23 1109   Non-staged Wound Description Full thickness 12/14/23 1109   Dressing Status Intact (upon arrival) 12/14/23 1109       Temp (!) 97.4 °F (36.3 °C)   Resp 15     Physical Exam  Vitals reviewed. Constitutional:       Appearance: Normal appearance. HENT:      Head: Normocephalic and atraumatic. Eyes:      Extraocular Movements: Extraocular movements intact.    Pulmonary:      Effort: Pulmonary effort is normal. Musculoskeletal:      Cervical back: Neck supple. Skin:     Comments: Wound is almost fully epithelialized today. Extremity wound and toe wound are similar with fibrin deposition. No signs of infection. Neurological:      Mental Status: He is alert. Psychiatric:         Mood and Affect: Mood normal.                       Wound Instructions:  Orders Placed This Encounter   Procedures    Wound cleansing and dressings     Right lower leg wound, right great toe wound:     Wash your hands with soap and water. Remove old dressing, discard into plastic bag and place in trash. Cleanse the wound with soap and water prior to applying a clean dressing. Do not use tissue or cotton balls. Do not scrub the wound. Pat dry using gauze. Shower yes - take dressings off & cleanse with mild soap. Do not soak or submerge in water. Apply moisturizer to skin surrounding wound  Apply nickel thick SANTYL to the leg and right great toe wound  Cover with gauze, allison & tape. Change dressing daily     Medihoney was used today     Standing Status:   Future     Standing Expiration Date:   12/14/2024    Wound miscellaneous orders     Be sure to eat plenty of protein with each meal.     Stay well hydrated with water daily - if you have a fluid restriction, be sure to stay within your restriction. Standing Status:   Future     Standing Expiration Date:   12/14/2024    Wound compression and edema control     Circ-Aid / Elastic Tubular Stocking/ Compression Stocking Instructions Spandagrip size G due to cardiovascular status     Elastic Tubular Stocking     Tubular elastic bandage: Apply from base of toes to behind the knee. Apply in AM, may remove for sleep. Avoid prolonged standing in one place. Elevate leg(s) above the level of the heart when sitting or as much as possible.      Standing Status:   Future     Standing Expiration Date:   12/14/2024    Wound cleansing and dressings Traumatic Anterior;Left;Lower Arm Left Arm Wound:    Wash your hands with soap and water. Remove old dressing, discard into plastic bag and place in trash. Cleanse the wound with soap and water prior to applying a clean dressing. Do not use tissue or cotton balls. Do not scrub the wound. Pat dry using gauze. Shower yes   Apply moisturizer to skin surrounding wound  Apply MediHoney to the arm wound. Cover with gauze. Secure with rolled gauze and tape. Change dressing every other day. This was done today. ·     Standing Status:   Future     Standing Expiration Date:   12/14/2024    Wound miscellaneous orders     Please have an xray of your right foot done to rule out infection. Standing Status:   Future     Standing Expiration Date:   12/14/2024    Debridement     This order was created via procedure documentation    Debridement     This order was created via procedure documentation    XR foot 3+ vw right     Standing Status:   Future     Standing Expiration Date:   12/14/2027     Scheduling Instructions:      Bring along any outside films relating to this procedure. Diagnosis ICD-10-CM Associated Orders   1. Open wound of right great toe, initial encounter  S91.101A lidocaine (XYLOCAINE) 4 % topical solution 5 mL     Wound cleansing and dressings     Wound miscellaneous orders     Wound compression and edema control     Wound cleansing and dressings Traumatic Anterior;Left;Lower Arm     Wound miscellaneous orders     XR foot 3+ vw right     Debridement      2. 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  Skin tear of elbow without complication, left, initial encounter  S51.012A lidocaine (XYLOCAINE) 4 % topical solution 5 mL     Wound cleansing and dressings     Wound miscellaneous orders     Wound compression and edema control     Wound cleansing and dressings Traumatic Anterior;Left;Lower Arm     Wound miscellaneous orders      4. Chronic venous hypertension (idiopathic) with ulcer of right lower extremity (CODE) ([facility])  I87.311 lidocaine (XYLOCAINE) 4 % topical solution 5 mL     Wound cleansing and dressings     Wound miscellaneous orders     Wound compression and edema control     Wound cleansing and dressings Traumatic Anterior;Left;Lower Arm     Wound miscellaneous orders      5. Atherosclerotic PVD with intermittent claudication ([facility])  I70.219 lidocaine (XYLOCAINE) 4 % topical solution 5 mL     Wound cleansing and dressings     Wound miscellaneous orders     Wound compression and edema control     Wound cleansing and dressings Traumatic Anterior;Left;Lower Arm     Wound miscellaneous orders          --  Shelia Sahni MD    "This note has been constructed using a voice recognition system. Therefore there may be syntax, spelling, and/or grammatical errors. Occasional wrong word or "sound alike" substitutions may have occurred due to the inherent limitations of voice recognition software. Read the chart carefully and recognize, using context, where substitutions have occurred.  Please call if you have any questions."

## 2023-12-19 ENCOUNTER — APPOINTMENT (OUTPATIENT)
Dept: RADIOLOGY | Facility: CLINIC | Age: 88
End: 2023-12-19
Payer: COMMERCIAL

## 2023-12-19 DIAGNOSIS — S91.101A OPEN WOUND OF RIGHT GREAT TOE, INITIAL ENCOUNTER: ICD-10-CM

## 2023-12-19 PROCEDURE — 73630 X-RAY EXAM OF FOOT: CPT

## 2023-12-20 ENCOUNTER — OFFICE VISIT (OUTPATIENT)
Dept: VASCULAR SURGERY | Facility: CLINIC | Age: 88
End: 2023-12-20
Payer: COMMERCIAL

## 2023-12-20 VITALS
DIASTOLIC BLOOD PRESSURE: 72 MMHG | OXYGEN SATURATION: 99 % | BODY MASS INDEX: 23.21 KG/M2 | SYSTOLIC BLOOD PRESSURE: 148 MMHG | HEIGHT: 71 IN | WEIGHT: 165.8 LBS | RESPIRATION RATE: 18 BRPM | HEART RATE: 74 BPM

## 2023-12-20 DIAGNOSIS — I65.23 ASYMPTOMATIC BILATERAL CAROTID ARTERY STENOSIS: Primary | ICD-10-CM

## 2023-12-20 DIAGNOSIS — I70.221 ATHEROSCLEROSIS OF NATIVE ARTERIES OF EXTREMITIES WITH REST PAIN, RIGHT LEG (HCC): Chronic | ICD-10-CM

## 2023-12-20 PROCEDURE — 99215 OFFICE O/P EST HI 40 MIN: CPT | Performed by: SURGERY

## 2023-12-20 NOTE — ASSESSMENT & PLAN NOTE
89-year-old male with right lower extremity rest pain and short distance claudication.  He also has a small superficial wound on the anterior right shin and the tip of his right great toe.  He was seen previously by Dr. Duron couple months ago and his testing indicated a right common femoral occlusion he was sent for a CTA with runoff as well as cardiology preop evaluation.  He now returns to discuss the findings of his testing since that time his rest pain has not resolved he still sleeping sitting up in a chair and with that positioning he is able to tolerate his rest pain the wounds on his toe and calf are stable and they have actually slightly improved he does see wound care regularly.  The CTA with respect to his right lower extremity shows a right common femoral heavily calcified occlusion with a distal SFA short segment occlusion and no inflow disease.  The aortoiliac duplex suggested a moderate right common iliac stenosis which was not seen on CTA.  His cardiology evaluation showed that he has multivessel coronary disease and is not a candidate for percutaneous or open revascularization although he does have a preserved EF he does have stable angina.  I discussed options with him which would include a right common femoral endarterectomy with antegrade SFA recanalization and possible bypass given his perioperative cardiac risk of this would be an overall risky endeavor for perioperative MI cardiac event and death the other option I discussed with him would be continued wound care and observation given his level of perfusion I think ultimately the chances of these wounds improving with this management are quite low.  The patient was not interested in pursuing surgery at this time given his age and his understanding of his perioperative risks he is not willing to undergo surgical revascularization at this time I will plan to see him again in the office in 2 months and we will continue local wound care during  that time will revisit revascularization with him when he returns if his wounds are deteriorating.

## 2023-12-20 NOTE — PROGRESS NOTES
Assessment/Plan:    Atherosclerosis of native arteries of extremities with rest pain, right leg (HCC)  89-year-old male with right lower extremity rest pain and short distance claudication.  He also has a small superficial wound on the anterior right shin and the tip of his right great toe.  He was seen previously by Dr. Duron couple months ago and his testing indicated a right common femoral occlusion he was sent for a CTA with runoff as well as cardiology preop evaluation.  He now returns to discuss the findings of his testing since that time his rest pain has not resolved he still sleeping sitting up in a chair and with that positioning he is able to tolerate his rest pain the wounds on his toe and calf are stable and they have actually slightly improved he does see wound care regularly.  The CTA with respect to his right lower extremity shows a right common femoral heavily calcified occlusion with a distal SFA short segment occlusion and no inflow disease.  The aortoiliac duplex suggested a moderate right common iliac stenosis which was not seen on CTA.  His cardiology evaluation showed that he has multivessel coronary disease and is not a candidate for percutaneous or open revascularization although he does have a preserved EF he does have stable angina.  I discussed options with him which would include a right common femoral endarterectomy with antegrade SFA recanalization and possible bypass given his perioperative cardiac risk of this would be an overall risky endeavor for perioperative MI cardiac event and death the other option I discussed with him would be continued wound care and observation given his level of perfusion I think ultimately the chances of these wounds improving with this management are quite low.  The patient was not interested in pursuing surgery at this time given his age and his understanding of his perioperative risks he is not willing to undergo surgical revascularization at this time  "I will plan to see him again in the office in 2 months and we will continue local wound care during that time will revisit revascularization with him when he returns if his wounds are deteriorating.       Diagnoses and all orders for this visit:    Asymptomatic bilateral carotid artery stenosis    Atherosclerosis of native arteries of extremities with rest pain, right leg (HCC)          Subjective:      Patient ID: John Smallwood is a 89 y.o. male.    Patient had a CTA w/ runoff on 10/17/23. Pt c/o pain in the Rt hallux at night.  Pt can walk about 50 feet before getting aching pain in the Rt leg.  Pain goes away after about 10 minutes of resting. Pt has a wound on the RLE for the past 2 months. Pt is being treated by wound care. Pt denies numbness or tingling.     HPI    The following portions of the patient's history were reviewed and updated as appropriate: allergies, current medications, past family history, past medical history, past social history, past surgical history, and problem list.  Right great toe pain, foot pain when right leg is elevated.  Sees wound care regularly.  Denies LLE symptoms.  Non-smoker.  Right calf claudication at short distance.    Review of Systems   Constitutional: Negative.    HENT: Negative.     Eyes: Negative.    Respiratory:  Positive for cough (about 3 days ago).    Cardiovascular: Negative.    Gastrointestinal: Negative.    Endocrine: Negative.    Genitourinary: Negative.    Musculoskeletal:  Positive for gait problem.        Leg pain when walking  Leg pain at night   Skin:  Positive for color change and wound.   Allergic/Immunologic: Negative.    Neurological:  Negative for dizziness, weakness and numbness.   Hematological: Negative.    Psychiatric/Behavioral: Negative.           I have reviewed the ROS above and made changes as needed.        Objective:      /72 (BP Location: Left arm, Patient Position: Sitting)   Pulse 74   Resp 18   Ht 5' 11\" (1.803 m)   Wt " 75.2 kg (165 lb 12.8 oz)   SpO2 99%   BMI 23.12 kg/m²          Physical Exam      General  Exam: alert, awake, oriented, no distress, consistent with stated age    Integumentary  Exam: warm, dry, no gross lesions, no bruises and normal color    Head and Neck  Exam: supple, no bruits, trachea midline, no JVD, no mass or palpable nodes    Eye  Exam: extraoccular movements intact, no scleral icterus, sclera clear, pupils equal round and reactive to light    ENMT  Exam: oral mucosa pink and moist    Chest and Lung  Exam: chest normal without deformity, bilaterally expansive, clear to auscultation    Cardiovascular  Exam: regular rate, regular rhythm, no murmurs, no rubs or gallops    Adbomen  Exam: soft, non-tender, non-distended, no pulsatile abdominal masses, no abdominal bruit    Peripheral Vascular  Exam: no clubbing of the digits of the upper extremity, no cyanosis, no edema, both feet are warm, radial pulses 2+ bilaterally, skin well perfused, without and no varicosities.    No widened popliteal pulse noted bilaterally    Upper Extremity:  Palpation: Radial pulse- Bilateral 2+    Lower Extremity:  Palpation: Femoral pulse- Bilateral 2+          Absent distal pulses   Right foot ruborous   Punctate right 1st toe wound   Partial thickness <1cm right anterior calf wound   LLE distal pulses absent   No LLE wounds  Neurologic  Exam:alert, non-focal, oriented x 3, cranial nerves II-XII grossly intact

## 2023-12-26 DIAGNOSIS — I10 ESSENTIAL HYPERTENSION: ICD-10-CM

## 2023-12-26 RX ORDER — CHLORTHALIDONE 25 MG/1
25 TABLET ORAL EVERY OTHER DAY
Qty: 45 TABLET | Refills: 0 | Status: SHIPPED | OUTPATIENT
Start: 2023-12-26

## 2023-12-26 NOTE — TELEPHONE ENCOUNTER
PATIENT IS OUT OF MEDS : PLEASE SEND REFILL TO   [facility]   HAS APPT ON 2/14/24      chlorthalidone 25 mg tablet

## 2024-01-01 ENCOUNTER — APPOINTMENT (EMERGENCY)
Dept: RADIOLOGY | Facility: HOSPITAL | Age: 89
DRG: 283 | End: 2024-01-01
Payer: COMMERCIAL

## 2024-01-01 ENCOUNTER — TELEPHONE (OUTPATIENT)
Dept: FAMILY MEDICINE CLINIC | Facility: CLINIC | Age: 89
End: 2024-01-01

## 2024-01-01 ENCOUNTER — APPOINTMENT (INPATIENT)
Dept: RADIOLOGY | Facility: HOSPITAL | Age: 89
DRG: 283 | End: 2024-01-01
Payer: COMMERCIAL

## 2024-01-01 ENCOUNTER — OFFICE VISIT (OUTPATIENT)
Dept: FAMILY MEDICINE CLINIC | Facility: CLINIC | Age: 89
End: 2024-01-01
Payer: COMMERCIAL

## 2024-01-01 ENCOUNTER — HOSPITAL ENCOUNTER (INPATIENT)
Facility: HOSPITAL | Age: 89
LOS: 2 days | DRG: 283 | End: 2024-02-18
Attending: EMERGENCY MEDICINE | Admitting: STUDENT IN AN ORGANIZED HEALTH CARE EDUCATION/TRAINING PROGRAM
Payer: COMMERCIAL

## 2024-01-01 ENCOUNTER — OFFICE VISIT (OUTPATIENT)
Dept: CARDIOLOGY CLINIC | Facility: CLINIC | Age: 89
End: 2024-01-01
Payer: COMMERCIAL

## 2024-01-01 ENCOUNTER — APPOINTMENT (INPATIENT)
Dept: NON INVASIVE DIAGNOSTICS | Facility: HOSPITAL | Age: 89
DRG: 283 | End: 2024-01-01
Payer: COMMERCIAL

## 2024-01-01 VITALS
TEMPERATURE: 98.5 F | WEIGHT: 173.94 LBS | BODY MASS INDEX: 24.35 KG/M2 | SYSTOLIC BLOOD PRESSURE: 92 MMHG | HEART RATE: 147 BPM | DIASTOLIC BLOOD PRESSURE: 65 MMHG | OXYGEN SATURATION: 94 % | HEIGHT: 71 IN | RESPIRATION RATE: 39 BRPM

## 2024-01-01 VITALS
WEIGHT: 177 LBS | DIASTOLIC BLOOD PRESSURE: 68 MMHG | HEIGHT: 71 IN | BODY MASS INDEX: 24.78 KG/M2 | RESPIRATION RATE: 26 BRPM | OXYGEN SATURATION: 95 % | TEMPERATURE: 98 F | SYSTOLIC BLOOD PRESSURE: 118 MMHG | HEART RATE: 98 BPM

## 2024-01-01 VITALS
HEIGHT: 71 IN | BODY MASS INDEX: 24.36 KG/M2 | DIASTOLIC BLOOD PRESSURE: 60 MMHG | OXYGEN SATURATION: 100 % | WEIGHT: 174 LBS | HEART RATE: 74 BPM | SYSTOLIC BLOOD PRESSURE: 120 MMHG

## 2024-01-01 DIAGNOSIS — J96.01 ACUTE RESPIRATORY FAILURE WITH HYPOXIA (HCC): ICD-10-CM

## 2024-01-01 DIAGNOSIS — E55.9 VITAMIN D DEFICIENCY: ICD-10-CM

## 2024-01-01 DIAGNOSIS — R06.02 SOB (SHORTNESS OF BREATH) ON EXERTION: Primary | ICD-10-CM

## 2024-01-01 DIAGNOSIS — I25.85 CHRONIC CORONARY MICROVASCULAR DYSFUNCTION: Primary | ICD-10-CM

## 2024-01-01 DIAGNOSIS — I21.4 NSTEMI (NON-ST ELEVATED MYOCARDIAL INFARCTION) (HCC): ICD-10-CM

## 2024-01-01 DIAGNOSIS — E78.5 DYSLIPIDEMIA: ICD-10-CM

## 2024-01-01 DIAGNOSIS — R06.00 DYSPNEA: Primary | ICD-10-CM

## 2024-01-01 DIAGNOSIS — R79.89 ELEVATED TROPONIN: ICD-10-CM

## 2024-01-01 DIAGNOSIS — I25.118 CORONARY ARTERY DISEASE OF NATIVE ARTERY OF NATIVE HEART WITH STABLE ANGINA PECTORIS (HCC): ICD-10-CM

## 2024-01-01 DIAGNOSIS — I10 ESSENTIAL HYPERTENSION: ICD-10-CM

## 2024-01-01 DIAGNOSIS — I49.1 PREMATURE ATRIAL CONTRACTIONS: ICD-10-CM

## 2024-01-01 DIAGNOSIS — I50.9 ACUTE CONGESTIVE HEART FAILURE, UNSPECIFIED HEART FAILURE TYPE (HCC): ICD-10-CM

## 2024-01-01 DIAGNOSIS — I50.9 CHF (CONGESTIVE HEART FAILURE) (HCC): ICD-10-CM

## 2024-01-01 DIAGNOSIS — S89.90XA: ICD-10-CM

## 2024-01-01 LAB
2HR DELTA HS TROPONIN: 160 NG/L
4HR DELTA HS TROPONIN: 246 NG/L
ALBUMIN SERPL BCP-MCNC: 3.9 G/DL (ref 3.5–5)
ALP SERPL-CCNC: 62 U/L (ref 34–104)
ALT SERPL W P-5'-P-CCNC: 24 U/L (ref 7–52)
ANION GAP SERPL CALCULATED.3IONS-SCNC: 13 MMOL/L
ANION GAP SERPL CALCULATED.3IONS-SCNC: 9 MMOL/L
AORTIC ROOT: 3.3 CM
AORTIC VALVE MEAN VELOCITY: 12.4 M/S
APICAL FOUR CHAMBER EJECTION FRACTION: 30 %
APPEARANCE FLD: CLEAR
APTT PPP: 109 SECONDS (ref 23–37)
APTT PPP: 44 SECONDS (ref 23–37)
APTT PPP: 45 SECONDS (ref 23–37)
APTT PPP: 71 SECONDS (ref 23–37)
APTT PPP: 74 SECONDS (ref 23–37)
APTT PPP: 94 SECONDS (ref 23–37)
ARTERIAL PATENCY WRIST A: YES
AST SERPL W P-5'-P-CCNC: 33 U/L (ref 13–39)
ATRIAL RATE: 101 BPM
ATRIAL RATE: 99 BPM
AV AREA BY CONTINUOUS VTI: 1.1 CM2
AV AREA PEAK VELOCITY: 0.9 CM2
AV LVOT MEAN GRADIENT: 1 MMHG
AV LVOT PEAK GRADIENT: 1 MMHG
AV MEAN GRADIENT: 7 MMHG
AV PEAK GRADIENT: 12 MMHG
AV VALVE AREA: 1.06 CM2
AV VELOCITY RATIO: 0.32
BASE EXCESS BLDA CALC-SCNC: -1.4 MMOL/L
BASOPHILS # BLD AUTO: 0.02 THOUSANDS/ÂΜL (ref 0–0.1)
BASOPHILS NFR BLD AUTO: 0 % (ref 0–1)
BILIRUB SERPL-MCNC: 0.57 MG/DL (ref 0.2–1)
BILIRUB UR QL STRIP: NEGATIVE
BNP SERPL-MCNC: 1274 PG/ML (ref 0–100)
BODY TEMPERATURE: 97.6 DEGREES FEHRENHEIT
BSA FOR ECHO PROCEDURE: 1.99 M2
BUN SERPL-MCNC: 48 MG/DL (ref 5–25)
BUN SERPL-MCNC: 49 MG/DL (ref 5–25)
CALCIUM SERPL-MCNC: 8.5 MG/DL (ref 8.4–10.2)
CALCIUM SERPL-MCNC: 9.3 MG/DL (ref 8.4–10.2)
CARDIAC TROPONIN I PNL SERPL HS: 1174 NG/L
CARDIAC TROPONIN I PNL SERPL HS: 1334 NG/L
CARDIAC TROPONIN I PNL SERPL HS: 1420 NG/L
CARDIAC TROPONIN I PNL SERPL HS: 2551 NG/L (ref 8–18)
CHLORIDE SERPL-SCNC: 102 MMOL/L (ref 96–108)
CHLORIDE SERPL-SCNC: 103 MMOL/L (ref 96–108)
CLARITY UR: CLEAR
CO2 SERPL-SCNC: 24 MMOL/L (ref 21–32)
CO2 SERPL-SCNC: 25 MMOL/L (ref 21–32)
COLOR FLD: NORMAL
COLOR UR: NORMAL
CREAT SERPL-MCNC: 1.33 MG/DL (ref 0.6–1.3)
CREAT SERPL-MCNC: 1.37 MG/DL (ref 0.6–1.3)
DOP CALC AO PEAK VEL: 1.7 M/S
DOP CALC AO VTI: 27.86 CM
DOP CALC LVOT AREA: 2.83 CM2
DOP CALC LVOT CARDIAC INDEX: 1.5 L/MIN/M2
DOP CALC LVOT CARDIAC OUTPUT: 2.99 L/MIN
DOP CALC LVOT DIAMETER: 1.9 CM
DOP CALC LVOT PEAK VEL VTI: 10.46 CM
DOP CALC LVOT PEAK VEL: 0.54 M/S
DOP CALC LVOT STROKE INDEX: 14.6 ML/M2
DOP CALC LVOT STROKE VOLUME: 29.64
E WAVE DECELERATION TIME: 118 MS
E/A RATIO: 1.43
EOSINOPHIL # BLD AUTO: 0.01 THOUSAND/ÂΜL (ref 0–0.61)
EOSINOPHIL NFR BLD AUTO: 0 % (ref 0–6)
ERYTHROCYTE [DISTWIDTH] IN BLOOD BY AUTOMATED COUNT: 13.5 % (ref 11.6–15.1)
ERYTHROCYTE [DISTWIDTH] IN BLOOD BY AUTOMATED COUNT: 13.6 % (ref 11.6–15.1)
FLUAV RNA RESP QL NAA+PROBE: NEGATIVE
FLUBV RNA RESP QL NAA+PROBE: NEGATIVE
FRACTIONAL SHORTENING: 20 (ref 28–44)
GFR SERPL CREATININE-BSD FRML MDRD: 44 ML/MIN/1.73SQ M
GFR SERPL CREATININE-BSD FRML MDRD: 45 ML/MIN/1.73SQ M
GLUCOSE FLD-MCNC: 158 MG/DL
GLUCOSE SERPL-MCNC: 136 MG/DL (ref 65–140)
GLUCOSE SERPL-MCNC: 147 MG/DL (ref 65–140)
GLUCOSE UR STRIP-MCNC: NEGATIVE MG/DL
HCO3 BLDA-SCNC: 22.4 MMOL/L (ref 22–28)
HCT VFR BLD AUTO: 31.3 % (ref 36.5–49.3)
HCT VFR BLD AUTO: 33.1 % (ref 36.5–49.3)
HGB BLD-MCNC: 10.5 G/DL (ref 12–17)
HGB BLD-MCNC: 11.1 G/DL (ref 12–17)
HGB UR QL STRIP.AUTO: NEGATIVE
HISTIOCYTES NFR FLD: 28 %
IMM GRANULOCYTES # BLD AUTO: 0.04 THOUSAND/UL (ref 0–0.2)
IMM GRANULOCYTES NFR BLD AUTO: 0 % (ref 0–2)
INR PPP: 1.6 (ref 0.84–1.19)
INTERVENTRICULAR SEPTUM IN DIASTOLE (PARASTERNAL SHORT AXIS VIEW): 0.9 CM
INTERVENTRICULAR SEPTUM: 0.9 CM (ref 0.6–1.1)
KETONES UR STRIP-MCNC: NEGATIVE MG/DL
L PNEUMO1 AG UR QL IA.RAPID: NEGATIVE
LAAS-AP2: 23.7 CM2
LAAS-AP4: 16 CM2
LACTATE SERPL-SCNC: 2.2 MMOL/L (ref 0.5–2)
LACTATE SERPL-SCNC: 2.5 MMOL/L (ref 0.5–2)
LACTATE SERPL-SCNC: 2.7 MMOL/L (ref 0.5–2)
LDH FLD L TO P-CCNC: 119 U/L
LDH SERPL-CCNC: 219 U/L (ref 140–271)
LEFT ATRIUM SIZE: 3.5 CM
LEFT ATRIUM VOLUME (MOD BIPLANE): 67 ML
LEFT ATRIUM VOLUME INDEX (MOD BIPLANE): 33.7 ML/M2
LEFT INTERNAL DIMENSION IN SYSTOLE: 4 CM (ref 2.1–4)
LEFT VENTRICULAR INTERNAL DIMENSION IN DIASTOLE: 5 CM (ref 3.5–6)
LEFT VENTRICULAR POSTERIOR WALL IN END DIASTOLE: 1 CM
LEFT VENTRICULAR STROKE VOLUME: 46 ML
LEUKOCYTE ESTERASE UR QL STRIP: NEGATIVE
LVSV (TEICH): 46 ML
LYMPHOCYTES # BLD AUTO: 0.51 THOUSANDS/ÂΜL (ref 0.6–4.47)
LYMPHOCYTES NFR BLD AUTO: 39 %
LYMPHOCYTES NFR BLD AUTO: 6 % (ref 14–44)
MAGNESIUM SERPL-MCNC: 1.8 MG/DL (ref 1.9–2.7)
MAGNESIUM SERPL-MCNC: 2 MG/DL (ref 1.9–2.7)
MCH RBC QN AUTO: 30.6 PG (ref 26.8–34.3)
MCH RBC QN AUTO: 30.7 PG (ref 26.8–34.3)
MCHC RBC AUTO-ENTMCNC: 33.5 G/DL (ref 31.4–37.4)
MCHC RBC AUTO-ENTMCNC: 33.5 G/DL (ref 31.4–37.4)
MCV RBC AUTO: 91 FL (ref 82–98)
MCV RBC AUTO: 91 FL (ref 82–98)
MONO+MESO NFR FLD MANUAL: 3 %
MONOCYTES # BLD AUTO: 0.9 THOUSAND/ÂΜL (ref 0.17–1.22)
MONOCYTES NFR BLD AUTO: 10 % (ref 4–12)
MRSA NOSE QL CULT: NORMAL
MV PEAK A VEL: 0.56 M/S
MV PEAK E VEL: 80 CM/S
MV STENOSIS PRESSURE HALF TIME: 34 MS
MV VALVE AREA P 1/2 METHOD: 6.47
NASAL CANNULA: 15
NEUTROPHILS # BLD AUTO: 7.59 THOUSANDS/ÂΜL (ref 1.85–7.62)
NEUTS SEG NFR BLD AUTO: 30 %
NEUTS SEG NFR BLD AUTO: 84 % (ref 43–75)
NITRITE UR QL STRIP: NEGATIVE
NRBC BLD AUTO-RTO: 0 /100 WBCS
O2 CT BLDA-SCNC: 15.1 ML/DL (ref 16–23)
OXYHGB MFR BLDA: 89.3 % (ref 94–97)
P AXIS: 47 DEGREES
P AXIS: 58 DEGREES
PCO2 BLDA: 35 MM HG (ref 36–44)
PCO2 TEMP ADJ BLDA: 34.1 MM HG (ref 36–44)
PH BLD: 7.43 [PH] (ref 7.35–7.45)
PH BLDA: 7.42 [PH] (ref 7.35–7.45)
PH UR STRIP.AUTO: 5.5 [PH]
PLATELET # BLD AUTO: 336 THOUSANDS/UL (ref 149–390)
PLATELET # BLD AUTO: 374 THOUSANDS/UL (ref 149–390)
PMV BLD AUTO: 8.9 FL (ref 8.9–12.7)
PMV BLD AUTO: 9 FL (ref 8.9–12.7)
PO2 BLD: 58.2 MM HG (ref 75–129)
PO2 BLDA: 60.7 MM HG (ref 75–129)
POTASSIUM SERPL-SCNC: 3.7 MMOL/L (ref 3.5–5.3)
POTASSIUM SERPL-SCNC: 4.6 MMOL/L (ref 3.5–5.3)
PR INTERVAL: 150 MS
PR INTERVAL: 150 MS
PROCALCITONIN SERPL-MCNC: 0.07 NG/ML
PROCALCITONIN SERPL-MCNC: 0.24 NG/ML
PROT FLD-MCNC: <3 G/DL
PROT SERPL-MCNC: 6.4 G/DL (ref 6.4–8.4)
PROT SERPL-MCNC: 7.3 G/DL (ref 6.4–8.4)
PROT UR STRIP-MCNC: NEGATIVE MG/DL
PROTHROMBIN TIME: 19.2 SECONDS (ref 11.6–14.5)
QRS AXIS: 45 DEGREES
QRS AXIS: 47 DEGREES
QRSD INTERVAL: 82 MS
QRSD INTERVAL: 90 MS
QT INTERVAL: 328 MS
QT INTERVAL: 336 MS
QTC INTERVAL: 425 MS
QTC INTERVAL: 431 MS
RBC # BLD AUTO: 3.43 MILLION/UL (ref 3.88–5.62)
RBC # BLD AUTO: 3.62 MILLION/UL (ref 3.88–5.62)
RIGHT ATRIUM AREA SYSTOLE A4C: 15.5 CM2
RIGHT VENTRICLE ID DIMENSION: 4.3 CM
RSV RNA RESP QL NAA+PROBE: NEGATIVE
S PNEUM AG UR QL: NEGATIVE
SARS-COV-2 RNA RESP QL NAA+PROBE: NEGATIVE
SITE: NORMAL
SL CV LEFT ATRIUM LENGTH A2C: 6.2 CM
SL CV LV EF: 35
SL CV PED ECHO LEFT VENTRICLE DIASTOLIC VOLUME (MOD BIPLANE) 2D: 117 ML
SL CV PED ECHO LEFT VENTRICLE SYSTOLIC VOLUME (MOD BIPLANE) 2D: 71 ML
SODIUM SERPL-SCNC: 137 MMOL/L (ref 135–147)
SODIUM SERPL-SCNC: 139 MMOL/L (ref 135–147)
SP GR UR STRIP.AUTO: 1.01 (ref 1–1.03)
SPECIMEN SOURCE: ABNORMAL
T WAVE AXIS: 159 DEGREES
T WAVE AXIS: 232 DEGREES
TOTAL CELLS COUNTED SPEC: 100
TOTAL PROTEIN FLUID: 1.5 G/DL
TR MAX PG: 62 MMHG
TR PEAK VELOCITY: 3.9 M/S
TRICUSPID VALVE PEAK REGURGITATION VELOCITY: 3.92 M/S
UROBILINOGEN UR QL STRIP.AUTO: 0.2 E.U./DL
VENTRICULAR RATE: 101 BPM
VENTRICULAR RATE: 99 BPM
WBC # BLD AUTO: 15.43 THOUSAND/UL (ref 4.31–10.16)
WBC # BLD AUTO: 9.07 THOUSAND/UL (ref 4.31–10.16)
WBC # FLD MANUAL: 214 /UL

## 2024-01-01 PROCEDURE — 93005 ELECTROCARDIOGRAM TRACING: CPT

## 2024-01-01 PROCEDURE — 85730 THROMBOPLASTIN TIME PARTIAL: CPT | Performed by: STUDENT IN AN ORGANIZED HEALTH CARE EDUCATION/TRAINING PROGRAM

## 2024-01-01 PROCEDURE — 84155 ASSAY OF PROTEIN SERUM: CPT | Performed by: STUDENT IN AN ORGANIZED HEALTH CARE EDUCATION/TRAINING PROGRAM

## 2024-01-01 PROCEDURE — 85025 COMPLETE CBC W/AUTO DIFF WBC: CPT | Performed by: EMERGENCY MEDICINE

## 2024-01-01 PROCEDURE — 93308 TTE F-UP OR LMTD: CPT

## 2024-01-01 PROCEDURE — 99223 1ST HOSP IP/OBS HIGH 75: CPT | Performed by: NURSE PRACTITIONER

## 2024-01-01 PROCEDURE — 99223 1ST HOSP IP/OBS HIGH 75: CPT | Performed by: STUDENT IN AN ORGANIZED HEALTH CARE EDUCATION/TRAINING PROGRAM

## 2024-01-01 PROCEDURE — 88305 TISSUE EXAM BY PATHOLOGIST: CPT | Performed by: STUDENT IN AN ORGANIZED HEALTH CARE EDUCATION/TRAINING PROGRAM

## 2024-01-01 PROCEDURE — 36415 COLL VENOUS BLD VENIPUNCTURE: CPT | Performed by: EMERGENCY MEDICINE

## 2024-01-01 PROCEDURE — 94760 N-INVAS EAR/PLS OXIMETRY 1: CPT

## 2024-01-01 PROCEDURE — 87449 NOS EACH ORGANISM AG IA: CPT | Performed by: STUDENT IN AN ORGANIZED HEALTH CARE EDUCATION/TRAINING PROGRAM

## 2024-01-01 PROCEDURE — 94002 VENT MGMT INPAT INIT DAY: CPT

## 2024-01-01 PROCEDURE — 84145 PROCALCITONIN (PCT): CPT | Performed by: NURSE PRACTITIONER

## 2024-01-01 PROCEDURE — 0W993ZZ DRAINAGE OF RIGHT PLEURAL CAVITY, PERCUTANEOUS APPROACH: ICD-10-PCS | Performed by: STUDENT IN AN ORGANIZED HEALTH CARE EDUCATION/TRAINING PROGRAM

## 2024-01-01 PROCEDURE — NC001 PR NO CHARGE: Performed by: STUDENT IN AN ORGANIZED HEALTH CARE EDUCATION/TRAINING PROGRAM

## 2024-01-01 PROCEDURE — 83615 LACTATE (LD) (LDH) ENZYME: CPT | Performed by: STUDENT IN AN ORGANIZED HEALTH CARE EDUCATION/TRAINING PROGRAM

## 2024-01-01 PROCEDURE — 99285 EMERGENCY DEPT VISIT HI MDM: CPT

## 2024-01-01 PROCEDURE — 97163 PT EVAL HIGH COMPLEX 45 MIN: CPT

## 2024-01-01 PROCEDURE — 94640 AIRWAY INHALATION TREATMENT: CPT

## 2024-01-01 PROCEDURE — 83735 ASSAY OF MAGNESIUM: CPT | Performed by: NURSE PRACTITIONER

## 2024-01-01 PROCEDURE — 93325 DOPPLER ECHO COLOR FLOW MAPG: CPT

## 2024-01-01 PROCEDURE — 71045 X-RAY EXAM CHEST 1 VIEW: CPT

## 2024-01-01 PROCEDURE — 83605 ASSAY OF LACTIC ACID: CPT | Performed by: NURSE PRACTITIONER

## 2024-01-01 PROCEDURE — 32555 ASPIRATE PLEURA W/ IMAGING: CPT | Performed by: NURSE PRACTITIONER

## 2024-01-01 PROCEDURE — 93010 ELECTROCARDIOGRAM REPORT: CPT | Performed by: INTERNAL MEDICINE

## 2024-01-01 PROCEDURE — G1004 CDSM NDSC: HCPCS

## 2024-01-01 PROCEDURE — 99214 OFFICE O/P EST MOD 30 MIN: CPT | Performed by: NURSE PRACTITIONER

## 2024-01-01 PROCEDURE — 99214 OFFICE O/P EST MOD 30 MIN: CPT | Performed by: FAMILY MEDICINE

## 2024-01-01 PROCEDURE — 84484 ASSAY OF TROPONIN QUANT: CPT | Performed by: NURSE PRACTITIONER

## 2024-01-01 PROCEDURE — 93321 DOPPLER ECHO F-UP/LMTD STD: CPT

## 2024-01-01 PROCEDURE — 87040 BLOOD CULTURE FOR BACTERIA: CPT | Performed by: NURSE PRACTITIONER

## 2024-01-01 PROCEDURE — 83735 ASSAY OF MAGNESIUM: CPT | Performed by: EMERGENCY MEDICINE

## 2024-01-01 PROCEDURE — 85610 PROTHROMBIN TIME: CPT | Performed by: EMERGENCY MEDICINE

## 2024-01-01 PROCEDURE — 80053 COMPREHEN METABOLIC PANEL: CPT | Performed by: EMERGENCY MEDICINE

## 2024-01-01 PROCEDURE — 99238 HOSP IP/OBS DSCHRG MGMT 30/<: CPT | Performed by: STUDENT IN AN ORGANIZED HEALTH CARE EDUCATION/TRAINING PROGRAM

## 2024-01-01 PROCEDURE — 99222 1ST HOSP IP/OBS MODERATE 55: CPT | Performed by: STUDENT IN AN ORGANIZED HEALTH CARE EDUCATION/TRAINING PROGRAM

## 2024-01-01 PROCEDURE — 97167 OT EVAL HIGH COMPLEX 60 MIN: CPT

## 2024-01-01 PROCEDURE — 88112 CYTOPATH CELL ENHANCE TECH: CPT | Performed by: STUDENT IN AN ORGANIZED HEALTH CARE EDUCATION/TRAINING PROGRAM

## 2024-01-01 PROCEDURE — 87070 CULTURE OTHR SPECIMN AEROBIC: CPT | Performed by: STUDENT IN AN ORGANIZED HEALTH CARE EDUCATION/TRAINING PROGRAM

## 2024-01-01 PROCEDURE — 99291 CRITICAL CARE FIRST HOUR: CPT | Performed by: EMERGENCY MEDICINE

## 2024-01-01 PROCEDURE — 96375 TX/PRO/DX INJ NEW DRUG ADDON: CPT

## 2024-01-01 PROCEDURE — 36600 WITHDRAWAL OF ARTERIAL BLOOD: CPT

## 2024-01-01 PROCEDURE — 83880 ASSAY OF NATRIURETIC PEPTIDE: CPT | Performed by: EMERGENCY MEDICINE

## 2024-01-01 PROCEDURE — 84157 ASSAY OF PROTEIN OTHER: CPT | Performed by: STUDENT IN AN ORGANIZED HEALTH CARE EDUCATION/TRAINING PROGRAM

## 2024-01-01 PROCEDURE — 82945 GLUCOSE OTHER FLUID: CPT | Performed by: STUDENT IN AN ORGANIZED HEALTH CARE EDUCATION/TRAINING PROGRAM

## 2024-01-01 PROCEDURE — 89051 BODY FLUID CELL COUNT: CPT | Performed by: STUDENT IN AN ORGANIZED HEALTH CARE EDUCATION/TRAINING PROGRAM

## 2024-01-01 PROCEDURE — 87081 CULTURE SCREEN ONLY: CPT | Performed by: NURSE PRACTITIONER

## 2024-01-01 PROCEDURE — 84484 ASSAY OF TROPONIN QUANT: CPT | Performed by: EMERGENCY MEDICINE

## 2024-01-01 PROCEDURE — 87205 SMEAR GRAM STAIN: CPT | Performed by: STUDENT IN AN ORGANIZED HEALTH CARE EDUCATION/TRAINING PROGRAM

## 2024-01-01 PROCEDURE — 81003 URINALYSIS AUTO W/O SCOPE: CPT | Performed by: NURSE PRACTITIONER

## 2024-01-01 PROCEDURE — 93321 DOPPLER ECHO F-UP/LMTD STD: CPT | Performed by: INTERNAL MEDICINE

## 2024-01-01 PROCEDURE — 93308 TTE F-UP OR LMTD: CPT | Performed by: INTERNAL MEDICINE

## 2024-01-01 PROCEDURE — 96374 THER/PROPH/DIAG INJ IV PUSH: CPT

## 2024-01-01 PROCEDURE — 85730 THROMBOPLASTIN TIME PARTIAL: CPT | Performed by: EMERGENCY MEDICINE

## 2024-01-01 PROCEDURE — 94003 VENT MGMT INPAT SUBQ DAY: CPT

## 2024-01-01 PROCEDURE — 82805 BLOOD GASES W/O2 SATURATION: CPT | Performed by: NURSE PRACTITIONER

## 2024-01-01 PROCEDURE — 99223 1ST HOSP IP/OBS HIGH 75: CPT | Performed by: INTERNAL MEDICINE

## 2024-01-01 PROCEDURE — 93325 DOPPLER ECHO COLOR FLOW MAPG: CPT | Performed by: INTERNAL MEDICINE

## 2024-01-01 PROCEDURE — 99291 CRITICAL CARE FIRST HOUR: CPT | Performed by: STUDENT IN AN ORGANIZED HEALTH CARE EDUCATION/TRAINING PROGRAM

## 2024-01-01 PROCEDURE — 0241U HB NFCT DS VIR RESP RNA 4 TRGT: CPT | Performed by: NURSE PRACTITIONER

## 2024-01-01 PROCEDURE — 71250 CT THORAX DX C-: CPT

## 2024-01-01 PROCEDURE — 85027 COMPLETE CBC AUTOMATED: CPT | Performed by: NURSE PRACTITIONER

## 2024-01-01 PROCEDURE — 80048 BASIC METABOLIC PNL TOTAL CA: CPT | Performed by: NURSE PRACTITIONER

## 2024-01-01 PROCEDURE — 85730 THROMBOPLASTIN TIME PARTIAL: CPT | Performed by: NURSE PRACTITIONER

## 2024-01-01 RX ORDER — MAGNESIUM SULFATE HEPTAHYDRATE 40 MG/ML
2 INJECTION, SOLUTION INTRAVENOUS ONCE
Status: COMPLETED | OUTPATIENT
Start: 2024-01-01 | End: 2024-01-01

## 2024-01-01 RX ORDER — FINASTERIDE 5 MG/1
5 TABLET, FILM COATED ORAL
Status: DISCONTINUED | OUTPATIENT
Start: 2024-01-01 | End: 2024-01-01

## 2024-01-01 RX ORDER — POTASSIUM CHLORIDE 20MEQ/15ML
40 LIQUID (ML) ORAL ONCE
Status: COMPLETED | OUTPATIENT
Start: 2024-01-01 | End: 2024-01-01

## 2024-01-01 RX ORDER — FUROSEMIDE 10 MG/ML
40 INJECTION INTRAMUSCULAR; INTRAVENOUS ONCE
Status: COMPLETED | OUTPATIENT
Start: 2024-01-01 | End: 2024-01-01

## 2024-01-01 RX ORDER — LISINOPRIL 5 MG/1
5 TABLET ORAL
Status: DISCONTINUED | OUTPATIENT
Start: 2024-01-01 | End: 2024-01-01

## 2024-01-01 RX ORDER — IPRATROPIUM BROMIDE AND ALBUTEROL SULFATE 2.5; .5 MG/3ML; MG/3ML
3 SOLUTION RESPIRATORY (INHALATION)
Status: DISCONTINUED | OUTPATIENT
Start: 2024-01-01 | End: 2024-01-01

## 2024-01-01 RX ORDER — HEPARIN SODIUM 1000 [USP'U]/ML
2000 INJECTION, SOLUTION INTRAVENOUS; SUBCUTANEOUS EVERY 6 HOURS PRN
Status: DISCONTINUED | OUTPATIENT
Start: 2024-01-01 | End: 2024-01-01

## 2024-01-01 RX ORDER — IPRATROPIUM BROMIDE AND ALBUTEROL SULFATE 2.5; .5 MG/3ML; MG/3ML
3 SOLUTION RESPIRATORY (INHALATION) ONCE
Qty: 3 ML | Refills: 0 | Status: COMPLETED | OUTPATIENT
Start: 2024-01-01 | End: 2024-01-01

## 2024-01-01 RX ORDER — HEPARIN SODIUM 10000 [USP'U]/100ML
3-20 INJECTION, SOLUTION INTRAVENOUS
Status: DISCONTINUED | OUTPATIENT
Start: 2024-01-01 | End: 2024-01-01

## 2024-01-01 RX ORDER — POTASSIUM CHLORIDE 20 MEQ/1
40 TABLET, EXTENDED RELEASE ORAL ONCE
Status: DISCONTINUED | OUTPATIENT
Start: 2024-01-01 | End: 2024-01-01

## 2024-01-01 RX ORDER — HEPARIN SODIUM 1000 [USP'U]/ML
4000 INJECTION, SOLUTION INTRAVENOUS; SUBCUTANEOUS EVERY 6 HOURS PRN
Status: DISCONTINUED | OUTPATIENT
Start: 2024-01-01 | End: 2024-01-01

## 2024-01-01 RX ORDER — BENZONATATE 100 MG/1
100 CAPSULE ORAL 3 TIMES DAILY PRN
Status: DISCONTINUED | OUTPATIENT
Start: 2024-01-01 | End: 2024-01-01

## 2024-01-01 RX ORDER — MELATONIN
2000 DAILY
Status: DISCONTINUED | OUTPATIENT
Start: 2024-01-01 | End: 2024-01-01

## 2024-01-01 RX ORDER — HEPARIN SODIUM 1000 [USP'U]/ML
4000 INJECTION, SOLUTION INTRAVENOUS; SUBCUTANEOUS ONCE
Status: COMPLETED | OUTPATIENT
Start: 2024-01-01 | End: 2024-01-01

## 2024-01-01 RX ORDER — MULTIVIT-MIN/IRON/FOLIC ACID/K 18-600-40
1 CAPSULE ORAL DAILY
Start: 2024-01-01 | End: 2024-01-01

## 2024-01-01 RX ORDER — CHLORTHALIDONE 25 MG/1
25 TABLET ORAL DAILY
Qty: 90 TABLET | Refills: 2 | Status: SHIPPED | OUTPATIENT
Start: 2024-01-01 | End: 2024-01-01

## 2024-01-01 RX ORDER — LANOLIN ALCOHOL/MO/W.PET/CERES
1 CREAM (GRAM) TOPICAL
Status: DISCONTINUED | OUTPATIENT
Start: 2024-01-01 | End: 2024-01-01

## 2024-01-01 RX ORDER — RANOLAZINE 500 MG/1
500 TABLET, EXTENDED RELEASE ORAL EVERY 12 HOURS SCHEDULED
Status: DISCONTINUED | OUTPATIENT
Start: 2024-01-01 | End: 2024-01-01

## 2024-01-01 RX ORDER — ACETAMINOPHEN 325 MG/1
650 TABLET ORAL EVERY 6 HOURS PRN
Status: DISCONTINUED | OUTPATIENT
Start: 2024-01-01 | End: 2024-01-01

## 2024-01-01 RX ORDER — LANOLIN ALCOHOL/MO/W.PET/CERES
6 CREAM (GRAM) TOPICAL
Status: DISCONTINUED | OUTPATIENT
Start: 2024-01-01 | End: 2024-01-01

## 2024-01-01 RX ORDER — FUROSEMIDE 10 MG/ML
40 INJECTION INTRAMUSCULAR; INTRAVENOUS DAILY
Status: DISCONTINUED | OUTPATIENT
Start: 2024-01-01 | End: 2024-01-01

## 2024-01-01 RX ORDER — ATORVASTATIN CALCIUM 40 MG/1
40 TABLET, FILM COATED ORAL
Status: DISCONTINUED | OUTPATIENT
Start: 2024-01-01 | End: 2024-01-01

## 2024-01-01 RX ORDER — GUAIFENESIN 600 MG/1
600 TABLET, EXTENDED RELEASE ORAL EVERY 12 HOURS SCHEDULED
Status: DISCONTINUED | OUTPATIENT
Start: 2024-01-01 | End: 2024-01-01

## 2024-01-01 RX ORDER — LORAZEPAM 2 MG/ML
1 INJECTION INTRAMUSCULAR
Status: DISCONTINUED | OUTPATIENT
Start: 2024-01-01 | End: 2024-01-01 | Stop reason: HOSPADM

## 2024-01-01 RX ORDER — CEFTRIAXONE 1 G/50ML
1000 INJECTION, SOLUTION INTRAVENOUS EVERY 24 HOURS
Status: DISCONTINUED | OUTPATIENT
Start: 2024-01-01 | End: 2024-01-01

## 2024-01-01 RX ORDER — SODIUM CHLORIDE FOR INHALATION 3 %
4 VIAL, NEBULIZER (ML) INHALATION
Status: DISCONTINUED | OUTPATIENT
Start: 2024-01-01 | End: 2024-01-01

## 2024-01-01 RX ORDER — FAMOTIDINE 10 MG/ML
20 INJECTION, SOLUTION INTRAVENOUS
Status: DISCONTINUED | OUTPATIENT
Start: 2024-01-01 | End: 2024-01-01

## 2024-01-01 RX ADMIN — FUROSEMIDE 40 MG: 10 INJECTION, SOLUTION INTRAMUSCULAR; INTRAVENOUS at 18:30

## 2024-01-01 RX ADMIN — POTASSIUM CHLORIDE 40 MEQ: 20 SOLUTION ORAL at 08:29

## 2024-01-01 RX ADMIN — SODIUM CHLORIDE SOLN NEBU 3% 4 ML: 3 NEBU SOLN at 02:50

## 2024-01-01 RX ADMIN — IPRATROPIUM BROMIDE AND ALBUTEROL SULFATE 3 ML: 2.5; .5 SOLUTION RESPIRATORY (INHALATION) at 02:56

## 2024-01-01 RX ADMIN — ASPIRIN 81 MG: 81 TABLET, COATED ORAL at 09:16

## 2024-01-01 RX ADMIN — SODIUM CHLORIDE SOLN NEBU 3% 4 ML: 3 NEBU SOLN at 13:52

## 2024-01-01 RX ADMIN — Medication 6 MG: at 20:37

## 2024-01-01 RX ADMIN — RANOLAZINE 500 MG: 500 TABLET, FILM COATED, EXTENDED RELEASE ORAL at 20:06

## 2024-01-01 RX ADMIN — AZITHROMYCIN MONOHYDRATE 500 MG: 500 INJECTION, POWDER, LYOPHILIZED, FOR SOLUTION INTRAVENOUS at 23:42

## 2024-01-01 RX ADMIN — HEPARIN SODIUM 4000 UNITS: 1000 INJECTION INTRAVENOUS; SUBCUTANEOUS at 18:31

## 2024-01-01 RX ADMIN — Medication 2 MG/HR: at 11:25

## 2024-01-01 RX ADMIN — FUROSEMIDE 40 MG: 10 INJECTION, SOLUTION INTRAMUSCULAR; INTRAVENOUS at 05:41

## 2024-01-01 RX ADMIN — RANOLAZINE 500 MG: 500 TABLET, FILM COATED, EXTENDED RELEASE ORAL at 21:24

## 2024-01-01 RX ADMIN — MAGNESIUM SULFATE HEPTAHYDRATE 2 G: 40 INJECTION, SOLUTION INTRAVENOUS at 04:56

## 2024-01-01 RX ADMIN — Medication 1 TABLET: at 08:29

## 2024-01-01 RX ADMIN — CEFTRIAXONE 1000 MG: 1 INJECTION, SOLUTION INTRAVENOUS at 23:04

## 2024-01-01 RX ADMIN — HEPARIN SODIUM 12 UNITS/KG/HR: 10000 INJECTION, SOLUTION INTRAVENOUS at 18:33

## 2024-01-01 RX ADMIN — FINASTERIDE 5 MG: 5 TABLET, FILM COATED ORAL at 21:31

## 2024-01-01 RX ADMIN — METOPROLOL TARTRATE 25 MG: 25 TABLET, FILM COATED ORAL at 21:24

## 2024-01-01 RX ADMIN — HEPARIN SODIUM 8 UNITS/KG/HR: 10000 INJECTION, SOLUTION INTRAVENOUS at 18:44

## 2024-01-01 RX ADMIN — IPRATROPIUM BROMIDE AND ALBUTEROL SULFATE 3 ML: 2.5; .5 SOLUTION RESPIRATORY (INHALATION) at 07:35

## 2024-01-01 RX ADMIN — SODIUM CHLORIDE SOLN NEBU 3% 4 ML: 3 NEBU SOLN at 01:16

## 2024-01-01 RX ADMIN — GUAIFENESIN 600 MG: 600 TABLET, EXTENDED RELEASE ORAL at 23:04

## 2024-01-01 RX ADMIN — Medication 2.5 MG: at 20:36

## 2024-01-01 RX ADMIN — ATORVASTATIN CALCIUM 40 MG: 40 TABLET, FILM COATED ORAL at 21:23

## 2024-01-01 RX ADMIN — SODIUM CHLORIDE SOLN NEBU 3% 4 ML: 3 NEBU SOLN at 07:23

## 2024-01-01 RX ADMIN — FAMOTIDINE 20 MG: 10 INJECTION INTRAVENOUS at 21:31

## 2024-01-01 RX ADMIN — IPRATROPIUM BROMIDE AND ALBUTEROL SULFATE 3 ML: 2.5; .5 SOLUTION RESPIRATORY (INHALATION) at 07:23

## 2024-01-01 RX ADMIN — GUAIFENESIN 600 MG: 600 TABLET, EXTENDED RELEASE ORAL at 20:06

## 2024-01-01 RX ADMIN — IPRATROPIUM BROMIDE AND ALBUTEROL SULFATE 3 ML: 2.5; .5 SOLUTION RESPIRATORY (INHALATION) at 01:05

## 2024-01-01 RX ADMIN — FINASTERIDE 5 MG: 5 TABLET, FILM COATED ORAL at 21:23

## 2024-01-01 RX ADMIN — MORPHINE SULFATE 2 MG: 2 INJECTION, SOLUTION INTRAMUSCULAR; INTRAVENOUS at 11:59

## 2024-01-01 RX ADMIN — BENZONATATE 100 MG: 100 CAPSULE ORAL at 20:05

## 2024-01-01 RX ADMIN — Medication 2000 UNITS: at 09:16

## 2024-01-01 RX ADMIN — FUROSEMIDE 40 MG: 10 INJECTION, SOLUTION INTRAMUSCULAR; INTRAVENOUS at 09:16

## 2024-01-01 RX ADMIN — METOPROLOL TARTRATE 25 MG: 25 TABLET, FILM COATED ORAL at 14:31

## 2024-01-01 RX ADMIN — IPRATROPIUM BROMIDE AND ALBUTEROL SULFATE 3 ML: 2.5; .5 SOLUTION RESPIRATORY (INHALATION) at 19:25

## 2024-01-01 RX ADMIN — RANOLAZINE 500 MG: 500 TABLET, FILM COATED, EXTENDED RELEASE ORAL at 09:16

## 2024-01-01 RX ADMIN — FUROSEMIDE 40 MG: 10 INJECTION, SOLUTION INTRAMUSCULAR; INTRAVENOUS at 21:23

## 2024-01-01 RX ADMIN — IPRATROPIUM BROMIDE AND ALBUTEROL SULFATE 3 ML: 2.5; .5 SOLUTION RESPIRATORY (INHALATION) at 13:52

## 2024-01-01 RX ADMIN — Medication 1 TABLET: at 09:16

## 2024-01-01 RX ADMIN — IPRATROPIUM BROMIDE AND ALBUTEROL SULFATE 3 ML: 2.5; .5 SOLUTION RESPIRATORY (INHALATION) at 21:09

## 2024-01-01 RX ADMIN — GUAIFENESIN 600 MG: 600 TABLET, EXTENDED RELEASE ORAL at 09:16

## 2024-01-01 RX ADMIN — FAMOTIDINE 20 MG: 10 INJECTION INTRAVENOUS at 21:23

## 2024-01-01 RX ADMIN — ATORVASTATIN CALCIUM 40 MG: 40 TABLET, FILM COATED ORAL at 17:24

## 2024-01-01 RX ADMIN — AZITHROMYCIN MONOHYDRATE 500 MG: 500 INJECTION, POWDER, LYOPHILIZED, FOR SOLUTION INTRAVENOUS at 21:58

## 2024-01-01 RX ADMIN — MORPHINE SULFATE 2 MG: 2 INJECTION, SOLUTION INTRAMUSCULAR; INTRAVENOUS at 05:26

## 2024-01-01 RX ADMIN — SODIUM CHLORIDE SOLN NEBU 3% 4 ML: 3 NEBU SOLN at 19:36

## 2024-01-01 RX ADMIN — SODIUM CHLORIDE SOLN NEBU 3% 4 ML: 3 NEBU SOLN at 07:35

## 2024-01-01 RX ADMIN — COLLAGENASE SANTYL 1 APPLICATION: 250 OINTMENT TOPICAL at 09:17

## 2024-01-01 RX ADMIN — CEFTRIAXONE 1000 MG: 1 INJECTION, SOLUTION INTRAVENOUS at 21:31

## 2024-01-03 ENCOUNTER — OFFICE VISIT (OUTPATIENT)
Dept: WOUND CARE | Facility: HOSPITAL | Age: 89
End: 2024-01-03
Payer: COMMERCIAL

## 2024-01-03 VITALS
TEMPERATURE: 97.8 F | HEART RATE: 91 BPM | DIASTOLIC BLOOD PRESSURE: 72 MMHG | RESPIRATION RATE: 15 BRPM | SYSTOLIC BLOOD PRESSURE: 131 MMHG

## 2024-01-03 DIAGNOSIS — I87.2 EDEMA OF BOTH LOWER EXTREMITIES DUE TO PERIPHERAL VENOUS INSUFFICIENCY: ICD-10-CM

## 2024-01-03 DIAGNOSIS — I87.311 CHRONIC VENOUS HYPERTENSION (IDIOPATHIC) WITH ULCER OF RIGHT LOWER EXTREMITY (CODE) (HCC): ICD-10-CM

## 2024-01-03 DIAGNOSIS — I70.219 ATHEROSCLEROTIC PVD WITH INTERMITTENT CLAUDICATION (HCC): ICD-10-CM

## 2024-01-03 DIAGNOSIS — L97.911 NON-PRESSURE CHRONIC ULCER OF RIGHT LOWER LEG, LIMITED TO BREAKDOWN OF SKIN (HCC): ICD-10-CM

## 2024-01-03 DIAGNOSIS — S91.101A OPEN WOUND OF RIGHT GREAT TOE, INITIAL ENCOUNTER: Primary | ICD-10-CM

## 2024-01-03 DIAGNOSIS — S51.012A SKIN TEAR OF ELBOW WITHOUT COMPLICATION, LEFT, INITIAL ENCOUNTER: ICD-10-CM

## 2024-01-03 PROCEDURE — 97597 DBRDMT OPN WND 1ST 20 CM/<: CPT | Performed by: STUDENT IN AN ORGANIZED HEALTH CARE EDUCATION/TRAINING PROGRAM

## 2024-01-03 RX ORDER — LIDOCAINE HYDROCHLORIDE 40 MG/ML
5 SOLUTION TOPICAL ONCE
Status: COMPLETED | OUTPATIENT
Start: 2024-01-03 | End: 2024-01-03

## 2024-01-03 RX ADMIN — LIDOCAINE HYDROCHLORIDE 5 ML: 40 SOLUTION TOPICAL at 13:44

## 2024-01-03 NOTE — PROGRESS NOTES
Patient ID: John Smallwood is a 89 y.o. male Date of Birth 8/6/1930     Chief Complaint  Chief Complaint   Patient presents with    Follow Up Wound Care Visit     Toe, RLE, LUE       Allergies  Tamsulosin    Assessment:     Diagnoses and all orders for this visit:    Open wound of right great toe, initial encounter  -     lidocaine (XYLOCAINE) 4 % topical solution 5 mL  -     Wound cleansing and dressings; Future  -     Wound miscellaneous orders; Future  -     Wound compression and edema control; Future  -     Cancel: Wound cleansing and dressings Venous Ulcer Distal;Right;Medial Leg; Future  -     Debridement Anterior;Right Toe (Comment  which one)  -     Wound cleansing and dressings Venous Ulcer Distal;Right;Medial Leg; Future    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, limited to breakdown of skin (HCC)  -     lidocaine (XYLOCAINE) 4 % topical solution 5 mL  -     Wound cleansing and dressings; Future  -     Wound miscellaneous orders; Future  -     Wound compression and edema control; Future  -     Cancel: Wound cleansing and dressings Venous Ulcer Distal;Right;Medial Leg; Future  -     Debridement Right Pretibial  -     Wound cleansing and dressings Venous Ulcer Distal;Right;Medial Leg; Future    Skin tear of elbow without complication, left, initial encounter  -     lidocaine (XYLOCAINE) 4 % topical solution 5 mL  -     Wound cleansing and dressings; Future  -     Wound miscellaneous orders; Future  -     Wound compression and edema control; Future  -     Cancel: Wound cleansing and dressings Venous Ulcer Distal;Right;Medial Leg; Future  -     Wound cleansing and dressings Venous Ulcer Distal;Right;Medial Leg; Future    Chronic venous hypertension (idiopathic) with ulcer of right lower extremity (CODE) (HCC)  -     lidocaine (XYLOCAINE) 4 % topical solution 5 mL  -     Wound cleansing and dressings; Future  -     Wound miscellaneous orders; Future  -     Wound compression and edema control;  "Future  -     Cancel: Wound cleansing and dressings Venous Ulcer Distal;Right;Medial Leg; Future  -     Wound cleansing and dressings Venous Ulcer Distal;Right;Medial Leg; Future    Atherosclerotic PVD with intermittent claudication (HCC)  -     lidocaine (XYLOCAINE) 4 % topical solution 5 mL  -     Wound cleansing and dressings; Future  -     Wound miscellaneous orders; Future  -     Wound compression and edema control; Future  -     Cancel: Wound cleansing and dressings Venous Ulcer Distal;Right;Medial Leg; Future  -     Wound cleansing and dressings Venous Ulcer Distal;Right;Medial Leg; Future    Edema of both lower extremities due to peripheral venous insufficiency  -     lidocaine (XYLOCAINE) 4 % topical solution 5 mL  -     Wound cleansing and dressings; Future  -     Wound miscellaneous orders; Future  -     Wound compression and edema control; Future  -     Cancel: Wound cleansing and dressings Venous Ulcer Distal;Right;Medial Leg; Future  -     Wound cleansing and dressings Venous Ulcer Distal;Right;Medial Leg; Future              Debridement   Wound 11/21/23 Pretibial Right    Universal Protocol:  Consent: Verbal consent obtained.  Risks and benefits: risks, benefits and alternatives were discussed  Consent given by: patient  Time out: Immediately prior to procedure a \"time out\" was called to verify the correct patient, procedure, equipment, support staff and site/side marked as required.  Patient identity confirmed: verbally with patient    Debridement Details  Performed by: physician  Debridement type: selective  Pain control: lidocaine 4%      Post-debridement measurements  Length (cm): 0.6  Width (cm): 0.6  Depth (cm): 0.1  Percent debrided: 90%  Surface Area (cm^2): 0.36  Area Debrided (cm^2): 0.32  Volume (cm^3): 0.04    Devitalized tissue debrided: biofilm and exudate  Instrument(s) utilized: curette  Bleeding: small  Hemostasis obtained with: pressure  Procedural pain (0-10): 1  Post-procedural " "pain: 0   Response to treatment: procedure was tolerated well    Debridement   Wound 11/21/23 Toe (Comment  which one) Anterior;Right    Universal Protocol:  Consent: Verbal consent obtained.  Risks and benefits: risks, benefits and alternatives were discussed  Consent given by: patient  Time out: Immediately prior to procedure a \"time out\" was called to verify the correct patient, procedure, equipment, support staff and site/side marked as required.  Patient identity confirmed: verbally with patient    Debridement Details  Performed by: physician  Debridement type: selective  Pain control: lidocaine 4%      Post-debridement measurements  Length (cm): 0.5  Width (cm): 0.5  Depth (cm): 0.1  Percent debrided: 90%  Surface Area (cm^2): 0.25  Area Debrided (cm^2): 0.23  Volume (cm^3): 0.03    Devitalized tissue debrided: biofilm and exudate  Instrument(s) utilized: curette  Bleeding: small  Hemostasis obtained with: pressure  Procedural pain (0-10): 1  Post-procedural pain: 0   Response to treatment: procedure was tolerated well        Plan:   It was a pleasure to see John Smallwood for wound care follow up today  Selective debridement performed today as above  Wounds are not improving.  Also with increased right lower extremity edema and new posterior distal open wounds.  Arm wound is fully epithelialized today.  Continue plan of care as noted below with Santyl to toe wound and anterior right lower extremity wound.  Posterior wound has significantly more exudate.  Will use hydrofera ready on this wound.  Discussed with patient today as at prior visits that these wounds are unlikely to heal due to his poor vascular supply.  May consider changing to palliative status in the future.  No signs or symptoms of infection today. Patient understands that if any signs of infection start (such as increased redness, drainage, pain, fever, chills, diaphoresis), they should call our office or proceed to the ER or Urgent " Care.  Patient should continue a high protein diet to facilitate wound healing  Patient is advised to not submerge wound or leave wound open to air.  Follow up in 1 weeks  Given the multi-factorial nature of wound care, additional time was taken to review patient's treatment plan with other specialties and most recent pertinent lab work and imaging.   All plans of care discussed with patient at bedside who verbalized understanding with treatment plan.    Wound 11/21/23 Toe (Comment  which one) Anterior;Right (Active)   Wound Image Images linked 01/03/24 1338   Wound Description Yellow;Pink;Slough 01/03/24 1338   Nicolette-wound Assessment Edema;Erythema 01/03/24 1338   Wound Length (cm) 0.5 cm 01/03/24 1338   Wound Width (cm) 0.5 cm 01/03/24 1338   Wound Depth (cm) 0.1 cm 01/03/24 1338   Wound Surface Area (cm^2) 0.25 cm^2 01/03/24 1338   Wound Volume (cm^3) 0.025 cm^3 01/03/24 1338   Calculated Wound Volume (cm^3) 0.03 cm^3 01/03/24 1338   Change in Wound Size % -50 01/03/24 1338   Drainage Amount Small 01/03/24 1338   Drainage Description Serosanguineous 01/03/24 1338   Non-staged Wound Description Full thickness 01/03/24 1338   Dressing Status Intact (upon arrival) 01/03/24 1338       Wound 11/21/23 Pretibial Right (Active)   Wound Image Images linked 01/03/24 1340   Wound Description Yellow;Slough;Pink 01/03/24 1339   Nicolette-wound Assessment Edema;Dry;[employer] 01/03/24 1339   Wound Length (cm) 0.6 cm (scattered small openings) 01/03/24 1339   Wound Width (cm) 0.6 cm 01/03/24 1339   Wound Depth (cm) 0.1 cm 01/03/24 1339   Wound Surface Area (cm^2) 0.36 cm^2 01/03/24 1339   Wound Volume (cm^3) 0.036 cm^3 01/03/24 1339   Calculated Wound Volume (cm^3) 0.04 cm^3 01/03/24 1339   Change in Wound Size % 0 01/03/24 1339   Drainage Amount Scant 01/03/24 1339   Drainage Description Serous;Clear 01/03/24 1339   Non-staged Wound Description Full thickness 01/03/24 1339   Dressing Status Intact (upon arrival) 01/03/24 1339       Wound  12/07/23 Traumatic Arm Anterior;Left;Lower (Active)   Wound Image Images linked 01/03/24 1335   Wound Description Epithelialization 01/03/24 1334   Nicolette-wound Assessment Dry 01/03/24 1334   Wound Length (cm) 0 cm 01/03/24 1334   Wound Width (cm) 0 cm 01/03/24 1334   Wound Depth (cm) 0 cm 01/03/24 1334   Wound Surface Area (cm^2) 0 cm^2 01/03/24 1334   Wound Volume (cm^3) 0 cm^3 01/03/24 1334   Calculated Wound Volume (cm^3) 0 cm^3 01/03/24 1334   Change in Wound Size % 100 01/03/24 1334   Drainage Amount None 01/03/24 1334   Non-staged Wound Description Not applicable 01/03/24 1334   Dressing Status Other (Comment) (no dressing upon arrival) 01/03/24 1334       Wound 01/03/24 Venous Ulcer Leg Distal;Right;Medial (Active)   Wound Image Images linked 01/03/24 1343   Wound Description Epithelialization;Pink;Yellow;Slough 01/03/24 1343   Nicolette-wound Assessment Edema;Pink 01/03/24 1343   Wound Length (cm) 10.4 cm 01/03/24 1343   Wound Width (cm) 5 cm 01/03/24 1343   Wound Depth (cm) 0.1 cm 01/03/24 1343   Wound Surface Area (cm^2) 52 cm^2 01/03/24 1343   Wound Volume (cm^3) 5.2 cm^3 01/03/24 1343   Calculated Wound Volume (cm^3) 5.2 cm^3 01/03/24 1343   Drainage Amount Moderate 01/03/24 1343   Drainage Description Serous;Clear 01/03/24 1343   Non-staged Wound Description Full thickness 01/03/24 1343   Dressing Status Other (Comment) (upon arrival) 01/03/24 1343       Wound 11/21/23 Toe (Comment  which one) Anterior;Right (Active)   Date First Assessed/Time First Assessed: 11/21/23 1352   Location: Toe (Comment  which one)  Wound Location Orientation: Anterior;Right  Wound Description (Comments): 11/21/23 R 1st toe       Wound 11/21/23 Pretibial Right (Active)   Date First Assessed/Time First Assessed: 11/21/23 7762   Location: Pretibial  Wound Location Orientation: Right       Wound 12/07/23 Traumatic Arm Anterior;Left;Lower (Active)   Date First Assessed/Time First Assessed: 12/07/23 1103   Primary Wound Type: Traumatic   Location: Arm  Wound Location Orientation: Anterior;Left;Lower       Wound 01/03/24 Venous Ulcer Leg Distal;Right;Medial (Active)   Date First Assessed/Time First Assessed: 01/03/24 1342   Primary Wound Type: Venous Ulcer  Location: Leg  Wound Location Orientation: Distal;Right;Medial       [REMOVED] Incision 10/25/18 Scrotum Right (Removed)   Resolved Date: 11/21/23  Date First Assessed/Time First Assessed: 10/25/18 0816   Location: Scrotum  Wound Location Orientation: Right  Wound Outcome: Healed       [REMOVED] Wound 11/02/23 Incision Wrist Right (Removed)   Resolved Date: 11/21/23  Date First Assessed/Time First Assessed: 11/02/23 1525   Primary Wound Type: Incision  Location: Wrist  Wound Location Orientation: Right  Incision's 1st Dressing: [facility] puncture site  Wound Outcome: Healed       [REMOVED] Wound 11/02/23 Traumatic Abrasion(s) Toe (Comment  which one) (Removed)   Resolved Date: 11/21/23  Date First Assessed/Time First Assessed: 11/02/23 1759   Present on Original Admission: Yes  Primary Wound Type: Traumatic  Traumatic Wound Type: Abrasion(s)  Location: (c) Toe (Comment  which one)  Dressing Status: Clean;Dry;...       Subjective:      .    1/3/23: He recently seen by vascular surgery.  Given overall comorbidities and perioperative risk assessment patient declined surgery at this time.  Vascular sufficiency of the lower extremities.  Has a follow-up with vascular surgery in 2 months to talk again about surgery if wounds deteriorate    12/14/23: Wounds are the same.  No symptoms of infection.     12/7/23: Notes that legs are more edematous today he has been more active with the holidays.  Did have a mechanical fall and landed on his elbow.  Notices no change in range of motion just some bruising on the skin itself.  Does have a skin tear to the left elbow today.  No symptoms of infection.  Particularly notes that he did not fall and land with any impact to his head.     11/28/23: Wound care as directed.   Note that wounds are very dry.  No symptoms of infection today.     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  Patient was referred by his primary care provider Dr. Connie Smith.  Per chart review patient has had a right hallux wound for quite some time.  Following with podiatry Dr. Isaacs, he last saw in October 2023.  No mention of wound at that visit or chart review.  Patient notes that podiatry did note the wound and advised patient to take care of it.  No specific instructions at that time.  Patient notes that he has been covering it with gauze and not leaving open to air.  Denies any symptoms of infection today including fever chills diaphoresis.          The following portions of the patient's history were reviewed and updated as appropriate: allergies, current medications, past family history, past medical history, past social history, past surgical history, and problem list.    Review of Systems   Constitutional:  Negative for chills, diaphoresis and fever.   Skin:  Positive for wound.   All other systems reviewed and are negative.        Objective:       Wound 11/21/23 Toe (Comment  which one) Anterior;Right (Active)   Wound Image Images linked 01/03/24 1338   Wound Description Yellow;Pink;Slough 01/03/24 1338   Nicolette-wound Assessment Edema;Erythema 01/03/24 1338   Wound Length (cm) 0.5 cm 01/03/24 1338   Wound Width (cm) 0.5 cm 01/03/24 1338   Wound Depth (cm) 0.1 cm 01/03/24 1338   Wound Surface Area (cm^2) 0.25 cm^2 01/03/24 1338   Wound Volume (cm^3) 0.025 cm^3 01/03/24 1338   Calculated Wound Volume (cm^3) 0.03 cm^3 01/03/24 1338   Change in Wound Size % -50 01/03/24 1338   Drainage Amount Small 01/03/24 1338   Drainage Description  Serosanguineous 01/03/24 1338   Non-staged Wound Description Full thickness 01/03/24 1338   Dressing Status Intact (upon arrival) 01/03/24 1338       Wound 11/21/23 Pretibial Right (Active)   Wound Image Images linked 01/03/24 1340   Wound Description Yellow;Slough;Pink 01/03/24 1339   Nicolette-wound Assessment Edema;Dry;[place] 01/03/24 1339   Wound Length (cm) 0.6 cm (scattered small openings) 01/03/24 1339   Wound Width (cm) 0.6 cm 01/03/24 1339   Wound Depth (cm) 0.1 cm 01/03/24 1339   Wound Surface Area (cm^2) 0.36 cm^2 01/03/24 1339   Wound Volume (cm^3) 0.036 cm^3 01/03/24 1339   Calculated Wound Volume (cm^3) 0.04 cm^3 01/03/24 1339   Change in Wound Size % 0 01/03/24 1339   Drainage Amount Scant 01/03/24 1339   Drainage Description Serous;Clear 01/03/24 1339   Non-staged Wound Description Full thickness 01/03/24 1339   Dressing Status Intact (upon arrival) 01/03/24 1339       Wound 12/07/23 Traumatic Arm Anterior;Left;Lower (Active)   Wound Image Images linked 01/03/24 1335   Wound Description Epithelialization 01/03/24 1334   Nicolette-wound Assessment Dry 01/03/24 1334   Wound Length (cm) 0 cm 01/03/24 1334   Wound Width (cm) 0 cm 01/03/24 1334   Wound Depth (cm) 0 cm 01/03/24 1334   Wound Surface Area (cm^2) 0 cm^2 01/03/24 1334   Wound Volume (cm^3) 0 cm^3 01/03/24 1334   Calculated Wound Volume (cm^3) 0 cm^3 01/03/24 1334   Change in Wound Size % 100 01/03/24 1334   Drainage Amount None 01/03/24 1334   Non-staged Wound Description Not applicable 01/03/24 1334   Dressing Status Other (Comment) (no dressing upon arrival) 01/03/24 1334       Wound 01/03/24 Venous Ulcer Leg Distal;Right;Medial (Active)   Wound Image Images linked 01/03/24 1343   Wound Description Epithelialization;Pink;Yellow;Slough 01/03/24 1343   Nicolette-wound Assessment Edema;Pink 01/03/24 1343   Wound Length (cm) 10.4 cm 01/03/24 1343   Wound Width (cm) 5 cm 01/03/24 1343   Wound Depth (cm) 0.1 cm 01/03/24 1343   Wound Surface Area (cm^2) 52 cm^2  01/03/24 1343   Wound Volume (cm^3) 5.2 cm^3 01/03/24 1343   Calculated Wound Volume (cm^3) 5.2 cm^3 01/03/24 1343   Drainage Amount Moderate 01/03/24 1343   Drainage Description Serous;Clear 01/03/24 1343   Non-staged Wound Description Full thickness 01/03/24 1343   Dressing Status Other (Comment) (upon arrival) 01/03/24 1343       /72   Pulse 91   Temp 97.8 °F (36.6 °C)   Resp 15     Physical Exam  Vitals reviewed.   Constitutional:       Appearance: Normal appearance.   HENT:      Head: Normocephalic and atraumatic.   Eyes:      Extraocular Movements: Extraocular movements intact.   Pulmonary:      Effort: Pulmonary effort is normal.   Musculoskeletal:      Cervical back: Neck supple.      Right lower leg: Edema (+2) present.   Skin:     Comments: Prior right great toe wounds and right anterior lower extremity wound similar in size to last exam.  Fibrin deposition in the wound bed.  Minimal serosanguineous exudate.  New wound of posterior right lower extremity with serosanguineous exudate.  No signs of infection.    Left upper extremity wound is fully epithelialized today with no open wound or exudate.   Neurological:      Mental Status: He is alert.   Psychiatric:         Mood and Affect: Mood normal.                         Wound Instructions:  Orders Placed This Encounter   Procedures    Wound cleansing and dressings     Right lower leg wound, right great toe wound:     Wash your hands with soap and water.  Remove old dressing, discard into plastic bag and place in trash.    Cleanse the wound with soap and water prior to applying a clean dressing. Do not use tissue or cotton balls.   Do not scrub the wound. Pat dry using gauze.  Shower yes - take dressings off & cleanse with mild soap.   Do not soak or submerge in water.  Apply moisturizer to skin surrounding wound  Apply nickel thick SANTYL to the leg and right great toe wound  Cover with gauze, allison & tape.  Change dressing daily    MediHoney was  applied today.     Standing Status:   Future     Standing Expiration Date:   1/3/2025    Wound miscellaneous orders     Be sure to eat plenty of protein with each meal.     Stay well hydrated with water daily - if you have a fluid restriction, be sure to stay within your restriction.     Standing Status:   Future     Standing Expiration Date:   1/3/2025    Wound compression and edema control     Circ-Aid / Elastic Tubular Stocking/ Compression Stocking Instructions Spandagrip size G due to cardiovascular status     Elastic Tubular Stocking     Tubular elastic bandage: Apply from base of toes to behind the knee. Apply in AM, may remove for sleep.     Avoid prolonged standing in one place.     Elevate leg(s) above the level of the heart when sitting or as much as possible.     Standing Status:   Future     Standing Expiration Date:   1/3/2025    Debridement Right Pretibial     This order was created via procedure documentation    Debridement Anterior;Right Toe (Comment  which one)     This order was created via procedure documentation    Wound cleansing and dressings Venous Ulcer Distal;Right;Medial Leg     Right Medial Lower Leg Wounds:    Wash your hands with soap and water.  Remove old dressing, discard into plastic bag and place in trash.  Cleanse the wound with soap and water prior to applying a clean dressing. Do not use tissue or cotton balls. Do not scrub the wound. Pat dry using gauze.  Shower yes   Apply moisturizer to skin surrounding wound  Apply Hydrofera Ready to the leg wound. Apply blue foam side against wound and printed side facing out. Cover with ABD.  Secure with rolled gauze and tape.  Change dressing every other day.    This was done today.     Standing Status:   Future     Standing Expiration Date:   1/3/2025        Diagnosis ICD-10-CM Associated Orders   1. Open wound of right great toe, initial encounter  S91.101A lidocaine (XYLOCAINE) 4 % topical solution 5 mL     Wound cleansing and  "dressings     Wound miscellaneous orders     Wound compression and edema control     Debridement Anterior;Right Toe (Comment  which one)     Wound cleansing and dressings Venous Ulcer Distal;Right;Medial Leg      2. 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 Skin tear of elbow without complication, left, initial encounter  S51.012A lidocaine (XYLOCAINE) 4 % topical solution 5 mL     Wound cleansing and dressings     Wound miscellaneous orders     Wound compression and edema control     Wound cleansing and dressings Venous Ulcer Distal;Right;Medial Leg      4. Chronic venous hypertension (idiopathic) with ulcer of right lower extremity (CODE) ([facility])  I87.311 lidocaine (XYLOCAINE) 4 % topical solution 5 mL     Wound cleansing and dressings     Wound miscellaneous orders     Wound compression and edema control     Wound cleansing and dressings Venous Ulcer Distal;Right;Medial Leg      5. Atherosclerotic PVD with intermittent claudication ([facility])  I70.219 lidocaine (XYLOCAINE) 4 % topical solution 5 mL     Wound cleansing and dressings     Wound miscellaneous orders     Wound compression and edema control     Wound cleansing and dressings Venous Ulcer Distal;Right;Medial Leg      6. Edema of both lower extremities due to peripheral venous insufficiency  I87.2 lidocaine (XYLOCAINE) 4 % topical solution 5 mL     Wound cleansing and dressings     Wound miscellaneous orders     Wound compression and edema control     Wound cleansing and dressings Venous Ulcer Distal;Right;Medial Leg          --  Cadence Brown MD    \"This note has been constructed using a voice recognition system. Therefore there may be syntax, spelling, " "and/or grammatical errors. Occasional wrong word or \"sound alike\" substitutions may have occurred due to the inherent limitations of voice recognition software. Read the chart carefully and recognize, using context, where substitutions have occurred. Please call if you have any questions.\"     "

## 2024-01-03 NOTE — PATIENT INSTRUCTIONS
Orders Placed This Encounter   Procedures    Wound cleansing and dressings     Right lower leg wound, right great toe wound:     Wash your hands with soap and water.  Remove old dressing, discard into plastic bag and place in trash.    Cleanse the wound with soap and water prior to applying a clean dressing. Do not use tissue or cotton balls.   Do not scrub the wound. Pat dry using gauze.  Shower yes - take dressings off & cleanse with mild soap.   Do not soak or submerge in water.  Apply moisturizer to skin surrounding wound  Apply nickel thick SANTYL to the leg and right great toe wound  Cover with gauze, allison & tape.  Change dressing daily    MediHoney was applied today.     Standing Status:   Future     Standing Expiration Date:   1/3/2025    Wound miscellaneous orders     Be sure to eat plenty of protein with each meal.     Stay well hydrated with water daily - if you have a fluid restriction, be sure to stay within your restriction.     Standing Status:   Future     Standing Expiration Date:   1/3/2025    Wound compression and edema control     Circ-Aid / Elastic Tubular Stocking/ Compression Stocking Instructions Spandagrip size G due to cardiovascular status     Elastic Tubular Stocking     Tubular elastic bandage: Apply from base of toes to behind the knee. Apply in AM, may remove for sleep.     Avoid prolonged standing in one place.     Elevate leg(s) above the level of the heart when sitting or as much as possible.     Standing Status:   Future     Standing Expiration Date:   1/3/2025    Debridement Right Pretibial     This order was created via procedure documentation    Debridement Anterior;Right Toe (Comment  which one)     This order was created via procedure documentation    Wound cleansing and dressings Venous Ulcer Distal;Right;Medial Leg     Right Medial Lower Leg Wounds:    Wash your hands with soap and water.  Remove old dressing, discard into plastic bag and place in trash.  Cleanse the wound  with soap and water prior to applying a clean dressing. Do not use tissue or cotton balls. Do not scrub the wound. Pat dry using gauze.  Shower yes   Apply moisturizer to skin surrounding wound  Apply Hydrofera Ready to the leg wound. Apply blue foam side against wound and printed side facing out. Cover with ABD.  Secure with rolled gauze and tape.  Change dressing every other day.    This was done today.     Standing Status:   Future     Standing Expiration Date:   1/3/2025

## 2024-01-11 ENCOUNTER — OFFICE VISIT (OUTPATIENT)
Dept: WOUND CARE | Facility: HOSPITAL | Age: 89
End: 2024-01-11
Payer: COMMERCIAL

## 2024-01-11 DIAGNOSIS — S91.101A OPEN WOUND OF RIGHT GREAT TOE, INITIAL ENCOUNTER: Primary | ICD-10-CM

## 2024-01-11 DIAGNOSIS — L97.911 NON-PRESSURE CHRONIC ULCER OF RIGHT LOWER LEG, LIMITED TO BREAKDOWN OF SKIN (HCC): ICD-10-CM

## 2024-01-11 DIAGNOSIS — I87.311 CHRONIC VENOUS HYPERTENSION (IDIOPATHIC) WITH ULCER OF RIGHT LOWER EXTREMITY (CODE) (HCC): ICD-10-CM

## 2024-01-11 DIAGNOSIS — I70.219 ATHEROSCLEROTIC PVD WITH INTERMITTENT CLAUDICATION (HCC): ICD-10-CM

## 2024-01-11 PROCEDURE — 97597 DBRDMT OPN WND 1ST 20 CM/<: CPT | Performed by: STUDENT IN AN ORGANIZED HEALTH CARE EDUCATION/TRAINING PROGRAM

## 2024-01-11 PROCEDURE — 99213 OFFICE O/P EST LOW 20 MIN: CPT | Performed by: STUDENT IN AN ORGANIZED HEALTH CARE EDUCATION/TRAINING PROGRAM

## 2024-01-11 RX ORDER — LIDOCAINE HYDROCHLORIDE 40 MG/ML
5 SOLUTION TOPICAL ONCE
Status: COMPLETED | OUTPATIENT
Start: 2024-01-11 | End: 2024-01-11

## 2024-01-11 RX ADMIN — LIDOCAINE HYDROCHLORIDE 5 ML: 40 SOLUTION TOPICAL at 13:36

## 2024-01-11 NOTE — PATIENT INSTRUCTIONS
Orders Placed This Encounter   Procedures    Wound cleansing and dressings     Right lower leg wound, right great toe wound:     Wash your hands with soap and water.  Remove old dressing, discard into plastic bag and place in trash.    Cleanse the wound with soap and water prior to applying a clean dressing. Do not use tissue or cotton balls.   Do not scrub the wound. Pat dry using gauze.  Shower yes - take dressings off & cleanse with mild soap.   Do not soak or submerge in water.  Apply moisturizer to skin surrounding wound  Apply nickel thick SANTYL to the leg and right great toe wound  Cover with gauze, allison & tape.  Change dressing daily     MediHoney was applied today     Standing Status:   Future     Standing Expiration Date:   1/11/2025    Wound compression and edema control     Circ-Aid / Elastic Tubular Stocking/ Compression Stocking Instructions Spandagrip size G due to cardiovascular status     Elastic Tubular Stocking     Tubular elastic bandage: Apply from base of toes to behind the knee. Apply in AM, may remove for sleep.     Avoid prolonged standing in one place.     Elevate leg(s) above the level of the heart when sitting or as much as possible.     Standing Status:   Future     Standing Expiration Date:   1/11/2025

## 2024-01-11 NOTE — PROGRESS NOTES
"Patient ID: John Smallwood is a 89 y.o. 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, limited to breakdown of skin (HCC)  -     Wound cleansing and dressings; Future  -     Wound compression and edema control; Future  -     lidocaine (XYLOCAINE) 4 % topical solution 5 mL  -     Wound cleansing and dressings; Future  -     Debridement    Chronic venous hypertension (idiopathic) with ulcer of right lower extremity (CODE) (HCC)  -     Wound cleansing and dressings; Future  -     Wound compression and edema control; Future  -     lidocaine (XYLOCAINE) 4 % topical solution 5 mL  -     Wound cleansing and dressings; Future    Atherosclerotic PVD with intermittent claudication (HCC)  -     Wound cleansing and dressings; Future  -     Wound compression and edema control; Future  -     lidocaine (XYLOCAINE) 4 % topical solution 5 mL  -     Wound cleansing and dressings; Future              Debridement   Wound 11/21/23 Toe (Comment  which one) Anterior;Right    Universal Protocol:  Consent: Verbal consent obtained.  Risks and benefits: risks, benefits and alternatives were discussed  Consent given by: patient  Time out: Immediately prior to procedure a \"time out\" was called to verify the correct patient, procedure, equipment, support staff and site/side marked as required.  Patient identity confirmed: verbally with patient    Debridement Details  Performed by: physician  Debridement type: selective  Pain control: lidocaine 4%      Post-debridement " "measurements  Length (cm): 0.2  Width (cm): 0.3  Depth (cm): 0.1  Percent debrided: 90%  Surface Area (cm^2): 0.06  Area Debrided (cm^2): 0.05  Volume (cm^3): 0.01    Devitalized tissue debrided: biofilm, exudate and fibrin  Instrument(s) utilized: curette  Bleeding: small  Hemostasis obtained with: pressure  Procedural pain (0-10): 1  Post-procedural pain: 0   Response to treatment: procedure was tolerated well    Debridement   Wound 11/21/23 Pretibial Right    Universal Protocol:  Consent: Verbal consent obtained.  Risks and benefits: risks, benefits and alternatives were discussed  Consent given by: patient  Time out: Immediately prior to procedure a \"time out\" was called to verify the correct patient, procedure, equipment, support staff and site/side marked as required.  Patient identity confirmed: verbally with patient    Debridement Details  Performed by: physician  Debridement type: selective  Pain control: lidocaine 4%      Post-debridement measurements  Length (cm): 0.6  Width (cm): 0.6  Depth (cm): 0.1  Percent debrided: 90%  Surface Area (cm^2): 0.36  Area Debrided (cm^2): 0.32  Volume (cm^3): 0.04    Devitalized tissue debrided: biofilm, exudate and fibrin  Instrument(s) utilized: curette  Bleeding: small  Hemostasis obtained with: pressure  Procedural pain (0-10): 1  Post-procedural pain: 0   Response to treatment: procedure was tolerated well        Plan:   It was a pleasure to see John Smallwood for wound care follow up today  Selective debridement performed today as above. Remainder wounds debrided with NS and gauze  Wound is not improving   Continue plan of care as noted below with santyl  Discussed with patient today as at prior visits that these wounds are unlikely to heal due to his poor vascular supply.  May consider changing to palliative status in the future.   No signs or symptoms of infection today. Patient understands that if any signs of infection start (such as increased redness, " drainage, pain, fever, chills, diaphoresis), they should call our office or proceed to the ER or Urgent Care.  Patient should continue a high protein diet to facilitate wound healing  Patient is advised to not submerge wound or leave wound open to air.  Follow up in 2 weeks  Given the multi-factorial nature of wound care, additional time was taken to review patient's treatment plan with other specialties and most recent pertinent lab work and imaging.   All plans of care discussed with patient at bedside who verbalized understanding with treatment plan.    Wound 11/21/23 Toe (Comment  which one) Anterior;Right (Active)   Wound Image Images linked 01/11/24 1318   Wound Description Yellow;Pink;Slough 01/11/24 1318   Nicolette-wound Assessment Edema;Erythema 01/11/24 1318   Wound Length (cm) 0.2 cm 01/11/24 1318   Wound Width (cm) 0.3 cm 01/11/24 1318   Wound Depth (cm) 0.1 cm 01/11/24 1318   Wound Surface Area (cm^2) 0.06 cm^2 01/11/24 1318   Wound Volume (cm^3) 0.006 cm^3 01/11/24 1318   Calculated Wound Volume (cm^3) 0.01 cm^3 01/11/24 1318   Change in Wound Size % 50 01/11/24 1318   Drainage Amount Small 01/11/24 1318   Drainage Description Serosanguineous 01/11/24 1318   Non-staged Wound Description Full thickness 01/11/24 1318       Wound 11/21/23 Pretibial Right (Active)   Wound Image Images linked 01/11/24 1316   Wound Description Yellow;Slough;Pink 01/11/24 1316   Nicolette-wound Assessment Edema;Dry;Pink 01/11/24 1316   Wound Length (cm) 0.6 cm 01/11/24 1316   Wound Width (cm) 0.6 cm 01/11/24 1316   Wound Depth (cm) 0.1 cm 01/11/24 1316   Wound Surface Area (cm^2) 0.36 cm^2 01/11/24 1316   Wound Volume (cm^3) 0.036 cm^3 01/11/24 1316   Calculated Wound Volume (cm^3) 0.04 cm^3 01/11/24 1316   Change in Wound Size % 0 01/11/24 1316   Drainage Amount Small 01/11/24 1316   Drainage Description Serous;Clear 01/11/24 1316   Non-staged Wound Description Full thickness 01/11/24 1316       Wound 01/03/24 Venous Ulcer Leg  Distal;Right;Medial (Active)   Wound Image Images linked 01/11/24 1317   Wound Description Epithelialization;Pink;Yellow;Slough 01/11/24 1317   Nicolette-wound Assessment Edema;Pink 01/11/24 1317   Wound Length (cm) 0.2 cm 01/11/24 1317   Wound Width (cm) 0.2 cm 01/11/24 1317   Wound Depth (cm) 0.1 cm 01/11/24 1317   Wound Surface Area (cm^2) 0.04 cm^2 01/11/24 1317   Wound Volume (cm^3) 0.004 cm^3 01/11/24 1317   Calculated Wound Volume (cm^3) 0 cm^3 01/11/24 1317   Change in Wound Size % 100 01/11/24 1317   Drainage Amount Scant 01/11/24 1317   Drainage Description Serous;Clear 01/11/24 1317   Non-staged Wound Description Full thickness 01/11/24 1317       Wound 11/21/23 Toe (Comment  which one) Anterior;Right (Active)   Date First Assessed/Time First Assessed: 11/21/23 1352   Location: Toe (Comment  which one)  Wound Location Orientation: Anterior;Right  Wound Description (Comments): 11/21/23 R 1st toe       Wound 11/21/23 Pretibial Right (Active)   Date First Assessed/Time First Assessed: 11/21/23 1352   Location: Pretibial  Wound Location Orientation: Right       Wound 01/03/24 Venous Ulcer Leg Distal;Right;Medial (Active)   Date First Assessed/Time First Assessed: 01/03/24 1342   Primary Wound Type: Venous Ulcer  Location: Leg  Wound Location Orientation: Distal;Right;Medial       [REMOVED] Incision 10/25/18 Scrotum Right (Removed)   Resolved Date: 11/21/23  Date First Assessed/Time First Assessed: 10/25/18 0816   Location: Scrotum  Wound Location Orientation: Right  Wound Outcome: Healed       [REMOVED] Wound 11/02/23 Incision Wrist Right (Removed)   Resolved Date: 11/21/23  Date First Assessed/Time First Assessed: 11/02/23 1525   Primary Wound Type: Incision  Location: Wrist  Wound Location Orientation: Right  Incision's 1st Dressing: LHC puncture site  Wound Outcome: Healed       [REMOVED] Wound 11/02/23 Traumatic Abrasion(s) Toe (Comment  which one) (Removed)   Resolved Date: 11/21/23  Date First Assessed/Time  First Assessed: 11/02/23 2393   Present on Original Admission: Yes  Primary Wound Type: Traumatic  Traumatic Wound Type: Abrasion(s)  Location: (c) Toe (Comment  which one)  Dressing Status: Clean;Dry;...       [REMOVED] Wound 12/07/23 Traumatic Arm Anterior;Left;Lower (Removed)   Resolved Date: 01/03/24  Date First Assessed/Time First Assessed: 12/07/23 1103   Primary Wound Type: Traumatic  Location: Arm  Wound Location Orientation: Anterior;Left;Lower  Wound Outcome: Healed       Subjective:      .    1/11/24: Applying Santyl and Hydrofera Blue directed.  No symptoms of infection.    1/3/23: He recently seen by vascular surgery.  Given overall comorbidities and perioperative risk assessment patient declined surgery at this time.  Vascular sufficiency of the lower extremities.  Has a follow-up with vascular surgery in 2 months to talk again about surgery if wounds deteriorate     12/14/23: Wounds are the same.  No symptoms of infection.     12/7/23: Notes that legs are more edematous today he has been more active with the holidays.  Did have a mechanical fall and landed on his elbow.  Notices no change in range of motion just some bruising on the skin itself.  Does have a skin tear to the left elbow today.  No symptoms of infection.  Particularly notes that he did not fall and land with any impact to his head.     11/28/23: Wound care as directed.  Note that wounds are very dry.  No symptoms of infection today.     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  Patient was referred by his primary care provider Dr. Connie Smith.  Per chart review patient has had a right hallux wound for quite some time.  Following with podiatry Dr. Isaacs, he last saw in  October 2023.  No mention of wound at that visit or chart review.  Patient notes that podiatry did note the wound and advised patient to take care of it.  No specific instructions at that time.  Patient notes that he has been covering it with gauze and not leaving open to air.  Denies any symptoms of infection today including fever chills diaphoresis.          The following portions of the patient's history were reviewed and updated as appropriate: allergies, current medications, past family history, past medical history, past social history, past surgical history, and problem list.    Review of Systems   Constitutional:  Negative for chills, diaphoresis and fever.   Skin:  Positive for wound.   All other systems reviewed and are negative.        Objective:       Wound 11/21/23 Toe (Comment  which one) Anterior;Right (Active)   Wound Image Images linked 01/11/24 1318   Wound Description Yellow;Pink;Slough 01/11/24 1318   Nicolette-wound Assessment Edema;Erythema 01/11/24 1318   Wound Length (cm) 0.2 cm 01/11/24 1318   Wound Width (cm) 0.3 cm 01/11/24 1318   Wound Depth (cm) 0.1 cm 01/11/24 1318   Wound Surface Area (cm^2) 0.06 cm^2 01/11/24 1318   Wound Volume (cm^3) 0.006 cm^3 01/11/24 1318   Calculated Wound Volume (cm^3) 0.01 cm^3 01/11/24 1318   Change in Wound Size % 50 01/11/24 1318   Drainage Amount Small 01/11/24 1318   Drainage Description Serosanguineous 01/11/24 1318   Non-staged Wound Description Full thickness 01/11/24 1318       Wound 11/21/23 Pretibial Right (Active)   Wound Image Images linked 01/11/24 1316   Wound Description Yellow;Slough;Pink 01/11/24 1316   Nicolette-wound Assessment Edema;Dry;Pink 01/11/24 1316   Wound Length (cm) 0.6 cm 01/11/24 1316   Wound Width (cm) 0.6 cm 01/11/24 1316   Wound Depth (cm) 0.1 cm 01/11/24 1316   Wound Surface Area (cm^2) 0.36 cm^2 01/11/24 1316   Wound Volume (cm^3) 0.036 cm^3 01/11/24 1316   Calculated Wound Volume (cm^3) 0.04 cm^3 01/11/24 1316   Change in Wound Size  % 0 01/11/24 1316   Drainage Amount Small 01/11/24 1316   Drainage Description Serous;Clear 01/11/24 1316   Non-staged Wound Description Full thickness 01/11/24 1316       Wound 01/03/24 Venous Ulcer Leg Distal;Right;Medial (Active)   Wound Image Images linked 01/11/24 1317   Wound Description Epithelialization;Pink;Yellow;Slough 01/11/24 1317   Nicolette-wound Assessment Edema;Pink 01/11/24 1317   Wound Length (cm) 0.2 cm 01/11/24 1317   Wound Width (cm) 0.2 cm 01/11/24 1317   Wound Depth (cm) 0.1 cm 01/11/24 1317   Wound Surface Area (cm^2) 0.04 cm^2 01/11/24 1317   Wound Volume (cm^3) 0.004 cm^3 01/11/24 1317   Calculated Wound Volume (cm^3) 0 cm^3 01/11/24 1317   Change in Wound Size % 100 01/11/24 1317   Drainage Amount Scant 01/11/24 1317   Drainage Description Serous;Clear 01/11/24 1317   Non-staged Wound Description Full thickness 01/11/24 1317       There were no vitals taken for this visit.    Physical Exam  Vitals reviewed.   Constitutional:       Appearance: Normal appearance.   HENT:      Head: Normocephalic and atraumatic.   Eyes:      Extraocular Movements: Extraocular movements intact.   Pulmonary:      Effort: Pulmonary effort is normal.   Musculoskeletal:      Cervical back: Neck supple.      Right lower leg: Edema present.   Skin:     Comments: Right lower extremity and toes similar in size to last exam fibrin deposition.  No signs of infection.  medial right ankle wound smaller than last exam.  Less drainage noted today.   Neurological:      Mental Status: He is alert.   Psychiatric:         Mood and Affect: Mood normal.                     Wound Instructions:  Orders Placed This Encounter   Procedures    Wound cleansing and dressings     Right lower leg wound, right great toe wound:     Wash your hands with soap and water.  Remove old dressing, discard into plastic bag and place in trash.    Cleanse the wound with soap and water prior to applying a clean dressing. Do not use tissue or cotton balls.    Do not scrub the wound. Pat dry using gauze.  Shower yes - take dressings off & cleanse with mild soap.   Do not soak or submerge in water.  Apply moisturizer to skin surrounding wound  Apply nickel thick SANTYL to the leg and right great toe wound  Cover with gauze, allison & tape.  Change dressing daily     MediHoney was applied today     Standing Status:   Future     Standing Expiration Date:   1/11/2025    Wound compression and edema control     Circ-Aid / Elastic Tubular Stocking/ Compression Stocking Instructions Spandagrip size G due to cardiovascular status     Elastic Tubular Stocking     Tubular elastic bandage: Apply from base of toes to behind the knee. Apply in AM, may remove for sleep.     Avoid prolonged standing in one place.     Elevate leg(s) above the level of the heart when sitting or as much as possible.     Standing Status:   Future     Standing Expiration Date:   1/11/2025    Wound cleansing and dressings     Standing Status:   Future     Standing Expiration Date:   1/11/2025    Debridement     This order was created via procedure documentation    Debridement     This order was created via procedure documentation        Diagnosis ICD-10-CM Associated Orders   1. Open wound of right great toe, initial encounter  S91.101A Wound cleansing and dressings     Wound compression and edema control     lidocaine (XYLOCAINE) 4 % topical solution 5 mL     Wound cleansing and dressings     Debridement      2. 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 Chronic venous hypertension (idiopathic) with ulcer of right lower extremity (CODE) ([facility])  I87.311 Wound cleansing and dressings     Wound compression and edema control     lidocaine (XYLOCAINE) 4 % topical solution 5 mL     Wound  "cleansing and dressings      4. Atherosclerotic PVD with intermittent claudication ([facility])  I70.219 Wound cleansing and dressings     Wound compression and edema control     lidocaine (XYLOCAINE) 4 % topical solution 5 mL     Wound cleansing and dressings          --  Cadence Brown MD    \"This note has been constructed using a voice recognition system. Therefore there may be syntax, spelling, and/or grammatical errors. Occasional wrong word or \"sound alike\" substitutions may have occurred due to the inherent limitations of voice recognition software. Read the chart carefully and recognize, using context, where substitutions have occurred. Please call if you have any questions.\"     "

## 2024-01-12 ENCOUNTER — TELEPHONE (OUTPATIENT)
Age: 89
End: 2024-01-12

## 2024-01-12 NOTE — TELEPHONE ENCOUNTER
Pt received a bill for DOS 11/14/23 for medicare wellness.  He already had his medicare wellness visit on 04/28/23.    He would like someone to correct this error and call him back.

## 2024-01-16 NOTE — TELEPHONE ENCOUNTER
12/13/2017 8:41 PM 
 
Patient:  Yinka Youngblood. YOB: 1931 Date of Visit: 12/13/2017 Dear No Recipients: Thank you for referring Mr. Patricia Duque to me for evaluation/treatment. Below are the relevant portions of my assessment and plan of care. EMG dictated in procedure note If you have questions, please do not hesitate to call me. I look forward to following Mr. Fabby Mcdermott along with you. Sincerely, Stephanie Dow MD 
 
 Pt called in again stating he's still waiting for a call back.

## 2024-01-25 ENCOUNTER — OFFICE VISIT (OUTPATIENT)
Dept: WOUND CARE | Facility: HOSPITAL | Age: 89
End: 2024-01-25
Payer: COMMERCIAL

## 2024-01-25 VITALS
RESPIRATION RATE: 12 BRPM | DIASTOLIC BLOOD PRESSURE: 69 MMHG | TEMPERATURE: 97.1 F | SYSTOLIC BLOOD PRESSURE: 138 MMHG | HEART RATE: 92 BPM

## 2024-01-25 DIAGNOSIS — I87.312 CHRONIC VENOUS HYPERTENSION (IDIOPATHIC) WITH ULCER OF LEFT LOWER EXTREMITY (CODE) (HCC): ICD-10-CM

## 2024-01-25 DIAGNOSIS — S91.101A OPEN WOUND OF RIGHT GREAT TOE, INITIAL ENCOUNTER: Primary | ICD-10-CM

## 2024-01-25 DIAGNOSIS — I70.219 ATHEROSCLEROTIC PVD WITH INTERMITTENT CLAUDICATION (HCC): ICD-10-CM

## 2024-01-25 DIAGNOSIS — L97.921 NON-PRESSURE CHRONIC ULCER LEFT LOWER LEG, LIMITED TO BREAKDOWN SKIN (HCC): ICD-10-CM

## 2024-01-25 DIAGNOSIS — L97.911 NON-PRESSURE CHRONIC ULCER OF RIGHT LOWER LEG, LIMITED TO BREAKDOWN OF SKIN (HCC): ICD-10-CM

## 2024-01-25 DIAGNOSIS — I87.311 CHRONIC VENOUS HYPERTENSION (IDIOPATHIC) WITH ULCER OF RIGHT LOWER EXTREMITY (CODE) (HCC): ICD-10-CM

## 2024-01-25 PROCEDURE — 97598 DBRDMT OPN WND ADDL 20CM/<: CPT | Performed by: STUDENT IN AN ORGANIZED HEALTH CARE EDUCATION/TRAINING PROGRAM

## 2024-01-25 PROCEDURE — 97597 DBRDMT OPN WND 1ST 20 CM/<: CPT | Performed by: STUDENT IN AN ORGANIZED HEALTH CARE EDUCATION/TRAINING PROGRAM

## 2024-01-25 RX ORDER — LIDOCAINE HYDROCHLORIDE 40 MG/ML
5 SOLUTION TOPICAL ONCE
Status: COMPLETED | OUTPATIENT
Start: 2024-01-25 | End: 2024-01-25

## 2024-01-25 RX ADMIN — LIDOCAINE HYDROCHLORIDE 5 ML: 40 SOLUTION TOPICAL at 14:18

## 2024-01-25 NOTE — PATIENT INSTRUCTIONS
Orders Placed This Encounter   Procedures    Wound cleansing and dressings     Right lower leg and ankle and great toe wounds    Wash your hands with soap and water.  Remove old dressing, discard into plastic bag and place in trash.  Cleanse the wound with soap and water prior to applying a clean dressing. Do not use tissue or cotton balls. Do not scrub the wound. Pat dry using gauze.  Shower yes   Apply Silver Alginate to the wound.  Cover with ABD and roll gauze.   Secure with tape.  Change dressing every day.  This was done today.     Standing Status:   Future     Standing Expiration Date:   1/25/2025    Wound cleansing and dressings     Standing Status:   Future     Standing Expiration Date:   1/25/2025    Wound compression and edema control     Elastic Tubular Stocking size F    Tubular elastic bandage: Apply from base of toes to behind the knee. Apply in AM, may remove for sleep.    Avoid prolonged standing in one place.    Elevate leg(s) above the level of the heart when sitting or as much as possible.     Standing Status:   Future     Standing Expiration Date:   1/25/2025

## 2024-01-25 NOTE — PROGRESS NOTES
"Patient ID: John Smallwood is a 89 y.o. 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, limited to breakdown of skin (HCC)  -     lidocaine (XYLOCAINE) 4 % topical solution 5 mL  -     Wound cleansing and dressings; Future  -     Wound cleansing and dressings; Future  -     Wound compression and edema control; Future  -     Debridement    Atherosclerotic PVD with intermittent claudication (HCC)    Chronic venous hypertension (idiopathic) with ulcer of right lower extremity (CODE) (HCC)  -     lidocaine (XYLOCAINE) 4 % topical solution 5 mL    Non-pressure chronic ulcer left lower leg, limited to breakdown skin (HCC)  -     Debridement    Chronic venous hypertension (idiopathic) with ulcer of left lower extremity (CODE) ([facility])              Debridement   Wound 11/21/23 Toe (Comment  which one) Anterior;Right    Universal Protocol:  Consent: Verbal consent obtained.  Risks and benefits: risks, benefits and alternatives were discussed  Consent given by: patient  Time out: Immediately prior to procedure a \"time out\" was called to verify the correct patient, procedure, equipment, support staff and site/side marked as required.  Patient identity confirmed: verbally with patient    Debridement Details  Performed by: physician  Debridement type: selective  Pain control: lidocaine 4%      Post-debridement measurements  Length (cm): 0.5  Width (cm): 0.6  Depth (cm): 0.2  Percent debrided: 90%  Surface Area " "(cm^2): 0.3  Area Debrided (cm^2): 0.27  Volume (cm^3): 0.06    Devitalized tissue debrided: biofilm, exudate and fibrin  Instrument(s) utilized: curette  Bleeding: small  Hemostasis obtained with: pressure  Procedural pain (0-10): 1  Post-procedural pain: 0   Response to treatment: procedure was tolerated well    Debridement   Wound 11/21/23 Pretibial Right    Universal Protocol:  Consent: Verbal consent obtained.  Risks and benefits: risks, benefits and alternatives were discussed  Consent given by: patient  Time out: Immediately prior to procedure a \"time out\" was called to verify the correct patient, procedure, equipment, support staff and site/side marked as required.  Patient identity confirmed: verbally with patient    Debridement Details  Performed by: physician  Debridement type: selective  Pain control: lidocaine 4%      Post-debridement measurements  Length (cm): 9.5  Width (cm): 18  Depth (cm): 0.2  Percent debrided: 90%  Surface Area (cm^2): 171  Area Debrided (cm^2): 153.9  Volume (cm^3): 34.2    Devitalized tissue debrided: biofilm and exudate  Instrument(s) utilized: curette  Bleeding: small  Hemostasis obtained with: pressure  Procedural pain (0-10): 1  Post-procedural pain: 0   Response to treatment: procedure was tolerated well    Debridement   Wound 01/03/24 Venous Ulcer Leg Distal;Right;Medial    Universal Protocol:  Consent: Verbal consent obtained.  Risks and benefits: risks, benefits and alternatives were discussed  Consent given by: patient  Time out: Immediately prior to procedure a \"time out\" was called to verify the correct patient, procedure, equipment, support staff and site/side marked as required.  Patient identity confirmed: verbally with patient    Debridement Details  Performed by: physician  Debridement type: selective  Pain control: lidocaine 4%      Post-debridement measurements  Length (cm): 4  Width (cm): 5  Depth (cm): 0.1  Percent debrided: 80%  Surface Area (cm^2): 20  Area " Debrided (cm^2): 16  Volume (cm^3): 2    Devitalized tissue debrided: biofilm and exudate  Instrument(s) utilized: curette  Bleeding: small  Hemostasis obtained with: pressure  Procedural pain (0-10): 1  Post-procedural pain: 0   Response to treatment: procedure was tolerated well        Plan:   It was a pleasure to see John Smalwlood for wound care follow up today  Selective debridement performed today as above  Wounds are not improving   Continue plan of care as noted below with change to silver alginate, abd, rolled gauze, spanfigrip  Discussed with patient today as at prior visits that these wounds are unlikely to heal due to his poor vascular supply.  May consider changing to palliative status in the future.   No signs or symptoms of infection today. Patient understands that if any signs of infection start (such as increased redness, drainage, pain, fever, chills, diaphoresis), they should call our office or proceed to the ER or Urgent Care.  Patient should continue a high protein diet to facilitate wound healing  Patient is advised to not submerge wound or leave wound open to air.  Follow up in 1 weeks  Given the multi-factorial nature of wound care, additional time was taken to review patient's treatment plan with other specialties and most recent pertinent lab work and imaging.   All plans of care discussed with patient at bedside who verbalized understanding with treatment plan.    Wound 11/21/23 Toe (Comment  which one) Anterior;Right (Active)   Wound Image Images linked 01/25/24 1421   Wound Description Yellow;Pink;Slough 01/25/24 1421   Nicolette-wound Assessment Edema;Erythema 01/25/24 1421   Wound Length (cm) 0.5 cm 01/25/24 1421   Wound Width (cm) 0.6 cm 01/25/24 1421   Wound Depth (cm) 0.2 cm 01/25/24 1421   Wound Surface Area (cm^2) 0.3 cm^2 01/25/24 1421   Wound Volume (cm^3) 0.06 cm^3 01/25/24 1421   Calculated Wound Volume (cm^3) 0.06 cm^3 01/25/24 1421   Change in Wound Size % -200 01/25/24  1421   Drainage Amount Small 01/25/24 1421   Drainage Description Serosanguineous 01/25/24 1421   Non-staged Wound Description Full thickness 01/25/24 1421   Wound packed? No 01/25/24 1421   Dressing Status Intact 01/25/24 1421       Wound 11/21/23 Pretibial Right (Active)   Wound Image Images linked 01/25/24 1424   Wound Description Yellow;Slough;Pink;Epithelialization 01/25/24 1424   Nicolette-wound Assessment Edema;Dry;Pink 01/25/24 1424   Wound Length (cm) 9.5 cm 01/25/24 1424   Wound Width (cm) 18 cm 01/25/24 1424   Wound Depth (cm) 0.2 cm 01/25/24 1424   Wound Surface Area (cm^2) 171 cm^2 01/25/24 1424   Wound Volume (cm^3) 34.2 cm^3 01/25/24 1424   Calculated Wound Volume (cm^3) 34.2 cm^3 01/25/24 1424   Change in Wound Size % -13905 01/25/24 1424   Drainage Amount Large 01/25/24 1424   Drainage Description Serous;Clear 01/25/24 1424   Non-staged Wound Description Full thickness 01/25/24 1424   Dressing Status Intact 01/25/24 1424       Wound 01/03/24 Venous Ulcer Leg Distal;Right;Medial (Active)   Wound Image Images linked 01/25/24 1422   Wound Description Epithelialization;Pink;Yellow;Slough 01/25/24 1422   Nicolette-wound Assessment Edema;Pink 01/25/24 1422   Wound Length (cm) 4 cm 01/25/24 1422   Wound Width (cm) 5 cm 01/25/24 1422   Wound Depth (cm) 0.1 cm 01/25/24 1422   Wound Surface Area (cm^2) 20 cm^2 01/25/24 1422   Wound Volume (cm^3) 2 cm^3 01/25/24 1422   Calculated Wound Volume (cm^3) 2 cm^3 01/25/24 1422   Change in Wound Size % 61.54 01/25/24 1422   Drainage Amount Moderate 01/25/24 1422   Drainage Description Serous;Clear 01/25/24 1422   Non-staged Wound Description Full thickness 01/25/24 1422   Wound packed? No 01/25/24 1422   Dressing Status Intact 01/25/24 1422       Wound 11/21/23 Toe (Comment  which one) Anterior;Right (Active)   Date First Assessed/Time First Assessed: 11/21/23 1352   Location: Toe (Comment  which one)  Wound Location Orientation: Anterior;Right  Wound Description (Comments):  11/21/23 R 1st toe       Wound 11/21/23 Pretibial Right (Active)   Date First Assessed/Time First Assessed: 11/21/23 1352   Location: Pretibial  Wound Location Orientation: Right       Wound 01/03/24 Venous Ulcer Leg Distal;Right;Medial (Active)   Date First Assessed/Time First Assessed: 01/03/24 1342   Primary Wound Type: Venous Ulcer  Location: Leg  Wound Location Orientation: Distal;Right;Medial       [REMOVED] Incision 10/25/18 Scrotum Right (Removed)   Resolved Date: 11/21/23  Date First Assessed/Time First Assessed: 10/25/18 0816   Location: Scrotum  Wound Location Orientation: Right  Wound Outcome: Healed       [REMOVED] Wound 11/02/23 Incision Wrist Right (Removed)   Resolved Date: 11/21/23  Date First Assessed/Time First Assessed: 11/02/23 1525   Primary Wound Type: Incision  Location: Wrist  Wound Location Orientation: Right  Incision's 1st Dressing: LHC puncture site  Wound Outcome: Healed       [REMOVED] Wound 11/02/23 Traumatic Abrasion(s) Toe (Comment  which one) (Removed)   Resolved Date: 11/21/23  Date First Assessed/Time First Assessed: 11/02/23 1759   Present on Original Admission: Yes  Primary Wound Type: Traumatic  Traumatic Wound Type: Abrasion(s)  Location: (c) Toe (Comment  which one)  Dressing Status: Clean;Dry;...       [REMOVED] Wound 12/07/23 Traumatic Arm Anterior;Left;Lower (Removed)   Resolved Date: 01/03/24  Date First Assessed/Time First Assessed: 12/07/23 1103   Primary Wound Type: Traumatic  Location: Arm  Wound Location Orientation: Anterior;Left;Lower  Wound Outcome: Healed       Subjective:      .    1/25/24: Applying Santyl as directed however notes significantly more drainage in the past few days.  No symptoms of infection including fever chills diaphoresis.    1/11/24: Applying Santyl and Hydrofera Blue directed.  No symptoms of infection.     1/3/23: He recently seen by vascular surgery.  Given overall comorbidities and perioperative risk assessment patient declined surgery  at this time.  Vascular sufficiency of the lower extremities.  Has a follow-up with vascular surgery in 2 months to talk again about surgery if wounds deteriorate     12/14/23: Wounds are the same.  No symptoms of infection.     12/7/23: Notes that legs are more edematous today he has been more active with the holidays.  Did have a mechanical fall and landed on his elbow.  Notices no change in range of motion just some bruising on the skin itself.  Does have a skin tear to the left elbow today.  No symptoms of infection.  Particularly notes that he did not fall and land with any impact to his head.     11/28/23: Wound care as directed.  Note that wounds are very dry.  No symptoms of infection today.     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  Patient was referred by his primary care provider Dr. Connie Smith.  Per chart review patient has had a right hallux wound for quite some time.  Following with podiatry Dr. Isaacs, he last saw in October 2023.  No mention of wound at that visit or chart review.  Patient notes that podiatry did note the wound and advised patient to take care of it.  No specific instructions at that time.  Patient notes that he has been covering it with gauze and not leaving open to air.  Denies any symptoms of infection today including fever chills diaphoresis.          The following portions of the patient's history were reviewed and updated as appropriate: allergies, current medications, past family history, past medical history, past social history, past surgical history, and problem list.    Review of Systems   Constitutional:  Negative for chills, diaphoresis and fever.   Skin:  Positive for wound.   All other systems  reviewed and are negative.        Objective:       Wound 11/21/23 Toe (Comment  which one) Anterior;Right (Active)   Wound Image Images linked 01/25/24 1421   Wound Description Yellow;Pink;Slough 01/25/24 1421   Nicolette-wound Assessment Edema;Erythema 01/25/24 1421   Wound Length (cm) 0.5 cm 01/25/24 1421   Wound Width (cm) 0.6 cm 01/25/24 1421   Wound Depth (cm) 0.2 cm 01/25/24 1421   Wound Surface Area (cm^2) 0.3 cm^2 01/25/24 1421   Wound Volume (cm^3) 0.06 cm^3 01/25/24 1421   Calculated Wound Volume (cm^3) 0.06 cm^3 01/25/24 1421   Change in Wound Size % -200 01/25/24 1421   Drainage Amount Small 01/25/24 1421   Drainage Description Serosanguineous 01/25/24 1421   Non-staged Wound Description Full thickness 01/25/24 1421   Wound packed? No 01/25/24 1421   Dressing Status Intact 01/25/24 1421       Wound 11/21/23 Pretibial Right (Active)   Wound Image Images linked 01/25/24 1424   Wound Description Yellow;Slough;Pink;Epithelialization 01/25/24 1424   Nicolette-wound Assessment Edema;Dry;Pink 01/25/24 1424   Wound Length (cm) 9.5 cm 01/25/24 1424   Wound Width (cm) 18 cm 01/25/24 1424   Wound Depth (cm) 0.2 cm 01/25/24 1424   Wound Surface Area (cm^2) 171 cm^2 01/25/24 1424   Wound Volume (cm^3) 34.2 cm^3 01/25/24 1424   Calculated Wound Volume (cm^3) 34.2 cm^3 01/25/24 1424   Change in Wound Size % -96248 01/25/24 1424   Drainage Amount Large 01/25/24 1424   Drainage Description Serous;Clear 01/25/24 1424   Non-staged Wound Description Full thickness 01/25/24 1424   Dressing Status Intact 01/25/24 1424       Wound 01/03/24 Venous Ulcer Leg Distal;Right;Medial (Active)   Wound Image Images linked 01/25/24 1422   Wound Description Epithelialization;Pink;Yellow;Slough 01/25/24 1422   Nicolette-wound Assessment Edema;Pink 01/25/24 1422   Wound Length (cm) 4 cm 01/25/24 1422   Wound Width (cm) 5 cm 01/25/24 1422   Wound Depth (cm) 0.1 cm 01/25/24 1422   Wound Surface Area (cm^2) 20 cm^2 01/25/24 1422   Wound Volume (cm^3) 2 cm^3  01/25/24 1422   Calculated Wound Volume (cm^3) 2 cm^3 01/25/24 1422   Change in Wound Size % 61.54 01/25/24 1422   Drainage Amount Moderate 01/25/24 1422   Drainage Description Serous;Clear 01/25/24 1422   Non-staged Wound Description Full thickness 01/25/24 1422   Wound packed? No 01/25/24 1422   Dressing Status Intact 01/25/24 1422       /69   Pulse 92   Temp (!) 97.1 °F (36.2 °C)   Resp 12     Physical Exam  Vitals reviewed.   Constitutional:       Appearance: Normal appearance.   HENT:      Head: Normocephalic and atraumatic.   Eyes:      Extraocular Movements: Extraocular movements intact.   Pulmonary:      Effort: Pulmonary effort is normal.   Musculoskeletal:      Cervical back: Neck supple.   Skin:     Comments: Great toe wound similar in size to last exam.  Some fibrin deposition remains in the wound bed.  Bilateral legs with significantly more exudate.  Serosanguineous.  No overt signs of infection however there is maceration to the periwound.   Neurological:      Mental Status: He is alert.   Psychiatric:         Mood and Affect: Mood normal.                         Wound Instructions:  Orders Placed This Encounter   Procedures    Wound cleansing and dressings     Right lower leg and ankle and great toe wounds    Wash your hands with soap and water.  Remove old dressing, discard into plastic bag and place in trash.  Cleanse the wound with soap and water prior to applying a clean dressing. Do not use tissue or cotton balls. Do not scrub the wound. Pat dry using gauze.  Shower yes   Apply Silver Alginate to the wound.  Cover with ABD and roll gauze.   Secure with tape.  Change dressing every day.  This was done today.     Standing Status:   Future     Standing Expiration Date:   1/25/2025    Wound cleansing and dressings     Standing Status:   Future     Standing Expiration Date:   1/25/2025    Wound compression and edema control     Elastic Tubular Stocking size F    Tubular elastic bandage: Apply  "from base of toes to behind the knee. Apply in AM, may remove for sleep.    Avoid prolonged standing in one place.    Elevate leg(s) above the level of the heart when sitting or as much as possible.     Standing Status:   Future     Standing Expiration Date:   1/25/2025    Debridement     This order was created via procedure documentation    Debridement     This order was created via procedure documentation    Debridement     This order was created via procedure documentation        Diagnosis ICD-10-CM Associated Orders   1. Open wound of right great toe, initial encounter  S91.101A lidocaine (XYLOCAINE) 4 % topical solution 5 mL     Wound cleansing and dressings     Wound cleansing and dressings     Wound compression and edema control     Debridement      2. 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 Atherosclerotic PVD with intermittent claudication ([facility])  I70.219       4. Chronic venous hypertension (idiopathic) with ulcer of right lower extremity (CODE) ([facility])  I87.311 lidocaine (XYLOCAINE) 4 % topical solution 5 mL      5. Non-pressure chronic ulcer left lower leg, limited to breakdown skin ([facility])  L97.921 Debridement      6. Chronic venous hypertension (idiopathic) with ulcer of left lower extremity (CODE) ([facility])  I87.312           --  Cadecne Brown MD    \"This note has been constructed using a voice recognition system. Therefore there may be syntax, spelling, and/or grammatical errors. Occasional wrong word or \"sound alike\" substitutions may have occurred due to the inherent limitations of voice recognition software. Read the chart carefully and recognize, using context, where substitutions have occurred. Please call if you have any questions.\"     "

## 2024-02-01 ENCOUNTER — OFFICE VISIT (OUTPATIENT)
Dept: WOUND CARE | Facility: HOSPITAL | Age: 89
End: 2024-02-01
Payer: COMMERCIAL

## 2024-02-01 VITALS
TEMPERATURE: 97.6 F | HEART RATE: 100 BPM | DIASTOLIC BLOOD PRESSURE: 81 MMHG | SYSTOLIC BLOOD PRESSURE: 152 MMHG | RESPIRATION RATE: 18 BRPM

## 2024-02-01 DIAGNOSIS — L97.911 NON-PRESSURE CHRONIC ULCER OF RIGHT LOWER LEG, LIMITED TO BREAKDOWN OF SKIN (HCC): Primary | ICD-10-CM

## 2024-02-01 DIAGNOSIS — S91.101A OPEN WOUND OF RIGHT GREAT TOE, INITIAL ENCOUNTER: ICD-10-CM

## 2024-02-01 DIAGNOSIS — I87.311 CHRONIC VENOUS HYPERTENSION (IDIOPATHIC) WITH ULCER OF RIGHT LOWER EXTREMITY (CODE) (HCC): ICD-10-CM

## 2024-02-01 DIAGNOSIS — I70.219 ATHEROSCLEROTIC PVD WITH INTERMITTENT CLAUDICATION (HCC): ICD-10-CM

## 2024-02-01 PROCEDURE — 99213 OFFICE O/P EST LOW 20 MIN: CPT | Performed by: STUDENT IN AN ORGANIZED HEALTH CARE EDUCATION/TRAINING PROGRAM

## 2024-02-01 PROCEDURE — 99214 OFFICE O/P EST MOD 30 MIN: CPT | Performed by: STUDENT IN AN ORGANIZED HEALTH CARE EDUCATION/TRAINING PROGRAM

## 2024-02-01 NOTE — PROGRESS NOTES
Patient ID: John Smallwood is a 89 y.o. 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, limited to breakdown of skin (HCC)  -     Cancel: Wound cleansing and dressings; Future  -     Wound compression and edema control; Future  -     Wound cleansing and dressings; Future    Chronic venous hypertension (idiopathic) with ulcer of right lower extremity (CODE) (HCC)  -     Cancel: Wound cleansing and dressings; Future  -     Wound compression and edema control; Future  -     Wound cleansing and dressings; Future    Open wound of right great toe, initial encounter  -     Cancel: Wound cleansing and dressings; Future  -     Wound compression and edema control; Future  -     Wound cleansing and dressings; Future    Atherosclerotic PVD with intermittent claudication (HCC)  -     Cancel: Wound cleansing and dressings; Future  -     Wound compression and edema control; Future  -     Wound cleansing and dressings; Future              Plan:   It was a pleasure to see John Smallwood for wound care follow up today  All wounds debrided with saline and gauze only  Wounds are not improving patient has open wounds of lymphorrhea throughout distal left lower extremity today as well.  Notes that he has not been elevating legs or wearing spandigrip  Continue plan of care as noted below with silver alginate, convamax, spandigrip.  Prophase wash prior to dressing changes.  Patient has 2-month follow-up with vascular surgery in 3 weeks.  At that time can discuss possible interventions as these wounds are not likely to heal given his current vascular status.  This was discussed with patient today as we have discussed also at prior visits.  No signs or symptoms of  infection today. Patient understands that if any signs of infection start (such as increased redness, drainage, pain, fever, chills, diaphoresis), they should call our office or proceed to the ER or Urgent Care.  Patient should continue a high protein diet to facilitate wound healing  Patient is advised to not submerge wound or leave wound open to air.  Follow up in 1 weeks  Given the multi-factorial nature of wound care, additional time was taken to review patient's treatment plan with other specialties and most recent pertinent lab work and imaging.   All plans of care discussed with patient at bedside who verbalized understanding with treatment plan.    Wound 11/21/23 Toe (Comment  which one) Anterior;Right (Active)   Wound Image Images linked 02/01/24 1309   Wound Description Yellow;Pink;Slough;Epithelialization 02/01/24 1309   Nicolette-wound Assessment Erythema 02/01/24 1309   Wound Length (cm) 0.5 cm 02/01/24 1309   Wound Width (cm) 0.7 cm 02/01/24 1309   Wound Depth (cm) 0.1 cm 02/01/24 1309   Wound Surface Area (cm^2) 0.35 cm^2 02/01/24 1309   Wound Volume (cm^3) 0.035 cm^3 02/01/24 1309   Calculated Wound Volume (cm^3) 0.04 cm^3 02/01/24 1309   Change in Wound Size % -100 02/01/24 1309   Drainage Amount Small 02/01/24 1309   Drainage Description Serosanguineous 02/01/24 1309   Non-staged Wound Description Full thickness 02/01/24 1309   Treatments Irrigation with NSS 02/01/24 1309       Wound 11/21/23 Pretibial Right (Active)   Wound Image Images linked 02/01/24 1309   Wound Description Yellow;Slough;Pink;Epithelialization 02/01/24 1309   Nicolette-wound Assessment Edema;Dry;[place] 02/01/24 1309   Wound Length (cm) 9.1 cm 02/01/24 1309   Wound Width (cm) 8.4 cm 02/01/24 1309   Wound Depth (cm) 0.1 cm 02/01/24 1309   Wound Surface Area (cm^2) 76.44 cm^2 02/01/24 1309   Wound Volume (cm^3) 7.644 cm^3 02/01/24 1309   Calculated Wound Volume (cm^3) 7.64 cm^3 02/01/24 1309   Change in Wound Size % -[id number] 02/01/24 1309    Drainage Amount Large 02/01/24 1309   Drainage Description Serous;Clear 02/01/24 1309   Non-staged Wound Description Full thickness 02/01/24 1309       Wound 01/03/24 Venous Ulcer Leg Distal;Right;Medial (Active)   Wound Image Images linked 02/01/24 1308   Wound Description Epithelialization;Pink;Yellow;Slough 02/01/24 1308   Nicolette-wound Assessment Edema;Pink 02/01/24 1308   Wound Length (cm) 9.1 cm 02/01/24 1308   Wound Width (cm) 14 cm 02/01/24 1308   Wound Depth (cm) 0.1 cm 02/01/24 1308   Wound Surface Area (cm^2) 127.4 cm^2 02/01/24 1308   Wound Volume (cm^3) 12.74 cm^3 02/01/24 1308   Calculated Wound Volume (cm^3) 12.74 cm^3 02/01/24 1308   Change in Wound Size % -145 02/01/24 1308   Drainage Amount Large 02/01/24 1308   Drainage Description Serous;Clear 02/01/24 1308   Non-staged Wound Description Full thickness 02/01/24 1308       Wound 02/01/24 Venous Ulcer Pretibial Left (Active)   Wound Image Images linked 02/01/24 1310   Wound Description Epithelialization;Bleeding 02/01/24 1310   Nicolette-wound Assessment Pink;Edema 02/01/24 1310   Wound Length (cm) 20 cm 02/01/24 1310   Wound Width (cm) 27 cm 02/01/24 1310   Wound Depth (cm) 0.1 cm 02/01/24 1310   Wound Surface Area (cm^2) 540 cm^2 02/01/24 1310   Wound Volume (cm^3) 54 cm^3 02/01/24 1310   Calculated Wound Volume (cm^3) 54 cm^3 02/01/24 1310   Drainage Amount Moderate 02/01/24 1310   Drainage Description Serous 02/01/24 1310   Non-staged Wound Description Full thickness 02/01/24 1310       Wound 11/21/23 Toe (Comment  which one) Anterior;Right (Active)   Date First Assessed/Time First Assessed: 11/21/23 1352   Location: Toe (Comment  which one)  Wound Location Orientation: Anterior;Right  Wound Description (Comments): 11/21/23 R 1st toe       Wound 11/21/23 Pretibial Right (Active)   Date First Assessed/Time First Assessed: 11/21/23 1352   Location: Pretibial  Wound Location Orientation: Right       Wound 01/03/24 Venous Ulcer Leg Distal;Right;Medial  (Active)   Date First Assessed/Time First Assessed: 01/03/24 1342   Primary Wound Type: Venous Ulcer  Location: Leg  Wound Location Orientation: Distal;Right;Medial       Wound 02/01/24 Venous Ulcer Pretibial Left (Active)   Date First Assessed/Time First Assessed: 02/01/24 1310   Primary Wound Type: Venous Ulcer  Location: Pretibial  Wound Location Orientation: Left       [REMOVED] Incision 10/25/18 Scrotum Right (Removed)   Resolved Date: 11/21/23  Date First Assessed/Time First Assessed: 10/25/18 0816   Location: Scrotum  Wound Location Orientation: Right  Wound Outcome: Healed       [REMOVED] Wound 11/02/23 Incision Wrist Right (Removed)   Resolved Date: 11/21/23  Date First Assessed/Time First Assessed: 11/02/23 1525   Primary Wound Type: Incision  Location: Wrist  Wound Location Orientation: Right  Incision's 1st Dressing: [facility] puncture site  Wound Outcome: Healed       [REMOVED] Wound 11/02/23 Traumatic Abrasion(s) Toe (Comment  which one) (Removed)   Resolved Date: 11/21/23  Date First Assessed/Time First Assessed: 11/02/23 1759   Present on Original Admission: Yes  Primary Wound Type: Traumatic  Traumatic Wound Type: Abrasion(s)  Location: (c) Toe (Comment  which one)  Dressing Status: Clean;Dry;...       [REMOVED] Wound 12/07/23 Traumatic Arm Anterior;Left;Lower (Removed)   Resolved Date: 01/03/24  Date First Assessed/Time First Assessed: 12/07/23 1103   Primary Wound Type: Traumatic  Location: Arm  Wound Location Orientation: Anterior;Left;Lower  Wound Outcome: Healed       Subjective:      .    2/1/24: Applying alginate as directed.  Note that he has increased swelling and drainage in both lower extremities.  Also notes that he has not been applying compression or elevating his legs.  He finds it difficult to elevate his legs due to the pain he has in his calves upon leg elevation and rest.    1/25/24: Applying Santyl as directed however notes significantly more drainage in the past few days.  No symptoms  of infection including fever chills diaphoresis.     1/11/24: Applying Santyl and Hydrofera Blue directed.  No symptoms of infection.     1/3/23: He recently seen by vascular surgery.  Given overall comorbidities and perioperative risk assessment patient declined surgery at this time.  Vascular sufficiency of the lower extremities.  Has a follow-up with vascular surgery in 2 months to talk again about surgery if wounds deteriorate     12/14/23: Wounds are the same.  No symptoms of infection.     12/7/23: Notes that legs are more edematous today he has been more active with the holidays.  Did have a mechanical fall and landed on his elbow.  Notices no change in range of motion just some bruising on the skin itself.  Does have a skin tear to the left elbow today.  No symptoms of infection.  Particularly notes that he did not fall and land with any impact to his head.     11/28/23: Wound care as directed.  Note that wounds are very dry.  No symptoms of infection today.     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  Patient was referred by his primary care provider Dr. Connie Smith.  Per chart review patient has had a right hallux wound for quite some time.  Following with podiatry Dr. Isaacs, he last saw in October 2023.  No mention of wound at that visit or chart review.  Patient notes that podiatry did note the wound and advised patient to take care of it.  No specific instructions at that time.  Patient notes that he has been covering it with gauze and not leaving open to air.  Denies any symptoms of infection today including fever chills diaphoresis.                The following portions of the patient's history were reviewed and updated as appropriate:  allergies, current medications, past family history, past medical history, past social history, past surgical history, and problem list.    Review of Systems   Constitutional:  Negative for chills, diaphoresis and fever.   Skin:  Positive for wound.   All other systems reviewed and are negative.        Objective:       Wound 11/21/23 Toe (Comment  which one) Anterior;Right (Active)   Wound Image Images linked 02/01/24 1309   Wound Description Yellow;Pink;Slough;Epithelialization 02/01/24 1309   Nicolette-wound Assessment Erythema 02/01/24 1309   Wound Length (cm) 0.5 cm 02/01/24 1309   Wound Width (cm) 0.7 cm 02/01/24 1309   Wound Depth (cm) 0.1 cm 02/01/24 1309   Wound Surface Area (cm^2) 0.35 cm^2 02/01/24 1309   Wound Volume (cm^3) 0.035 cm^3 02/01/24 1309   Calculated Wound Volume (cm^3) 0.04 cm^3 02/01/24 1309   Change in Wound Size % -100 02/01/24 1309   Drainage Amount Small 02/01/24 1309   Drainage Description Serosanguineous 02/01/24 1309   Non-staged Wound Description Full thickness 02/01/24 1309   Treatments Irrigation with NSS 02/01/24 1309       Wound 11/21/23 Pretibial Right (Active)   Wound Image Images linked 02/01/24 1309   Wound Description Yellow;Slough;Pink;Epithelialization 02/01/24 1309   Nicolette-wound Assessment Edema;Dry;[place] 02/01/24 1309   Wound Length (cm) 9.1 cm 02/01/24 1309   Wound Width (cm) 8.4 cm 02/01/24 1309   Wound Depth (cm) 0.1 cm 02/01/24 1309   Wound Surface Area (cm^2) 76.44 cm^2 02/01/24 1309   Wound Volume (cm^3) 7.644 cm^3 02/01/24 1309   Calculated Wound Volume (cm^3) 7.64 cm^3 02/01/24 1309   Change in Wound Size % -32316 02/01/24 1309   Drainage Amount Large 02/01/24 1309   Drainage Description Serous;Clear 02/01/24 1309   Non-staged Wound Description Full thickness 02/01/24 1309       Wound 01/03/24 Venous Ulcer Leg Distal;Right;Medial (Active)   Wound Image Images linked 02/01/24 1308   Wound Description Epithelialization;Pink;Yellow;Slough 02/01/24 1308   Nicolette-wound  Assessment Edema;[place] 02/01/24 1308   Wound Length (cm) 9.1 cm 02/01/24 1308   Wound Width (cm) 14 cm 02/01/24 1308   Wound Depth (cm) 0.1 cm 02/01/24 1308   Wound Surface Area (cm^2) 127.4 cm^2 02/01/24 1308   Wound Volume (cm^3) 12.74 cm^3 02/01/24 1308   Calculated Wound Volume (cm^3) 12.74 cm^3 02/01/24 1308   Change in Wound Size % -145 02/01/24 1308   Drainage Amount Large 02/01/24 1308   Drainage Description Serous;Clear 02/01/24 1308   Non-staged Wound Description Full thickness 02/01/24 1308       Wound 02/01/24 Venous Ulcer Pretibial Left (Active)   Wound Image Images linked 02/01/24 1310   Wound Description Epithelialization;Bleeding 02/01/24 1310   Nicolette-wound Assessment Pink;Edema 02/01/24 1310   Wound Length (cm) 20 cm 02/01/24 1310   Wound Width (cm) 27 cm 02/01/24 1310   Wound Depth (cm) 0.1 cm 02/01/24 1310   Wound Surface Area (cm^2) 540 cm^2 02/01/24 1310   Wound Volume (cm^3) 54 cm^3 02/01/24 1310   Calculated Wound Volume (cm^3) 54 cm^3 02/01/24 1310   Drainage Amount Moderate 02/01/24 1310   Drainage Description Serous 02/01/24 1310   Non-staged Wound Description Full thickness 02/01/24 1310       /81   Pulse 100   Temp 97.6 °F (36.4 °C)   Resp 18     Physical Exam  Vitals reviewed.   Constitutional:       Appearance: Normal appearance.   HENT:      Head: Normocephalic and atraumatic.   Eyes:      Extraocular Movements: Extraocular movements intact.   Pulmonary:      Effort: Pulmonary effort is normal.   Musculoskeletal:      Cervical back: Neck supple.      Right lower leg: Edema (+4) present.      Left lower leg: Edema (+3) present.   Skin:     Comments: Several discrete wounds areas of her right lower extremity larger than last exam.  No signs of infection.  Serosanguineous exudate.  Left lower extremity with lymphorrhea and some open wound areas.  Great toe wound similar in size to last exam.  No overt signs of infection.   Neurological:      Mental Status: He is alert.  "  Psychiatric:         Mood and Affect: Mood normal.                         Wound Instructions:  Orders Placed This Encounter   Procedures    Wound compression and edema control     Elastic Tubular Stocking size F     Tubular elastic bandage: Apply from base of toes to behind the knee. Apply in AM, may remove for sleep.     Avoid prolonged standing in one place.     Standing Status:   Future     Standing Expiration Date:   2/1/2025    Wound cleansing and dressings     Right and left lower leg and ankle and great toe wounds     Wash your hands with soap and water.  Remove old dressing, discard into plastic bag and place in trash.  Cleanse the wound with prophase prior to applying a clean dressing. Do not use tissue or cotton balls. Do not scrub the wound. Pat dry using gauze.  Shower yes   Apply Silver Alginate to the wound.  Cover with convamax and roll gauze.   Secure with tape.  Change dressing every day.  This was done today.     Standing Status:   Future     Standing Expiration Date:   2/1/2025        Diagnosis ICD-10-CM Associated Orders   1. 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 Chronic venous hypertension (idiopathic) with ulcer of right lower extremity (CODE) ([facility])  I87.311 Wound compression and edema control     Wound cleansing and dressings      3. Open wound of right great toe, initial encounter  S91.101A Wound compression and edema control     Wound cleansing and dressings      4. Atherosclerotic PVD with intermittent claudication ([facility])  I70.219 Wound compression and edema control     Wound cleansing and dressings          --  Cadence Brown MD    \"This note has been constructed using a voice recognition system. Therefore there may be syntax, spelling, and/or grammatical errors. Occasional wrong word or \"sound alike\" substitutions may have occurred due to the inherent " "limitations of voice recognition software. Read the chart carefully and recognize, using context, where substitutions have occurred. Please call if you have any questions.\"     "

## 2024-02-01 NOTE — PATIENT INSTRUCTIONS
Orders Placed This Encounter   Procedures    Wound compression and edema control     Elastic Tubular Stocking size F     Tubular elastic bandage: Apply from base of toes to behind the knee. Apply in AM, may remove for sleep.     Avoid prolonged standing in one place.     Standing Status:   Future     Standing Expiration Date:   2/1/2025    Wound cleansing and dressings     Right and left lower leg and ankle and great toe wounds     Wash your hands with soap and water.  Remove old dressing, discard into plastic bag and place in trash.  Cleanse the wound with prophase prior to applying a clean dressing. Do not use tissue or cotton balls. Do not scrub the wound. Pat dry using gauze.  Shower yes   Apply Silver Alginate to the wound.  Cover with convamax and roll gauze.   Secure with tape.  Change dressing every day.  This was done today.     Standing Status:   Future     Standing Expiration Date:   2/1/2025

## 2024-02-08 ENCOUNTER — OFFICE VISIT (OUTPATIENT)
Dept: WOUND CARE | Facility: HOSPITAL | Age: 89
End: 2024-02-08
Payer: COMMERCIAL

## 2024-02-08 VITALS
RESPIRATION RATE: 18 BRPM | HEART RATE: 66 BPM | TEMPERATURE: 96.6 F | SYSTOLIC BLOOD PRESSURE: 151 MMHG | DIASTOLIC BLOOD PRESSURE: 80 MMHG

## 2024-02-08 DIAGNOSIS — L97.911 NON-PRESSURE CHRONIC ULCER OF RIGHT LOWER LEG, LIMITED TO BREAKDOWN OF SKIN (HCC): Primary | ICD-10-CM

## 2024-02-08 DIAGNOSIS — I87.312 CHRONIC VENOUS HYPERTENSION (IDIOPATHIC) WITH ULCER OF LEFT LOWER EXTREMITY (CODE) (HCC): ICD-10-CM

## 2024-02-08 DIAGNOSIS — I87.311 CHRONIC VENOUS HYPERTENSION (IDIOPATHIC) WITH ULCER OF RIGHT LOWER EXTREMITY (CODE) (HCC): ICD-10-CM

## 2024-02-08 DIAGNOSIS — I70.219 ATHEROSCLEROTIC PVD WITH INTERMITTENT CLAUDICATION (HCC): ICD-10-CM

## 2024-02-08 DIAGNOSIS — S91.101A OPEN WOUND OF RIGHT GREAT TOE, INITIAL ENCOUNTER: ICD-10-CM

## 2024-02-08 DIAGNOSIS — L97.921 NON-PRESSURE CHRONIC ULCER LEFT LOWER LEG, LIMITED TO BREAKDOWN SKIN (HCC): ICD-10-CM

## 2024-02-08 PROCEDURE — 97597 DBRDMT OPN WND 1ST 20 CM/<: CPT | Performed by: STUDENT IN AN ORGANIZED HEALTH CARE EDUCATION/TRAINING PROGRAM

## 2024-02-08 PROCEDURE — 97598 DBRDMT OPN WND ADDL 20CM/<: CPT | Performed by: STUDENT IN AN ORGANIZED HEALTH CARE EDUCATION/TRAINING PROGRAM

## 2024-02-08 PROCEDURE — 99213 OFFICE O/P EST LOW 20 MIN: CPT | Performed by: STUDENT IN AN ORGANIZED HEALTH CARE EDUCATION/TRAINING PROGRAM

## 2024-02-08 RX ORDER — LIDOCAINE HYDROCHLORIDE 40 MG/ML
5 SOLUTION TOPICAL ONCE
Status: COMPLETED | OUTPATIENT
Start: 2024-02-08 | End: 2024-02-08

## 2024-02-08 RX ADMIN — LIDOCAINE HYDROCHLORIDE 5 ML: 40 SOLUTION TOPICAL at 14:29

## 2024-02-08 NOTE — PROGRESS NOTES
Patient ID: John Smallwood is a 89 y.o. 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, limited to breakdown of skin (HCC)  -     lidocaine (XYLOCAINE) 4 % topical solution 5 mL  -     Cancel: Wound cleansing and dressings; Future  -     Wound compression and edema control; Future  -     Wound cleansing and dressings; Future  -     Debridement  -     Debridement    Chronic venous hypertension (idiopathic) with ulcer of right lower extremity (CODE) ([facility])  -     lidocaine (XYLOCAINE) 4 % topical solution 5 mL  -     Cancel: Wound cleansing and dressings; Future  -     Wound compression and edema control; Future  -     Wound cleansing and dressings; Future    Open wound of right great toe, initial encounter  -     lidocaine (XYLOCAINE) 4 % topical solution 5 mL  -     Cancel: Wound cleansing and dressings; Future  -     Wound compression and edema control; Future  -     Wound cleansing and dressings; Future    Non-pressure chronic ulcer left lower leg, limited to breakdown skin (HCC)  -     lidocaine (XYLOCAINE) 4 % topical solution 5 mL  -     Cancel: Wound cleansing and dressings; Future  -     Wound compression and edema control; Future  -     Wound cleansing and dressings; Future    Chronic venous hypertension (idiopathic) with ulcer of left lower extremity (CODE) ([facility])  -     lidocaine (XYLOCAINE) 4 % topical solution 5 mL  -     Cancel: Wound cleansing and dressings; Future  -     Wound compression and edema control; Future  -     Wound cleansing and dressings; Future    Atherosclerotic PVD with intermittent claudication (HCC)  -     lidocaine (XYLOCAINE) 4 % topical solution 5 mL  -     Cancel: Wound cleansing and dressings; Future  -     Wound compression and edema  "control; Future  -     Wound cleansing and dressings; Future              Debridement   Wound 11/21/23 Pretibial Right    Universal Protocol:  Consent: Verbal consent obtained.  Risks and benefits: risks, benefits and alternatives were discussed  Consent given by: patient  Time out: Immediately prior to procedure a \"time out\" was called to verify the correct patient, procedure, equipment, support staff and site/side marked as required.  Patient identity confirmed: verbally with patient    Debridement Details  Performed by: physician  Debridement type: selective  Pain control: lidocaine 4%      Post-debridement measurements  Length (cm): 2.5  Width (cm): 2  Depth (cm): 0.2  Percent debrided: 90%  Surface Area (cm^2): 5  Area Debrided (cm^2): 4.5  Volume (cm^3): 1    Devitalized tissue debrided: biofilm and exudate  Instrument(s) utilized: curette  Bleeding: small  Hemostasis obtained with: pressure  Procedural pain (0-10): 1  Post-procedural pain: 0   Response to treatment: procedure was tolerated well    Debridement   Wound 01/03/24 Venous Ulcer Leg Distal;Right;Medial    Universal Protocol:  Consent: Verbal consent obtained.  Risks and benefits: risks, benefits and alternatives were discussed  Consent given by: patient  Time out: Immediately prior to procedure a \"time out\" was called to verify the correct patient, procedure, equipment, support staff and site/side marked as required.  Patient identity confirmed: verbally with patient    Debridement Details  Performed by: physician  Debridement type: selective  Pain control: lidocaine 4%      Post-debridement measurements  Length (cm): 11  Width (cm): 9  Depth (cm): 0.1  Percent debrided: 90%  Surface Area (cm^2): 99  Area Debrided (cm^2): 89.1  Volume (cm^3): 9.9    Devitalized tissue debrided: biofilm and exudate  Instrument(s) utilized: curette  Bleeding: small  Hemostasis obtained with: pressure  Procedural pain (0-10): 1  Post-procedural pain: 0   Response to " treatment: procedure was tolerated well        Plan:   It was a pleasure to see John Smallwood for palliative wound care follow up today  Selective debridement performed today as above.  All other wounds debrided  with saline and gauze today.  Wound is not improving   Continue plan of care as noted below with silver algainte, spandigrip  Patient notes again that he is not elevating his legs and also sleeps with legs in a dependent position.  We talked again about the need to elevate legs to decrease edema in length and the chances of wound healing.  Patient verbalized understanding.  Patient has 2-month follow-up with vascular surgery soon.  At that time can discuss possible interventions as these wounds are not likely to heal given his current vascular status.  This was discussed with patient today as we have discussed also at prior visits.   No signs or symptoms of infection today. Patient understands that if any signs of infection start (such as increased redness, drainage, pain, fever, chills, diaphoresis), they should call our office or proceed to the ER or Urgent Care.  Patient should continue a high protein diet to facilitate wound healing  Patient is advised to not submerge wound or leave wound open to air.  Follow up in 2 weeks  Given the multi-factorial nature of wound care, additional time was taken to review patient's treatment plan with other specialties and most recent pertinent lab work and imaging.   All plans of care discussed with patient at bedside who verbalized understanding with treatment plan.    Wound 11/21/23 Toe (Comment  which one) Anterior;Right (Active)   Wound Image Images linked 02/08/24 1423   Wound Description Yellow;Pink;Brown 02/08/24 1423   Nicolette-wound Assessment Erythema 02/08/24 1423   Wound Length (cm) 0.8 cm 02/08/24 1423   Wound Width (cm) 0.8 cm 02/08/24 1423   Wound Depth (cm) 0.1 cm 02/08/24 1423   Wound Surface Area (cm^2) 0.64 cm^2 02/08/24 1423   Wound Volume  (cm^3) 0.064 cm^3 02/08/24 1423   Calculated Wound Volume (cm^3) 0.06 cm^3 02/08/24 1423   Change in Wound Size % -200 02/08/24 1423   Drainage Amount Scant 02/08/24 1423   Drainage Description Serosanguineous 02/08/24 1423   Non-staged Wound Description Full thickness 02/08/24 1423   Dressing Status Intact 02/08/24 1423       Wound 11/21/23 Pretibial Right (Active)   Wound Image Images linked 02/08/24 1421   Wound Description Yellow;Slough;Pink;Epithelialization 02/08/24 1421   Nicolette-wound Assessment Edema 02/08/24 1421   Wound Length (cm) 2.5 cm 02/08/24 1421   Wound Width (cm) 2 cm 02/08/24 1421   Wound Depth (cm) 0.1 cm 02/08/24 1421   Wound Surface Area (cm^2) 5 cm^2 02/08/24 1421   Wound Volume (cm^3) 0.5 cm^3 02/08/24 1421   Calculated Wound Volume (cm^3) 0.5 cm^3 02/08/24 1421   Change in Wound Size % -1150 02/08/24 1421   Drainage Amount Large 02/08/24 1421   Drainage Description Serous;Serosanguineous 02/08/24 1421   Non-staged Wound Description Full thickness 02/08/24 1421   Dressing Status Intact 02/08/24 1421       Wound 01/03/24 Venous Ulcer Leg Distal;Right;Medial (Active)   Wound Image Images linked 02/08/24 1421   Wound Description Epithelialization;Pink;Yellow;Slough 02/08/24 1421   Nicolette-wound Assessment Edema;Pink 02/08/24 1421   Wound Length (cm) 11 cm 02/08/24 1421   Wound Width (cm) 9 cm 02/08/24 1421   Wound Depth (cm) 0.1 cm 02/08/24 1421   Wound Surface Area (cm^2) 99 cm^2 02/08/24 1421   Wound Volume (cm^3) 9.9 cm^3 02/08/24 1421   Calculated Wound Volume (cm^3) 9.9 cm^3 02/08/24 1421   Change in Wound Size % -90.38 02/08/24 1421   Drainage Amount Large 02/08/24 1421   Drainage Description Serosanguineous 02/08/24 1421   Non-staged Wound Description Full thickness 02/08/24 1421   Dressing Status Intact 02/08/24 1421       Wound 02/01/24 Venous Ulcer Pretibial Left (Active)   Wound Image Images linked 02/08/24 1422   Wound Description Epithelialization;Pink;Yellow 02/08/24 1422   Nicolette-wound  Assessment Pink;Edema 02/08/24 1422   Wound Length (cm) 10.5 cm 02/08/24 1422   Wound Width (cm) 7 cm 02/08/24 1422   Wound Depth (cm) 0.1 cm 02/08/24 1422   Wound Surface Area (cm^2) 73.5 cm^2 02/08/24 1422   Wound Volume (cm^3) 7.35 cm^3 02/08/24 1422   Calculated Wound Volume (cm^3) 7.35 cm^3 02/08/24 1422   Change in Wound Size % 86.39 02/08/24 1422   Drainage Amount Large 02/08/24 1422   Drainage Description Serosanguineous 02/08/24 1422   Non-staged Wound Description Full thickness 02/08/24 1422   Dressing Status Intact 02/08/24 1422       Wound 02/08/24 Venous Ulcer Pretibial Right;Lateral (Active)   Wound Image Images linked 02/08/24 1421   Wound Description Black;Pink;Yellow;White;Slough;Epithelialization 02/08/24 1421   Nicolette-wound Assessment Edema 02/08/24 1421   Wound Length (cm) 13 cm 02/08/24 1421   Wound Width (cm) 8 cm 02/08/24 1421   Wound Depth (cm) 0.1 cm 02/08/24 1421   Wound Surface Area (cm^2) 104 cm^2 02/08/24 1421   Wound Volume (cm^3) 10.4 cm^3 02/08/24 1421   Calculated Wound Volume (cm^3) 10.4 cm^3 02/08/24 1421   Drainage Amount Large 02/08/24 1421   Drainage Description Serosanguineous 02/08/24 1421   Non-staged Wound Description Full thickness 02/08/24 1421   Dressing Status Intact 02/08/24 1421       Wound 11/21/23 Toe (Comment  which one) Anterior;Right (Active)   Date First Assessed/Time First Assessed: 11/21/23 1352   Location: Toe (Comment  which one)  Wound Location Orientation: Anterior;Right  Wound Description (Comments): 11/21/23 R 1st toe       Wound 11/21/23 Pretibial Right (Active)   Date First Assessed/Time First Assessed: 11/21/23 1352   Location: Pretibial  Wound Location Orientation: Right       Wound 01/03/24 Venous Ulcer Leg Distal;Right;Medial (Active)   Date First Assessed/Time First Assessed: 01/03/24 1342   Primary Wound Type: Venous Ulcer  Location: Leg  Wound Location Orientation: Distal;Right;Medial       Wound 02/01/24 Venous Ulcer Pretibial Left (Active)   Date  First Assessed/Time First Assessed: 02/01/24 1310   Primary Wound Type: Venous Ulcer  Location: Pretibial  Wound Location Orientation: Left       Wound 02/08/24 Venous Ulcer Pretibial Right;Lateral (Active)   Date First Assessed/Time First Assessed: 02/08/24 1419   Present on Original Admission: No  Primary Wound Type: Venous Ulcer  Location: Pretibial  Wound Location Orientation: Right;Lateral       [REMOVED] Incision 10/25/18 Scrotum Right (Removed)   Resolved Date: 11/21/23  Date First Assessed/Time First Assessed: 10/25/18 0816   Location: Scrotum  Wound Location Orientation: Right  Wound Outcome: Healed       [REMOVED] Wound 11/02/23 Incision Wrist Right (Removed)   Resolved Date: 11/21/23  Date First Assessed/Time First Assessed: 11/02/23 1525   Primary Wound Type: Incision  Location: Wrist  Wound Location Orientation: Right  Incision's 1st Dressing: [facility] puncture site  Wound Outcome: Healed       [REMOVED] Wound 11/02/23 Traumatic Abrasion(s) Toe (Comment  which one) (Removed)   Resolved Date: 11/21/23  Date First Assessed/Time First Assessed: 11/02/23 1759   Present on Original Admission: Yes  Primary Wound Type: Traumatic  Traumatic Wound Type: Abrasion(s)  Location: (c) Toe (Comment  which one)  Dressing Status: Clean;Dry;...       [REMOVED] Wound 12/07/23 Traumatic Arm Anterior;Left;Lower (Removed)   Resolved Date: 01/03/24  Date First Assessed/Time First Assessed: 12/07/23 1103   Primary Wound Type: Traumatic  Location: Arm  Wound Location Orientation: Anterior;Left;Lower  Wound Outcome: Healed       Subjective:      .    2/8/24, 2/1/24: Applying alginate as directed.  Note that he has increased swelling and drainage in both lower extremities.  Also notes that he has not been applying compression or elevating his legs.  He finds it difficult to elevate his legs due to the pain he has in his calves upon leg elevation and rest.     1/25/24: Applying Santyl as directed however notes significantly more  drainage in the past few days.  No symptoms of infection including fever chills diaphoresis.     1/11/24: Applying Santyl and Hydrofera Blue directed.  No symptoms of infection.     1/3/23: He recently seen by vascular surgery.  Given overall comorbidities and perioperative risk assessment patient declined surgery at this time.  Vascular sufficiency of the lower extremities.  Has a follow-up with vascular surgery in 2 months to talk again about surgery if wounds deteriorate     12/14/23: Wounds are the same.  No symptoms of infection.     12/7/23: Notes that legs are more edematous today he has been more active with the holidays.  Did have a mechanical fall and landed on his elbow.  Notices no change in range of motion just some bruising on the skin itself.  Does have a skin tear to the left elbow today.  No symptoms of infection.  Particularly notes that he did not fall and land with any impact to his head.     11/28/23: Wound care as directed.  Note that wounds are very dry.  No symptoms of infection today.     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  Patient was referred by his primary care provider Dr. Connie Smith.  Per chart review patient has had a right hallux wound for quite some time.  Following with podiatry Dr. Isaacs, he last saw in October 2023.  No mention of wound at that visit or chart review.  Patient notes that podiatry did note the wound and advised patient to take care of it.  No specific instructions at that time.  Patient notes that he has been covering it with gauze and not leaving open to air.  Denies any symptoms of infection today including fever chills diaphoresis.                The following portions of the patient's  history were reviewed and updated as appropriate: allergies, current medications, past family history, past medical history, past social history, past surgical history, and problem list.    Review of Systems   Constitutional:  Negative for chills, diaphoresis and fever.   Skin:  Positive for wound.   All other systems reviewed and are negative.        Objective:       Wound 11/21/23 Toe (Comment  which one) Anterior;Right (Active)   Wound Image Images linked 02/08/24 1423   Wound Description Yellow;Pink;Brown 02/08/24 1423   Nicolette-wound Assessment Erythema 02/08/24 1423   Wound Length (cm) 0.8 cm 02/08/24 1423   Wound Width (cm) 0.8 cm 02/08/24 1423   Wound Depth (cm) 0.1 cm 02/08/24 1423   Wound Surface Area (cm^2) 0.64 cm^2 02/08/24 1423   Wound Volume (cm^3) 0.064 cm^3 02/08/24 1423   Calculated Wound Volume (cm^3) 0.06 cm^3 02/08/24 1423   Change in Wound Size % -200 02/08/24 1423   Drainage Amount Scant 02/08/24 1423   Drainage Description Serosanguineous 02/08/24 1423   Non-staged Wound Description Full thickness 02/08/24 1423   Dressing Status Intact 02/08/24 1423       Wound 11/21/23 Pretibial Right (Active)   Wound Image Images linked 02/08/24 1421   Wound Description Yellow;Slough;Pink;Epithelialization 02/08/24 1421   Nicolette-wound Assessment Edema 02/08/24 1421   Wound Length (cm) 2.5 cm 02/08/24 1421   Wound Width (cm) 2 cm 02/08/24 1421   Wound Depth (cm) 0.1 cm 02/08/24 1421   Wound Surface Area (cm^2) 5 cm^2 02/08/24 1421   Wound Volume (cm^3) 0.5 cm^3 02/08/24 1421   Calculated Wound Volume (cm^3) 0.5 cm^3 02/08/24 1421   Change in Wound Size % -1150 02/08/24 1421   Drainage Amount Large 02/08/24 1421   Drainage Description Serous;Serosanguineous 02/08/24 1421   Non-staged Wound Description Full thickness 02/08/24 1421   Dressing Status Intact 02/08/24 1421       Wound 01/03/24 Venous Ulcer Leg Distal;Right;Medial (Active)   Wound Image Images linked 02/08/24 1421   Wound Description  Epithelialization;Pink;Yellow;Slough 02/08/24 1421   Nicolette-wound Assessment Edema;Pink 02/08/24 1421   Wound Length (cm) 11 cm 02/08/24 1421   Wound Width (cm) 9 cm 02/08/24 1421   Wound Depth (cm) 0.1 cm 02/08/24 1421   Wound Surface Area (cm^2) 99 cm^2 02/08/24 1421   Wound Volume (cm^3) 9.9 cm^3 02/08/24 1421   Calculated Wound Volume (cm^3) 9.9 cm^3 02/08/24 1421   Change in Wound Size % -90.38 02/08/24 1421   Drainage Amount Large 02/08/24 1421   Drainage Description Serosanguineous 02/08/24 1421   Non-staged Wound Description Full thickness 02/08/24 1421   Dressing Status Intact 02/08/24 1421       Wound 02/01/24 Venous Ulcer Pretibial Left (Active)   Wound Image Images linked 02/08/24 1422   Wound Description Epithelialization;Pink;Yellow 02/08/24 1422   Nicolette-wound Assessment Pink;Edema 02/08/24 1422   Wound Length (cm) 10.5 cm 02/08/24 1422   Wound Width (cm) 7 cm 02/08/24 1422   Wound Depth (cm) 0.1 cm 02/08/24 1422   Wound Surface Area (cm^2) 73.5 cm^2 02/08/24 1422   Wound Volume (cm^3) 7.35 cm^3 02/08/24 1422   Calculated Wound Volume (cm^3) 7.35 cm^3 02/08/24 1422   Change in Wound Size % 86.39 02/08/24 1422   Drainage Amount Large 02/08/24 1422   Drainage Description Serosanguineous 02/08/24 1422   Non-staged Wound Description Full thickness 02/08/24 1422   Dressing Status Intact 02/08/24 1422       Wound 02/08/24 Venous Ulcer Pretibial Right;Lateral (Active)   Wound Image Images linked 02/08/24 1421   Wound Description Black;Pink;Yellow;White;Slough;Epithelialization 02/08/24 1421   Nicolette-wound Assessment Edema 02/08/24 1421   Wound Length (cm) 13 cm 02/08/24 1421   Wound Width (cm) 8 cm 02/08/24 1421   Wound Depth (cm) 0.1 cm 02/08/24 1421   Wound Surface Area (cm^2) 104 cm^2 02/08/24 1421   Wound Volume (cm^3) 10.4 cm^3 02/08/24 1421   Calculated Wound Volume (cm^3) 10.4 cm^3 02/08/24 1421   Drainage Amount Large 02/08/24 1421   Drainage Description Serosanguineous 02/08/24 1421   Non-staged Wound  Description Full thickness 02/08/24 1421   Dressing Status Intact 02/08/24 1421       /80   Pulse 66   Temp (!) 96.6 °F (35.9 °C)   Resp 18     Physical Exam  Vitals reviewed.   Constitutional:       Appearance: Normal appearance.   HENT:      Head: Normocephalic and atraumatic.   Eyes:      Extraocular Movements: Extraocular movements intact.   Pulmonary:      Effort: Pulmonary effort is normal.   Musculoskeletal:      Cervical back: Neck supple.      Right lower leg: Edema (+3) present.      Left lower leg: Edema present.   Skin:     Comments: Bilateral lower extremity wound similar in size to last exam.  Serosanguineous exudate.  No signs of infection.   Neurological:      Mental Status: He is alert.   Psychiatric:         Mood and Affect: Mood normal.                             Wound Instructions:  Orders Placed This Encounter   Procedures    Wound compression and edema control     Elastic Tubular Stocking size F Tubular elastic bandage: Apply from base of toes to behind the knee. Apply in AM, may remove for sleep. Avoid prolonged standing in one place.     Standing Status:   Future     Standing Expiration Date:   2/8/2025    Wound cleansing and dressings     Right and left lower leg and ankle and great toe wounds Wash your hands with soap and water. Remove old dressing, discard into plastic bag and place in trash. Cleanse the wound with prophase prior to applying a clean dressing. Do not use tissue or cotton balls. Do not scrub the wound. Pat dry using gauze.   Shower yes, Remove dressing to shower, redress immediately. Do not leave open to air.    Apply Silver Alginate to the wound.   Cover with abd and roll gauze.   Secure with tape.   Change dressing every day to the right leg and every other day to the left leg.     This was done today     Standing Status:   Future     Standing Expiration Date:   2/8/2025    Debridement     This order was created via procedure documentation    Debridement     This  "order was created via procedure documentation        Diagnosis ICD-10-CM Associated Orders   1. 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 Chronic venous hypertension (idiopathic) with ulcer of right lower extremity (CODE) ([facility])  I87.311 lidocaine (XYLOCAINE) 4 % topical solution 5 mL     Wound compression and edema control     Wound cleansing and dressings      3. Open wound of right great toe, initial encounter  S91.101A lidocaine (XYLOCAINE) 4 % topical solution 5 mL     Wound compression and edema control     Wound cleansing and dressings      4. Non-pressure chronic ulcer left lower leg, limited to breakdown skin ([facility])  L97.921 lidocaine (XYLOCAINE) 4 % topical solution 5 mL     Wound compression and edema control     Wound cleansing and dressings      5. Chronic venous hypertension (idiopathic) with ulcer of left lower extremity (CODE) ([facility])  I87.312 lidocaine (XYLOCAINE) 4 % topical solution 5 mL     Wound compression and edema control     Wound cleansing and dressings      6. Atherosclerotic PVD with intermittent claudication ([facility])  I70.219 lidocaine (XYLOCAINE) 4 % topical solution 5 mL     Wound compression and edema control     Wound cleansing and dressings          --  Cadence Brown MD    \"This note has been constructed using a voice recognition system. Therefore there may be syntax, spelling, and/or grammatical errors. Occasional wrong word or \"sound alike\" substitutions may have occurred due to the inherent limitations of voice recognition software. Read the chart carefully and recognize, using context, where substitutions have occurred. Please call if you have any questions.\"     "

## 2024-02-08 NOTE — PATIENT INSTRUCTIONS
Orders Placed This Encounter   Procedures    Wound compression and edema control     Elastic Tubular Stocking size F Tubular elastic bandage: Apply from base of toes to behind the knee. Apply in AM, may remove for sleep. Avoid prolonged standing in one place.     Standing Status:   Future     Standing Expiration Date:   2/8/2025    Wound cleansing and dressings     Right and left lower leg and ankle and great toe wounds Wash your hands with soap and water. Remove old dressing, discard into plastic bag and place in trash. Cleanse the wound with prophase prior to applying a clean dressing. Do not use tissue or cotton balls. Do not scrub the wound. Pat dry using gauze.   Shower yes, Remove dressing to shower, redress immediately. Do not leave open to air.    Apply Silver Alginate to the wound.   Cover with abd and roll gauze.   Secure with tape.   Change dressing every day to the right leg and every other day to the left leg.     This was done today     Standing Status:   Future     Standing Expiration Date:   2/8/2025

## 2024-02-14 PROBLEM — I25.118 CORONARY ARTERY DISEASE OF NATIVE ARTERY OF NATIVE HEART WITH STABLE ANGINA PECTORIS (HCC): Status: ACTIVE | Noted: 2023-01-01

## 2024-02-14 NOTE — PROGRESS NOTES
Progress Note - Cardiology Office  [facility]    John Smallwood 89 y.o. male MRN: [id number]  : 1930  Encounter: [id number]      Assessment:     1. Chronic coronary microvascular dysfunction    2. Essential hypertension    3. Dyslipidemia    4. Premature atrial contractions    5. Vitamin D deficiency    6. Coronary artery disease of native artery of native heart with stable angina pectoris (HCC)        Discussion Summary and Plan:  1. Triple vessel coronary artery disease with microvascular dysfunction: 2023 [facility]: patient noted to have diffuse disease throughout all three coronary vessels that is severely calcified with chronic total occlusion in the mid LAD and also chronic total occlusion of left circumflex with left left collaterals    -   films reviewed by intervention and CT surgery, unfortunately due to the disease and patient age he is not a candidate for any procedure and family verbalizes understanding and has decided on optimal goal-directed medical therapy.    -   Continue Norvasc 2.5 mg daily    -   continue lisinopril 5 mg once a day    -   continue aspirin 81 mg once a day    -   continue Lipitor 40 mg daily    -   continue Xarelto 2.5 mg BID    -   continue metoprolol tartrate 25 mg Q 12 hours    -   continue Ranexa 500 mg BID    2. Hypertension: blood pressures currently stable    -   continue Norvasc 2.5 mg daily    -   continue lisinopril 5 mg a day    -   will increase Chlorthalidone to 25 mg daily due to edema    -   check BMP in one week    3. Dyslipidemia: 11/3/2023 lipid panel: total cholesterol 137, triglycerides 53, HDL 73 and LDL 53    -   continue Lipitor 40 mg daily    4. Peripheral arterial disease but lower extremity wounds: patient with diffuse disease noted throughout femoral popliteal arteries of 50 to 75% with distal MARY BETH and severe versus total occlusion of the SFA of both right and left lower extremity right greater than left.    -    Follows with vascular surgery, unfortunately due to his age and coronary artery disease he is not a candidate for any procedures    -   currently being followed by the wound center for lower extremity wounds with edema    -   will increase Chlothalidone to 25 mg daily and check BMP in one week due to lower extremity edema with weeping          Patient / Caretaker was advised and educated to call our office  immediately if  patient has any new symptoms of chest pain/shortness of breath, near-syncope, syncope, light headedness sustained palpitations  or any other cardiovascular symptoms before their scheduled follow-up appointment.  Office number was provided #[phone].    Please call [phone] if any questions.    Counseling :  A description of the counseling.  Goals and Barriers.  Patient's ability to self care: Yes  Medication side effect reviewed with patient in detail and all their questions answered to their satisfaction.    HPI :     John Smallwood is a 89 y.o. year old male who came for follow up. Patient presents with complaints of shortness of breath and decreased in his ability to perform activities. Patient states that when he walks 50 feet or more he begins to have claudication pain in his legs. He also becomes somewhat short of breath. He denies any chest pain at this time. He is also following now with the wound center at [facility] due to wounds on both lower extremities with weeping.    Patient previously was seen by Dr. Castañeda and has a history significant for chronic PACs and PVCs, carvedilol and Flomax induced Caroline, peripheral arterial disease with claudication, carotid artery disease, dyslipidemia, chronic BPH, Gerd, basal cell skin cancer, arterial ulcerations of both lower extremities, and venous insufficiency    Review of Systems   Constitutional:  Positive for activity change and fatigue.   HENT:  Negative for congestion, facial swelling, sinus pressure and trouble  swallowing.    Eyes: Negative.  Negative for photophobia and visual disturbance.   Respiratory:  Positive for shortness of breath. Negative for chest tightness.    Cardiovascular:  Positive for leg swelling. Negative for chest pain and palpitations.   Gastrointestinal:  Negative for abdominal distention, diarrhea, nausea and vomiting.   Endocrine: Negative.  Negative for polydipsia, polyphagia and polyuria.   Genitourinary: Negative.  Negative for difficulty urinating.   Musculoskeletal:  Positive for gait problem.   Skin:  Positive for wound.   Neurological:  Negative for dizziness, syncope, weakness and light-headedness.   Hematological: Negative.    Psychiatric/Behavioral: Negative.         Historical Information   Past Medical History:   Diagnosis Date    Asymptomatic PVCs     last assessed 18    BPH (benign prostatic hyperplasia)     last assessed 17     High cholesterol     last assessed 17     Hypertension     uncontrolled, last assessed 17    PAD (peripheral artery disease) ([facility]) 2023     Past Surgical History:   Procedure Laterality Date    CARDIAC CATHETERIZATION N/A 2023    Procedure: Cardiac Coronary Angiogram;  Surgeon: Donny Padilla MD;  Location: [facility] CARDIAC [facility]ATH [facility];  Service: Cardiology    CATARACT EXTRACTION      COLONOSCOPY      Complete    FL EXCISION HYDROCELE UNILATERAL Right 10/25/2018    Procedure: HYDROCELECTOMY;  Surgeon: Efraín Wright MD;  Location: [facility] MAIN OR;  Service: Urology    TESTICLE SURGERY      last assessed 3/31/15     Social History     Substance and Sexual Activity   Alcohol Use Not Currently     Social History     Substance and Sexual Activity   Drug Use No     Social History     Tobacco Use   Smoking Status Former    Current packs/day: 0.00    Types: Cigarettes    Start date: 10/18/1945    Quit date: 10/18/1965    Years since quittin.3   Smokeless Tobacco Never     Family History:   Family History   Problem Relation Age of Onset    Heart  disease Mother     Hypertension Mother     Heart disease Father     Hypertension Father     Hypertension Family     Hyperlipidemia Family        Meds/Allergies     Allergies   Allergen Reactions    Tamsulosin Syncope       Current Outpatient Medications:     amLODIPine (NORVASC) 2.5 mg tablet, Take 1 tablet (2.5 mg total) by mouth daily In the evening., Disp: 30 tablet, Rfl: 5    aspirin (ECOTRIN LOW STRENGTH) 81 mg EC tablet, Take 1 tablet (81 mg total) by mouth daily, Disp: 100 tablet, Rfl: 5    atorvastatin (LIPITOR) 40 mg tablet, Take 1 tablet (40 mg total) by mouth daily with dinner, Disp: 30 tablet, Rfl: 5    Calcium Carbonate-Vitamin D 500-125 MG-UNIT TABS, Take 1 tablet by mouth daily, Disp: , Rfl:     chlorthalidone 25 mg tablet, Take 1 tablet (25 mg total) by mouth daily, Disp: 90 tablet, Rfl: 2    Cholecalciferol (Vitamin D) 50 MCG (2000 UT) CAPS, Take 1 capsule (2,000 Units total) by mouth in the morning, Disp: , Rfl:     collagenase (SANTYL) ointment, Apply topically daily, Disp: 30 g, Rfl: 1    finasteride (PROSCAR) 5 mg tablet, Take 1 tablet (5 mg total) by mouth daily at bedtime, Disp: 90 tablet, Rfl: 3    lisinopril (ZESTRIL) 5 mg tablet, Take  1 tablet in the evening., Disp: 180 tablet, Rfl: 3    metoprolol tartrate (LOPRESSOR) 25 mg tablet, Take 1 tablet (25 mg total) by mouth every 12 (twelve) hours, Disp: 60 tablet, Rfl: 5    Multiple Vitamins-Minerals (Ocuvite Adult Formula) CAPS, Take 1 capsule by mouth in the morning, Disp: 100 capsule, Rfl: 5    oxyCODONE (ROXICODONE) 5 immediate release tablet, Take 0.5 tablets (2.5 mg total) by mouth every 6 (six) hours as needed for moderate pain or severe pain Max Daily Amount: 10 mg, Disp: 15 tablet, Rfl: 0    ranolazine (RANEXA) 500 mg 12 hr tablet, Take 1 tablet (500 mg total) by mouth every 12 (twelve) hours, Disp: 60 tablet, Rfl: 5    rivaroxaban (XARELTO) 2.5 mg tablet, Take 1 tablet (2.5 mg total) by mouth 2 (two) times a day, Disp: 60 tablet,  "Rfl: 5    triamcinolone (KENALOG) 0.025 % ointment, Apply topically 2 (two) times a day, Disp: 30 g, Rfl: 2    ammonium lactate (LAC-HYDRIN) 12 % cream, Apply topically as needed for dry skin (Patient not taking: Reported on 11/21/2023), Disp: 385 g, Rfl: 0    Vitals: Blood pressure 120/60, pulse 74, height 5' 11\" (1.803 m), weight 78.9 kg (174 lb), SpO2 100%.    Body mass index is 24.27 kg/m².  Wt Readings from Last 3 Encounters:   02/14/24 78.9 kg (174 lb)   12/20/23 75.2 kg (165 lb 12.8 oz)   11/21/23 74.8 kg (165 lb)     Vitals:    02/14/24 1115   Weight: 78.9 kg (174 lb)     BP Readings from Last 3 Encounters:   02/14/24 120/60   02/08/24 151/80   02/01/24 152/81       Physical Exam:  Physical Exam  Vitals and nursing note reviewed.   Constitutional:       General: He is not in acute distress.     Appearance: Normal appearance. He is normal weight.   HENT:      Right Ear: External ear normal.      Left Ear: External ear normal.      Nose: Nose normal.   Eyes:      General: No scleral icterus.        Right eye: No discharge.         Left eye: No discharge.   Cardiovascular:      Rate and Rhythm: Normal rate and regular rhythm.      Pulses: Normal pulses.   Pulmonary:      Effort: Pulmonary effort is normal. No respiratory distress.      Breath sounds: Normal breath sounds.   Abdominal:      General: Bowel sounds are normal. There is no distension.      Palpations: Abdomen is soft.   Musculoskeletal:      Right lower leg: Edema present.      Left lower leg: Edema present.   Skin:     General: Skin is warm and dry.      Capillary Refill: Capillary refill takes less than 2 seconds.   Neurological:      General: No focal deficit present.      Mental Status: He is alert and oriented to person, place, and time. Mental status is at baseline.   Psychiatric:         Mood and Affect: Mood normal.          Loan SPENCER  Cardiology        \"This note was completed in part utilizing m-modal fluency direct voice " "recognition software.   Grammatical errors, random word insertion, spelling mistakes, and incomplete sentences may be an occasional consequence of the system secondary to software limitations, ambient noise and hardware issues.    Please read the chart carefully and recognize, using context, where substitutions have occurred.  If you have any questions or concerns about the context, text or information contained within the body of this dictation, please contact myself, the provider, for further clarification.\"  "

## 2024-02-16 PROBLEM — I83.029 VENOUS STASIS ULCER OF LEFT LOWER EXTREMITY (HCC): Status: ACTIVE | Noted: 2023-01-01

## 2024-02-16 PROBLEM — L97.919 CHRONIC ULCER OF RIGHT LOWER EXTREMITY (HCC): Status: ACTIVE | Noted: 2024-01-01

## 2024-02-16 PROBLEM — L97.919 CHRONIC ULCER OF RIGHT LOWER EXTREMITY (HCC): Chronic | Status: ACTIVE | Noted: 2024-01-01

## 2024-02-16 PROBLEM — N40.0 BPH (BENIGN PROSTATIC HYPERPLASIA): Chronic | Status: ACTIVE | Noted: 2018-08-10

## 2024-02-16 PROBLEM — J96.01 ACUTE RESPIRATORY FAILURE WITH HYPOXIA (HCC): Status: ACTIVE | Noted: 2024-01-01

## 2024-02-16 PROBLEM — I83.029 VENOUS STASIS ULCER OF LEFT LOWER EXTREMITY (HCC): Chronic | Status: ACTIVE | Noted: 2023-01-01

## 2024-02-16 PROBLEM — J96.00 ACUTE RESPIRATORY FAILURE (HCC): Status: ACTIVE | Noted: 2024-01-01

## 2024-02-16 PROBLEM — R06.02 SOB (SHORTNESS OF BREATH) ON EXERTION: Status: ACTIVE | Noted: 2024-01-01

## 2024-02-16 PROBLEM — K21.9 GASTROESOPHAGEAL REFLUX DISEASE: Chronic | Status: ACTIVE | Noted: 2023-01-01

## 2024-02-16 PROBLEM — R79.89 ELEVATED TROPONIN: Status: ACTIVE | Noted: 2024-01-01

## 2024-02-16 PROBLEM — R79.89 ELEVATED BRAIN NATRIURETIC PEPTIDE (BNP) LEVEL: Status: ACTIVE | Noted: 2024-01-01

## 2024-02-16 PROBLEM — N17.9 AKI (ACUTE KIDNEY INJURY) (HCC): Status: ACTIVE | Noted: 2024-01-01

## 2024-02-16 PROBLEM — I25.118 CORONARY ARTERY DISEASE OF NATIVE ARTERY OF NATIVE HEART WITH STABLE ANGINA PECTORIS (HCC): Chronic | Status: ACTIVE | Noted: 2023-01-01

## 2024-02-16 PROBLEM — L97.929 VENOUS STASIS ULCER OF LEFT LOWER EXTREMITY (HCC): Status: ACTIVE | Noted: 2023-01-01

## 2024-02-16 PROBLEM — L97.929 VENOUS STASIS ULCER OF LEFT LOWER EXTREMITY (HCC): Chronic | Status: ACTIVE | Noted: 2023-01-01

## 2024-02-16 NOTE — ED PROVIDER NOTES
History  Chief Complaint   Patient presents with    Shortness of Breath     Pt brought by S for increasing SOB for a few days. Per report pt sat was 84% on RA, placed 4LNC sat of 95%.  Pt also has increased bilateral leg swelling and weeping, seen by podiatry today and had legs wrapped     89-year-old male presents with shortness of breath leg swelling orthopnea for the past few days.  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  He is currently on Xarelto and has been compliant.      History provided by:  Patient   used: No        Prior to Admission Medications   Prescriptions Last Dose Informant Patient Reported? Taking?   Calcium Carbonate-Vitamin D 500-125 MG-UNIT TABS 2/16/2024 Self Yes Yes   Sig: Take 1 tablet by mouth daily   Cholecalciferol (Vitamin D) 50 MCG (2000 UT) CAPS 2/16/2024 at 0900  No Yes   Sig: Take 1 capsule (2,000 Units total) by mouth in the morning   Multiple Vitamins-Minerals (Ocuvite Adult Formula) CAPS 2/16/2024 Self No Yes   Sig: Take 1 capsule by mouth in the morning   amLODIPine (NORVASC) 2.5 mg tablet 2/15/2024 at 0900  No Yes   Sig: Take 1 tablet (2.5 mg total) by mouth daily In the evening.   Patient taking differently: Take 2.5 mg by mouth daily   ammonium lactate (LAC-HYDRIN) 12 % cream  Self No No   Sig: Apply topically as needed for dry skin   Patient not taking: Reported on 11/21/2023   aspirin (ECOTRIN LOW STRENGTH) 81 mg EC tablet 2/16/2024 at 0900 Self No Yes   Sig: Take 1 tablet (81 mg total) by mouth daily   atorvastatin (LIPITOR) 40 mg tablet  2/15/2024 Self No Yes   Sig: Take 1 tablet (40 mg total) by mouth daily with dinner   chlorthalidone 25 mg tablet 2/16/2024 at 0900  No Yes   Sig: Take 1 tablet (25 mg total) by mouth daily   collagenase (SANTYL) ointment  Self No No   Sig: Apply topically daily   finasteride (PROSCAR) 5 mg tablet 2/15/2024 Self No Yes   Sig: Take 1 tablet (5 mg total) by mouth daily at bedtime   lisinopril (ZESTRIL) 5 mg tablet 2/15/2024 Self No Yes   Sig: Take  1 tablet in the evening.   Patient taking differently: Take 5 mg by mouth Take  1 tablet in the evening. Hold for SBP< 130   metoprolol tartrate (LOPRESSOR) 25 mg tablet 2/16/2024 Self No Yes   Sig: Take 1 tablet (25 mg total) by mouth every 12 (twelve) hours   oxyCODONE (ROXICODONE) 5 immediate release tablet  Self No No   Sig: Take 0.5 tablets (2.5 mg total) by mouth every 6 (six) hours as needed for moderate pain or severe pain Max Daily Amount: 10 mg   ranolazine (RANEXA) 500 mg 12 hr tablet 2/16/2024 Self No Yes   Sig: Take 1 tablet (500 mg total) by mouth every 12 (twelve) hours   rivaroxaban (XARELTO) 2.5 mg tablet 2/16/2024 Self No Yes   Sig: Take 1 tablet (2.5 mg total) by mouth 2 (two) times a day   triamcinolone (KENALOG) 0.025 % ointment  Self No No   Sig: Apply topically 2 (two) times a day      Facility-Administered Medications: None       Past Medical History:   Diagnosis Date    Asymptomatic PVCs     last assessed 1/18/18    BPH (benign prostatic hyperplasia)     last assessed 4/11/17     High cholesterol     last assessed 4/11/17     Hypertension     uncontrolled, last assessed 4/11/17    PAD (peripheral artery disease) ([facility]) 11/21/2023       Past Surgical History:   Procedure Laterality Date    CARDIAC CATHETERIZATION N/A 11/2/2023    Procedure: Cardiac Coronary Angiogram;  Surgeon: Donny Padilla MD;  Location: [facility] CARDIAC CATH LAB;  Service: Cardiology    CATARACT EXTRACTION      COLONOSCOPY      Complete    [place] EXCISION HYDROCELE UNILATERAL Right 10/25/2018     Procedure: HYDROCELECTOMY;  Surgeon: Efraín Wright MD;  Location: [facility] MAIN OR;  Service: Urology    TESTICLE SURGERY      last assessed 3/31/15       Family History   Problem Relation Age of Onset    Heart disease Mother     Hypertension Mother     Heart disease Father     Hypertension Father     Hypertension Family     Hyperlipidemia Family      I have reviewed and agree with the history as documented.    E-Cigarette/Vaping    E-Cigarette Use Never User      E-Cigarette/Vaping Substances    Nicotine No     THC No     CBD No     Flavoring No     Other No     Unknown No      Social History     Tobacco Use    Smoking status: Former     Current packs/day: 0.00     Types: Cigarettes     Start date: 10/18/1945     Quit date: 10/18/1965     Years since quittin.3    Smokeless tobacco: Never   Vaping Use    Vaping status: Never Used   Substance Use Topics    Alcohol use: Not Currently    Drug use: No       Review of Systems   Constitutional: Negative.    HENT: Negative.     Eyes: Negative.    Respiratory:  Positive for shortness of breath.    Cardiovascular:  Positive for leg swelling.   Gastrointestinal: Negative.    Endocrine: Negative.    Genitourinary: Negative.    Musculoskeletal: Negative.    Skin: Negative.    Allergic/Immunologic: Negative.    Neurological: Negative.    Hematological: Negative.    Psychiatric/Behavioral: Negative.     All other systems reviewed and are negative.      Physical Exam  Physical Exam  Vitals and nursing note reviewed.   Constitutional:       Appearance: Normal appearance.   HENT:      Head: Normocephalic and atraumatic.      Nose: Nose normal.      Mouth/Throat:      Mouth: Mucous membranes are moist.   Eyes:      Extraocular Movements: Extraocular movements intact.      Pupils: Pupils are equal, round, and reactive to light.   Cardiovascular:      Rate and Rhythm: Normal rate and regular rhythm.   Pulmonary:      Effort: Pulmonary effort is normal.      Breath sounds: Normal  breath sounds.   Abdominal:      General: Abdomen is flat. Bowel sounds are normal.      Palpations: Abdomen is soft.   Musculoskeletal:         General: Normal range of motion.      Cervical back: Normal range of motion and neck supple.      Right lower leg: Edema present.      Left lower leg: Edema present.   Skin:     General: Skin is warm.      Capillary Refill: Capillary refill takes less than 2 seconds.   Neurological:      General: No focal deficit present.      Mental Status: He is alert and oriented to person, place, and time. Mental status is at baseline.   Psychiatric:         Mood and Affect: Mood normal.         Thought Content: Thought content normal.         Vital Signs  ED Triage Vitals [02/16/24 1717]   Temperature Pulse Respirations Blood Pressure SpO2   (!) 97.3 °F (36.3 °C) 105 (!) 26 132/82 95 %      Temp Source Heart Rate Source Patient Position - Orthostatic VS BP Location FiO2 (%)   Oral Monitor Lying Left arm --      Pain Score       --           Vitals:    02/16/24 1717 02/16/24 1800 02/16/24 1845 02/16/24 1945   BP: 132/82 124/71 118/77 113/70   Pulse: 105 102 100 103   Patient Position - Orthostatic VS: Lying            Visual Acuity      ED Medications  Medications   heparin (porcine) 25,000 units in 0.45% NaCl 250 mL infusion (premix) (12 Units/kg/hr × 80 kg (Order-Specific) Intravenous New Bag 2/16/24 1833)   heparin (porcine) injection 4,000 Units (has no administration in time range)   heparin (porcine) injection 2,000 Units (has no administration in time range)   furosemide (LASIX) injection 40 mg (40 mg Intravenous Given 2/16/24 1830)   heparin (porcine) injection 4,000 Units (4,000 Units Intravenous Given 2/16/24 1831)       Diagnostic Studies  Results Reviewed       Procedure Component Value Units Date/Time    HS Troponin I 2hr [[id number]]  (Abnormal) Collected: 02/16/24 1944    Lab Status: Final result Specimen: Blood from Arm, Right Updated: 02/16/24 2016     hs TnI 2hr 1,334  ng/L      Delta 2hr hsTnI 160 ng/L     APTT six (6) hours after Heparin bolus/drip initiation or dosing change [[id number]]  (Abnormal) Collected: 02/16/24 1839    Lab Status: Final result Specimen: Blood from Arm, Right Updated: 02/16/24 1951     PTT 45 seconds     Protime-INR [[id number]]  (Abnormal) Collected: 02/16/24 1839    Lab Status: Final result Specimen: Blood from Arm, Right Updated: 02/16/24 1951     Protime 19.2 seconds      INR 1.60    HS Troponin I 4hr [[id number]]     Lab Status: No result Specimen: Blood     B-Type Natriuretic Peptide(BNP) [[id number]]  (Abnormal) Collected: 02/16/24 1735    Lab Status: Final result Specimen: Blood from Arm, Right Updated: 02/16/24 1805     BNP 1,274 pg/mL     HS Troponin 0hr (reflex protocol) [[id number]]  (Abnormal) Collected: 02/16/24 1735    Lab Status: Final result Specimen: Blood from Arm, Right Updated: 02/16/24 1804     hs TnI 0hr 1,174 ng/L     Comprehensive metabolic panel [[id number]]  (Abnormal) Collected: 02/16/24 1735    Lab Status: Final result Specimen: Blood from Arm, Right Updated: 02/16/24 1803     Sodium 137 mmol/L      Potassium 4.6 mmol/L      Chloride 103 mmol/L      CO2 25 mmol/L      ANION GAP 9 mmol/L      BUN 48 mg/dL      Creatinine 1.37 mg/dL      Glucose 136 mg/dL      Calcium 9.3 mg/dL      AST 33 U/L      ALT 24 U/L      Alkaline Phosphatase 62 U/L      Total Protein 7.3 g/dL      Albumin 3.9 g/dL      Total Bilirubin 0.57 mg/dL      eGFR 44 ml/min/1.73sq m     Narrative:      National Kid[employer] Foundation guidelines for Chronic Kidney Disease (CKD):     Stage 1 with normal or high GFR (GFR > 90 mL/min/1.73 square meters)    Stage 2 Mild CKD (GFR = 60-89 mL/min/1.73 square meters)    Stage 3A Moderate CKD (GFR = 45-59 mL/min/1.73 square meters)    Stage 3B Moderate CKD (GFR = 30-44 mL/min/1.73 square meters)    Stage 4 Severe CKD (GFR = 15-29 mL/min/1.73 square meters)    Stage 5 End Stage CKD (GFR <15 mL/min/1.73 square meters)  Note:  GFR calculation is accurate only with a steady state creatinine    Magnesium [[id number]]  (Normal) Collected: 02/16/24 1735    Lab Status: Final result Specimen: Blood from Arm, Right Updated: 02/16/24 1803     Magnesium 2.0 mg/dL     CBC and differential [[id number]]  (Abnormal) Collected: 02/16/24 1735    Lab Status: Final result Specimen: Blood from Arm, Right Updated: 02/16/24 1741     WBC 9.07 Thousand/uL      RBC 3.62 Million/uL      Hemoglobin 11.1 g/dL      Hematocrit 33.1 %      MCV 91 fL      MCH 30.7 pg      MCHC 33.5 g/dL      RDW 13.5 %      MPV 9.0 fL      Platelets 374 Thousands/uL      nRBC 0 /100 WBCs      Neutrophils Relative 84 %      Immat GRANS % 0 %      Lymphocytes Relative 6 %      Monocytes Relative 10 %      Eosinophils Relative 0 %      Basophils Relative 0 %      Neutrophils Absolute 7.59 Thousands/µL      Immature Grans Absolute 0.04 Thousand/uL      Lymphocytes Absolute 0.51 Thousands/µL      Monocytes Absolute 0.90 Thousand/µL      Eosinophils Absolute 0.01 Thousand/µL      Basophils Absolute 0.02 Thousands/µL     UA (URINE) with reflex to Scope [[id number]]     Lab Status: No result Specimen: Urine                    XR chest 1 view portable    (Results Pending)              Procedures  ECG 12 Lead Documentation Only    Date/Time: 2/16/2024 8:18 PM    Performed by: Delmy Jackson DO  Authorized by: Delmy Jackson DO    ECG reviewed by me, the ED Provider: yes    Patient location:  ED  Previous ECG:     Previous ECG:  Unavailable    Comparison to cardiac monitor: Yes    Interpretation:     Interpretation: abnormal    Rate:     ECG rate assessment: normal    Ectopy:     Ectopy: none    QRS:     QRS axis:  Normal  Conduction:     Conduction: normal    ST segments:     ST segments:  Non-specific  T waves:     T waves: non-specific    Comments:      ST depressions noted diffusely.  No ST elevations noted.  CriticalCare Time    Date/Time: 2/16/2024 8:18 PM    Performed by: Delmy Jackson    Authorized by: Delmy Jackson DO    Critical care provider statement:     Critical care time (minutes):  45    Critical care time was exclusive of:  Separately billable procedures and treating other patients    Critical care was time spent personally by me on the following activities:  Blood draw for specimens, obtaining history from patient or surrogate, discussions with consultants, development of treatment plan with patient or surrogate, evaluation of patient's response to treatment, examination of patient, interpretation of cardiac output measurements, ordering and performing treatments and interventions, ordering and review of laboratory studies, ordering and review of radiographic studies, re-evaluation of patient's condition and review of old charts    I assumed direction of critical care for this patient from another provider in my specialty: no             ED Course                                             Medical Decision Making  Patient evaluated with EKG labs and imaging diagnosis of congestive heart failure given 40 of IV Lasix his troponin was 1200 with ST depressions on the EKG I looked through his chart medical management advised admitted to the hospitalist service with a heparin drip.  Patient is on Xarelto took only 1 dose this morning I checked with the cardiologist on-call via [place] text he is okay with a heparin drip at this time.    Problems Addressed:  CH[facility]congestive heart failure) (HCC): acute illness or injury  Dyspnea: acute illness or injury  NSTEMI (non-ST elevated myocardial infarction) (HCC): acute illness or injury    Amount and/or Complexity of Data Reviewed  External Data Reviewed: notes.  Labs: ordered. Decision-making details documented in ED Course.  Radiology: ordered and independent interpretation performed. Decision-making details documented in ED Course.     Details: Vascular congestion    ECG/medicine tests: ordered and independent interpretation performed.  Decision-making details documented in ED Course.    Risk  Prescription drug management.  Decision regarding hospitalization.             Disposition  Final diagnoses:   Dyspnea   CH[facility]congestive heart failure) ([facility])   NSTEMI (non-ST elevated myocardial infarction) ([facility])     Time reflects when diagnosis was documented in both MDM as applicable and the Disposition within this note       Time User Action Codes Description Comment    2/16/2024  6:34 PM Delmy Jackson [R06.00] Dyspnea     2/16/2024  6:34 PM Delmy Jackson [I50.9] CH[facility]congestive heart failure) ([facility])     2/16/2024  6:34 PM Delmy Jackson [I21.4] NSTEMI (non-ST elevated myocardial infarction) ([facility])           ED Disposition       ED Disposition   Admit    Condition   Stable    Date/Time   Fri Feb 16, 2024  6:34 PM    Comment                   Follow-up Information    None         Patient's Medications   Discharge Prescriptions    No medications on file       No discharge procedures on file.    PDMP Review       None            ED Provider  Electronically Signed by             Delmy Jackson DO  02/16/24 2020

## 2024-02-16 NOTE — PROGRESS NOTES
Chief Complaint   Patient presents with   • Shortness of Breath     Started Tuesday        Patient ID: John Smallwood is a 89 y.o. male.    Shortness of Breath  Episode onset: 3 days. The problem occurs constantly. The problem is unchanged. The problem is severe. Associated symptoms include fatigue, leg swelling and orthopnea. Pertinent negatives include no chest pain, chest pressure, coughing, dizziness, hoarseness of voice, palpitations, rhinorrhea, sore throat, stridor, sweats or wheezing. The symptoms are aggravated by activity. Past treatments include nothing. His past medical history is significant for DVT.   Has CAD       The following portions of the patient's history were reviewed and updated as appropriate: allergies, current medications, past family history, past medical history, past social history, past surgical history and problem list.    Review of Systems   Constitutional:  Positive for fatigue.   HENT:  Negative for hoarse voice, rhinorrhea and sore throat.    Respiratory:  Positive for shortness of breath. Negative for cough, wheezing and stridor.    Cardiovascular:  Positive for orthopnea and leg swelling. Negative for chest pain and palpitations.   Gastrointestinal: Negative.    Neurological:  Negative for dizziness.       Current Outpatient Medications   Medication Sig Dispense Refill   • amLODIPine (NORVASC) 2.5 mg tablet Take 1 tablet (2.5 mg total) by mouth daily In the evening. 30 tablet 5   • aspirin (ECOTRIN LOW STRENGTH) 81 mg EC tablet Take 1 tablet (81 mg total) by mouth daily 100 tablet 5   • atorvastatin (LIPITOR) 40 mg tablet Take 1 tablet (40 mg total) by mouth daily with dinner 30 tablet 5   • Calcium Carbonate-Vitamin D 500-125 MG-UNIT TABS Take 1 tablet by mouth daily     • chlorthalidone 25 mg tablet Take 1 tablet (25 mg total) by mouth daily 90 tablet 2   • Cholecalciferol (Vitamin D) 50 MCG (2000 UT) CAPS Take 1 capsule (2,000 Units total) by mouth in the morning     •  "collagenase (SANTYL) ointment Apply topically daily 30 g 1   • finasteride (PROSCAR) 5 mg tablet Take 1 tablet (5 mg total) by mouth daily at bedtime 90 tablet 3   • lisinopril (ZESTRIL) 5 mg tablet Take  1 tablet in the evening. 180 tablet 3   • metoprolol tartrate (LOPRESSOR) 25 mg tablet Take 1 tablet (25 mg total) by mouth every 12 (twelve) hours 60 tablet 5   • Multiple Vitamins-Minerals (Ocuvite Adult Formula) CAPS Take 1 capsule by mouth in the morning 100 capsule 5   • oxyCODONE (ROXICODONE) 5 immediate release tablet Take 0.5 tablets (2.5 mg total) by mouth every 6 (six) hours as needed for moderate pain or severe pain Max Daily Amount: 10 mg 15 tablet 0   • ranolazine (RANEXA) 500 mg 12 hr tablet Take 1 tablet (500 mg total) by mouth every 12 (twelve) hours 60 tablet 5   • rivaroxaban (XARELTO) 2.5 mg tablet Take 1 tablet (2.5 mg total) by mouth 2 (two) times a day 60 tablet 5   • triamcinolone (KENALOG) 0.025 % ointment Apply topically 2 (two) times a day 30 g 2   • ammonium lactate (LAC-HYDRIN) 12 % cream Apply topically as needed for dry skin (Patient not taking: Reported on 11/21/2023) 385 g 0     No current facility-administered medications for this visit.       Objective:    /68 (BP Location: Left arm, Patient Position: Sitting, Cuff Size: Standard)   Pulse 98   Temp 98 °F (36.7 °C) (Temporal)   Resp (!) 26   Ht 5' 11\" (1.803 m)   Wt 80.3 kg (177 lb)   SpO2 95%   BMI 24.69 kg/m²        Physical Exam  Constitutional:       General: He is in acute distress.      Appearance: He is ill-appearing.   Cardiovascular:      Rate and Rhythm: Normal rate and regular rhythm.      Heart sounds: Murmur heard.   Pulmonary:      Effort: Respiratory distress (with minimal exertion) present.      Breath sounds: Examination of the right-lower field reveals decreased breath sounds. Decreased breath sounds present. No wheezing, rhonchi or rales.   Musculoskeletal:      Right lower leg: Edema present.      " Left lower leg: Edema present.   Skin:     Coloration: Skin is pale.   Neurological:      Mental Status: He is alert and oriented to person, place, and time.                 Assessment/Plan:         Diagnoses and all orders for this visit:    SOB (shortness of breath) on exertion  -     Transfer to other facility    Acute congestive heart failure, unspecified heart failure type (HCC)  -     Transfer to other facility        Rto ezekiel Smith MD

## 2024-02-16 NOTE — TELEPHONE ENCOUNTER
Dr. Rosales:    Dr. Isaacs's office called stating that patient is SOB and would like for him to seen today.  Patient and his wife were advised to go to ED but they refused and would like an appointment with PCP today.  Advised that I would have to discuss with you and get back to patient.

## 2024-02-17 PROBLEM — I50.43 ACUTE ON CHRONIC COMBINED SYSTOLIC AND DIASTOLIC HEART FAILURE (HCC): Status: ACTIVE | Noted: 2024-01-01

## 2024-02-17 PROBLEM — J90 PLEURAL EFFUSION, BILATERAL: Status: ACTIVE | Noted: 2024-01-01

## 2024-02-17 PROBLEM — R65.10 SIRS (SYSTEMIC INFLAMMATORY RESPONSE SYNDROME) (HCC): Status: ACTIVE | Noted: 2024-01-01

## 2024-02-17 PROBLEM — I50.9 ACUTE HEART FAILURE (HCC): Status: ACTIVE | Noted: 2024-01-01

## 2024-02-17 NOTE — ASSESSMENT & PLAN NOTE
Follows vascular as outpatient.  Continue aspirin Lipitor  Hold low-dose Xarelto as patient is on heparin drip currently

## 2024-02-17 NOTE — PROCEDURES
Thoracentesis (Bedside)    Date/Time: 2/17/2024 4:20 PM    Performed by: TJ Hansen  Authorized by: TJ Hansen    Patient location:  ICU  Other Assisting Provider: Yes (comment) (Dr. Tinoco)    Consent:     Consent obtained:  Verbal    Consent given by:  Patient    Risks discussed:  Bleeding, infection, pain, incomplete drainage, nerve damage and pneumothorax    Alternatives discussed:  No treatment  Universal protocol:     Procedure explained and questions answered to patient or proxy's satisfaction: yes      Relevant documents present and verified: yes      Test results available and properly labeled: yes      Radiology Images displayed and confirmed.  If images not available, report reviewed: yes      Required blood products, implants, devices and special equipment available: yes      Site/side marked: yes      Immediately prior to procedure a time out was called: yes      Patient identity confirmed:  Arm band and verbally with patient  Indications:     Procedure Purpose: diagnostic      Indications: pleural effusion    Sedation:     Sedation type:  Anxiolysis  Anesthesia (see MAR for exact dosages):     Anesthesia method:  Local infiltration    Local anesthetic:  Lidocaine 1% w/o epi  Procedure details:     Preparation: Patient was prepped and draped in usual sterile fashion      Standard thoracentesis cath kit used: Yes      Patient position:  Sitting    Laterality:  Right    Location:  Posterior    Intercostal space:  7th    Puncture method:  Over-the-needle catheter    Ultrasound guidance: yes      Reason for ultrasound: Identify fluid collection and guide catheter placement.      Ultrasound image availability:  Not saved    Sterile ultrasound techniques: Sterile gel and sterile probe covers were used      Indwelling catheter placed: no      Number of attempts:  1    Needle gauge:  18    Catheter size:  8 Fr    Drainage color:  Yellow    Drainage characteristics:  Clear    Fluid removed  amount:  650 ml  Post-procedure details:     Chest x-ray performed: yes      Chest x-ray findings:  Pleural effusion improved    Patient tolerance of procedure:  Tolerated well, no immediate complications

## 2024-02-17 NOTE — ASSESSMENT & PLAN NOTE
Baseline creatinine appears to be around 0.7-0.8 in past year  Creatinine today 1.37  Suspect cardiorenal  IV Lasix as above  Monitor BMP

## 2024-02-17 NOTE — ASSESSMENT & PLAN NOTE
Lab Results   Component Value Date    CHOLESTEROL 137 11/03/2023    CHOLESTEROL 153 12/06/2022    CHOLESTEROL 154 11/30/2021     Lab Results   Component Value Date    HDL 73 11/03/2023    HDL 77 12/06/2022    HDL 78 11/30/2021     Lab Results   Component Value Date    TRIG 53 11/03/2023    TRIG 74 12/06/2022    TRIG 61 11/30/2021     Lab Results   Component Value Date    NONHDLC 64 11/03/2023    NONHDLC 76 12/06/2022    NONHDLC 76 11/30/2021     Continue statin

## 2024-02-17 NOTE — ASSESSMENT & PLAN NOTE
Pt presented with SOB on exertion for about 4 days. Also with associated wet cough/mild sore throat but unable to expectorate sputum. Denies fever, chills, or chest pain. Sent to ED from PCP office today after SpO2 84% on room air during visit  SpO2 low 90's on 4 L in ED and was admitted to [facility]  BNP 1,274. Left lower extremity edema on exam, right lower extremity difficult to assess due to dressing  CXR appears with diffuse pulmonary edema, pleural effusions,  + left mid/lower consolidation    S/p total of 80mg IV lasix yesterday with UOP of 850ml last evening.   Had escalating FiO2 requirements to 15L then NRB mask on [facility]  ABG 7.43/34/61/22  CT chest: bilateral centrilobular and dependent, multifocal infiltrates. Also moderate bilateral effusions. Suspect infiltrates 2/2 fluid overload vs. infectious etiology on my read. Formal report is pending   COVID flu RSV negative  Procalcitonin 0.07    Plan  Blood cultures and sputum culture pending  Check urine antigens for completeness  Respiratory protocol  WOB remained increased despite HFNC 55L and 95% last evening. Was transitioned to BiPap with some improvement.   Current BiPap settings: 10/6 55%  Wean O2 for goal SpO2 >90%  Continue ceftriaxone, azithromycin D2 for possible pulmonary infection  Follow culture data

## 2024-02-17 NOTE — ASSESSMENT & PLAN NOTE
Patient presents with SOB with exertion for about 4 days, with associated wet cough/mild sore throat.  Denies fever chills chest pain.  Sent to ED from PCP office today.  Reported SpO2 84% on room air in PCPs office.  Was on oxygen 4 L in ED, satting low 90s.  Escalated to mid flow 15L to maintain SpO2 above 90% when arrived to the floor  Chart reviewed.  Patient was hospitalized in November 2023 for abnormal stress test.  Underwent cardiac cath which revealed severe RCA and left main CAD, deemed not a surgical candidate with cardiology recommendation of medical management optimization.  Chest x-ray appears to have pulmonary edema, pending final read.  BNP 1274  Troponin 1174  Left lower extremity edema on exam, right lower extremity difficult to assess due to dressing  Suspect due to acute CHF.  Management as below.  Check COVID flu RSV for completeness  Check ABG stat  Check CT chest  Respiratory protocol

## 2024-02-17 NOTE — ASSESSMENT & PLAN NOTE
Follows vascular as outpatient.  Continue aspirin, Lipitor  Hold home Xarelto as patient is on heparin drip for elevated troponin

## 2024-02-17 NOTE — ASSESSMENT & PLAN NOTE
On low-dose Norvasc and lisinopril at home  Hold Norvasc to allow room for diuretic  Holding lisinopril in setting of GASPER  BP WNL off antihypertensives  VS per unit protocol

## 2024-02-17 NOTE — ASSESSMENT & PLAN NOTE
Pt presented with SOB on exertion x 4 days, associated wet cough/mild sore throat, orthopnea, leg swelling. Denies fever, chills, or chest pain. Sent to ED from PCP office 2/16 after SpO2 84% on room air during visit.   Increased FiO2 requirements from 4L on GMF to HFNC. Xfr to ICU evening 2/16  BNP 1,274. Left lower extremity edema on exam, right lower extremity difficult to assess due to dressing  CXR appears with diffuse pulmonary edema, pleural effusions,  + left mid/lower consolidation    2/16 CT chest: pulmonary interstitial edema in the setting of acute CHF/fluid overload. Superimposed infection difficult to exclude.   COVID flu RSV negative  S/p total of 80mg IV lasix 2/16 and additional 40mg lasix 2/17. Net stay fluid balance: -1.9L  Procalcitonin 0.07-> 0.24  Urine antigens negative  S/p right thora for 650ml 2/17. Had brief improvement in WOB and O2 requirements shortly after. Then with worsening WOB and desaturation into the evening. Unable to tolerate BiPap. BP borderline, no diuretics administered. Placed on HFNC 95% and 50L and WOB improved. This AM he awoke SOB and is verbalizing wishes to just be made comfortable. He wishes for family to come to the bedside first so that he can discuss this with them in person.     Plan  Blood cultures, sputum culture, MRSA surveillance pending  Respiratory protocol  Continue duoneb, 3% saline IH  Continue ceftriaxone D3. Can d/c azithromycin given neg legionella antigen  Current O2 requirements: HFNC 95% and 50L with intermittent superimposed NRB to recover from desaturations  Did not tolerate BiPap overnight  Consider additional L. Thora today pending POCUS and GOC discussion  Wean O2 for goal SpO2 >90%  Follow culture data, procal

## 2024-02-17 NOTE — SEPSIS NOTE
"  Sepsis Note   John Smallwood 89 y.o. male MRN: [id number]  Unit/Bed#: ICU 08 Encounter: [id number]       Initial Sepsis Screening       Row Name 02/16/24 2322                Is the patient's history suggestive of a new or worsening infection? Yes (Proceed)  -KM        Suspected source of infection pneumonia  -KM        Indicate SIRS criteria Tachycardia > 90 bpm;Tachypnea > 20 resp per min  -KM        Are two or more of the above signs & symptoms of infection both present and new to the patient? No  -KM        Assess for evidence of organ dysfunction: Are any of the below criteria present within 6 hours of suspected infection and SIRS criteria that are NOT considered to be chronic conditions? New need for invasive/non-invasive ventilation  -KM        Date of presentation of severe sepsis 02/16/24  -KM        Time of presentation of severe sepsis 2322  -KM        Sepsis Note: Click \"NEXT\" below (NOT \"close\") to generate sepsis note based on above information. YES (proceed by clicking \"NEXT\")  -KM                  User Key  (r) = Recorded By, (t) = Taken By, (c) = Cosigned By      Initials Name Provider Type    KM TJ Nguyen Nurse Practitioner                        Body mass index is 24.26 kg/m².  Wt Readings from Last 1 Encounters:   02/16/24 78.9 kg (173 lb 15.1 oz)     IBW (Ideal Body Weight): 75.3 kg    Ideal body weight: 75.3 kg (166 lb 0.1 oz)  Adjusted ideal body weight: 76.7 kg (169 lb 2.9 oz)    "

## 2024-02-17 NOTE — ASSESSMENT & PLAN NOTE
Pt presented with SOB on exertion for about 4 days. Also with associated wet cough/mild sore throat but unable to expectorate sputum. Denies fever, chills, or chest pain. Sent to ED from PCP office today after SpO2 84% on room air during visit  SpO2 low 90's on 4 L in ED and was admitted to [facility]  BNP 1,274. Left lower extremity edema on exam, right lower extremity difficult to assess due to dressing  CXR appears with diffuse pulmonary edema + possible left sided multifocal PNA  S/p lasix 40mg in ED with only 200ml urine output   Had escalating FiO2 requirements to 15L then NRB mask on [facility]  ABG 7.43/34/61/222  Additional 40mg IV lasix given with additional 300ml urine output  CT chest: bilateral centrilobular and dependent, multifocal infiltrates. Also moderate bilateral effusions. Suspect infiltrates 2/2 fluid overload vs. Infectious etiology  COVID flu RSV negative    Plan  Obtain procalcitonin, blood cultures, sputum culture  Check urine antigens for completeness  Respiratory protocol  Start HFNC on transfer to ICU, goal SpO2 >90%  Start ceftriaxone, azithromycin for possible pulmonary infection  Follow culture data   Vaccine status unknown

## 2024-02-17 NOTE — ASSESSMENT & PLAN NOTE
As above, has known severe RCA and left main CAD, not amenable to intervention.  Initial troponin 1174.  Repeat 1334  Initial EKG sinus tach with frequent PVCs, rate 101, ST depression in inferior leads and V4-6.  Patient denies chest pain, chest pressure  NSTEMI versus type II MI due to CHF  Started on heparin drip in ED.  Will continue.  Continue home medication baby aspirin, Lipitor 40, Ranexa, metoprolol  Telemetry  Consult cardiology.

## 2024-02-17 NOTE — NURSING NOTE
Pt arrived to unit on 6L nasal cannula, desaturated to mid 70s applied 15L midflow. PATRICIO Eden notified. response at bedside. New orders administered. Pt transferred by this RN to CT on 15L non rebreather saturation low 90s. PETER Chan response at CT while pt saturation in high 70s. Transferred to ICU following CT.

## 2024-02-17 NOTE — PHYSICAL THERAPY NOTE
"   PT EVALUATION     02/17/24 1440   PT Last Visit   PT Visit Date 02/17/24   Note Type   Note type Evaluation   Pain Assessment   Pain Assessment Tool 0-10   Pain Score No Pain   Restrictions/Precautions   Weight Bearing Precautions Per Order No   Other Precautions Chair Alarm;Bed Alarm;Multiple lines;Telemetry;O2;Fall Risk   Home Living   Type of Home House   Home Layout One level  (1 CARLOS at doorway)   Bathroom Shower/Tub Tub/shower unit   Bathroom Equipment Grab bars in shower   Home Equipment Cane   Prior Function   Level of [place] Independent with ADLs;Independent with functional mobility;Needs assistance with IADLS   Lives With Spouse   Receives Help From Family   IADLs Independent with driving;Family/Friend/Other provides meals;Independent with medication management   Comments wife cooks meals   General   Additional Pertinent History pt admitted due to SOB, CHF.   Family/Caregiver Present Yes  (wife and daughter)   Cognition   Overall Cognitive Status WFL   Arousal/Participation Cooperative   Attention Within functional limits   Orientation Level Oriented X4   Following Commands Follows one step commands without difficulty   Subjective   Subjective Pt reports that his right leg is \"weepy\" at wound.   RLE Assessment   RLE Assessment WFL  (ROM WFLs, strength: grossly 3 to 3+ /5 via observation: AROM)   LLE Assessment   LLE Assessment WFL  (ROM WFLs, strength: grossly 3 to 3+ /5 via observation: AROM)   Bed Mobility   Supine to Sit 3  Moderate assistance   Additional items Assist x 2;Increased time required;Verbal cues   Sit to Supine 3  Moderate assistance   Additional items Assist x 2;Verbal cues;LE management   Transfers   Sit to Stand 3  Moderate assistance   Additional items Assist x 2;Verbal cues   Stand to Sit 3  Moderate assistance   Additional items Assist x 2;Verbal cues   Additional Comments no ambulation: able to take one step along side of bed with Mod A of 2 before sitting. "   Ambulation/Elevation   Gait Assistance Not tested   Balance   Static Sitting Fair +   Dynamic Sitting Fair   Static Standing Poor +   Dynamic Standing Poor   Ambulatory Zero   Activity Tolerance   Activity Tolerance Patient limited by fatigue   Nurse Made Aware yes: Angélica RN: pt in bed, sat at Side of bed, sats varied, but ranged from 87 to 95% without acute distress.   Assessment   Prognosis Good   Problem List Decreased strength;Decreased endurance;Impaired balance;Decreased mobility   Assessment Patient seen for Physical Therapy evaluation. Patient admitted with Acute respiratory failure with hypoxia (HCC).  Comorbidities affecting patient's physical performance include: CAD, HTN, PAD, GERD, LE leg ulcers. Personal factors affecting patient at time of initial evaluation include: lives in single story house, ambulating with assistive device, inability to ambulate household distances, inability to navigate community distances, inability to navigate level surfaces without external assistance, hearing impairments, inability to perform physical activity, inability to perform ADLS, and inability to perform IADLS . Prior to admission, patient was independent with functional mobility with single point cane , independent with ADLS, independent with IADLS, living with wife in a single level home with 1 steps to enter, ambulating household distance, ambulating community distances, retired, and home with family assist.  Please find objective findings from Physical Therapy assessment regarding body systems outlined above with impairments and limitations including weakness, impaired balance, decreased endurance, gait deviations, decreased activity tolerance, decreased functional mobility tolerance, fall risk, SOB upon exertion, and decreased skin integrity.  The Barthel Index was used as a functional outcome tool presenting with a score of Barthel Index Score: 30 today indicating marked limitations of functional mobility and  ADLS.  Patient's clinical presentation is currently unstable/unpredictable as seen in patient's presentation of vital sign response, increased fall risk, new onset of impairment of functional mobility, decreased endurance, and new onset of weakness. Pt would benefit from continued Physical Therapy treatment to address deficits as defined above and maximize level of functional mobility. As demonstrated by objective findings, the assigned level of complexity for this evaluation is high.The patient's -[facility] Basic Mobility Inpatient Short Form Raw Score is 10. A Raw score of less than or equal to 16 suggests the patient may benefit from discharge to post-acute rehabilitation services. Please also refer to the recommendation of the Physical Therapist for safe discharge planning.   Goals   Patient Goals to get stronger and go home   STG Expiration Date 02/24/24   Short Term Goal #1 Min A to supervision for transfers supine <> short sit and sit <> stand with LRAD   Short Term Goal #2 Min A for tranfer bed <> chair / commode with use of LRAD.;  Min A for amb. with LRAD for 40 feet with change in direction and fair + balance with O2 sats remaining in mid to high 90s range with O2 PRN.,   LTG Expiration Date 03/02/24   Long Term Goal #1 Indep amb. with SPC for functional household distances with good balance  on RA with O2 sats remaining in mid to hi 90s range .   Long Term Goal #2 AMPAC score at least 22/24 to improve independence with functional mobility.   Plan   Treatment/Interventions Functional transfer training;LE strengthening/ROM;Therapeutic exercise;Endurance training;Patient/family training;Equipment eval/education;Bed mobility;Gait training;Spoke to nursing;OT;Family   PT Frequency 3-5x/wk   Discharge Recommendation   Rehab Resource Intensity Level, PT II (Moderate Resource Intensity)   AM-PAC Basic Mobility Inpatient   Turning in Flat Bed Without Bedrails 2   Lying on Back to Sitting on Edge of Flat Bed Without  Bedrails 2   Moving Bed to Chair 2   Standing Up From Chair Using Arms 2   Walk in Room 1   Climb 3-5 Stairs With Railing 1   Basic Mobility Inpatient Raw Score 10   Turning Head Towards Sound 4   Follow Simple Instructions 3   Low Function Basic Mobility Raw Score  17   Low Function Basic Mobility Standardized Score  27.46   Highest Level Of Mobility   [facility] Goal 4: Move to chair/commode   -[facility] Achieved 3: Sit at edge of bed   Barthel Index   Feeding 5   Bathing 0   Grooming Score 0   Dressing Score 5   Bladder Score 0   Bowels Score 10   Toilet Use Score 5   Transfers (Bed/Chair) Score 5   Mobility (Level Surface) Score 0   Stairs Score 0   Barthel Index Score 30   End of Consult   Patient Position at End of Consult Supine;Bed/Chair alarm activated;All needs within reach  (pt in long sit in bed on hi flow ; all lines intact.  RN aware)   Licensure   [place] License Number  Reshma mora PT  [id number]

## 2024-02-17 NOTE — ASSESSMENT & PLAN NOTE
Noted on CXR and CT chest in setting of fluid overload  S/p diuresis as above  S/p R. Thora 2/17 for 650ml    Plan  Daily consideration for diuresis  Consider Left thoracentesis pending POCUS/AM CXR today  Rest of plan as outlined

## 2024-02-17 NOTE — ASSESSMENT & PLAN NOTE
Chronic ulcer in right great toe and right lower leg, likely secondary to PAD and venous stasis. New dressing applied in office today   Continue local wound care  Consult wound care

## 2024-02-17 NOTE — ASSESSMENT & PLAN NOTE
Chronic ulcer in right great toe and right lower leg, likely secondary to PAD and venous stasis. New dressing applied in office 2/16   Continue daily local wound care  Consult wound care

## 2024-02-17 NOTE — ASSESSMENT & PLAN NOTE
Baseline creatinine appears to be around 0.7-0.8 in past year  Creatinine trend 1.37-> 1.33  Suspect cardiorenal. EF 35%  IV Lasix as above  Monitor BMP  Avoid nephrotoxins  Close I&O monitoring

## 2024-02-17 NOTE — ASSESSMENT & PLAN NOTE
On low-dose Norvasc and lisinopril at home  Hold Norvasc to allow room for diuretic  Continue lisinopril with holding parameter  BP acceptable currently

## 2024-02-17 NOTE — ASSESSMENT & PLAN NOTE
Has known severe RCA and left main CAD, not amenable to intervention.  Troponin 1,174-> 1,334-> 1,420  Initial EKG sinus tach with frequent PVCs, rate 101, ST depression in inferior leads and V4-6.  Patient denies chest pain or pressure  Suspect type II MI due to CHF    Plan  Continue heparin drip  Continue home aspirin 81mg daily, Lipitor 40, Ranexa, metoprolol 25mg q12h  Continuous Cardiac monitoring   Consult Cardiology  Trend troponin through clear   EKG in AM

## 2024-02-17 NOTE — ASSESSMENT & PLAN NOTE
Baseline creatinine appears to be around 0.7-0.8 in past year  Creatinine on admission 1.37  Suspect cardiorenal  IV Lasix as above  Monitor BMP  Avoid nephrotoxins  Close I&O monitoring

## 2024-02-17 NOTE — ASSESSMENT & PLAN NOTE
As above, suspect acute CHF  2D echo in November 2023 showed EF 55%, normal diastolic function, mildly dilated atriums, mild to moderate aortic stenosis.   Patient is on chlorthalidone 25 mg p.o. daily at home.  Given Lasix 40 mg IV x2 with total 850ml UOP last evening.   ECHO pending   Daily weight, close intake & output  NPO for now while on BiPap  Appreciate Cardiology following

## 2024-02-17 NOTE — ASSESSMENT & PLAN NOTE
As above, suspect acute CHF  2D echo in November 2023 showed EF 55%, normal diastolic function, mildly dilated atriums, mild to moderate aortic stenosis.   ECHO 2/17 with wall motion normality and  EF dropping to 35%. Has severe anterior apical hypokinesis, G2DD, moderate to severe aortic stenosis .  Patient is on chlorthalidone 25 mg p.o. daily at home.  Daily consideration for diuresis. Currently net negative 1.9L. IVC collapsible on ECHO 2/17.  Daily weight, close intake & output  Appreciate Cardiology following

## 2024-02-17 NOTE — ASSESSMENT & PLAN NOTE
Chronic ulcer in right great toe and right lower leg, likely secondary to PAD and venous stasis as well.  Continue local wound care  Consult wound care

## 2024-02-17 NOTE — OCCUPATIONAL THERAPY NOTE
"Occupational Therapy Evaluation       02/17/24 1420   OT Last Visit   OT Visit Date 02/17/24   Note Type   Note type Evaluation   Pain Assessment   Pain Assessment Tool 0-10   Pain Score No Pain   Restrictions/Precautions   Weight Bearing Precautions Per Order No   Other Precautions Chair Alarm;Bed Alarm;Multiple lines;Telemetry;O2  (high flow O2; seen in ICU)   Home Living   Type of Home House   Home Layout One level   Bathroom Shower/Tub Tub/shower unit   Bathroom Toilet Standard   Bathroom Equipment Grab bars in shower   Home Equipment Cane   Prior Function   Level of [place] Independent with ADLs;Independent with functional mobility;Needs assistance with IADLS   Lives With Spouse   Receives Help From Family   IADLs Independent with driving;Independent with medication management;Family/Friend/Other provides meals   General   Additional Pertinent History Pt admitted with worsening SOB with exertion   Family/Caregiver Present Yes  (wife and daughter)   Subjective   Subjective \"I've been having some trouble breathing.\"   ADL   Eating Assistance 5  Supervision/Setup   Grooming Assistance 5  Supervision/Setup   UB Bathing Assistance 4  Minimal Assistance   LB Bathing Assistance 2  Maximal Assistance   UB Dressing Assistance 4  Minimal Assistance   LB Dressing Assistance 2  Maximal Assistance   Toileting Assistance  3  Moderate Assistance   Bed Mobility   Supine to Sit 3  Moderate assistance   Sit to Supine 3  Moderate assistance   Transfers   Sit to Stand 3  Moderate assistance   Additional items Assist x 2;Verbal cues;Increased time required   Stand to Sit 3  Moderate assistance   Additional items Assist x 2;Verbal cues;Increased time required   Balance   Static Sitting Fair +   Dynamic Sitting Fair   Static Standing Poor +   Dynamic Standing Poor   Activity Tolerance   Activity Tolerance Patient limited by fatigue   Nurse Made Aware yes RAYRAY Lindsay   RUE Assessment   RUE Assessment   (ROM WFL, MMT grossly graded " 3+/5)   LUE Assessment   LUE Assessment   (ROM WFL, MMT grossly graded 3+/5)   Vision-Basic Assessment   Current Vision Wears glasses all the time   Cognition   Overall Cognitive Status WFL   Arousal/Participation Alert;Cooperative   Attention Within functional limits   Orientation Level Oriented X4   Memory Within functional limits   Following Commands Follows one step commands without difficulty   Assessment   Limitation Decreased ADL status;Decreased UE strength;Decreased endurance;Decreased self-care trans;Decreased high-level ADLs  (decreased balance and mobility)   Prognosis Good   Assessment Patient evaluated by Occupational Therapy.  Patient admitted with Acute respiratory failure with hypoxia (HCC).  The patients occupational profile, medical and therapy history includes a extensive additional review of physical, cognitive, or psychosocial history related to current functional performance.  Comorbidities affecting functional mobility and ADLS include: .  Prior to admission, patient was independent with functional mobility without assistive device, independent with ADLS, and requiring assist for IADLS.  The evaluation identifies the following performance deficits: weakness, impaired balance, decreased endurance, increased fall risk, new onset of impairment of functional mobility, decreased ADLS, decreased IADLS, decreased activity tolerance, SOB upon exertion, and decreased strength, that result in activity limitations and/or participation restrictions. This evaluation requires clinical decision making of high complexity, because the patient presents with comorbidites that affect occupational performance and required significant modification of tasks or assistance with consideration of multiple treatment options.  The Barthel Index was used as a functional outcome tool presenting with a score of Barthel Index Score: 30, indicating marked limitations of functional mobility and ADLS.  The patient's raw score  on the AM-PAC Daily Activity Inpatient Short Form is 15. A raw score of less than 19 suggests the patient may benefit from discharge to post-acute rehabilitation services. Please refer to the recommendation of the Occupational Therapist for safe discharge planning.  Patient will benefit from skilled Occupational Therapy services to address above deficits and facilitate a safe return to prior level of function.   Goals   Patient Goals to get better and go home   STG Time Frame   (1-7 days)   Short Term Goal  Goals established to promote Patient Goals: to get better and go home:  Eating: independent; Grooming: independent seated; Bathing: mod assist; Upper Body Dressing supervision; Lower Body Dressing: mod assist; Toileting: min assist; Patient will increase ambulatory commode transfer to max assist with LRAD to increase performance and safety with ADLS and functional mobility; Patient will increase standing tolerance to 5 minutes during ADL task to decrease assistance level and decrease fall risk; Patient will increase bed mobility to min assist in preparation for ADLS and transfers; Patient will increase functional mobility to and from bathroom with LRAD with max assist to increase performance with ADLS and to use a toilet; Patient will tolerate 5 minutes of UE ROM/strengthening to increase general activity tolerance and performance in ADLS/IADLS; Patient will improve functional activity tolerance to 5 minutes of sustained functional tasks to increase participation in basic self-care and decrease assistance level;  Patient will be able to to verbalize understanding and perform energy conservation/proper body mechanics during ADLS and functional mobility at least 75% of the time with minimal cueing to decrease signs of fatigue and increase stamina to return to prior level of function; Patient will increase dynamic sitting balance to fair+ to improve the ability to sit at edge of bed or on a chair for ADLS;  Patient  will increase static standing balance to fair- to improve postural stability and decrease fall risk during standing ADLS and transfers.   LTG Time Frame   (8-14 days)   Long Term Goal Bathing: min assist; Upper Body Dressing independent; Lower Body Dressing: min assist; Toileting: supervision; Patient will increase ambulatory commode transfer to mod assist with LRAD to increase performance and safety with ADLS and functional mobility; Patient will increase standing tolerance to 10 minutes during ADL task to decrease assistance level and decrease fall risk; Patient will increase bed mobility to supervision in preparation for ADLS and transfers; Patient will increase functional mobility to and from bathroom with LRAD with mod assist to increase performance with ADLS and to use a toilet; Patient will tolerate 10 minutes of UE ROM/strengthening to increase general activity tolerance and performance in ADLS/IADLS; Patient will improve functional activity tolerance to 10 minutes of sustained functional tasks to increase participation in basic self-care and decrease assistance level;  Patient will be able to to verbalize understanding and perform energy conservation/proper body mechanics during ADLS and functional mobility at least 90% of the time with no cueing to decrease signs of fatigue and increase stamina to return to prior level of function; Patient will increase dynamic sitting balance to good to improve the ability to sit at edge of bed or on a chair for ADLS;  Patient will increase static standing balance to fair to improve postural stability and decrease fall risk during standing ADLS and transfers.  Pt will score >/= 19/24 on AM-PAC Daily Activity Inpatient scale to promote safe independence with ADLs and functional mobility; Pt will score >/= 60/100 on Barthel Index in order to decrease caregiver assistance needed and increase ability to perform ADLs and functional mobility.   Plan   Treatment Interventions  ADL retraining;Functional transfer training;UE strengthening/ROM;Endurance training;Patient/family training;Equipment evaluation/education;Compensatory technique education;Energy conservation;Activityengagement;Continued evaluation   Goal Expiration Date 03/02/24   OT Frequency 3-5x/wk   Discharge Recommendation   Rehab Resource Intensity Level, OT II (Moderate Resource Intensity)   AM-PAC Daily Activity Inpatient   Lower Body Dressing 2   Bathing 2   Toileting 2   Upper Body Dressing 3   Grooming 3   Eating 3   Daily Activity Raw Score 15   Daily Activity Standardized Score (Calc for Raw Score >=11) 34.69   AM-PAC Applied Cognition Inpatient   Following a Speech/Presentation 3   Understanding Ordinary Conversation 4   Taking Medications 4   Remembering Where Things Are Placed or Put Away 4   Remembering List of 4-5 Errands 3   Taking Care of Complicated Tasks 3   Applied Cognition Raw Score 21   Applied Cognition Standardized Score 44.3   Barthel Index   Feeding 5   Bathing 0   Grooming Score 0   Dressing Score 5   Bladder Score 0   Bowels Score 10   Toilet Use Score 5   Transfers (Bed/Chair) Score 5   Mobility (Level Surface) Score 0   Stairs Score 0   Barthel Index Score 30   Licensure   [place] License Number  Gaye Parsons OTR/L [id number]

## 2024-02-17 NOTE — PROGRESS NOTES
[facility]  Progress Note  Name: John Smallwood I  MRN: [id number]  Unit/Bed#: ICU 08 I Date of Admission: 2/16/2024   Date of Service: 2/17/2024 I Hospital Day: 1    Assessment/Plan   * Acute respiratory failure with hypoxia (HCC)  Assessment & Plan  Pt presented with SOB on exertion for about 4 days. Also with associated wet cough/mild sore throat but unable to expectorate sputum. Denies fever, chills, or chest pain. Sent to ED from PCP office today after SpO2 84% on room air during visit  SpO2 low 90's on 4 L in ED and was admitted to [facility]  BNP 1,274. Left lower extremity edema on exam, right lower extremity difficult to assess due to dressing  CXR appears with diffuse pulmonary edema, pleural effusions,  + left mid/lower consolidation    S/p total of 80mg IV lasix yesterday with UOP of 850ml last evening.   Had escalating FiO2 requirements to 15L then NRB mask on [facility]  ABG 7.43/34/61/22  CT chest: bilateral centrilobular and dependent, multifocal infiltrates. Also moderate bilateral effusions. Suspect infiltrates 2/2 fluid overload vs. infectious etiology on my read. Formal report is pending   COVID flu RSV negative  Procalcitonin 0.07    Plan  Blood cultures and sputum culture pending  Check urine antigens for completeness  Respiratory protocol  WOB remained increased despite HFNC 55L and 95% last evening. Was transitioned to BiPap with some improvement.   Current BiPap settings: 10/6 55%  Wean O2 for goal SpO2 >90%  Continue ceftriaxone, azithromycin D2 for possible pulmonary infection  Follow culture data    Pleural effusion, bilateral  Assessment & Plan  Noted on CXR and CT chest in setting of fluid overload  S/p total of furosemide 80mg yesterday. Has had 850ml urine output  Transferred to ICU 2/16 evening for escalating FiO2 requirements. Now on BiPap to help with lung recruitment    Plan  Daily consideration for diuresis  Consider thoracentesis today  Rest of plan as outlined      GASPER (acute kidney injury) ([facility])  Assessment & Plan  Baseline creatinine appears to be around 0.7-0.8 in past year  Creatinine on admission 1.37  Suspect cardiorenal  IV Lasix as above  Monitor BMP  Avoid nephrotoxins  Close I&O monitoring     SIRS (systemic inflammatory response syndrome) ([facility])  Assessment & Plan  Met SIRS with tachycardia and tachypnea- suspected 2/2 CHF exacerbation  Initial CXR appeared with b/l pleural effusions and pulmonary edema. Also with increased consolidation of the left mid and lower lung fields  BNP elevated at 1274 initially.   Afebrile, no leukocytosis  Had escalating FiO2 requirements and hypoxia evening of 2/16. Required transfer to ICU and HFNC then BiPap to improve SpO2 >90  CT scan showing Centrilobular infiltrates concerning for fluid overload vs. Infection  BC x2 ordered  Sputum culture ordered- not yet able to produce specimen  Procalcitonin negative x1. Repeat pending this AM  Urine antigens pending for completeness    Plan  Lactic acid 2.2. Suspect elevated due to earlier hypoxia. F/u repeat in 2 hrs  Started on Ceftriaxone and Azithromycin to cover possible pulmonary source  Continue to trend temperature curve, CBC, procal    Elevated troponin  Assessment & Plan  Has known severe RCA and left main CAD, not amenable to intervention.  Troponin 1,174-> 1,334-> 1,420-> 2,551  Initial EKG sinus tach with frequent PVCs, rate 101, ST depression in inferior leads and V4-6.  Patient denies chest pain or pressure  Suspect type II MI due to CHF    Plan  Continue heparin drip  Continue home aspirin 81mg daily, Lipitor 40, Ranexa, metoprolol 25mg q12h  Continuous Cardiac monitoring   Consult Cardiology  Trend troponin through peak   EKG this AM    Acute on chronic heart failure ([facility])  Assessment & Plan  As above, suspect acute CHF  2D echo in November 2023 showed EF 55%, normal diastolic function, mildly dilated atriums, mild to moderate aortic stenosis.   Patient is on  chlorthalidone 25 mg p.o. daily at home.  Given Lasix 40 mg IV x2 with total 850ml UOP last evening.   ECHO pending   Daily weight, close intake & output  NPO for now while on BiPap  Appreciate Cardiology following        Essential hypertension  Assessment & Plan  On low-dose Norvasc and lisinopril at home  Hold Norvasc to allow room for diuretic  Holding lisinopril in setting of GASPER and now soft BPs post diuresis  VS per unit protocol       Coronary artery disease of native artery of native heart with stable angina pectoris (HCC)  Assessment & Plan  See elevated troponin as outlined    Hyperlipidemia  Assessment & Plan  Lab Results   Component Value Date    CHOLESTEROL 137 11/03/2023    CHOLESTEROL 153 12/06/2022    CHOLESTEROL 154 11/30/2021     Lab Results   Component Value Date    HDL 73 11/03/2023    HDL 77 12/06/2022    HDL 78 11/30/2021     Lab Results   Component Value Date    TRIG 53 11/03/2023    TRIG 74 12/06/2022    TRIG 61 11/30/2021     Lab Results   Component Value Date    NONHDLC 64 11/03/2023    NONHDLC 76 12/06/2022    NONHDLC 76 11/30/2021     Continue statin      Atherosclerosis of native arteries of extremities with rest pain, right leg (HCC)  Assessment & Plan  Follows vascular as outpatient.  Continue aspirin, Lipitor  Hold home Xarelto as patient is on heparin drip for elevated troponin     BPH (benign prostatic hyperplasia)  Assessment & Plan  Continue Proscar  Bladder scan   Urinary retention protocol    Gastroesophageal reflux disease  Assessment & Plan  Continue daily Pepcid while heparin infusing       Venous stasis ulcer of left lower extremity (HCC)  Assessment & Plan  Above left outer ankle.  Healing well.  Continue local wound care    Chronic ulcer of right lower extremity (HCC)  Assessment & Plan  Chronic ulcer in right great toe and right lower leg, likely secondary to PAD and venous stasis. New dressing applied in office 2/16   Continue daily local wound care  Consult wound  "care             Disposition: Stepdown Level 1    ICU Core Measures     A: Assess, Prevent, and Manage Pain Has pain been assessed? Yes  Need for changes to pain regimen? No   B: Both SAT/SAT  N/A   C: Choice of Sedation RASS Goal: N/A patient not on sedation  Need for changes to sedation or analgesia regimen? NA   D: Delirium CAM-ICU: Negative   E: Early Mobility  Plan for early mobility? Yes   F: Family Engagement Plan for family engagement today? Yes       Antibiotic Review: Awaiting culture results.       Prophylaxis:  VTE VTE covered by:  heparin (porcine), Intravenous, 10 Units/kg/hr at 02/17/24 0115  heparin (porcine), Intravenous  heparin (porcine), Intravenous       Stress Ulcer  covered byFamotidine (PF) (PEPCID) injection 20 mg [[id number]]         Significant 24hr Events     24hr events: Admitted yesterday to F in setting of acute respiratory failure 2/2 CHF. Had escalating FiO2 requirements to NRB mask on F and was transferred to ICU for HFNC. WOB not improved much on HFNC and was transitioned to BiPap. SpO2 improved to mid 90's and WOB mildly improved though still tachypneic and frequently coughing. Has not yet produced sputum specimen. Was started on Ceftriaxone and azithromycin to cover for possible pulmonary infection. Lactic acid was noted at 2.2 this AM. Repeat level is currently pending. Troponin continued to rise this morning; still without chest pain. Continues on heparin drip. No other acute changes overnight.       Subjective   Review of Systems   Constitutional: Negative.  Negative for chills and fever.   HENT:  Positive for sore throat.    Respiratory:  Positive for cough (\"worse since I'm on this mask\") and shortness of breath (\"It's getting better\").    Cardiovascular:  Positive for leg swelling (chronic, unchanged per patient). Negative for chest pain and palpitations.   Gastrointestinal: Negative.    Skin:  Positive for wound.        Chronic right lower extremity wounds. "   Neurological: Negative.    Psychiatric/Behavioral: Negative.        Objective                            Vitals I/O      Most Recent Min/Max in 24hrs   Temp 97.9 °F (36.6 °C) Temp  Min: 97.3 °F (36.3 °C)  Max: 98 °F (36.7 °C)   Pulse (!) 108 Pulse  Min: 96  Max: 115   Resp (!) 33 Resp  Min: 19  Max: 45   /61 BP  Min: 92/52  Max: 147/99   O2 Sat 96 % SpO2  Min: 84 %  Max: 100 %      Intake/Output Summary (Last 24 hours) at 2024 0445  Last data filed at 2024 0115  Gross per 24 hour   Intake 300 ml   Output 850 ml   Net -550 ml       Diet NPO; Sips with meds    Invasive Monitoring   N/A        Physical Exam   Physical Exam  Vitals and nursing note reviewed.   Eyes:      General: Lids are normal.   Skin:     General: Skin is warm and dry.      Comments: Compression dressing intact to RLE   HENT:      Head: Normocephalic.      Mouth/Throat:      Mouth: Mucous membranes are moist.   Cardiovascular:      Rate and Rhythm: Normal rate and regular rhythm.      Pulses:           Radial pulses are 2+ on the right side and 2+ on the left side.      Heart sounds: Normal heart sounds.   Musculoskeletal:      Right lower le+ Edema present.      Left lower le+ Edema present.      Comments: Firm, pitting edema   Abdominal: General: Bowel sounds are normal.      Palpations: Abdomen is soft.   Constitutional:       General: He is not in acute distress.     Appearance: He is well-developed and well-nourished.      Interventions: Face mask in place.   Pulmonary:      Effort: Tachypnea, accessory muscle usage and accessory muscle usage present.      Breath sounds: Examination of the right-lower field reveals decreased breath sounds. Examination of the left-lower field reveals decreased breath sounds. Decreased breath sounds present.      Comments: Coarse b/l breath sounds   Psychiatric:         Attention and Perception: Attention normal.         Mood and Affect: Mood normal.         Speech: Speech normal.          Behavior: Behavior is cooperative.   Neurological:      General: No focal deficit present.      Mental Status: He is alert and oriented to person, place and time. Mental status is at baseline.      GCS: GCS eye subscore is 4. GCS verbal subscore is 5. GCS motor subscore is 6.      Motor: Strength full and intact in all extremities.   Genitourinary/Anorectal:     Comments: External urinary catheter patent for yellow urine  external catheter present.          Diagnostic Studies      EKG: NSR with PACs on monitor, rate 90s  Imaging: No new imaging. I have personally reviewed pertinent films in PACS     Medications:  Scheduled PRN   aspirin, 81 mg, Daily  atorvastatin, 40 mg, Daily With Dinner  azithromycin, 500 mg, Q24H  calcium carbonate-vitamin D, 1 tablet, Daily With Breakfast  cefTRIAXone, 1,000 mg, Q24H  cholecalciferol, 2,000 Units, Daily  collagenase, , Daily  famotidine, 20 mg, HS  finasteride, 5 mg, HS  furosemide, 40 mg, Daily  guaiFENesin, 600 mg, Q12H LASHAY  ipratropium-albuterol, 3 mL, Q6H   And  sodium chloride, 4 mL, Q6H  magnesium sulfate, 2 g, Once  metoprolol tartrate, 25 mg, Q12H LASHAY  multivitamin-minerals, 1 tablet, Daily  ranolazine, 500 mg, Q12H LASHAY      acetaminophen, 650 mg, Q6H PRN  benzonatate, 100 mg, TID PRN  heparin (porcine), 2,000 Units, Q6H PRN  heparin (porcine), 4,000 Units, Q6H PRN  oxyCODONE, 2.5 mg, Q6H PRN       Continuous    heparin (porcine), 3-20 Units/kg/hr (Order-Specific), Last Rate: 10 Units/kg/hr (02/17/24 0115)         Labs:    CBC    Recent Labs     02/16/24  1735 02/17/24  0349   WBC 9.07 15.43*   HGB 11.1* 10.5*   HCT 33.1* 31.3*    336     BMP    Recent Labs     02/16/24  1735 02/17/24  0349   SODIUM 137 139   K 4.6 3.7    102   CO2 25 24   AGAP 9 13   BUN 48* 49*   CREATININE 1.37* 1.33*   CALCIUM 9.3 8.5       Coags    Recent Labs     02/16/24  1839 02/17/24  0038   INR 1.60*  --    PTT 45* 109*        Additional Electrolytes  Recent Labs      02/16/24  1735 02/17/24  0349   MG 2.0 1.8*          Blood Gas    Recent Labs     02/16/24 2118   PHART 7.425   OYW1ICM 35.0*   PO2ART 60.7*   TBV0RYC 22.4   BEART -1.4   SOURCE Radial, Left     Recent Labs     02/16/24 2118   SOURCE Radial, Left    LFTs  Recent Labs     02/16/24  1735   ALT 24   AST 33   ALKPHOS 62   ALB 3.9   TBILI 0.57       Infectious  Recent Labs     02/16/24  1735 02/17/24  0349   PROCALCITONI 0.07 0.24     Glucose  Recent Labs     02/16/24  1735 02/17/24  0349   GLUC 136 147*                 TJ Nguyen

## 2024-02-17 NOTE — ASSESSMENT & PLAN NOTE
On low-dose Norvasc and lisinopril at home  Hold Norvasc to allow room for diuretic  Holding lisinopril in setting of GASPER and now soft BPs post diuresis  VS per unit protocol

## 2024-02-17 NOTE — ASSESSMENT & PLAN NOTE
Has known severe RCA and left main CAD, not amenable to intervention.  Troponin 1,174-> 1,334-> 1,420-> 2,551  Initial EKG sinus tach with frequent PVCs, rate 101, ST depression in inferior leads and V4-6.  Patient denies chest pain or pressure  Suspect type II MI due to CHF    Plan  Continue heparin drip  Continue home aspirin 81mg daily, Lipitor 40, Ranexa, metoprolol 25mg q12h  Continuous Cardiac monitoring   Consult Cardiology  Trend troponin through peak   EKG this AM

## 2024-02-17 NOTE — ASSESSMENT & PLAN NOTE
Met SIRS with tachycardia and tachypnea- suspected 2/2 CHF exacerbation  Initial CXR appeared with b/l pleural effusions and pulmonary edema. Also with increased consolidation of the left mid and lower lung fields  BNP elevated at 1274 initially   Afebrile, no initial leukocytosis. WBC bumped to 15 yesterday   CT scan showing Centrilobular infiltrates concerning for fluid overload. Cannot r/o infection  BC x2 pending  Sputum culture ordered- not yet able to produce specimen  Procalcitonin 0.07-> 0.24  Urine antigens negative    Plan  Lactic acid 2.2-> 2.5-> 2.7. Suspect elevated due to intermittent hypoxia, poor CO with new EF 35%  Continues on ceftriaxone D3. Can d/c azithro today given neg legionella antigens  Continue to trend temperature curve, CBC, procal

## 2024-02-17 NOTE — ASSESSMENT & PLAN NOTE
As above, suspect acute CHF  2D echo in November 2023 showed EF 55%, normal diastolic function, mildly dilated atriums, mild to moderate aortic stenosis.   Patient is on chlorthalidone 25 mg p.o. daily at home.  Given Lasix 40 mg IV x2 with total 500ml UOP shortly after  Obtain updated ECHO  Daily weight, close intake & output  Cardiac diet, fluid restriction 1800 cc per day  Appreciate Cardiology following

## 2024-02-17 NOTE — ASSESSMENT & PLAN NOTE
Met SIRS with tachycardia and tachypnea- suspected 2/2 CHF exacerbation  Initial CXR appeared with b/l pleural effusions and pulmonary edema. Also with increased consolidation of the left mid and lower lung fields  BNP elevated at 1274 initially.   Afebrile, no leukocytosis  Had escalating FiO2 requirements and hypoxia evening of 2/16. Required transfer to ICU and HFNC then BiPap to improve SpO2 >90  CT scan showing Centrilobular infiltrates concerning for fluid overload vs. Infection  BC x2 ordered  Sputum culture ordered- not yet able to produce specimen  Procalcitonin negative x1. Repeat pending this AM  Urine antigens pending for completeness    Plan  Lactic acid 2.2. Suspect elevated due to earlier hypoxia. F/u repeat in 2 hrs  Started on Ceftriaxone and Azithromycin to cover possible pulmonary source  Continue to trend temperature curve, CBC, procal

## 2024-02-17 NOTE — ASSESSMENT & PLAN NOTE
Has known severe RCA and left main CAD, not amenable to intervention.  Troponin 1,174-> 1,334-> 1,420-> 2,551-> no utility in further trending as pt receiving aggressive medical management only  Initial EKG sinus tach with frequent PVCs, rate 101, ST depression in inferior leads and V4-6.  Patient denies chest pain or pressure  Suspect type II MI due to CHF, supply/demand mismatch, new drop in EF to 35%    Plan  Continue heparin drip for total 48h through 1800 this evening   Continue home aspirin 81mg daily, Lipitor 40, Ranexa, metoprolol 25mg q12h  Continuous Cardiac monitoring   Appreciate Cardiology following

## 2024-02-17 NOTE — ASSESSMENT & PLAN NOTE
Noted on CXR and CT chest in setting of fluid overload  S/p total of furosemide 80mg yesterday. Has had 850ml urine output  Transferred to ICU 2/16 evening for escalating FiO2 requirements. Now on BiPap to help with lung recruitment    Plan  Daily consideration for diuresis  Consider thoracentesis today  Rest of plan as outlined

## 2024-02-17 NOTE — CONSULTS
[facility]  Consult  Name: John Smallwood 89 y.o. male I MRN: [id number]  Unit/Bed#: ICU 08 I Date of Admission: 2/16/2024   Date of Service: 2/17/2024 I Hospital Day: 1    Inpatient consult to Medical Critical Care  Consult performed by: TJ Nguyen  Consult ordered by: TJ Art          Assessment/Plan   * Acute respiratory failure with hypoxia (HCC)  Assessment & Plan  Pt presented with SOB on exertion for about 4 days. Also with associated wet cough/mild sore throat but unable to expectorate sputum. Denies fever, chills, or chest pain. Sent to ED from PCP office today after SpO2 84% on room air during visit  SpO2 low 90's on 4 L in ED and was admitted to [facility]  BNP 1,274. Left lower extremity edema on exam, right lower extremity difficult to assess due to dressing  CXR appears with diffuse pulmonary edema + possible left sided multifocal PNA  S/p lasix 40mg in ED with only 200ml urine output   Had escalating FiO2 requirements to 15L then NRB mask on [facility]  ABG 7.43/34/61/222  Additional 40mg IV lasix given with additional 300ml urine output  CT chest: bilateral centrilobular and dependent, multifocal infiltrates. Also moderate bilateral effusions. Suspect infiltrates 2/2 fluid overload vs. Infectious etiology  COVID flu RSV negative    Plan  Obtain procalcitonin, blood cultures, sputum culture  Check urine antigens for completeness  Respiratory protocol  Start HFNC on transfer to ICU, goal SpO2 >90%  Start ceftriaxone, azithromycin for possible pulmonary infection  Follow culture data    GASPER (acute kidney injury) ([facility])  Assessment & Plan  Baseline creatinine appears to be around 0.7-0.8 in past year  Creatinine on admission 1.37  Suspect cardiorenal  IV Lasix as above  Monitor BMP  Avoid nephrotoxins  Close I&O monitoring     Elevated troponin  Assessment & Plan  Has known severe RCA and left main CAD, not amenable to intervention.  Troponin 1,174-> 1,334->  1,420  Initial EKG sinus tach with frequent PVCs, rate 101, ST depression in inferior leads and V4-6.  Patient denies chest pain or pressure  Suspect type II MI due to CHF    Plan  Continue heparin drip  Continue home aspirin 81mg daily, Lipitor 40, Ranexa, metoprolol 25mg q12h  Continuous Cardiac monitoring   Consult Cardiology  Trend troponin through clear   EKG in AM    Acute on chronic heart failure (HCC)  Assessment & Plan  As above, suspect acute CHF  2D echo in November 2023 showed EF 55%, normal diastolic function, mildly dilated atriums, mild to moderate aortic stenosis.   Patient is on chlorthalidone 25 mg p.o. daily at home.  Given Lasix 40 mg IV x2 with total 500ml UOP shortly after  Obtain updated ECHO  Daily weight, close intake & output  Cardiac diet, fluid restriction 1800 cc per day  Appreciate Cardiology following        Essential hypertension  Assessment & Plan  On low-dose Norvasc and lisinopril at home  Hold Norvasc to allow room for diuretic  Holding lisinopril in setting of GASPER  BP WNL off antihypertensives  VS per unit protocol       Coronary artery disease of native artery of native heart with stable angina pectoris (HCC)  Assessment & Plan  See elevated troponin as outlined    Hyperlipidemia  Assessment & Plan  Lab Results   Component Value Date    CHOLESTEROL 137 11/03/2023    CHOLESTEROL 153 12/06/2022    CHOLESTEROL 154 11/30/2021     Lab Results   Component Value Date    HDL 73 11/03/2023    HDL 77 12/06/2022    HDL 78 11/30/2021     Lab Results   Component Value Date    TRIG 53 11/03/2023    TRIG 74 12/06/2022    TRIG 61 11/30/2021     Lab Results   Component Value Date    NONHDLC 64 11/03/2023    NONHDLC 76 12/06/2022    NONHDLC 76 11/30/2021     Continue statin      Atherosclerosis of native arteries of extremities with rest pain, right leg (HCC)  Assessment & Plan  Follows vascular as outpatient.  Continue aspirin, Lipitor  Hold home Xarelto as patient is on heparin drip for  elevated troponin     BPH (benign prostatic hyperplasia)  Assessment & Plan  Continue Proscar  Bladder scan   Urinary retention protocol    Gastroesophageal reflux disease  Assessment & Plan  Continue daily Pepcid while heparin infusing       Venous stasis ulcer of left lower extremity (HCC)  Assessment & Plan  Above left outer ankle.  Healing well.  Continue local wound care    Chronic ulcer of right lower extremity (HCC)  Assessment & Plan  Chronic ulcer in right great toe and right lower leg, likely secondary to PAD and venous stasis. New dressing applied in office today   Continue local wound care  Consult wound care           History of Present Illness     HPI: John Smallwood is a 89 y.o. with PMHx of CAD, HTN, PAD, GERD, HLD, BPH, and HFpEF who presents with worsening Sob which started 4 days PTA. Also c/o associated wet cough and mild sore throat. Denies any mucous production, fever, chills, chest pain, palpitations. Was at his PCP today and was sent to ED after found with room air SpO2 of 84%. Denies any increase in leg edema but does have chronic weeping ulcers of his right leg. In the ED his CXR showed diffuse pulmonary edema with concerns for possible consolidation of the left middle and lower lung. Pertinent labs include BNP 1,274, troponin 1,174, WBC 9, creatinine 1.37. He received 40mg IV furosemide in the ED and was admitted to [facility] service. He experienced escalating FiO2 requirements to NRB mask and was still hypoxic to the high 80's on the GMF. He was given an additional 40mg IV furosemide and an ABG showed 7.43/34/61/22. He was sent for CT of his chest which showed bilateral centrilobular and dependent multifocal infiltrates as well as bilateral pleural effusions. He was started on Ceftriaxone and azithromycin for concerns of possible pulmonary infection. He was brought to ICU for initiation of HFNC.     The case was discussed via TT with the on-call ICU attending. His wife was called  "and notified of the above events and transfer to ICU. All questions were answered to her satisfaction.     History obtained from chart review and the patient.  Review of Systems   Respiratory:  Positive for cough and shortness of breath. Negative for chest tightness.    Cardiovascular:  Positive for leg swelling (\"looks like it normally does\"). Negative for chest pain and palpitations.   Gastrointestinal: Negative.    Genitourinary: Negative.    Neurological: Negative.    Psychiatric/Behavioral: Negative.       Disposition: Stepdown Level 1   Historical Information   Past Medical History:  No date: Asymptomatic PVCs      Comment:  last assessed 1/18/18  No date: BPH (benign prostatic hyperplasia)      Comment:  last assessed 4/11/17   No date: High cholesterol      Comment:  last assessed 4/11/17   No date: Hypertension      Comment:  uncontrolled, last assessed 4/11/17 11/21/2023: PAD (peripheral artery disease) ([facility]) Past Surgical History:  11/2/2023: CARDIAC CATHETERIZATION; N/A      Comment:  Procedure: Cardiac Coronary Angiogram;  Surgeon: Donny Padilla MD;  Location: [facility] CARDIAC CATH LAB;  Service:                Cardiology  No date: CATARACT EXTRACTION  No date: COLONOSCOPY      Comment:  Complete  10/25/2018: FL EXCISION HYDROCELE UNILATERAL; Right      Comment:  Procedure: HYDROCELECTOMY;  Surgeon: Efraín Wright MD;               Location: [facility] MAIN OR;  Service: Urology  No date: TESTICLE SURGERY      Comment:  last assessed 3/31/15   Current Outpatient Medications   Medication Instructions    amLODIPine (NORVASC) 2.5 mg, Oral, Daily, In the evening.    ammonium lactate (LAC-HYDRIN) 12 % cream Topical, As needed    aspirin (ECOTRIN LOW STRENGTH) 81 mg, Oral, Daily    atorvastatin (LIPITOR) 40 mg, Oral, Daily with dinner    Calcium Carbonate-Vitamin D 500-125 MG-UNIT TABS 1 tablet, Oral, Daily    chlorthalidone 25 mg, Oral, Daily    collagenase (SANTYL) ointment Topical, Daily    finasteride " (PROSCAR) 5 mg, Oral, Daily at bedtime    lisinopril (ZESTRIL) 5 mg tablet Take  1 tablet in the evening.    metoprolol tartrate (LOPRESSOR) 25 mg, Oral, Every 12 hours scheduled    Multiple Vitamins-Minerals (Ocuvite Adult Formula) CAPS 1 capsule, Oral, Daily    oxyCODONE (ROXICODONE) 2.5 mg, Oral, Every 6 hours PRN    ranolazine (RANEXA) 500 mg, Oral, Every 12 hours scheduled    rivaroxaban (XARELTO) 2.5 mg, Oral, 2 times daily    triamcinolone (KENALOG) 0.025 % ointment Topical, 2 times daily    Vitamin D 2,000 Units, Oral, Daily    Allergies   Allergen Reactions    Tamsulosin Syncope      Social History     Tobacco Use    Smoking status: Former     Current packs/day: 0.00     Types: Cigarettes     Start date: 10/18/1945     Quit date: 10/18/1965     Years since quittin.3    Smokeless tobacco: Never   Vaping Use    Vaping status: Never Used   Substance Use Topics    Alcohol use: Not Currently    Drug use: No    Family History   Problem Relation Age of Onset    Heart disease Mother     Hypertension Mother     Heart disease Father     Hypertension Father     Hypertension Family     Hyperlipidemia Family         Objective                            Vitals I/O      Most Recent Min/Max in 24hrs   Temp 97.9 °F (36.6 °C) Temp  Min: 97.3 °F (36.3 °C)  Max: 98 °F (36.7 °C)   Pulse 98 Pulse  Min: 98  Max: 115   Resp (!) 45 Resp  Min: 19  Max: 45   BP 92/58 BP  Min: 92/58  Max: 147/99   O2 Sat 95 % SpO2  Min: 84 %  Max: 100 %    No intake or output data in the 24 hours ending 24 0103    Diet Cardiovascular; Cardiac; Fluid Restriction 1800 ML    Invasive Monitoring   N/A        Physical Exam   Physical Exam  Vitals and nursing note reviewed.   Eyes:      General: Lids are normal.   Skin:     General: Skin is warm and dry.      Comments: RLE with compressive dressing intact to lower leg. Toes erythematous, dry. LLE with dry, flaking skin noted.   HENT:      Head: Normocephalic.      Mouth/Throat:      Mouth: Mucous  membranes are moist.   Cardiovascular:      Rate and Rhythm: Regular rhythm. Tachycardia present.      Pulses:           Radial pulses are 2+ on the right side and 2+ on the left side.      Heart sounds: Normal heart sounds.   Musculoskeletal:      Right lower le+ Edema present.      Left lower le+ Edema present.      Comments: Firm, pitting edema   Abdominal: General: Bowel sounds are normal.      Palpations: Abdomen is soft.   Constitutional:       General: He is in acute distress (mild increased WOB and conversational dyspnea).      Appearance: He is well-developed and well-nourished. He is ill-appearing.      Interventions: Face mask in place.   Pulmonary:      Effort: Tachypnea, accessory muscle usage, respiratory distress and accessory muscle usage present.      Comments: Coarse breath sounds throughout all lung fields. Diminished at b/l bases  Psychiatric:         Attention and Perception: Attention normal.         Mood and Affect: Mood normal.         Speech: Speech normal.         Behavior: Behavior is cooperative.         Cognition and Memory: Cognition normal.   Neurological:      General: No focal deficit present.      Mental Status: He is alert and oriented to person, place and time. Mental status is at baseline.      GCS: GCS eye subscore is 4. GCS verbal subscore is 5. GCS motor subscore is 6.      Motor: Strength full and intact in all extremities.   Genitourinary/Anorectal:     Comments: External catheter draining clear yellow urine   external catheter present.          Diagnostic Studies      EKG: NSR with frequent PACs noted on monitor, rate 110-115  Imagin/16 CXR: B/l pulmonary edema, and left middle/lower lung field consolidation. Small b/l pleural effusions on my read. Formal report is pending   CT chest: bilateral centrilobular, multifocal consolidation and pleural effusions. Formal report is pending.      I have personally reviewed pertinent films in PACS      Medications:  Scheduled PRN   aspirin, 81 mg, Daily  atorvastatin, 40 mg, Daily With Dinner  azithromycin, 500 mg, Q24H  calcium carbonate-vitamin D, 1 tablet, Daily With Breakfast  cefTRIAXone, 1,000 mg, Q24H  cholecalciferol, 2,000 Units, Daily  collagenase, , Daily  famotidine, 20 mg, HS  finasteride, 5 mg, HS  furosemide, 40 mg, Daily  guaiFENesin, 600 mg, Q12H LASHAY  ipratropium-albuterol, 3 mL, Q6H   And  sodium chloride, 4 mL, Q6H  metoprolol tartrate, 25 mg, Q12H LASHAY  multivitamin-minerals, 1 tablet, Daily  ranolazine, 500 mg, Q12H LASHAY      acetaminophen, 650 mg, Q6H PRN  benzonatate, 100 mg, TID PRN  heparin (porcine), 2,000 Units, Q6H PRN  heparin (porcine), 4,000 Units, Q6H PRN  oxyCODONE, 2.5 mg, Q6H PRN       Continuous    heparin (porcine), 3-20 Units/kg/hr (Order-Specific), Last Rate: 12 Units/kg/hr (02/16/24 1833)         Labs:    CBC    Recent Labs     02/16/24  1735   WBC 9.07   HGB 11.1*   HCT 33.1*        BMP    Recent Labs     02/16/24  1735   SODIUM 137   K 4.6      CO2 25   AGAP 9   BUN 48*   CREATININE 1.37*   CALCIUM 9.3       Coags    Recent Labs     02/16/24  1839   INR 1.60*   PTT 45*        Additional Electrolytes  Recent Labs     02/16/24  1735   MG 2.0          Blood Gas    Recent Labs     02/16/24  2118   PHART 7.425   OWR9XQD 35.0*   PO2ART 60.7*   VTN1WKJ 22.4   BEART -1.4   SOURCE Radial, Left     Recent Labs     02/16/24  2118   SOURCE Radial, Left    LFTs  Recent Labs     02/16/24  1735   ALT 24   AST 33   ALKPHOS 62   ALB 3.9   TBILI 0.57       Infectious  Recent Labs     02/16/24  1735   PROCALCITONI 0.07     Glucose  Recent Labs     02/16/24  1735   GLUC 136             TJ Nguyen

## 2024-02-17 NOTE — CONSULTS
Consultation - Cardiology   [facility]     John Smallwood 89 y.o. male MRN: [id number]  : 1930  Unit/Bed#: ICU 08 Encounter: [id number]      Assessment & Plan   Elevated BNP.  - Suspected CHF however prior echocardiogram was normal.  Follow-up repeat echocardiogram.  - 24 BNP: 1274.   - 24 CT chest without contrast: Constellation of findings above overall suggesting pulmonary interstitial edema in the setting of acute CHF/fluid overload (in conjunction with patient's BNP level). Superimposed infection difficult to exclude.   - Presented on 24 for evaluation for worsening shortness of breath on exertion, cough and sore throat.  - Follow up TTE.   - 24 TTE: LVEF 55%. Left ventricle wall motion is normal based on short axis and apical views Diastolic function is normal for age.  Bilateral atrium are mildly dilated. Aortic Valve: The aortic valve is functionally bicuspid with commissural fusion of the left-noncoronary cusp. The leaflets are moderately thickened. The leaflets are moderately calcified. There is mild to moderate stenosis. The aortic valve peak velocity is 2.05 m/s. The aortic valve area is 1.17 cm2. Pulmonic Valve: There is mild regurgitation.  - Received a total of 80 mg IV Lasix on 24.  - Currently on Lasix 40 mg IV daily.  - Strict I/Os.   - Daily weight, standing scale.     Elevated troponin.  - 24 hs troponin: 1174 (0hrs), 1334 (2hrs), 1420 (4hrs).   - 24 hs troponin: 2551.   - 24 EKG: Sinus rhythm with sinus arrhythmia with occasional PVCs, 99 bpm.  Marked ST abnormality, possible inferior subendocardial injury.  - Likely non-ischemic myocardial injury secondary to acute respiratory failure.   - Continue to trend to peak.  - Patient with known history of severe triple vessel coronary artery disease and microvascular dysfunction.    Acute respiratory failure with hypoxia.  - Presented on 24 for evaluation for worsening  shortness of breath on exertion, cough and sore throat. Patient was evaluated by PCP on 2/16/2024 and sent to the ED for further evaluation due to concern for CHF.  Unable to obtain a full history due to patient being tachypneic however patient states that he had noticed worsening shortness of breath and cough over the past 4 days.  He denies experiencing chest pain, palpitations, lightheadedness, dizziness, headache, nausea, or vomiting.  - 2/16/24 CT chest without contrast: Constellation of findings above overall suggesting pulmonary interstitial edema in the setting of acute CHF/fluid overload (in conjunction with patient's BNP level). Superimposed infection difficult to exclude.   - Currently on azithromycin for upper respiratory infection.  - COVID/flu/RSV negative.  - Currently on high flow nasal cannula.   - Care per ICU.    GASPER.  - Baseline creatinine appears between 0.7 and 0.8.   - 2/16/24 (admission) creatinine: 1.37.   - Care per primary team.     Coronary artery disease.  - Patient with known history of severe triple-vessel CAD with microvascular dysfunction.  Cardiac catheterization from November 2023 discussed with CT surgery and interventional cardiology, unfortunately due to patient's disease and his advanced age patient is not a candidate for any intervention, recommending medical therapy.  - 11/02/23 cardiac catheterization: Severe 3 vessel calcific coronary artery disease including ostial L main + RCA.   - 11/02/23 nuclear stress test: LVEF 65%. There is a left ventricular perfusion defect that is medium in size present in the apical location(s) that is partially reversible. Markedly abnormal nuclear stress. SSS 5 SRS 4 SDS 1. Patient with chest pain and st depressions with vasodilation.  - Continue Lopressor 25 mg twice daily.  - Continue Ranexa 500 mg twice daily.  - Continue aspirin 81 mg daily.  - Continue Lipitor 40 mg daily.    Valvular heart disease.  - Mild to moderate aortic valve  stenosis.  Trace mitral valve regurgitation, trace tricuspid valve regurgitation, mild pulmonic valve regurgitation.  - 11/03/23 TTE:   Aortic Valve The aortic valve is functionally bicuspid with commissural fusion of the left-noncoronary cusp. The leaflets are moderately thickened. The leaflets are moderately calcified. There is no evidence of regurgitation. There is mild to moderate stenosis. The aortic valve peak velocity is 2.05 m/s. The aortic valve area is 1.17 cm2.   Mitral Valve There is mild thickening.  There is trace regurgitation. There is no evidence of stenosis.   Tricuspid Valve There is mild thickening. There is trace regurgitation. There is no evidence of stenosis.   Pulmonic Valve There is mild thickening. There is mild calcification. There is mild regurgitation. There is no evidence of stenosis.   - Follow up repeat TTE.     Essential hypertension.  - SBP since admission .  - Continue Lopressor 25 mg twice daily.  - Continue to monitor BP.    Hyperlipidemia.  - Continue Lipitor 40 mg daily.  - 11/03/23 lipid panel: Cholesterol 137, triglycerides 53, HDL 73, LDL 53.    Bilateral carotid artery stenosis.  - Follows outpatient with [facility] surgery.  - 9/20/23 VAS carotid complete study:      RIGHT: There is <50% stenosis noted in the internal carotid artery. Plaque is heterogenous and smooth. Vertebral artery flow is antegrade. There is no significant subclavian artery disease.     LEFT: There is 50-69% stenosis noted in the internal carotid artery. Plaque is heterogenous and irregular. Vertebral artery flow is antegrade. There is no significant subclavian artery disease.    PAD.  - Patient with known history of severe PAD.  Follows outpatient with [facility] vascular surgery.  Patient had been considered for right lower extremity revascularization however deemed too high risk.  - Chronic right lower extremity wounds.    Summary of Recommendations:        Thank you for your  consultation.    Physician Requesting Consult: Shannon Tinoco MD    Reason for Consult / Principal Problem: Acute respiratory failure with hypoxia, elevated BNP, elevated troponin.    Inpatient consult to Cardiology  Consult performed by: Ibeth Michel PA-C  Consult ordered by: TJ Art          HPI: John Smallwood is a 89 y.o. male with PMHx of CAD, valvular heart disease, HTN, HLD, B/L carotid stenosis, PAD, BPH who presented on 2/16/24 for evaluation for worsening shortness of breath on exertion, cough and sore throat.      Patient was evaluated by PCP on 2/16/2024 and sent to the ED for further evaluation due to concern for CHF.  Unable to obtain a full history due to patient being tachypneic however patient states that he had noticed worsening shortness of breath and cough over the past 4 days.  He denies experiencing chest pain, palpitations, lightheadedness, dizziness, headache, nausea, or vomiting.    Review of Systems   Constitutional:  Positive for activity change and fatigue. Negative for appetite change, chills, diaphoresis, fever and unexpected weight change.   Respiratory:  Positive for cough and shortness of breath. Negative for chest tightness.    Cardiovascular:  Negative for chest pain, palpitations and leg swelling.   Gastrointestinal:  Negative for abdominal distention, abdominal pain, constipation, nausea and vomiting.   Neurological:  Positive for weakness. Negative for dizziness, syncope, light-headedness, numbness and headaches.       Historical Information   Past Medical History:   Diagnosis Date    Asymptomatic PVCs     last assessed 1/18/18    BPH (benign prostatic hyperplasia)     last assessed 4/11/17     High cholesterol     last assessed 4/11/17     Hypertension     uncontrolled, last assessed 4/11/17    PAD (peripheral artery disease) (HCC) 11/21/2023     Past Surgical History:   Procedure Laterality Date    CARDIAC CATHETERIZATION N/A 11/2/2023    Procedure: Cardiac  Coronary Angiogram;  Surgeon: Donny Padilla MD;  Location: [facility] CARDIAC CATH LAB;  Service: Cardiology    CATARACT EXTRACTION      COLONOSCOPY      Complete    MD EXCISION HYDROCELE UNILATERAL Right 10/25/2018    Procedure: HYDROCELECTOMY;  Surgeon: Efraín Wright MD;  Location: [facility] MAIN OR;  Service: Urology    TESTICLE SURGERY      last assessed 3/31/15     Social History     Substance and Sexual Activity   Alcohol Use Not Currently     Social History     Substance and Sexual Activity   Drug Use No     Social History     Tobacco Use   Smoking Status Former    Current packs/day: 0.00    Types: Cigarettes    Start date: 10/18/1945    Quit date: 10/18/1965    Years since quittin.3   Smokeless Tobacco Never     Family History:   Family History   Problem Relation Age of Onset    Heart disease Mother     Hypertension Mother     Heart disease Father     Hypertension Father     Hypertension Family     Hyperlipidemia Family        Meds/Allergies    PTA meds:    Medications Prior to Admission   Medication    amLODIPine (NORVASC) 2.5 mg tablet    aspirin (ECOTRIN LOW STRENGTH) 81 mg EC tablet    atorvastatin (LIPITOR) 40 mg tablet    Calcium Carbonate-Vitamin D 500-125 MG-UNIT TABS    chlorthalidone 25 mg tablet    Cholecalciferol (Vitamin D) 50 MCG (2000) CAPS    finasteride (PROSCAR) 5 mg tablet    lisinopril (ZESTRIL) 5 mg tablet    metoprolol tartrate (LOPRESSOR) 25 mg tablet    Multiple Vitamins-Minerals (Ocuvite Adult Formula) CAPS    ranolazine (RANEXA) 500 mg 12 hr tablet    rivaroxaban (XARELTO) 2.5 mg tablet    ammonium lactate (LAC-HYDRIN) 12 % cream    collagenase (SANTYL) ointment    oxyCODONE (ROXICODONE) 5 immediate release tablet    triamcinolone (KENALOG) 0.025 % ointment      Allergies   Allergen Reactions    Tamsulosin Syncope       Current Facility-Administered Medications:     acetaminophen (TYLENOL) tablet 650 mg, 650 mg, Oral, Q6H PRN, TJ Nguyen    aspirin (ECOTRIN LOW STRENGTH) EC  tablet 81 mg, 81 mg, Oral, Daily, TJ Nguyen, 81 mg at 02/17/24 0916    atorvastatin (LIPITOR) tablet 40 mg, 40 mg, Oral, Daily With Dinner, TJ Nguyen, 40 mg at 02/16/24 2123    azithromycin (ZITHROMAX) 500 mg in sodium chloride 0.9 % 250 mL IVPB, 500 mg, Intravenous, Q24H, TJ Nguyen, Stopped at 02/17/24 0042    benzonatate (TESSALON PERLES) capsule 100 mg, 100 mg, Oral, TID PRN, TJ Nguyen    calcium carbonate-vitamin D 500 mg-5 mcg tablet 1 tablet, 1 tablet, Oral, Daily With Breakfast, TJ Nguyen, 1 tablet at 02/17/24 0829    cefTRIAXone (ROCEPHIN) IVPB (premix in dextrose) 1,000 mg 50 mL, 1,000 mg, Intravenous, Q24H, TJ Nguyen, Stopped at 02/16/24 2334    cholecalciferol (VITAMIN D3) tablet 2,000 Units, 2,000 Units, Oral, Daily, TJ Nguyen, 2,000 Units at 02/17/24 0916    collagenase (SANTYL) ointment, , Topical, Daily, TJ Nguyen, 1 Application at 02/17/24 0917    Famotidine (PF) (PEPCID) injection 20 mg, 20 mg, Intravenous, HS, TJ Nguyen, 20 mg at 02/16/24 2123    finasteride (PROSCAR) tablet 5 mg, 5 mg, Oral, HS, TJ Nguyen, 5 mg at 02/16/24 2123    furosemide (LASIX) injection 40 mg, 40 mg, Intravenous, Daily, TJ Nguyen, 40 mg at 02/17/24 0916    guaiFENesin (MUCINEX) 12 hr tablet 600 mg, 600 mg, Oral, Q12H LASHAY, TJ Nguyen, 600 mg at 02/17/24 0916    heparin (porcine) 25,000 units in 0.45% NaCl 250 mL infusion (premix), 3-20 Units/kg/hr (Order-Specific), Intravenous, Titrated, TJ Nguyen, Last Rate: 8 mL/hr at 02/17/24 0115, 10 Units/kg/hr at 02/17/24 0115    heparin (porcine) injection 2,000 Units, 2,000 Units, Intravenous, Q6H PRN, TJ Nguyen    heparin (porcine) injection 4,000 Units, 4,000 Units, Intravenous, Q6H PRN, TJ Nguyen    ipratropium-albuterol (DUO-NEB) 0.5-2.5 mg/3 mL inhalation solution 3 mL, 3 mL, Nebulization, Q6H, 3 mL at 02/17/24 0702  "**AND** sodium chloride 3 % inhalation solution 4 mL, 4 mL, Nebulization, Q6H, TJ Nguyen, 4 mL at 02/17/24 0735    metoprolol tartrate (LOPRESSOR) tablet 25 mg, 25 mg, Oral, Q12H LASHAY, TJ Nguyen, 25 mg at 02/16/24 2124    multivitamin-minerals (CENTRUM) tablet 1 tablet, 1 tablet, Oral, Daily, TJ Nguyen, 1 tablet at 02/17/24 0916    oxyCODONE (ROXICODONE) split tablet 2.5 mg, 2.5 mg, Oral, Q6H PRN, TJ Nguyen    ranolazine (RANEXA) 12 hr tablet 500 mg, 500 mg, Oral, Q12H LASHAY, TJ Nguyen, 500 mg at 02/17/24 0916    VTE Pharmacologic Prophylaxis:   Heparin    Objective:   Vitals: Blood pressure 92/72, pulse (!) 108, temperature 97.6 °F (36.4 °C), temperature source Temporal, resp. rate (!) 25, height 5' 11\" (1.803 m), weight 78.9 kg (173 lb 15.1 oz), SpO2 92%.  Body mass index is 24.26 kg/m².  Wt Readings from Last 3 Encounters:   02/17/24 78.9 kg (173 lb 15.1 oz)   02/16/24 80.3 kg (177 lb)   02/14/24 78.9 kg (174 lb)     BP Readings from Last 3 Encounters:   02/17/24 92/72   02/16/24 118/68   02/14/24 120/60     Orthostatic Blood Pressures      Flowsheet Row Most Recent Value   Blood Pressure 92/72 filed at 02/17/2024 1200   Patient Position - Orthostatic VS Lying filed at 02/16/2024 2300            Intake/Output Summary (Last 24 hours) at 2/17/2024 1311  Last data filed at 2/17/2024 0656  Gross per 24 hour   Intake 350 ml   Output 1500 ml   Net -1150 ml       Invasive Devices       Peripheral Intravenous Line  Duration             Peripheral IV 02/16/24 Distal;Left;Upper;Ventral (anterior) Arm <1 day    Peripheral IV 02/16/24 Right Antecubital <1 day              Drain  Duration             External Urinary Catheter -- days                      Physical Exam:   Physical Exam  Vitals reviewed.   Constitutional:       General: He is not in acute distress.     Appearance: He is ill-appearing.   Cardiovascular:      Rate and Rhythm: Regular rhythm. Tachycardia present. " "     Pulses: Normal pulses.      Heart sounds: Murmur heard.   Pulmonary:      Effort: Tachypnea present.      Breath sounds: Decreased breath sounds, rhonchi and rales present.   Abdominal:      General: Abdomen is flat. There is no distension.      Palpations: Abdomen is soft.      Tenderness: There is no abdominal tenderness.   Musculoskeletal:      Right lower leg: No edema.      Left lower leg: No edema.   Skin:     General: Skin is warm and dry.   Neurological:      Mental Status: He is alert and oriented to person, place, and time.       Labs:   Troponins:  Results from last 7 days   Lab Units 02/17/24 0349 02/16/24  2148 02/16/24  1944   HS TNI RAND ng/L 2,551*  --   --    HSTNI D2 ng/L  --   --  160*   HSTNI D4 ng/L  --  246*  --        CBC with diff:   Results from last 7 days   Lab Units 02/17/24 0349 02/16/24  1735   WBC Thousand/uL 15.43* 9.07   HEMOGLOBIN g/dL 10.5* 11.1*   HEMATOCRIT % 31.3* 33.1*   MCV fL 91 91   PLATELETS Thousands/uL 336 374   RBC Million/uL 3.43* 3.62*   MCH pg 30.6 30.7   MCHC g/dL 33.5 33.5   RDW % 13.6 13.5   MPV fL 8.9 9.0   NRBC AUTO /100 WBCs  --  0       CMP:   Results from last 7 days   Lab Units 02/17/24 0349 02/16/24  1735   SODIUM mmol/L 139 137   POTASSIUM mmol/L 3.7 4.6   CHLORIDE mmol/L 102 103   CO2 mmol/L 24 25   ANION GAP mmol/L 13 9   BUN mg/dL 49* 48*   CREATININE mg/dL 1.33* 1.37*   CALCIUM mg/dL 8.5 9.3   AST U/L  --  33   ALT U/L  --  24   ALK PHOS U/L  --  62   TOTAL PROTEIN g/dL  --  7.3   ALBUMIN g/dL  --  3.9   TOTAL BILIRUBIN mg/dL  --  0.57   EGFR ml/min/1.73sq m 45 44   GLUCOSE RANDOM mg/dL 147* 136       Magnesium:   Results from last 7 days   Lab Units 02/17/24 0349 02/16/24  1735   MAGNESIUM mg/dL 1.8* 2.0     Coags:   Results from last 7 days   Lab Units 02/17/24  1233 02/17/24  0634 02/17/24  0038 02/16/24  1839   PTT seconds 94* 74* 109* 45*   INR   --   --   --  1.60*     TSH:      No components found for: \"TSH3\"  Lipid Profile:     Lipid " "Profile:   Lab Results   Component Value Date    CHOLESTEROL 137 11/03/2023    HDL 73 11/03/2023    LDLCALC 53 11/03/2023    TRIG 53 11/03/2023     Hgb A1c:     NT-proBNP: No results for input(s): \"NTBNP\" in the last 72 hours.     Imaging & Testing     Cardiac testing:   NM myocardial perfusion spect (rx stress and/or rest)     Result Date: 11/2/2023  Narrative:   Stress Function: Left ventricular function post-stress is normal. Stress ejection fraction is 65 %.   Perfusion Defect Conclusion: The stress/rest perfusion ratio is 1.20 . There is evidence of transient ischemic dilation (TID).   Perfusion: There is a left ventricular perfusion defect that is medium in size present in the apical location(s) that is partially reversible.   Stress Combined Conclusion: Left ventricular perfusion is abnormal.   Stress ECG: ST depression is noted. The ECG was positive for ischemia. The ECG was not diagnostic due to pharmacological (vasodilator) stress. The stress ECG is consistent with ischemia after pharmacologic vasodilation, with reproduction of symptoms.   Stress ECG: The patient after exercising for 3 min and had a maximal HR of 123 bpm (96 % of MPHR) and 1.6 METS. Markedly abnormal nuclear stress. SSS 5 SRS 4 SDS 1. Patient with chest pain and st depressions with vasodilation.     Echo complete   Result date: 11/03/23    Left Ventricle Left ventricular cavity size is normal. Wall thickness is mildly increased. There is mild concentric hypertrophy. The left ventricular ejection fraction is 55%. Systolic function is low normal.  Wall motion is normal based on short axis and apical views Diastolic function is normal for age.   Right Ventricle Systolic function is normal. Normal tricuspid annular plane systolic excursion (TAPSE) > 1.7 cm.   Left Atrium The atrium is mildly dilated.   Right Atrium The atrium is mildly dilated.   Aortic Valve The aortic valve is functionally bicuspid with commissural fusion of the " left-noncoronary cusp. The leaflets are moderately thickened. The leaflets are moderately calcified. There is no evidence of regurgitation. There is mild to moderate stenosis. The aortic valve peak velocity is 2.05 m/s. The aortic valve area is 1.17 cm2.   Mitral Valve There is mild thickening.  There is trace regurgitation. There is no evidence of stenosis.   Tricuspid Valve There is mild thickening. There is trace regurgitation. There is no evidence of stenosis.   Pulmonic Valve There is mild thickening. There is mild calcification. There is mild regurgitation. There is no evidence of stenosis.   IVC/SVC The inferior vena cava is normal in size.         Imaging: I have personally reviewed pertinent reports.    CT chest wo contrast    Result Date: 2/17/2024    Impression: Constellation of findings above overall suggesting pulmonary interstitial edema in the setting of acute CHF/fluid overload (in conjunction with patient's BNP level). Superimposed infection difficult to exclude. Recommend clinical correlation. Consider short-term interval follow-up after treatment.     EKG/ Monitor: Personally reviewed.     2/16/24 EKG: Sinus rhythm with sinus arrhythmia with occasional PVCs, 99 bpm.  Marked ST abnormality, possible inferior subendocardial injury.     Code Status: Level 3 - DNAR and DNI  Advance Directive and Living Will:      POLST:        Ibeth Michel PA-C

## 2024-02-17 NOTE — H&P
[facility]  H&P  Name: John Smallwood 89 y.o. male I MRN: [id number]  Unit/Bed#: 4 [address] I Date of Admission: 2/16/2024   Date of Service: 2/16/2024 I Hospital Day: 0      Assessment/Plan   * Acute respiratory failure with hypoxia (HCC)  Assessment & Plan  Patient presents with SOB with exertion for about 4 days, with associated wet cough/mild sore throat.  Denies fever chills chest pain.  Sent to ED from PCP office today.  Reported SpO2 84% on room air in PCPs office.  Was on oxygen 4 L in ED, satting low 90s.  Escalated to mid flow 15L to maintain SpO2 above 90% when arrived to the floor  Chart reviewed.  Patient was hospitalized in November 2023 for abnormal stress test.  Underwent cardiac cath which revealed severe RCA and left main CAD, deemed not a surgical candidate with cardiology recommendation of medical management optimization.  Chest x-ray appears to have pulmonary edema, pending final read.  BNP 1274  Troponin 1174  Left lower extremity edema on exam, right lower extremity difficult to assess due to dressing  Suspect due to acute CHF.  Management as below.  Check COVID flu RSV for completeness  Check ABG stat  Check CT chest  Respiratory protocol        Elevated brain natriuretic peptide (BNP) level  Assessment & Plan  As above, suspect acute CHF.  2D echo in November 2023 showed EF 55%, normal diastolic function, mildly dilated atriums, mild to moderate aortic stenosis.   Patient is on chlorthalidone 25 mg p.o. daily at home.  Given Lasix 40 mg IV in ED, only urinated 200 ml after that.  Communicated with cardiology on-call, will give additional Lasix 40 mg IV.  Check bladder scan.  Telemetry  Daily weight, intake output  Cardiac diet, fluid restriction 1800 cc per day  Consult cardiology.     Elevated troponin  Assessment & Plan  As above, has known severe RCA and left main CAD, not amenable to intervention.  Initial troponin 1174.  Repeat 1334  Initial EKG sinus  tach with frequent PVCs, rate 101, ST depression in inferior leads and V4-6.  Patient denies chest pain, chest pressure  NSTEMI versus type II MI due to CHF  Started on heparin drip in ED.  Will continue.  Continue home medication baby aspirin, Lipitor 40, Ranexa, metoprolol  Telemetry  Consult cardiology.    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  Continue local wound care  Consult wound care    Coronary artery disease of native artery of native heart with stable angina pectoris (HCC)  Assessment & Plan  Management as above    Venous stasis ulcer of left lower extremity (HCC)  Assessment & Plan  Above left outer ankle.  Healing well.  Continue local wound care    Gastroesophageal reflux disease  Assessment & Plan  Will order Pepcid daily while on heparin drip    BPH (benign prostatic hyperplasia)  Assessment & Plan  Continue Proscar  Bladder scan     Atherosclerosis of native arteries of extremities with rest pain, right leg (HCC)  Assessment & Plan  Follows vascular as outpatient.  Continue aspirin Lipitor  Hold low-dose Xarelto as patient is on heparin drip currently      Hyperlipidemia  Assessment & Plan  Continue statin  Recent LDL 53 in November    Essential hypertension  Assessment & Plan  On low-dose Norvasc and lisinopril at home  Hold Norvasc to allow room for diuretic  Continue lisinopril with holding parameter  BP acceptable currently          VTE Prophylaxis: Heparin Drip  / reason for no mechanical VTE prophylaxis on heparin drip    Code Status: DNR/DNI  POLST: POLST form is not discussed and not completed at this time.    Anticipated Length of Stay:  Patient will be admitted  on an Inpatient basis with an anticipated length of stay of  > 2 midnights.   Justification for Hospital Stay: Acute respiratory failure, elevated troponin and BNP    Total Time for Visit, including Counseling / Coordination of Care: 1 hour.  Greater than 50% of this total time spent on direct patient counseling and coordination of care.    Chief Complaint:   SOB with cough for about 4 days    History of Present Illness:    John Smallwood is a 89 y.o. male with PMH of CAD, hypertension, PAD of right lower extremity, venous stasis ulcer of the lower extremities GERD, hyperlipidemia, BPH who presents with SOB with exertion for about 4 days, with associated wet cough/mild sore throat.  Denies fever chills chest pain.  Sent to ED from PCP office today.  SpO2 84% on room air in PCPs office.  Patient states he went to podiatrist office today for right great toe ulcer.  Was sent to PCP by podiatry due to his breathing problem.  Was sent to ED by PCP.  Patient denies home O2 use.  Denies runny nose, headache, dizziness, nausea vomiting diarrhea constipation.  Patient reports chronic bilateral leg ulcers, goes to wound care center every other week and daily dressing changes at home by his wife.  Reports left leg ulcers almost healed.  Reports right leg ulcers weeping a lot.  Patient ambulates with cane at home.  No other complaints.    Patient denies history of heart attack but reports he has some issues with his heart but cannot be fixed and he was admitted here in November last year.              Review of Systems:    Review of Systems   HENT:  Positive for sore throat.    Respiratory:  Positive for cough and shortness of breath.    Cardiovascular:  Positive for leg swelling.   Skin:  Positive for wound.   All other systems reviewed and are negative.      Past Medical and Surgical History:     Past Medical History:   Diagnosis Date    Asymptomatic PVCs     last assessed 1/18/18    BPH (benign prostatic hyperplasia)      last assessed 4/11/17     High cholesterol     last assessed 4/11/17     Hypertension     uncontrolled, last assessed 4/11/17    PAD (peripheral artery disease) (HCC) 11/21/2023       Past Surgical History:   Procedure Laterality Date    CARDIAC CATHETERIZATION N/A 11/2/2023    Procedure: Cardiac Coronary Angiogram;  Surgeon: Donny Padilla MD;  Location: [facility] C[facility];  Service: Cardiology    CATARACT EXTRACTION      COLONOSCOPY      Complete    HI EXCISION HYDROCELE UNILATERAL Right 10/25/2018    Procedure: HYDROCELECTOMY;  Surgeon: Efraín Wright MD;  Location: [facility] MAIN OR;  Service: Urology    TESTICLE SURGERY      last assessed 3/31/15       Meds/Allergies:    Prior to Admission medications    Medication Sig Start Date End Date Taking? Authorizing Provider   amLODIPine (NORVASC) 2.5 mg tablet Take 1 tablet (2.5 mg total) by mouth daily In the evening.  Patient taking differently: Take 2.5 mg by mouth daily 6/6/23  Yes Eric Castañeda MD   aspirin (ECOTRIN LOW STRENGTH) 81 mg EC tablet Take 1 tablet (81 mg total) by mouth daily 11/13/23  Yes Eric Castañeda MD   atorvastatin (LIPITOR) 40 mg tablet Take 1 tablet (40 mg total) by mouth daily with dinner 11/13/23  Yes Eric Castañeda MD   Calcium Carbonate-Vitamin D 500-125 MG-UNIT TABS Take 1 tablet by mouth daily   Yes Historical Provider, MD   chlorthalidone 25 mg tablet Take 1 tablet (25 mg total) by mouth daily 2/14/24  Yes TJ Fontenot   Cholecalciferol (Vitamin D) 50 MCG (2000 UT) CAPS Take 1 capsule (2,000 Units total) by mouth in the morning 2/14/24  Yes TJ Fontenot   finasteride (PROSCAR) 5 mg tablet Take 1 tablet (5 mg total) by mouth daily at bedtime 8/1/23  Yes Connie Smith MD   lisinopril (ZESTRIL) 5 mg tablet Take  1 tablet in the evening.  Patient taking differently: Take 5 mg by mouth Take  1 tablet in the evening. Hold for SBP< 130 11/13/23  Yes Eric Castañeda MD   metoprolol tartrate (LOPRESSOR) 25 mg tablet Take 1  tablet (25 mg total) by mouth every 12 (twelve) hours 23  Yes Eric Castañeda MD   Multiple Vitamins-Minerals (Ocuvite Adult Formula) CAPS Take 1 capsule by mouth in the morning 6/15/22  Yes Eric Castañeda MD   ranolazine (RANEXA) 500 mg 12 hr tablet Take 1 tablet (500 mg total) by mouth every 12 (twelve) hours 23  Yes Eric Castañeda MD   rivaroxaban (XARELTO) 2.5 mg tablet Take 1 tablet (2.5 mg total) by mouth 2 (two) times a day 23  Yes Eric Castañeda MD   ammonium lactate (LAC-HYDRIN) 12 % cream Apply topically as needed for dry skin  Patient not taking: Reported on 2023 10/30/20   Edward Herrera DPM   collagenase (SANTYL) ointment Apply topically daily 23   Cadence Brown MD   oxyCODONE (ROXICODONE) 5 immediate release tablet Take 0.5 tablets (2.5 mg total) by mouth every 6 (six) hours as needed for moderate pain or severe pain Max Daily Amount: 10 mg 11/3/23   Michelle Oliva MD   triamcinolone (KENALOG) 0.025 % ointment Apply topically 2 (two) times a day 23   Connie Smith MD     I have reviewed home medications with patient personally.    Allergies:   Allergies   Allergen Reactions    Tamsulosin Syncope       Social History:     Marital Status: /Civil Union   Occupation: Retired  Patient Pre-hospital Living Situation: Wife  Patient Pre-hospital Level of Mobility: Cane  Patient Pre-hospital Diet Restrictions: Cardiac diet  Substance Use History:   Social History     Substance and Sexual Activity   Alcohol Use Not Currently     Social History     Tobacco Use   Smoking Status Former    Current packs/day: 0.00    Types: Cigarettes    Start date: 10/18/1945    Quit date: 10/18/1965    Years since quittin.3   Smokeless Tobacco Never     Social History     Substance and Sexual Activity   Drug Use No       Family History:    non-contributory    Physical Exam:     Vitals:   Blood Pressure: 147/99 (24)  Pulse: (!) 113 (24)  Temperature: 97.6 °F  (36.4 °C) (02/16/24 2034)  Temp Source: Oral (02/16/24 1717)  Respirations: (!) 25 (02/16/24 2113)  SpO2: (!) 85 % (02/16/24 2158)    Physical Exam  Vitals and nursing note reviewed.   Constitutional:       Appearance: He is well-developed.   HENT:      Head: Normocephalic and atraumatic.   Neck:      Thyroid: No thyromegaly.      Vascular: No JVD.      Trachea: No tracheal deviation.   Cardiovascular:      Rate and Rhythm: Regular rhythm. Tachycardia present.      Heart sounds: Normal heart sounds.      Comments: Frequent PVCs on telemetry monitor, heart rate 100-110s on ED monitor  Pulmonary:      Effort: No respiratory distress.      Breath sounds: Rhonchi present. No wheezing.      Comments: Tachypneic during conversation, wet cough during exam.  On O2 4L, satting high 80 to 90% on exam.  Abdominal:      General: Bowel sounds are normal. There is no distension.      Palpations: Abdomen is soft.      Tenderness: There is no abdominal tenderness. There is no guarding.   Musculoskeletal:      Cervical back: Neck supple.      Left lower leg: Edema present.      Comments: Right lower extremity dressing intact.  Left lower extremity 1-2+ edema.  Ulcer above left outer ankle healing well.   Skin:     General: Skin is warm and dry.   Neurological:      General: No focal deficit present.      Mental Status: He is alert and oriented to person, place, and time.   Psychiatric:         Mood and Affect: Mood normal.         Judgment: Judgment normal.         Additional Data:     Lab Results: I have personally reviewed pertinent reports.      Results from last 7 days   Lab Units 02/16/24  1735   WBC Thousand/uL 9.07   HEMOGLOBIN g/dL 11.1*   HEMATOCRIT % 33.1*   PLATELETS Thousands/uL 374   NEUTROS PCT % 84*   LYMPHS PCT % 6*   MONOS PCT % 10   EOS PCT % 0     Results from last 7 days   Lab Units 02/16/24  1735   POTASSIUM mmol/L 4.6   CHLORIDE mmol/L 103   CO2 mmol/L 25   BUN mg/dL 48*   CREATININE mg/dL 1.37*   CALCIUM mg/dL  9.3   ALK PHOS U/L 62   ALT U/L 24   AST U/L 33     Results from last 7 days   Lab Units 02/16/24  1839   INR  1.60*       Imaging: I have personally reviewed pertinent reports.      CXR reviewed    EKG, Pathology, and Other Studies Reviewed on Admission:   EKG: ST with frequent PVCs, diffuse ST depression    Allscripts Records Reviewed: Yes     ** Please Note: Dragon 360 Dictation voice to text software may have been used in the creation of this document. **

## 2024-02-17 NOTE — ASSESSMENT & PLAN NOTE
As above, suspect acute CHF.  2D echo in November 2023 showed EF 55%, normal diastolic function, mildly dilated atriums, mild to moderate aortic stenosis.   Patient is on chlorthalidone 25 mg p.o. daily at home.  Given Lasix 40 mg IV in ED, only urinated 200 ml after that.  Communicated with cardiology on-call, will give additional Lasix 40 mg IV.  Check bladder scan.  Telemetry  Daily weight, intake output  Cardiac diet, fluid restriction 1800 cc per day  Consult cardiology.

## 2024-02-17 NOTE — ASSESSMENT & PLAN NOTE
On low-dose Norvasc and lisinopril at home  Hold Norvasc to allow room for diuretic  Holding lisinopril in setting of GASPER and soft BPs in setting of diuresis  VS per unit protocol

## 2024-02-18 NOTE — CONSULTS
Podiatry - Consultation    Patient Information:   John Smallwood 89 y.o. male MRN: [id number]  Unit/Bed#: ICU 08 Encounter: [id number]  PCP: Connie Smith MD  Date of Admission:  2/16/2024  Date of Consultation: 02/17/24  Requesting Physician: Shannon Tinoco MD      ASSESSMENT:    John Smallwood is a 89 y.o. male with:    Right distal hallux arterial ulcer to level of subcutaneous tissue  Multiple right leg arterial ulcers  Severe peripheral arterial disease    PLAN:    Will plan for local wound care consisting of Santyl, DSD for enzymatic debridement  Reviewed right foot x-ray from 12/19/2023: No acute osseous destruction to the cortices to suggest osteomyelitis.  No soft tissue emphysema noted.  Reviewed lower extremity arterial duplex study from 9/20/2023: Patient does have diffuse disease throughout the femoral-popliteal arteries with 50 to 75% stenosis in the distal common iliac artery.  Severe stenosis versus total occlusion in the common femoral artery.  Severe stenosis versus total occlusion of the distal SFA.  CHU 0.29, metatarsal pressure and great toe pressure are 0 mmHg.  Reviewed vascular surgery note from 12/20/2023: Patient does have severe peripheral arterial disease, unfortunately he would require a femoral endarterectomy and most likely right lower extremity bypass, he would be high risk of complication for this procedure.  The patient understands this and given his age would not like to pursue any intervention.  Elevation and offloading on green foam wedges or pillows when non-ambulatory.  Rest of care per primary team.    Weightbearing status: Weightbearing as tolerated    SUBJECTIVE:    History of Present Illness:    John Smallwood is a 89 y.o. male who is originally admitted 2/16/2024 due to acute respiratory failure with hypoxia. Patient has a past medical history of hypertension, hyperlipidemia, peripheral arterial disease.    We are consulted for evaluation and  management of the patient's bilateral lower extremities.  He states that he has had his ulcers for about the past 5 months.  He also does complain of rest pain to the right lower extremity.  He states that he does not wish to undergo any invasive procedures to possibly improve his right lower extremity arterial status given his age and risk of these procedures.  He states that he has been seeing his local podiatrist in [place] who has been performing local wound care to the right lower extremity including placing Unna boots on his right lower extremity.  He denies any systemic signs of infection currently    Review of Systems:    Constitutional: Negative.    HENT: Negative.    Eyes: Negative.    Respiratory: Negative.    Cardiovascular: Negative.    Gastrointestinal: Negative.    Musculoskeletal: Right lower extremity pain  Skin: Multiple ulcerations right lower extremity  Neurological: Negative  Psych: Negative.     Past Medical and Surgical History:     Past Medical History:   Diagnosis Date    Asymptomatic PVCs     last assessed 1/18/18    BPH (benign prostatic hyperplasia)     last assessed 4/11/17     High cholesterol     last assessed 4/11/17     Hypertension     uncontrolled, last assessed 4/11/17    PAD (peripheral artery disease) ([facility]) 11/21/2023       Past Surgical History:   Procedure Laterality Date    CARDIAC CATHETERIZATION N/A 11/2/2023    Procedure: Cardiac Coronary Angiogram;  Surgeon: Donny Padilla MD;  Location: [facility] CARDIAC CATH LAB;  Service: Cardiology    CATARACT EXTRACTION      COLONOSCOPY      Complete    CT EXCISION HYDROCELE UNILATERAL Right 10/25/2018    Procedure: HYDROCELECTOMY;  Surgeon: Efraín Wright MD;  Location: [facility] MAIN [facility];  Service: Urology    TESTICLE SURGERY      last assessed 3/31/15       Meds/Allergies:    Medications Prior to Admission   Medication    amLODIPine (NORVASC) 2.5 mg tablet    aspirin (ECOTRIN LOW STRENGTH) 81 mg EC tablet    atorvastatin (LIPITOR) 40 mg tablet  "   Calcium Carbonate-Vitamin D 500-125 MG-UNIT TABS    chlorthalidone 25 mg tablet    Cholecalciferol (Vitamin D) 50 MCG (2000) CAPS    finasteride (PROSCAR) 5 mg tablet    lisinopril (ZESTRIL) 5 mg tablet    metoprolol tartrate (LOPRESSOR) 25 mg tablet    Multiple Vitamins-Minerals (Ocuvite Adult Formula) CAPS    ranolazine (RANEXA) 500 mg 12 hr tablet    rivaroxaban (XARELTO) 2.5 mg tablet    ammonium lactate (LAC-HYDRIN) 12 % cream    collagenase (SANTYL) ointment    oxyCODONE (ROXICODONE) 5 immediate release tablet    triamcinolone (KENALOG) 0.025 % ointment       Allergies   Allergen Reactions    Tamsulosin Syncope       Social History:     Marital Status: /Civil Union    Substance Use History:   Social History     Substance and Sexual Activity   Alcohol Use Not Currently     Social History     Tobacco Use   Smoking Status Former    Current packs/day: 0.00    Types: Cigarettes    Start date: 10/18/1945    Quit date: 10/18/1965    Years since quittin.3   Smokeless Tobacco Never     Social History     Substance and Sexual Activity   Drug Use No       Family History:    Family History   Problem Relation Age of Onset    Heart disease Mother     Hypertension Mother     Heart disease Father     Hypertension Father     Hypertension Family     Hyperlipidemia Family          OBJECTIVE:    Vitals:   Blood Pressure: 107/74 (24)  Pulse: (!) 106 (24)  Temperature: (!) 97.1 °F (36.2 °C) (24)  Temp Source: Temporal (24)  Respirations: (!) 27 (24)  Height: 5' 11\" (180.3 cm) (24)  Weight - Scale: 78.9 kg (173 lb 15.1 oz) (24)  SpO2: 93 % (24)    Physical Exam:    General Appearance: Alert, cooperative, no distress.  HEENT: Head normocephalic, atraumatic, without obvious abnormality.  Heart: Normal rate and rhythm.  Lungs: Non-labored breathing. No respiratory distress.  Abdomen: Without distension.  Psychiatric: AAOx3  Lower " Extremity:    Vascular:   DP: Right: non-palpable Left: non-palpable  PT: Right: non-palpable Left: non-palpable  CRT < 3 seconds at the digits. +0/4 edema noted at bilateral lower extremities.   Pedal hair is absent.   Skin temperature is cool bilaterally.    Musculoskeletal:  MMT is 3/5 in all muscle compartments bilaterally.   ROM at the 1st MPJ and ankle joint are decreased bilaterally with the leg extended.   Pain on palpation of the right leg ulcerations.   No gross deformities noted.     Dermatological:  Lower extremity wound(s) as noted below:    Wound #: 1  Location: Right distal hallux  Length 1.0 cm: Width 0.9 cm: Depth 0.2 cm:   Deepest Tissue Noted in Base: Mixed dermis/subcutaneous tissue  Probe to Bone: No  Peripheral Skin Description: rolled edge  Granulation: 0% Fibrotic Tissue: 100% Necrotic Tissue: 0%   Drainage Amount: minimal, serous  Signs of Infection: No      Wound #: 2  Location: Right anterior leg  Length 1.0 cm: Width 0.9 cm: Depth 0.2 cm:   Deepest Tissue Noted in Base: subcutaneous tissue  Probe to Bone: No  Peripheral Skin Description: rolled edge  Granulation: 0% Fibrotic Tissue: 100% Necrotic Tissue: 0%   Drainage Amount: minimal, serous  Signs of Infection: No      Wound #: 3  Location: Right lateral leg  Length 1.0 cm: Width 01.3 cm: Depth 0.3 cm:   Deepest Tissue Noted in Base subcutaneous tissue  Probe to Bone: No  Peripheral Skin Description: rolled edge  Granulation: 0% Fibrotic Tissue: 100% Necrotic Tissue: 0%   Drainage Amount: minimal, serous  Signs of Infection: No      Wound #: 4  Location: Right posterior leg  Length 6.0 cm: Width 7.0 cm: Depth 0.2 cm:   Deepest Tissue Noted in Base: Mixed dermis/subcutaneous tissue  Probe to Bone: No  Peripheral Skin Description: rolled edge  Granulation: 0% Fibrotic Tissue: 100% Necrotic Tissue: 0%   Drainage Amount: minimal, serous  Signs of Infection: No      Neurological:  Gross sensation is intact.   Light touch is intact.  "  Protective sensation is intact.    Additional data:     Lab Results: I have personally reviewed pertinent labs including:    Results from last 7 days   Lab Units 02/17/24  0349 02/16/24  1735   WBC Thousand/uL 15.43* 9.07   HEMOGLOBIN g/dL 10.5* 11.1*   HEMATOCRIT % 31.3* 33.1*   PLATELETS Thousands/uL 336 374   NEUTROS PCT %  --  84*   LYMPHS PCT %  --  6*   MONOS PCT %  --  10   EOS PCT %  --  0     Results from last 7 days   Lab Units 02/17/24  0349 02/16/24  1735   POTASSIUM mmol/L 3.7 4.6   CHLORIDE mmol/L 102 103   CO2 mmol/L 24 25   BUN mg/dL 49* 48*   CREATININE mg/dL 1.33* 1.37*   CALCIUM mg/dL 8.5 9.3   ALK PHOS U/L  --  62   ALT U/L  --  24   AST U/L  --  33     Results from last 7 days   Lab Units 02/16/24  1839   INR  1.60*       Cultures: I have personally reviewed pertinent cultures including:    Results from last 7 days   Lab Units 02/17/24  0033 02/16/24  2148   BLOOD CULTURE  Received in Microbiology Lab. Culture in Progress.  Received in Microbiology Lab. Culture in Progress.  --    LEGIONELLA URINARY ANTIGEN   --  Negative           Imaging: I have personally reviewed pertinent reports in PACS.  EKG, Pathology, and Other Studies: I have personally reviewed pertinent reports.    Time Spent for Care: 30 minutes.  More than 50% of total time spent on counseling and coordination of care as described above.      ** Please Note: Portions of the record may have been created with voice recognition software. Occasional wrong word or \"sound a like\" substitutions may have occurred due to the inherent limitations of voice recognition software. Read the chart carefully and recognize, using context, where substitutions have occurred. **    "

## 2024-02-18 NOTE — QUICK NOTE
Family requested a  for last rites and a prayer.  Unfortunately he  before the  returned the phone call.  However, family is still requesting his presence in order to pray.

## 2024-02-18 NOTE — DISCHARGE SUMMARY
Discharge Summary - John Smallwood 89 y.o. male MRN: [id number]    Unit/Bed#: ICU 08 Encounter: [id number]    Admission Date:   Admission Orders (From admission, onward)       Ordered        02/16/24 1834  INPATIENT ADMISSION  Once                            Admitting Diagnosis: CH[facility]congestive heart failure) ([facility]) [I50.9]  Dyspnea [R06.00]  Elevated troponin [R79.89]  NSTEMI (non-ST elevated myocardial infarction) ([facility]) [I21.4]  Shortness of breath [R06.02]    HPI: Presented to the ER on 2/16 with shortness of breath and cough x 4 days after being evaluated by his PCP earlier in the day    Procedures Performed:   Orders Placed This Encounter   Procedures    Critical Care    ED ECG Documentation Only       Summary of Hospital Course: Patient was admitted to the medical surgical floor on 2/16.  His past medical history includes CAD, hypertension, PAD in the right lower extremity, venous stasis ulcer, GERD, hyperlipidemia, and BPH.  Initially presented to his PCP office with complaints of shortness of breath x 4 days with a wet cough.  At that time, the PCP sent him to the emergency room because oxygen saturation was 84% on room air.  Unfortunately, soon after admission to the medical floor, the patient had worsening hypoxic respiratory failure in the setting of volume overload versus community-acquired pneumonia.  CT chest showed groundglass opacities with pleural effusions.  He was admitted to the ICU for high flow nasal cannula/BiPAP.  On 217, the patient received a right-sided thoracentesis with a 650 mL fluid removal.  Initially, he had improvement in his oxygenation requiring 10 L of mid flow and was able to be weaned off the high flow nasal cannula.  Unfortunately into the evening, the patient had worsening shortness of breath with increased work of breathing and hypoxia.  He was transition back to high flow nasal cannula and then eventually BiPAP.  Overnight, he had intermittent delirium, and was  frustrated with wearing the BiPAP mask.  He stated numerous times that he wanted to die and that he was tired and wanted to be let go.  Early in the morning on , his wife was notified of his wishes, and family arrived at the bedside.  After multiple goals of care conversations with the ICU team, the patient had elected to withdraw treatment and transition to comfort care with his family at the bedside.  Patient was transition to a morphine infusion to help with his work of breathing, he  with multiple family members at the bedside at 12:35 PM on 2024.    Significant Findings, Care, Treatment and Services Provided:    right thoracentesis -650 mL removed      Complications: Acute hypoxic respiratory failure in the setting of volume overload versus community-acquired pneumonia      Medical Problems       Resolved Problems  Date Reviewed: 2024   None                  PCP: Connie Smith MD    Disposition:        Discharge Statement   I spent 30 minutes discharging the patient. This time was spent on the day of discharge. I had direct contact with the patient on the day of discharge. Additional documentation is required if more than 30 minutes were spent on discharge.     .

## 2024-02-18 NOTE — PROGRESS NOTES
[facility]  Progress Note  Name: John Smallwood I  MRN: [id number]  Unit/Bed#: ICU 08 I Date of Admission: 2/16/2024   Date of Service: 2/18/2024 I Hospital Day: 2    Assessment/Plan   * Acute respiratory failure with hypoxia (HCC)  Assessment & Plan  Pt presented with SOB on exertion x 4 days, associated wet cough/mild sore throat, orthopnea, leg swelling. Denies fever, chills, or chest pain. Sent to ED from PCP office 2/16 after SpO2 84% on room air during visit.   Increased FiO2 requirements from 4L on GMF to HFNC. Xfr to ICU evening 2/16  BNP 1,274. Left lower extremity edema on exam, right lower extremity difficult to assess due to dressing  CXR appears with diffuse pulmonary edema, pleural effusions,  + left mid/lower consolidation    2/16 CT chest: pulmonary interstitial edema in the setting of acute CHF/fluid overload. Superimposed infection difficult to exclude.   COVID flu RSV negative  S/p total of 80mg IV lasix 2/16 and additional 40mg lasix 2/17. Net stay fluid balance: -1.9L  Procalcitonin 0.07-> 0.24  Urine antigens negative  S/p right thora for 650ml 2/17. Had brief improvement in WOB and O2 requirements shortly after. Then with worsening WOB and desaturation into the evening. Unable to tolerate BiPap. BP borderline, no diuretics administered. Placed on HFNC 95% and 50L and WOB improved. This AM he awoke SOB and is verbalizing wishes to just be made comfortable. He wishes for family to come to the bedside first so that he can discuss this with them in person.     Plan  Blood cultures, sputum culture, MRSA surveillance pending  Respiratory protocol  Continue duoneb, 3% saline IH  Continue ceftriaxone D3. Can d/c azithromycin given neg legionella antigen  Current O2 requirements: HFNC 95% and 50L with intermittent superimposed NRB to recover from desaturations  Did not tolerate BiPap overnight  Consider additional L. Thora today pending POCUS and GOC discussion  Wean O2  for goal SpO2 >90%  Follow culture data, procal     GASPER (acute kidney injury) ([facility])  Assessment & Plan  Baseline creatinine appears to be around 0.7-0.8 in past year  Creatinine trend 1.37-> 1.33  Suspect cardiorenal. EF 35%  IV Lasix as above  Monitor BMP  Avoid nephrotoxins  Close I&O monitoring     Pleural effusion, bilateral  Assessment & Plan  Noted on CXR and CT chest in setting of fluid overload  S/p diuresis as above  S/p R. Thora 2/17 for 650ml    Plan  Daily consideration for diuresis  Consider Left thoracentesis pending POCUS/AM CXR today  Rest of plan as outlined     Elevated troponin  Assessment & Plan  Has known severe RCA and left main CAD, not amenable to intervention.  Troponin 1,174-> 1,334-> 1,420-> 2,551-> no utility in further trending as pt receiving aggressive medical management only  Initial EKG sinus tach with frequent PVCs, rate 101, ST depression in inferior leads and V4-6.  Patient denies chest pain or pressure  Suspect type II MI due to CHF, supply/demand mismatch, new drop in EF to 35%    Plan  Continue heparin drip for total 48h through 1800 this evening   Continue home aspirin 81mg daily, Lipitor 40, Ranexa, metoprolol 25mg q12h  Continuous Cardiac monitoring   Appreciate Cardiology following    SIRS (systemic inflammatory response syndrome) ([facility])  Assessment & Plan  Met SIRS with tachycardia and tachypnea- suspected 2/2 CHF exacerbation  Initial CXR appeared with b/l pleural effusions and pulmonary edema. Also with increased consolidation of the left mid and lower lung fields  BNP elevated at 1274 initially   Afebrile, no initial leukocytosis. WBC bumped to 15 yesterday   CT scan showing Centrilobular infiltrates concerning for fluid overload. Cannot r/o infection  BC x2 pending  Sputum culture ordered- not yet able to produce specimen  Procalcitonin 0.07-> 0.24  Urine antigens negative    Plan  Lactic acid 2.2-> 2.5-> 2.7. Suspect elevated due to intermittent hypoxia, poor CO with  new EF 35%  Continues on ceftriaxone D3. Can d/c azithro today given neg legionella antigens  Continue to trend temperature curve, CBC, procal    Acute on chronic combined systolic and diastolic heart failure (HCC)  Assessment & Plan  As above, suspect acute CHF  2D echo in November 2023 showed EF 55%, normal diastolic function, mildly dilated atriums, mild to moderate aortic stenosis.   ECHO 2/17 with wall motion normality and  EF dropping to 35%. Has severe anterior apical hypokinesis, G2DD, moderate to severe aortic stenosis .  Patient is on chlorthalidone 25 mg p.o. daily at home.  Daily consideration for diuresis. Currently net negative 1.9L. IVC collapsible on ECHO 2/17.  Daily weight, close intake & output  Appreciate Cardiology following        Essential hypertension  Assessment & Plan  On low-dose Norvasc and lisinopril at home  Hold Norvasc to allow room for diuretic  Holding lisinopril in setting of GASPER and soft BPs in setting of diuresis  VS per unit protocol       Coronary artery disease of native artery of native heart with stable angina pectoris (HCC)  Assessment & Plan  See elevated troponin as outlined    Hyperlipidemia  Assessment & Plan  Lab Results   Component Value Date    CHOLESTEROL 137 11/03/2023    CHOLESTEROL 153 12/06/2022    CHOLESTEROL 154 11/30/2021     Lab Results   Component Value Date    HDL 73 11/03/2023    HDL 77 12/06/2022    HDL 78 11/30/2021     Lab Results   Component Value Date    TRIG 53 11/03/2023    TRIG 74 12/06/2022    TRIG 61 11/30/2021     Lab Results   Component Value Date    NONHDLC 64 11/03/2023    NONHDLC 76 12/06/2022    NONHDLC 76 11/30/2021     Continue statin      Atherosclerosis of native arteries of extremities with rest pain, right leg (HCC)  Assessment & Plan  Follows vascular as outpatient.  Continue aspirin, Lipitor  Hold home Xarelto as patient is on heparin drip for elevated troponin     BPH (benign prostatic hyperplasia)  Assessment & Plan  Continue  "Proscar  Bladder scan   Urinary retention protocol    Gastroesophageal reflux disease  Assessment & Plan  Continue daily Pepcid while heparin infusing       Venous stasis ulcer of left lower extremity (HCC)  Assessment & Plan  Above left outer ankle. Healing well.  Continue local wound care    Chronic ulcer of right lower extremity (HCC)  Assessment & Plan  Chronic ulcer in right great toe and right lower leg, likely secondary to PAD and venous stasis. New dressing applied in office 2/16   Continue daily local wound care  Consult wound care             Disposition: Stepdown Level 1    ICU Core Measures     A: Assess, Prevent, and Manage Pain Has pain been assessed? Yes  Need for changes to pain regimen? No   B: Both SAT/SAT  N/A   C: Choice of Sedation RASS Goal: N/A patient not on sedation  Need for changes to sedation or analgesia regimen? No   D: Delirium CAM-ICU: Negative   E: Early Mobility  Plan for early mobility? Yes   F: Family Engagement Plan for family engagement today? Yes       Antibiotic Review: Awaiting culture results.       Prophylaxis:  VTE VTE covered by:  heparin (porcine), Intravenous, 8 Units/kg/hr at 02/18/24 0045  heparin (porcine), Intravenous  heparin (porcine), Intravenous       Stress Ulcer  covered byFamotidine (PF) (PEPCID) injection 20 mg [[id number]]         Significant 24hr Events     24hr events: S/p R. Thora for 650ml last evening. Verbalized improvement in respiratory status, and was weaned to 10L MF. Developed increased WOB last evening as well as conversational dyspnea. Unable to tolerate BiPap at that time. WOB improved with HFNC and was able to sleep for part of the night. Awoke this morning and was very dyspneic, stating \"I'm ready to die\" and \"just make me comfortable, I can't do this\". Had self-removed his HFNC and desaturated to low 70's. Required replacement of HFNC plus superimposed NRB mask to recover to the low 90's. CXR done and showing worsening pulmonary " edema. Lasix 40mg IV x1 was given. Was also given morphine 2mg for air hunger. His wife was called and notified of his clinical deterioration. She and her daughter plan to come in as soon as they can this morning.          Subjective   Review of Systems   Constitutional: Negative.    Respiratory:  Positive for cough (moist, non-productive) and shortness of breath.         Conversational dyspnea   Cardiovascular:  Positive for leg swelling. Negative for chest pain.   Gastrointestinal: Negative.    Genitourinary: Negative.    Musculoskeletal: Negative.    Skin:  Positive for wound (chronic lower extremity).   Neurological: Negative.    Psychiatric/Behavioral: Negative.        Objective                            Vitals I/O      Most Recent Min/Max in 24hrs   Temp (!) 97.1 °F (36.2 °C) Temp  Min: 97.1 °F (36.2 °C)  Max: 97.6 °F (36.4 °C)   Pulse 101 Pulse  Min: 88  Max: 116   Resp 20 Resp  Min: 19  Max: 49   BP 98/57 BP  Min: 86/55  Max: 123/71   O2 Sat 96 % SpO2  Min: 86 %  Max: 98 %      Intake/Output Summary (Last 24 hours) at 2024 0508  Last data filed at 2024 2258  Gross per 24 hour   Intake 350 ml   Output 1000 ml   Net -650 ml       Diet Regular; Regular House; Dysphagia 3-Dental Soft; Thin Liquid    Invasive Monitoring   N/A        Physical Exam   Physical Exam  Vitals and nursing note reviewed.   Eyes:      General: Lids are normal.   Skin:     General: Skin is warm and dry.      Comments: RLE with dressing intact   HENT:      Head: Normocephalic.      Mouth/Throat:      Mouth: Mucous membranes are moist.   Neck:      Vascular: JVD present.   Cardiovascular:      Rate and Rhythm: Regular rhythm. Tachycardia present.      Pulses:           Radial pulses are 1+ on the right side and 1+ on the left side.      Heart sounds: Murmur heard.      Systolic murmur is present with a grade of 4/6.   Musculoskeletal:      Right lower le+ Edema present.      Left lower le+ Edema present.      Comments:  Pitting chronic b/l lower leg edema   Abdominal: General: Bowel sounds are normal.      Palpations: Abdomen is soft.   Constitutional:       General: He is in acute distress (Increased WOB, accessory muscle use).      Appearance: He is well-developed and well-nourished. He is ill-appearing.      Interventions: Face mask in place.   Pulmonary:      Effort: Tachypnea, accessory muscle usage and accessory muscle usage present.      Breath sounds: Examination of the right-lower field reveals rales. Examination of the left-lower field reveals decreased breath sounds and rales. Decreased breath sounds and rales present.      Comments: Coarse throughout anteriorly  Psychiatric:         Attention and Perception: Attention normal.         Mood and Affect: Mood normal.         Speech: Speech normal.         Behavior: Behavior normal. Behavior is cooperative.   Neurological:      General: No focal deficit present.      Mental Status: He is alert and oriented to person, place and time.      GCS: GCS eye subscore is 4. GCS verbal subscore is 5. GCS motor subscore is 6.      Motor: Strength full and intact in all extremities.   Genitourinary/Anorectal:     Comments: Draining yellow urine   external catheter present.          Diagnostic Studies      EKG: Sinus tachycardia on monitor, rate 100s  Imaging: Thoracentesis (Bedside)  TJ Hansen     2/17/2024  4:52 PM  Thoracentesis (Bedside)    Date/Time: 2/17/2024 4:20 PM    Performed by: TJ Hansen  Authorized by: TJ Hansen    Patient location:  ICU  Other Assisting Provider: Yes (comment) (Dr. Tinoco)    Consent:     Consent obtained:  Verbal    Consent given by:  Patient    Risks discussed:  Bleeding, infection, pain, incomplete drainage, nerve   damage and pneumothorax    Alternatives discussed:  No treatment  Universal protocol:     Procedure explained and questions answered to patient or proxy's   satisfaction: yes      Relevant documents present  and verified: yes      Test results available and properly labeled: yes      Radiology Images displayed and confirmed.  If images not available,   report reviewed: yes      Required blood products, implants, devices and special equipment   available: yes      Site/side marked: yes      Immediately prior to procedure a time out was called: yes      Patient identity confirmed:  Arm band and verbally with patient  Indications:     Procedure Purpose: diagnostic      Indications: pleural effusion    Sedation:     Sedation type:  Anxiolysis  Anesthesia (see MAR for exact dosages):     Anesthesia method:  Local infiltration    Local anesthetic:  Lidocaine 1% w/o epi  Procedure details:     Preparation: Patient was prepped and draped in usual sterile fashion      Standard thoracentesis cath kit used: Yes      Patient position:  Sitting    Laterality:  Right    Location:  Posterior    Intercostal space:  7th    Puncture method:  Over-the-needle catheter    Ultrasound guidance: yes      Reason for ultrasound: Identify fluid collection and guide catheter   placement.      Ultrasound image availability:  Not saved    Sterile ultrasound techniques: Sterile gel and sterile probe covers were   used      Indwelling catheter placed: no      Number of attempts:  1    Needle gauge:  18    Catheter size:  8 Fr    Drainage color:  Yellow    Drainage characteristics:  Clear    Fluid removed amount:  650 ml  Post-procedure details:     Chest x-ray performed: yes      Chest x-ray findings:  Pleural effusion improved    Patient tolerance of procedure:  Tolerated well, no immediate   complications  XR chest 1 view portable  Narrative: XR CHEST PORTABLE    INDICATION: shortness of breath.    COMPARISON: None    FINDINGS:    Borderline cardiomegaly. Mild interstitial and slight vascular accentuation. Small bilateral pleural effusions. Right upper lung small airspace opacities. Lower left perihilar larger airspace opacity. No  pneumothorax.    Bones are unremarkable for age.    Normal upper abdomen.  Impression: Borderline cardiomegaly.    Mild pulmonary edema.    Small pleural effusions.    Right upper lung small infiltrates. Larger left perihilar infiltrate    Workstation performed: [id number]  Echo follow up/limited w/ contrast if indicated    Left Ventricle: Left ventricular cavity size is normal. Wall thickness   is mildly increased. There is borderline concentric hypertrophy. The left   ventricular ejection fraction is around 35% by visual estimation. There is   moderate global hypokinesis with specific regional wall abnormalities.  He   has severe hypokinesis of anterior apical segments Diastolic function is   moderately abnormal, consistent with grade II (pseudonormal) relaxation.    IVS: There is abnormal septal motion consistent conduction abnormality.    Left Atrium: The atrium is mildly dilated.    Aortic Valve: The aortic valve is trileaflet. The leaflets are   moderately thickened. The leaflets are moderately calcified. There is   severely reduced mobility. There is severe stenosis.  This is a limited   study.  Dimensionless index is 0.32.  LVOT velocity is only 0.54.  There   is evidence of low cardiac output.    Mitral Valve: There is mild annular calcification. There is mild to   moderate regurgitation.    Tricuspid Valve: There is mild to moderate regurgitation.  Pulmonary   artery pressure around 65 mmHg.    Pericardium: There is a left pleural effusion.    As compared to previous study patient's EF has decreased, there is a   wall motion normality involving the apical segment.  Evidence of low   cardiac output and left-sided pleural effusion noted.  CT chest wo contrast  Narrative: CT CHEST WITHOUT IV CONTRAST    INDICATION: Pneumonia, effusion or abscess suspected, xray done  SOB, suspect CHF, infection..    COMPARISON: None.    TECHNIQUE: CT examination of the chest was performed without intravenous contrast.  Multiplanar 2D reformatted images were created from the source data.    This examination, like all CT scans performed in the [facility], was performed utilizing techniques to minimize radiation dose exposure, including the use of iterative reconstruction and automated exposure control. Radiation dose length   product (DLP) for this visit: 340 mGy-cm    FINDINGS:    LUNGS: Respiratory motion artifact somewhat limits assessment. Biapical scarring. There is extensive groundglass and consolidative opacity predominantly central in distribution and more pronounced in the perihilar region and lower lobes. There is mild   associated septal thickening noted, more evident in the upper lobes. There is no tracheal or endobronchial lesion.    PLEURA: Small bilateral pleural effusions with probable subjacent compressive atelectatic change. Apical calcified pleural plaque seen.    HEART/GREAT VESSELS: Mild/borderline cardiomegaly with atrial enlargement. No pericardial effusion. There is thoracic aortic and coronary atherosclerosis. No thoracic aortic aneurysm.    MEDIASTINUM AND MEENAKSHI: Limited without contrast. Several shotty subcentimeter mediastinal lymph nodes are seen.    CHEST WALL AND LOWER NECK: Gynecomastia.    VISUALIZED STRUCTURES IN THE UPPER ABDOMEN: No acute abnormality.    OSSEOUS STRUCTURES: No acute fracture or destructive osseous lesion.  Impression: Constellation of findings above overall suggesting pulmonary interstitial edema in the setting of acute CHF/fluid overload (in conjunction with patient's BNP level). Superimposed infection difficult to exclude. Recommend clinical correlation. Consider   short-term interval follow-up after treatment.    Workstation performed: [id number]     I have personally reviewed pertinent reports.   and I have personally reviewed pertinent films in PACS     Medications:  Scheduled PRN   aspirin, 81 mg, Daily  atorvastatin, 40 mg, Daily With Dinner  calcium  carbonate-vitamin D, 1 tablet, Daily With Breakfast  cefTRIAXone, 1,000 mg, Q24H  cholecalciferol, 2,000 Units, Daily  collagenase, , Daily  famotidine, 20 mg, HS  finasteride, 5 mg, HS  furosemide, 40 mg, Daily  guaiFENesin, 600 mg, Q12H LASHAY  ipratropium-albuterol, 3 mL, Q6H   And  sodium chloride, 4 mL, Q6H  metoprolol tartrate, 25 mg, Q12H LASHAY  multivitamin-minerals, 1 tablet, Daily  ranolazine, 500 mg, Q12H LASHAY      acetaminophen, 650 mg, Q6H PRN  benzonatate, 100 mg, TID PRN  heparin (porcine), 2,000 Units, Q6H PRN  heparin (porcine), 4,000 Units, Q6H PRN  melatonin, 6 mg, HS PRN  oxyCODONE, 2.5 mg, Q6H PRN       Continuous    heparin (porcine), 3-20 Units/kg/hr (Order-Specific), Last Rate: 8 Units/kg/hr (02/18/24 0045)         Labs:    CBC    Recent Labs     02/16/24 1735 02/17/24 0349   WBC 9.07 15.43*   HGB 11.1* 10.5*   HCT 33.1* 31.3*    336     BMP    Recent Labs     02/16/24 1735 02/17/24 0349   SODIUM 137 139   K 4.6 3.7    102   CO2 25 24   AGAP 9 13   BUN 48* 49*   CREATININE 1.37* 1.33*   CALCIUM 9.3 8.5       Coags    Recent Labs     02/16/24  1839 02/17/24  0038 02/17/24  1820 02/18/24  0043   INR 1.60*  --   --   --    PTT 45*   < > 44* 71*    < > = values in this interval not displayed.        Additional Electrolytes  Recent Labs     02/16/24 1735 02/17/24  0349   MG 2.0 1.8*          Blood Gas    Recent Labs     02/16/24 2118   PHART 7.425   BPL4AAD 35.0*   PO2ART 60.7*   IPA6ZAR 22.4   BEART -1.4   SOURCE Radial, Left     Recent Labs     02/16/24 2118   SOURCE Radial, Left    LFTs  Recent Labs     02/16/24  1735   ALT 24   AST 33   ALKPHOS 62   ALB 3.9   TBILI 0.57       Infectious  Recent Labs     02/16/24  1735 02/17/24  0349   PROCALCITONI 0.07 0.24     Glucose  Recent Labs     02/16/24  1735 02/17/24  0349   GLUC 136 147*             TJ Nguyen

## 2024-02-18 NOTE — DEATH NOTE
INPATIENT DEATH NOTE  John Smallwood 89 y.o. male MRN: [id number]  Unit/Bed#: ICU 08 Encounter: [id number]    Date, Time and Cause of Death    Date of Death: 24  Time of Death: 12:35 PM  Preliminary Cause of Death: Respiratory failure with hypoxia (HCC)  Entered by: TJ Rock[LH1.1]       Attribution       1.1 TJ Hansen 24 12:43             Patient's Information  Pronounced by: TJ Rock  Did the patient's death occur in the ED?: No  Did the patient's death occur in the OR?: No  Did the patient's death occur less than 10 days post-op?: No  Did the patient's death occur within 24 hours of admission?: No  Was code status DNR at the time of death?: Yes    PHYSICAL EXAM:  Unresponsive to noxious stimuli, Spontaneous respirations absent, Breath sounds absent, Carotid pulse absent, Heart sounds absent, Pupillary light reflex absent, and Corneal blink reflex absent    Medical Examiner notification criteria:  Patient  within 24 hours of arrival to hospital, Suspected homicide or suicide, Suspected / documented overdose, Any type of trauma, Death while in custody of police, and  within 24 hours of surgery / procedure / anesthesia   Medical Examiner's office notified?:  No, does not meet ME notification criteria   Medical Examiner accepted case?:  No  Name of Medical Examiner: N/A    Family Notification  Was the family notified?: Yes  Date Notified: 24  Time Notified: 1235  Notified by: TJ Rock  Name of Family Notified of Death: Justyna   Relationship to Patient: Spouse  Family Notification Route: At bedside  Was the family told to contact a  home?: Yes  Name of  Home:: unknown yet    Autopsy Options:  Post-mortem examination declined by next of kin    Primary Service Attending Physician notified?:  yes - Attending:  Shannon Tinoco MD    Physician/Resident responsible for completing Discharge Summary:  Cristina Jeffries  CRNP

## 2024-02-19 LAB
ATRIAL RATE: 101 BPM
P AXIS: 75 DEGREES
PR INTERVAL: 154 MS
QRS AXIS: 56 DEGREES
QRSD INTERVAL: 100 MS
QT INTERVAL: 338 MS
QTC INTERVAL: 438 MS
T WAVE AXIS: 179 DEGREES
VENTRICULAR RATE: 101 BPM

## 2024-02-19 PROCEDURE — 93010 ELECTROCARDIOGRAM REPORT: CPT | Performed by: INTERNAL MEDICINE

## 2024-02-19 NOTE — UTILIZATION REVIEW
Initial Clinical Review    Admission: Date/Time/Statement:   Admission Orders (From admission, onward)       Ordered        02/16/24 1834  INPATIENT ADMISSION  Once                          Orders Placed This Encounter   Procedures    INPATIENT ADMISSION     Standing Status:   Standing     Number of Occurrences:   1     Order Specific Question:   Level of Care     Answer:   Med Surg [16]     Order Specific Question:   Estimated length of stay     Answer:   More than 2 Midnights     Order Specific Question:   Certification     Answer:   I certify that inpatient services are medically necessary for this patient for a duration of greater than two midnights. See H&P and MD Progress Notes for additional information about the patient's course of treatment.     ED Arrival Information       Expected   2/16/2024     Arrival   2/16/2024 17:04    Acuity   Emergent              Means of arrival   Ambulance    Escorted by   [facility]   Critical Care/ICU    Admission type   Emergency              Arrival complaint   SOB (shortness of breath) on exertion             Chief Complaint   Patient presents with    Shortness of Breath     Pt brought by BLS for increasing SOB for a few days. Per report pt sat was 84% on RA, placed 4LNC sat of 95%.  Pt also has increased bilateral leg swelling and weeping, seen by podiatry today and had legs wrapped       Initial Presentation:  89-year-old male presents with shortness of breath leg swelling orthopnea for the past few days. 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 He is  currently on Xarelto and has been compliant.   Admitted to inpatient status for acute respiratory failure with hypoxia.    Date: 2/17/24   Day 2:   Acute respiratory failure 2/2 CHF. Had escalating FiO2 requirements to NRB mask on GMF and was transferred to ICU for HFNC. WOB not improved much on HFNC and was transitioned to BiPap. SpO2 improved to mid 90's and WOB mildly improved though still tachypneic and frequently coughing. Has not yet produced sputum specimen. Was started on Ceftriaxone and azithromycin to cover for possible pulmonary infection. Lactic acid was noted at 2.2 this AM. Repeat level is currently pending. Troponin continued to rise this morning; still without chest pain. Continues on heparin drip.  R sided thoracentesis done with 650cc clear yellow fluid removed.     Per cardio:  1.  Acute respiratory failure etiology may be multifactorial.  Possible causes could be evidence of decreased cardiac output due to severe ischemia due to wall motion normality and tachycardia with possibility of underlying infection in the lungs or volume overload.  2.  Elevated troponin most likely type II MI due to mismatch of supply and demand due to tachycardia there are EKG changes consistent with inferior subacute endocardial injury.  Patient is known to have history of coronary artery disease  3.  Elevated BNP.  Patient's echo shows EF dropped to 35% with moderate to severe valvular disease.  But IVC is collapsing intravascularly volume may be acceptable.  Plan. Continue supportive care. Patient is on IV heparin which will be continued for 48 hours. He is already on antibiotics. Continue aspirin, beta-blocker, Ranexa and statins. As needed diuretics. Care discussed with ICU team. Overall prognosis guarded.     Per podiatry:  Right distal hallux arterial ulcer to level of subcutaneous tissue  Multiple right leg arterial ulcers  Severe peripheral arterial disease  PLAN:  Will plan for local wound care consisting of  JULIO Quintero for enzymatic debridement  Reviewed right foot x-ray from 2023: No acute osseous destruction to the cortices to suggest osteomyelitis.  No soft tissue emphysema noted.  Reviewed lower extremity arterial duplex study from 2023: Patient does have diffuse disease throughout the femoral-popliteal arteries with 50 to 75% stenosis in the distal common iliac artery.  Severe stenosis versus total occlusion in the common femoral artery.  Severe stenosis versus total occlusion of the distal SFA.  CHU 0.29, metatarsal pressure and great toe pressure are 0 mmHg.  Reviewed vascular surgery note from 2023: Patient does have severe peripheral arterial disease, unfortunately he would require a femoral endarterectomy and most likely right lower extremity bypass, he would be high risk of complication for this procedure.  The patient understands this and given his age would not like to pursue any intervention.  Elevation and offloading on green foam wedges or pillows when non-ambulatory.      Date: 24    Day 3: Has surpassed a 2nd midnight with active treatments and services. Transitioned to comfort care & pt .      ED Triage Vitals   Temperature Pulse Respirations Blood Pressure SpO2   24 171   (!) 97.3 °F (36.3 °C) 105 (!) 26 132/82 95 %      Temp Source Heart Rate Source Patient Position - Orthostatic VS BP Location FiO2 (%)   24 17124 17124 2229   Oral Monitor Lying Left arm 95      Pain Score       24 2106       No Pain          Wt Readings from Last 1 Encounters:   24 78.9 kg (173 lb 15.1 oz)     Additional Vital Signs:  Date/Time Temp Pulse Resp BP MAP (mmHg) SpO2 FiO2 (%) Calculated FIO2 (%) - Nasal Cannula O2 Flow Rate (L/min) Nasal Cannula O2 Flow Rate (L/min) O2 Device O2 Interface Device Patient Position - Orthostatic VS   24 1100 -- 147 Abnormal  39  Abnormal  92/65 72 94 % -- -- -- -- -- -- --   02/18/24 1030 -- 110 Abnormal  37 Abnormal  -- -- 93 % 75 -- 55 L/min -- High flow nasal cannula HFNC prongs --   02/18/24 1000 -- 110 Abnormal  29 Abnormal  103/62 78 94 % -- -- -- -- -- -- --   02/18/24 0900 -- 110 Abnormal  23 Abnormal  103/66 77 92 % -- -- -- -- -- -- --   02/18/24 0818 -- -- -- -- -- 96 % -- -- -- -- -- HFNC prongs --   02/18/24 0800 -- 105 40 Abnormal  105/70 83 100 % -- -- -- -- -- -- --   02/18/24 0723 -- 102 40 Abnormal  -- -- 100 % -- -- -- -- -- Full face mask --   02/18/24 0700 98.5 °F (36.9 °C) 105 39 Abnormal  106/54 75 100 % -- -- -- -- BiPAP -- Lying   02/18/24 0600 -- 119 Abnormal  54 Abnormal  110/68 85 97 % -- -- -- -- -- -- --   02/18/24 0559 -- -- -- -- -- 96 % -- -- -- -- -- Full face mask --   02/18/24 0500 -- 117 Abnormal  42 Abnormal  113/77 91 79 % Abnormal  -- -- -- -- -- -- --   02/18/24 0400 -- 101 20 98/57 72 96 % -- -- -- -- -- -- --   02/18/24 0300 -- 105 30 Abnormal  98/62 75 93 % 95 -- 50 L/min -- High flow nasal cannula HFNC prongs --   02/18/24 0200 -- 105 25 Abnormal  87/56 Abnormal  67 94 % -- -- -- -- -- -- --   02/18/24 0117 -- -- -- -- -- 91 % 95 80 50 L/min 15 L/min High flow nasal cannula HFNC prongs --   02/18/24 0105 -- -- -- -- -- 93 % -- -- -- -- -- -- --   02/18/24 0100 -- 104 27 Abnormal  88/51 Abnormal  65 97 % -- -- -- -- -- -- --   02/18/24 0000 -- 93 20 99/57 73 95 % -- -- -- -- -- -- --   02/17/24 2300 -- 95 19 90/52 66 94 % -- -- -- -- -- -- --   02/17/24 2231 -- -- -- -- -- 96 % 95 80 50 L/min 15 L/min High flow nasal cannula HFNC prongs --   02/17/24 2200 -- 99 27 Abnormal  108/64 78 96 % -- -- -- -- -- -- --   02/17/24 2100 -- 97 49 Abnormal  94/64 74 98 % -- -- -- -- -- -- --   02/17/24 2047 -- -- -- -- -- 93 % 55 -- -- -- BiPAP Full face mask --   02/17/24 2006 -- 106 Abnormal  -- 107/74 -- -- -- -- -- -- -- -- --   02/17/24 2000 -- 110 Abnormal  34 Abnormal  107/74 87 86 % Abnormal  -- -- 15  L/min -- Mid flow nasal cannula -- --   02/17/24 1955 97.1 °F (36.2 °C) Abnormal  -- -- -- -- -- -- -- -- -- -- -- --   02/17/24 1937 -- -- -- -- -- 94 % -- -- -- -- -- -- --   02/17/24 1925 -- -- -- -- -- 91 % -- 80 -- 15 L/min Mid flow nasal cannula -- --   02/17/24 1910 -- 105 27 Abnormal  102/58 68 87 % Abnormal  -- -- -- -- -- -- --   02/17/24 1822 -- 112 Abnormal  47 Abnormal  123/71 91 92 % -- -- -- -- -- -- --   02/17/24 1705 -- -- -- -- -- -- -- 60 10 L/min 10 L/min Mid flow nasal cannula -- --   02/17/24 1700 -- 103 42 Abnormal  -- -- 91 % -- -- -- -- -- -- --   02/17/24 1601 97.1 °F (36.2 °C) Abnormal  -- -- -- -- -- -- -- -- -- -- -- --   02/17/24 1600 -- 88 30 Abnormal  86/55 Abnormal  65 93 % -- -- -- -- -- -- --   02/17/24 1500 -- 109 Abnormal  37 Abnormal  -- -- 91 % -- -- -- -- -- -- --   02/17/24 1431 -- 112 Abnormal  -- 101/58 -- -- -- -- -- -- -- -- --   02/17/24 1400 -- 107 Abnormal  36 Abnormal  101/58 76 93 % -- -- -- -- -- -- --   02/17/24 1300 -- 111 Abnormal  26 Abnormal  -- -- 87 % Abnormal  -- -- -- -- -- -- --   02/17/24 1200 -- 108 Abnormal  25 Abnormal  92/72 78 92 % -- -- -- -- -- -- --   02/17/24 1149 -- -- -- -- -- -- 60 200 45 L/min 45 L/min High flow nasal cannula -- --   02/17/24 1100 -- 98 30 Abnormal  100/59 77 98 % -- -- -- -- -- -- --   02/17/24 1000 -- 108 Abnormal  24 Abnormal  104/68 82 97 % -- -- -- -- -- -- --   02/17/24 0921 -- 113 Abnormal  -- 107/63 -- -- -- -- -- -- -- -- --   02/17/24 0900 -- 116 Abnormal  20 102/68 81 96 % -- -- -- -- -- -- --   02/17/24 0800 97.6 °F (36.4 °C) 108 Abnormal  28 Abnormal  101/59 73 95 % -- -- -- -- High flow nasal cannula -- --   02/17/24 0736 -- -- -- -- -- -- 95 240 55 L/min 55 L/min High flow nasal cannula -- --   02/17/24 0700 -- 101 23 Abnormal  102/63 77 98 % -- -- -- -- -- -- --   02/17/24 0644 -- -- -- -- -- -- -- -- -- -- -- HFNC prongs --   02/17/24 0600 -- 103 28 Abnormal  103/57 73 94 % -- -- -- -- -- -- --   02/17/24  0500 -- 108 Abnormal  29 Abnormal  95/59 72 95 % -- -- -- -- -- -- --   02/17/24 0400 -- 108 Abnormal  33 Abnormal  104/61 78 96 % -- -- -- -- -- -- --   02/17/24 0300 -- 104 32 Abnormal  97/63 76 96 % -- -- -- -- -- Full face mask --   02/17/24 0250 -- -- -- -- -- 97 % -- -- -- -- -- -- --   02/17/24 0200 -- 101 31 Abnormal  96/65 77 97 % -- -- -- -- -- -- --   02/17/24 0100 -- 96 32 Abnormal  92/52 67 96 % -- -- -- -- -- -- --   02/17/24 0044 -- -- -- -- -- -- -- -- -- -- -- Full face mask --   02/17/24 0000 97.9 °F (36.6 °C) 98 45 Abnormal  92/58 71 95 % -- -- -- -- -- -- --   02/16/24 2319 -- -- -- -- -- 100 % -- -- -- -- BiPAP Full face mask --   02/16/24 2300 97.9 °F (36.6 °C) 110 Abnormal  39 Abnormal  134/78 100 95 % -- -- -- -- High flow nasal cannula -- Lying   02/16/24 2229 -- -- -- -- -- 90 % 95 240 -- 55 L/min High flow nasal cannula Full face mask --   02/16/24 2200 -- 112 Abnormal  -- 140/95 110 90 % -- -- -- -- -- -- --   02/16/24 21:58:13 -- 113 Abnormal  -- -- -- 85 % Abnormal  -- -- -- -- -- -- --   02/16/24 2113 -- -- 25 Abnormal  -- -- 91 % -- 80 -- 15 L/min Mid flow nasal cannula -- --   02/16/24 20:49:38 -- 110 Abnormal  -- 147/99 115 90 % -- -- -- -- -- -- --   02/16/24 20:47:16 -- 109 Abnormal  -- -- -- 89 % Abnormal  -- -- -- -- -- -- --   02/16/24 2043 -- 113 Abnormal  -- -- -- 84 % Abnormal  -- -- -- -- -- -- --   02/16/24 20:34:24 97.6 °F (36.4 °C) 115 Abnormal  19 134/94 107 84 % Abnormal  -- -- -- -- -- -- --   02/16/24 1945 -- 103 21 113/70 86 93 % -- 36 -- 4 L/min Nasal cannula -- --   02/16/24 1845 -- 100 23 Abnormal  118/77 91 91 % -- 36 -- 4 L/min Nasal cannula -- --   02/16/24 1800 -- 102 21 124/71 92 92 % -- -- -- -- -- -- --   02/16/24 1717 97.3 °F (36.3 °C) Abnormal  105 26 Abnormal  132/82 -- 95 % -- 36 -- 4 L/min Nasal cannula -- Lying     Pertinent Labs/Diagnostic Test Results:   XR chest portable ICU   Final Result  (02/18 0824)      Persistent extensive bilateral  consolidation due to pulmonary edema.      Trace effusions with no pneumothorax.            Workstation performed: [id number]         CT chest wo contrast   Final Result (02/17 0111)      Constellation of findings above overall suggesting pulmonary interstitial edema in the setting of acute CHF/fluid overload (in conjunction with patient's BNP level). Superimposed infection difficult to exclude. Recommend clinical correlation. Consider    short-term interval follow-up after treatment.      XR chest 1 view portable   Final Result  (02/17 1420)      Borderline cardiomegaly.      Mild pulmonary edema.      Small pleural effusions.      Right upper lung small infiltrates. Larger left perihilar infiltrate     Results from last 7 days   Lab Units 02/16/24  2148   SARS-COV-2  Negative     Results from last 7 days   Lab Units 02/17/24  0349 02/16/24  1735   WBC Thousand/uL 15.43* 9.07   HEMOGLOBIN g/dL 10.5* 11.1*   HEMATOCRIT % 31.3* 33.1*   PLATELETS Thousands/uL 336 374   NEUTROS ABS Thousands/µL  --  7.59         Results from last 7 days   Lab Units 02/17/24  0349 02/16/24  1735   SODIUM mmol/L 139 137   POTASSIUM mmol/L 3.7 4.6   CHLORIDE mmol/L 102 103   CO2 mmol/L 24 25   ANION GAP mmol/L 13 9   BUN mg/dL 49* 48*   CREATININE mg/dL 1.33* 1.37*   EGFR ml/min/1.73sq m 45 44   CALCIUM mg/dL 8.5 9.3   MAGNESIUM mg/dL 1.8* 2.0     Results from last 7 days   Lab Units 02/17/24  0349 02/16/24  1735   AST U/L  --  33   ALT U/L  --  24   ALK PHOS U/L  --  62   TOTAL PROTEIN g/dL 6.4 7.3   ALBUMIN g/dL  --  3.9   TOTAL BILIRUBIN mg/dL  --  0.57     Results from last 7 days   Lab Units 02/17/24  0349 02/16/24  1735   GLUCOSE RANDOM mg/dL 147* 136     Results from last 7 days   Lab Units 02/16/24  2118   PH ART  7.425   PCO2 ART mm Hg 35.0*   PO2 ART mm Hg 60.7*   HCO3 ART mmol/L 22.4   BASE EXC ART mmol/L -1.4   O2 CONTENT ART mL/dL 15.1*   O2 HGB, ARTERIAL % 89.3*   ABG SOURCE  Radial, Left     Results from last 7 days   Lab  Units 02/16/24  2148 02/16/24  1944 02/16/24  1735   HS TNI 0HR ng/L  --   --  1,174*   HS TNI 2HR ng/L  --  1,334*  --    HSTNI D2 ng/L  --  160*  --    HS TNI 4HR ng/L 1,420*  --   --    HSTNI D4 ng/L 246*  --   --          Results from last 7 days   Lab Units 02/18/24  0043 02/17/24  1820 02/17/24  1233 02/17/24  0038 02/16/24  1839   PROTIME seconds  --   --   --   --  19.2*   INR   --   --   --   --  1.60*   PTT seconds 71* 44* 94*   < > 45*    < > = values in this interval not displayed.     Results from last 7 days   Lab Units 02/17/24  0349 02/16/24  1735   PROCALCITONIN ng/ml 0.24 0.07     Results from last 7 days   Lab Units 02/17/24  1820 02/17/24  0634 02/17/24  0349   LACTIC ACID mmol/L 2.7* 2.5* 2.2*     Results from last 7 days   Lab Units 02/16/24  1735   BNP pg/mL 1,274*     Results from last 7 days   Lab Units 02/16/24  2148   CLARITY UA  Clear   COLOR UA  Light Yellow   SPEC GRAV UA  1.015   PH UA  5.5   GLUCOSE UA mg/dl Negative   KETONES UA mg/dl Negative   BLOOD UA  Negative   PROTEIN UA mg/dl Negative   NITRITE UA  Negative   BILIRUBIN UA  Negative   UROBILINOGEN UA E.U./dl 0.2   LEUKOCYTES UA  Negative     Results from last 7 days   Lab Units 02/16/24  2148   STREP PNEUMONIAE ANTIGEN, URINE  Negative   LEGIONELLA URINARY ANTIGEN  Negative   INFLUENZA A PCR  Negative   INFLUENZA B PCR  Negative   RSV PCR  Negative     Results from last 7 days   Lab Units 02/17/24  1700 02/17/24  0033   BLOOD CULTURE   --  No Growth at 24 hrs.  No Growth at 24 hrs.   GRAM STAIN RESULT  1+ Polys  No organisms seen  --    BODY FLUID CULTURE, STERILE  No growth  --      Results from last 7 days   Lab Units 02/17/24  1657   TOTAL COUNTED  100   WBC FLUID /ul 214       ED Treatment:   Medication Administration from 02/16/2024 1607 to 02/16/2024 2025         Date/Time Order Dose Route Action Action by Comments     02/16/2024 1830 EST furosemide (LASIX) injection 40 mg 40 mg Intravenous Given Shaunna Ahn, RN  --     02/16/2024 1831 EST heparin (porcine) injection 4,000 Units 4,000 Units Intravenous Given Shaunna Ahn RN --     02/16/2024 1833 EST heparin (porcine) 25,000 units in 0.45% NaCl 250 mL infusion (premix) 12 Units/kg/hr Intravenous New Bag Shaunna Ahn RN --          Past Medical History:   Diagnosis Date    Asymptomatic PVCs     last assessed 1/18/18    BPH (benign prostatic hyperplasia)     last assessed 4/11/17     High cholesterol     last assessed 4/11/17     Hypertension     uncontrolled, last assessed 4/11/17    PAD (peripheral artery disease) ([facility]) 11/21/2023     Present on Admission:   Venous stasis ulcer of left lower extremity ([facility])   Hyperlipidemia   Gastroesophageal reflux disease   Essential hypertension   Coronary artery disease of native artery of native heart with stable angina pectoris ([facility])   BPH (benign prostatic hyperplasia)   Atherosclerosis of native arteries of extremities with rest pain, right leg ([facility])   Acute respiratory failure with hypoxia ([facility])   Elevated troponin   GASPER (acute kidney injury) ([facility])   Acute on chronic combined systolic and diastolic heart failure ([facility])   Chronic ulcer of right lower extremity ([facility])   SIRS (systemic inflammatory response syndrome) ([facility])   Pleural effusion, bilateral      Admitting Diagnosis: CH[facility]congestive heart failure) ([facility]) [I50.9]  Dyspnea [R06.00]  Elevated troponin [R79.89]  NSTEMI (non-ST elevated myocardial infarction) ([facility]) [I21.4]  Shortness of breath [R06.02]  Age/Sex: 89 y.o. male  Admission Orders:  Scheduled Medications:  Medications 02/09 02/10 02/11 02/12 02/13 02/14 02/15 02/16 02/17 02/18   aspirin (ECOTRIN LOW STRENGTH) EC tablet 81 mg  Dose: 81 mg  Freq: Daily Route: PO  Start: 02/17/24 0900 End: 02/18/24 1053   Admin Instructions:              2236     2238      0916      1053-D/C'd  (1109)        atorvastatin (LIPITOR) tablet 40 mg  Dose: 40 mg  Freq: Daily with dinner Route: PO  Start: 02/16/24 2130 End: 02/18/24  1053           2123     2236     2238      1724      1053-D/C'd      azithromycin (ZITHROMAX) 500 mg in sodium chloride 0.9 % 250 mL IVPB  Dose: 500 mg  Freq: Every 24 hours Route: IV  Last Dose: Stopped (02/17/24 2258)  Start: 02/16/24 2230 End: 02/18/24 0503   Admin Instructions:      Order specific questions:              2236 2238     2342      0042     2158 2258      0503-D/C'd      calcium carbonate-vitamin D 500 mg-5 mcg tablet 1 tablet  Dose: 1 tablet  Freq: Daily with breakfast Route: PO  Start: 02/17/24 0730 End: 02/18/24 1053   Admin Instructions:              2236 2238      0829      (0710)     1053-D/C'd      cefTRIAXone (ROCEPHIN) IVPB (premix in dextrose) 1,000 mg 50 mL  Dose: 1,000 mg  Freq: Every 24 hours Route: IV  Last Dose: Stopped (02/17/24 2201)  Start: 02/16/24 2230 End: 02/18/24 1053   Admin Instructions:      Order specific questions:              2236 2238     2304     2334      2131 2201      1053-D/C'd      cholecalciferol (VITAMIN D3) tablet 2,000 Units  Dose: 2,000 Units  Freq: Daily Route: PO  Start: 02/17/24 0900 End: 02/18/24 1053   Admin Instructions:              2236 2238      0916      1053-D/C'd  (1109)        collagenase (SANTYL) ointment  Freq: Daily Route: TP  Start: 02/17/24 0900 End: 02/18/24 1058   Admin Instructions:      Order specific questions:              2236 2238 0917      1058-D/C'd  (1109)        Famotidine (PF) (PEPCID) injection 20 mg  Dose: 20 mg  Freq: Daily at bedtime Route: IV  Start: 02/16/24 2200 End: 02/18/24 1053   Admin Instructions:              2123 2236 2238      2131      1053-D/C'd      finasteride (PROSCAR) tablet 5 mg  Dose: 5 mg  Freq: Daily at bedtime Route: PO  Start: 02/16/24 2200 End: 02/18/24 1053   Admin Instructions:              2123 2236 2238 2131      1053-D/C'd      furosemide (LASIX) injection 40 mg  Dose: 40 mg  Freq: Once Route: IV  Start: 02/18/24 0530 End: 02/18/24 0541    Admin Instructions:      Order specific questions:                0541        furosemide (LASIX) injection 40 mg  Dose: 40 mg  Freq: Once Route: IV  Start: 02/16/24 2115 End: 02/16/24 2123   Admin Instructions:      Order specific questions:              2123          furosemide (LASIX) injection 40 mg  Dose: 40 mg  Freq: Daily Route: IV  Start: 02/17/24 0900 End: 02/18/24 1053   Admin Instructions:      Order specific questions:              2236 2238 0916      1053-D/C'd  (1110)        furosemide (LASIX) injection 40 mg  Dose: 40 mg  Freq: Once Route: IV  Start: 02/16/24 1830 End: 02/16/24 1830   Admin Instructions:      Order specific questions:              1830          guaiFENesin (MUCINEX) 12 hr tablet 600 mg  Dose: 600 mg  Freq: Every 12 hours scheduled Route: PO  Start: 02/16/24 2300 End: 02/18/24 1058   Admin Instructions:              2304 0916 2006      1058-D/C'd  (1110)        heparin (ACS LOW)  Freq: Once Route: IV  Start: 02/17/24 1800 End: 02/17/24 1704   Admin Instructions:      Order specific questions:               1704-D/C'd       heparin (ACS LOW)  Freq: Once Route: IV  Start: 02/16/24 1830 End: 02/16/24 1819   Admin Instructions:      Order specific questions:              1819-D/C'd        heparin (porcine) injection 4,000 Units  Dose: 4,000 Units  Freq: Once Route: IV  Start: 02/16/24 1830 End: 02/16/24 1831   Admin Instructions:              1831           ipratropium-albuterol (DUO-NEB) 0.5-2.5 mg/3 mL inhalation solution 3 mL  Dose: 3 mL  Freq: Every 6 hours (RESP) Route: NEBULIZATION  Start: 02/17/24 0200 End: 02/18/24 1053            0256     0735     1352     1925      0105     0723     1053-D/C'd      And  sodium chloride 3 % inhalation solution 4 mL  Dose: 4 mL  Freq: Every 6 hours (RESP) Route: NEBULIZATION  Start: 02/17/24 0200 End: 02/18/24 1053   Admin Instructions:               0250     0735     1352     1936      0116     0723     1053-D/C'd       ipratropium-albuterol (DUO-NEB) 0.5-2.5 mg/3 mL inhalation solution 3 mL  Dose: 3 mL  Freq: Once Route: NEBULIZATION  Start: 02/16/24 2130 End: 02/16/24 2109 2109          lidocaine (XYLOCAINE) 4 % topical solution 5 mL  Dose: 5 mL  Freq: Once Route: TP  Start: 02/08/24 1430 End: 02/08/24 1429   Admin Instructions:                   lisinopril (ZESTRIL) tablet 5 mg  Dose: 5 mg  Freq: Daily after dinner Route: PO  Start: 02/17/24 1800 End: 02/17/24 0026   Admin Instructions:      Order specific questions:              2236     2238      0026-D/C'd       magnesium sulfate 2 g/50 mL IVPB (premix) 2 g  Dose: 2 g  Freq: Once Route: IV  Last Dose: Stopped (02/17/24 0656)  Start: 02/17/24 0430 End: 02/17/24 0656   Admin Instructions:               0456     0656         metoprolol tartrate (LOPRESSOR) tablet 25 mg  Dose: 25 mg  Freq: Once Route: PO  Start: 02/17/24 1430 End: 02/17/24 1431   Admin Instructions:      Order specific questions:               1431 [C]         metoprolol tartrate (LOPRESSOR) tablet 25 mg  Dose: 25 mg  Freq: Every 12 hours scheduled Route: PO  Start: 02/16/24 2130 End: 02/18/24 1053   Admin Instructions:      Order specific questions:              2124     2236     2238      0928)     (2006)      1053-D/C'd  (1110)        morphine injection 2 mg  Dose: 2 mg  Freq: Once Route: IV  Start: 02/18/24 0530 End: 02/18/24 0526   Admin Instructions:                0526        multivitamin-minerals (CENTRUM) tablet 1 tablet  Dose: 1 tablet  Freq: Daily Route: PO  Start: 02/17/24 0900 End: 02/18/24 1053   Admin Instructions:              2236     2238      0916      1053-D/C'd  (1110)        potassium chloride (Klor-Con M20) CR tablet 40 mEq  Dose: 40 mEq  Freq: Once Route: PO  Start: 02/17/24 0645 End: 02/17/24 0717   Admin Instructions:               0717-D/C'd  (0736)         potassium chloride oral solution 40 mEq  Dose: 40 mEq  Freq: Once Route: PO  Start: 02/17/24 0730 End: 02/17/24 0821    Admin Instructions:               0829         ranolazine (RANEXA) 12 hr tablet 500 mg  Dose: 500 mg  Freq: Every 12 hours scheduled Route: PO  Start: 02/16/24 2130 End: 02/18/24 1053   Admin Instructions:              2124     2236     2238      0916     2006      1053-D/C'd  (1110)        Legend:       [id number]/1102/1202/1302/1402/1502/1602/1702/18        Continuous Meds Sorted by Name  for John Smallwood as of 02/09/24 through 2/18/24  Legend:       Medications 02/09 02/10 02/11 02/12 02/13 02/14 02/15 02/16 02/17 02/18   heparin (porcine) 25,000 units in 0.45% NaCl 250 mL infusion (premix)  Rate: 2.4-16 mL/hr Dose: 3-20 Units/kg/hr  Weight Dosing Info: 80 kg (Order-Specific)  Freq: Titrated Route: IV  Last Dose: Stopped (02/18/24 1118)  Start: 02/17/24 1715 End: 02/18/24 1053   Admin Instructions:      Order specific questions:                    1844     1901      0045     1053-D/C'd  1118        heparin (porcine) 25,000 units in 0.45% NaCl 250 mL infusion (premix)  Rate: 2.4-16 mL/hr Dose: 3-20 Units/kg/hr  Weight Dosing Info: 80 kg (Order-Specific)  Freq: Titrated Route: IV  Last Dose: Stopped (02/17/24 1415)  Start: 02/17/24 1345 End: 02/17/24 1506   Admin Instructions:      Order specific questions:                    1353     1415 [C]     1506-D/C'd       heparin (porcine) 25,000 units in 0.45% NaCl 250 mL infusion (premix)  Rate: 2.4-16 mL/hr Dose: 3-20 Units/kg/hr  Weight Dosing Info: 80 kg (Order-Specific)  Freq: Titrated Route: IV  Last Dose: 8 Units/kg/hr (02/17/24 1321)  Start: 02/16/24 1830 End: 02/17/24 1330   Admin Instructions:      Order specific questions:              1833      0115     1320     1321     1330-D/C'd       morphine 250 mg in sodium chloride 0.9% 50mL drip  Rate: 0.4 mL/hr Dose: 2 mg/hr  Freq: Continuous Route: IV  Last Dose: Stopped (02/18/24 1235)  Start: 02/18/24 1100 End: 02/18/24 1805   Admin Instructions:                1125     1235     1805-D/C'd       Legend:       [id number]/0902/1002/1102/1202/1302/1402/1502/1602/1702/18        PRN Meds Sorted by Name  for John Smallwood as of 02/09/24 through 2/18/24  Legend:       Medications 02/09 02/10 02/11 02/12 02/13 02/14 02/15 02/16 02/17 02/18   acetaminophen (TYLENOL) tablet 650 mg  Dose: 650 mg  Freq: Every 6 hours PRN Route: PO  PRN Reasons: mild pain,headaches,fever  Start: 02/16/24 2048 End: 02/18/24 1058           2236     2238       1058-D/C'd      benzonatate (TESSALON PERLES) capsule 100 mg  Dose: 100 mg  Freq: 3 times daily PRN Route: PO  PRN Reason: cough  Start: 02/17/24 0012 End: 02/18/24 1058   Admin Instructions:               2005      1058-D/C'd      heparin (porcine) injection 2,000 Units  Dose: 2,000 Units  Freq: Every 6 hours PRN Route: IV  PRN Comment: PTT 43- 59 seconds  Start: 02/17/24 1705 End: 02/18/24 1053   Admin Instructions:                1053-D/C'd      heparin (porcine) injection 2,000 Units  Dose: 2,000 Units  Freq: Every 6 hours PRN Route: IV  PRN Comment: PTT 43 - 59 seconds  Start: 02/16/24 1820 End: 02/17/24 1506   Admin Instructions:              2236     2238      1506-D/C'd       heparin (porcine) injection 4,000 Units  Dose: 4,000 Units  Freq: Every 6 hours PRN Route: IV  PRN Comment: PTT less than or equal to 42 seconds  Start: 02/17/24 1705 End: 02/18/24 1053   Admin Instructions:                1053-D/C'd      heparin (porcine) injection 4,000 Units  Dose: 4,000 Units  Freq: Every 6 hours PRN Route: IV  PRN Comment: PTT less than or equal to 42 seconds  Start: 02/16/24 1820 End: 02/17/24 1506   Admin Instructions:              2236     2238      1506-D/C'd       LORazepam (ATIVAN) injection 1 mg  Dose: 1 mg  Freq: Every 10 minutes PRN Route: IV  PRN Comment: seizure activity  Start: 02/18/24 1057 End: 02/18/24 1805   Admin Instructions:                1805-D/C'd      melatonin tablet 6 mg  Dose: 6 mg  Freq: Daily at bedtime PRN Route: PO  PRN Reason:  insomnia  Start: 02/17/24 1933 End: 02/18/24 1058 2037      1058-D/C'd      morphine injection 2 mg  Dose: 2 mg  Freq: Every 1 hour PRN Route: IV  PRN Reason: severe pain  PRN Comment: Pain, dyspnea, air hunger  Start: 02/18/24 1057 End: 02/18/24 1805   Admin Instructions:                1159     1805-D/C'd      oxyCODONE (ROXICODONE) split tablet 2.5 mg  Dose: 2.5 mg  Freq: Every 6 hours PRN Route: PO  PRN Reasons: moderate pain,severe pain  Start: 02/16/24 2048 End: 02/18/24 1058   Admin Instructions:              2236     2238      2036      1058-D/C'd          Network Utilization Review Department  ATTENTION: Please call with any questions or concerns to [phone] and carefully listen to the prompts so that you are directed to the right person. All voicemails are confidential.   For Discharge needs, contact Care Management DC Support Team at [phone] opt. 2  Send all requests for admission clinical reviews, approved or denied determinations and any other requests to dedicated fax number below belonging to the campus where the patient is receiving treatment. List of dedicated fax numbers for the Facilities:  FACILITY NAME UR FAX NUMBER   ADMISSION DENIALS (Administrative/Medical Necessity) [phone]   DISCHARGE SUPPORT TEAM (NETWORK) [phone]   PARENT CHILD HEALTH (Maternity/NICU/Pediatrics) [phone]   [facility] [phone]   [facility] [phone]   [facility] [phone]   [facility] [phone]   [facility] [phone]   [facility] [phone]   [facility] [phone]   [facility] [phone]   [facility] [phone]   [facility] [phone]   [facility] [phone]    [facility] [phone]

## 2024-02-19 NOTE — UTILIZATION REVIEW
NOTIFICATION OF ADMISSION DISCHARGE   This is a Notification of Discharge from [facility]. Please be advised that this patient has been discharge from our facility. Below you will find the admission and discharge date and time including the patient’s disposition.   UTILIZATION REVIEW CONTACT:  Mariela Barboza  Utilization   Network Utilization Review Department  Phone: [phone] x carefully listen to the prompts. All voicemails are confidential.  Email: [email or web address]     ADMISSION INFORMATION  PRESENTATION DATE: 2024  5:08 PM  OBERVATION ADMISSION DATE:   INPATIENT ADMISSION DATE: 24  6:34 PM   DISCHARGE DATE: 2024  4:04 PM   DISPOSITION:    Network Utilization Review Department  ATTENTION: Please call with any questions or concerns to [phone] and carefully listen to the prompts so that you are directed to the right person. All voicemails are confidential.   For Discharge needs, contact Care Management DC Support Team at [phone] opt. 2  Send all requests for admission clinical reviews, approved or denied determinations and any other requests to dedicated fax number below belonging to the campus where the patient is receiving treatment. List of dedicated fax numbers for the Facilities:  FACILITY NAME UR FAX NUMBER   ADMISSION DENIALS (Administrative/Medical Necessity) [phone]   DISCHARGE SUPPORT TEAM ([facility]) [phone]   PARENT CHILD HEALTH (Maternity/NICU/Pediatrics) [phone]   [facility] [phone]   [facility] [phone]   [facility] [phone]   [facility] [phone]   [facility] [phone]   [facility] [phone]   [facility] [phone]   [facility] [phone]   [facility]  [facility] [phone]   [facility] [phone]   [facility] [phone]   [facility] [phone]

## 2024-02-21 LAB
BACTERIA SPEC BFLD CULT: NO GROWTH
GRAM STN SPEC: NORMAL
GRAM STN SPEC: NORMAL

## 2024-02-21 NOTE — UTILIZATION REVIEW
NOTIFICATION OF ADMISSION DISCHARGE   This is a Notification of Discharge from [facility]. Please be advised that this patient has been discharge from our facility. Below you will find the admission and discharge date and time including the patient’s disposition.   UTILIZATION REVIEW CONTACT:  Mariela Barboza  Utilization   Network Utilization Review Department  Phone: [phone] x carefully listen to the prompts. All voicemails are confidential.  Email: [email or web address]     ADMISSION INFORMATION  PRESENTATION DATE: 2024  5:08 PM  OBERVATION ADMISSION DATE:   INPATIENT ADMISSION DATE: 24  6:34 PM   DISCHARGE DATE: 2024  4:04 PM   DISPOSITION:    Network Utilization Review Department  ATTENTION: Please call with any questions or concerns to [phone] and carefully listen to the prompts so that you are directed to the right person. All voicemails are confidential.   For Discharge needs, contact Care Management DC Support Team at [phone] opt. 2  Send all requests for admission clinical reviews, approved or denied determinations and any other requests to dedicated fax number below belonging to the campus where the patient is receiving treatment. List of dedicated fax numbers for the Facilities:  FACILITY NAME UR FAX NUMBER   ADMISSION DENIALS (Administrative/Medical Necessity) [phone]   DISCHARGE SUPPORT TEAM ([facility]) [phone]   PARENT CHILD HEALTH (Maternity/NICU/Pediatrics) [phone]   [facility] [phone]   [facility] [phone]   [facility] [phone]   [facility] [phone]   [facility] [phone]   [facility] [phone]   [facility] [phone]   [facility] [phone]   [facility]  [facility] [phone]   [facility] [phone]   [facility] [phone]   [facility] [phone]

## 2024-02-22 LAB
BACTERIA BLD CULT: NORMAL
BACTERIA BLD CULT: NORMAL

## (undated) DEVICE — OR2 STERILE LABELS: Brand: CENTURION

## (undated) DEVICE — CATH DIAG 5FR IMPULSE 100CM FR4

## (undated) DEVICE — DRAPE LAPAROTOMY W/POUCHES

## (undated) DEVICE — NEEDLE PERCUTANEOUS 21G X 4CM

## (undated) DEVICE — SUPER SPONGES,MEDIUM: Brand: KERLIX

## (undated) DEVICE — 3M™ TEGADERM™ TRANSPARENT FILM DRESSING FRAME STYLE, 1626W, 4 IN X 4-3/4 IN (10 CM X 12 CM), 50/CT 4CT/CASE: Brand: 3M™ TEGADERM™

## (undated) DEVICE — BASIC DOUBLE BASIN 2-LF: Brand: MEDLINE INDUSTRIES, INC.

## (undated) DEVICE — RADIFOCUS OPTITORQUE ANGIOGRAPHIC CATHETER: Brand: OPTITORQUE

## (undated) DEVICE — BETADINE SURGICAL SCRUB 32OZ

## (undated) DEVICE — GROUNDING PAD UNIVERSAL SLW

## (undated) DEVICE — SUT SILK 3-0 30 IN SA84H

## (undated) DEVICE — CATH DIAG 5FR IMPULSE 100CM FL3.5

## (undated) DEVICE — ASTOUND STANDARD SURGICAL GOWN, XL: Brand: CONVERTORS

## (undated) DEVICE — STANDARD SURGICAL GOWN, L: Brand: CONVERTORS

## (undated) DEVICE — DGW .035 FC J3MM 260CM TEF: Brand: EMERALD

## (undated) DEVICE — 3M™ STERI-STRIP™ REINFORCED ADHESIVE SKIN CLOSURES, R1546, 1/4 IN X 4 IN (6 MM X 100 MM), 10 STRIPS/ENVELOPE: Brand: 3M™ STERI-STRIP™

## (undated) DEVICE — GLIDESHEATH SLENDER STAINLESS STEEL KIT: Brand: GLIDESHEATH SLENDER

## (undated) DEVICE — SUT CHROMIC 2-0 18 IN SG13T

## (undated) DEVICE — LABEL STERILE (2- POVIDONE IODINE PAINT 2 -POVIDONE IODINE SCRUB 2- CHLOHEXIDINE MOUTHWASH 4 -BLANK MED/SOL

## (undated) DEVICE — GLOVE INDICATOR PI UNDERGLOVE SZ 8.5 BLUE

## (undated) DEVICE — GLOVE SRG BIOGEL 8

## (undated) DEVICE — ALL PURPOSE SPONGES,NON-WOVEN, 4 PLY: Brand: CURITY

## (undated) DEVICE — POV-IOD SOLUTION 4OZ BT

## (undated) DEVICE — PACK CUSTOM C V DRAPE SCV11CDSLA

## (undated) DEVICE — INTENDED FOR TISSUE SEPARATION, AND OTHER PROCEDURES THAT REQUIRE A SHARP SURGICAL BLADE TO PUNCTURE OR CUT.: Brand: BARD-PARKER ® CARBON RIB-BACK BLADES

## (undated) DEVICE — MANIFOLD KIT NETWORK - CCL

## (undated) DEVICE — Device: Brand: ASAHI SILVERWAY

## (undated) DEVICE — CATH DIAG 5FR IMPULSE 100CM FL4

## (undated) DEVICE — TR BAND RADIAL ARTERY COMPRESSION DEVICE: Brand: TR BAND

## (undated) DEVICE — PACK GENERAL LF

## (undated) DEVICE — SYRINGE 10ML LL CONTROL TOP

## (undated) DEVICE — ADHESIVE SKN CLSR HISTOACRYL FLEX 0.5ML LF

## (undated) DEVICE — NEEDLE 25G X 1 1/2